# Patient Record
Sex: MALE | Race: WHITE | NOT HISPANIC OR LATINO | Employment: PART TIME | URBAN - METROPOLITAN AREA
[De-identification: names, ages, dates, MRNs, and addresses within clinical notes are randomized per-mention and may not be internally consistent; named-entity substitution may affect disease eponyms.]

---

## 2017-01-04 ENCOUNTER — ANESTHESIA EVENT (OUTPATIENT)
Dept: SURGERY | Facility: CLINIC | Age: 47
DRG: 027 | End: 2017-01-04
Payer: COMMERCIAL

## 2017-01-05 ENCOUNTER — OFFICE VISIT (OUTPATIENT)
Dept: SURGERY | Facility: CLINIC | Age: 47
End: 2017-01-05

## 2017-01-05 ENCOUNTER — ALLIED HEALTH/NURSE VISIT (OUTPATIENT)
Dept: SURGERY | Facility: CLINIC | Age: 47
End: 2017-01-05

## 2017-01-05 VITALS
BODY MASS INDEX: 27.63 KG/M2 | OXYGEN SATURATION: 93 % | RESPIRATION RATE: 16 BRPM | WEIGHT: 208.5 LBS | SYSTOLIC BLOOD PRESSURE: 133 MMHG | HEART RATE: 77 BPM | HEIGHT: 73 IN | TEMPERATURE: 97.6 F | DIASTOLIC BLOOD PRESSURE: 90 MMHG

## 2017-01-05 DIAGNOSIS — G24.1 DYSTONIA DUE TO DYT-1 GENE MUTATION: ICD-10-CM

## 2017-01-05 DIAGNOSIS — G24.1 DYSTONIA DUE TO DYT-1 GENE MUTATION: Primary | ICD-10-CM

## 2017-01-05 LAB
ALBUMIN UR-MCNC: NEGATIVE MG/DL
ANION GAP SERPL CALCULATED.3IONS-SCNC: 6 MMOL/L (ref 3–14)
APPEARANCE UR: ABNORMAL
APTT PPP: 27 SEC (ref 22–37)
BILIRUB UR QL STRIP: NEGATIVE
BUN SERPL-MCNC: 19 MG/DL (ref 7–30)
CALCIUM SERPL-MCNC: 9 MG/DL (ref 8.5–10.1)
CHLORIDE SERPL-SCNC: 106 MMOL/L (ref 94–109)
CO2 SERPL-SCNC: 29 MMOL/L (ref 20–32)
COLOR UR AUTO: YELLOW
CREAT SERPL-MCNC: 1.16 MG/DL (ref 0.66–1.25)
ERYTHROCYTE [DISTWIDTH] IN BLOOD BY AUTOMATED COUNT: 12.6 % (ref 10–15)
GFR SERPL CREATININE-BSD FRML MDRD: 68 ML/MIN/1.7M2
GLUCOSE SERPL-MCNC: 99 MG/DL (ref 70–99)
GLUCOSE UR STRIP-MCNC: NEGATIVE MG/DL
HCT VFR BLD AUTO: 51.7 % (ref 40–53)
HGB BLD-MCNC: 17.6 G/DL (ref 13.3–17.7)
HGB UR QL STRIP: NEGATIVE
INR PPP: 0.92 (ref 0.86–1.14)
KETONES UR STRIP-MCNC: 5 MG/DL
LEUKOCYTE ESTERASE UR QL STRIP: NEGATIVE
MCH RBC QN AUTO: 29.2 PG (ref 26.5–33)
MCHC RBC AUTO-ENTMCNC: 34 G/DL (ref 31.5–36.5)
MCV RBC AUTO: 86 FL (ref 78–100)
MUCOUS THREADS #/AREA URNS LPF: PRESENT /LPF
NITRATE UR QL: NEGATIVE
PH UR STRIP: 5 PH (ref 5–7)
PLATELET # BLD AUTO: 207 10E9/L (ref 150–450)
POTASSIUM SERPL-SCNC: 4.5 MMOL/L (ref 3.4–5.3)
RBC # BLD AUTO: 6.03 10E12/L (ref 4.4–5.9)
RBC #/AREA URNS AUTO: 1 /HPF (ref 0–2)
SODIUM SERPL-SCNC: 142 MMOL/L (ref 133–144)
SP GR UR STRIP: 1.03 (ref 1–1.03)
URN SPEC COLLECT METH UR: ABNORMAL
UROBILINOGEN UR STRIP-MCNC: 0 MG/DL (ref 0–2)
WBC # BLD AUTO: 6.5 10E9/L (ref 4–11)
WBC #/AREA URNS AUTO: 1 /HPF (ref 0–2)

## 2017-01-05 RX ORDER — POLYETHYLENE GLYCOL 3350 17 G
2 POWDER IN PACKET (EA) ORAL
COMMUNITY

## 2017-01-05 ASSESSMENT — LIFESTYLE VARIABLES: TOBACCO_USE: 1

## 2017-01-05 NOTE — PATIENT INSTRUCTIONS
Using an Incentive Spirometer: 5 x's/day and  5-10 x's each time.  Soon after your surgery, a nurse or therapist will teach you breathing exercises. These keep your lungs clear, strengthen your breathing muscles, and help prevent complications.  The exercises include doing a deep-breathing exercise using a device called an incentive spirometer.  To do these exercises, you will breathe in through your mouth and not your nose. The incentive spirometer only works correctly if you breathe in through your mouth.  Four steps to clear lungs     Deep breathing expands the lungs, aids circulation, and helps prevent pneumonia.   1. Exhale normally.    Relax and breathe out.  2. Place your lips tightly around the mouthpiece.    Make sure the device is upright and not tilted.  3. Inhale as much air as you can through the mouthpiece (don't breath through your nose).    Inhale slowly and deeply.    Hold your breath long enough to keep the balls or disk raised for at least 3 seconds.    If you re inhaling too quickly, your device may make a tone. If you hear this tone, inhale more slowly.  4. Repeat the exercise regularly.    Do this exercise every hour while you're awake, or as your health care provider instructs.    You will also be taught coughing exercises and be asked to do them regularly on your own.    0198-4922 [employer]. [address] [place] [address], [place] [address]. All rights reserved. This information is not intended as a substitute for professional medical care. Always follow your healthcare professional's instructions.    How to Quit Smoking  Smoking is one of the hardest habits to break. About half of all people who have ever smoked have been able to quit. Most people who still smoke want to quit. Here are some of the best ways to stop smoking.    Keep trying  It takes most smokers about eight tries before they can quit entirely. It s important not to give up.  Go cold turkey  Most former smokers quit  cold turkey (all at once). Trying to cut back gradually doesn't seem to work as well, perhaps because it continues the smoking habit. Also, it is possible to inhale more while smoking fewer cigarettes. This results in the same amount of nicotine in your body!  Get support  Support programs can be a big help, especially for heavy smokers. These groups offer lectures, ways to change behavior, and peer support. Here are some ways to find a support program:    Free national quitline: 800-QUIT-NOW (967-080-6186).    [facility] quit-smoking programs.    American Lung Association: (721.922.7624).    American Cancer Society (190-075-4067).  Support at home is important too. Nonsmokers can offer praise and encouragement. If the smoker in your life finds it hard to quit, encourage them to keep trying!  Over-the-counter medicines  Nicotine replacement therapy may make quitting easier. Certain aids, such as the nicotine patch, gum, and lozenges, are available without a prescription. It is best to use these under a doctor s care, though. The skin patch provides a steady supply of nicotine. Nicotine gum and lozenges give temporary bursts of low levels of nicotine. Both methods reduce the craving for cigarettes. Warning: If you have nausea, vomiting, dizziness, weakness, or a fast heartbeat, stop using these products and see your doctor.  Prescription medicines  After reviewing your smoking patterns and prior attempts to quit, your doctor may offer a prescription medicine such as bupropion, varenicline, a nicotine inhaler, or nasal spray. Each has advantages and side effects. Your doctor can review these with you.  Health benefits of quitting  The benefits of quitting start right away and keep improving the longer you go without smoking. These benefits occur at any age.  So whether you are 17 or 70, quitting is a good decision. Some of the benefits include:    20 minutes: Blood pressure and pulse return to normal.    8 hours: Oxygen  levels return to normal.    2 days: Ability to smell and taste begin to improve as damaged nerves regrow.    2 to 3 weeks: Circulation and lung function improve.    1 to 9 months: Coughing, congestion, and shortness of breath decrease; tiredness decreases.    1 year: Risk of heart attack decreases by half.    5 years: Risk of lung cancer decreases by half; risk of stroke becomes the same as a nonsmoker s.  For more on how to quit smoking, try these online resources:     Smokefree.gov http://smokefree.gov/    Clearing the Air  booklet from the National Cancer Bowlus http://smokefree.gov/sites/default/files/pdf/clearing-the-air-accessible.pdf    7374-7204 [employer]. [address] [place] [address], [place] [address]. All rights reserved. This information is not intended as a substitute for professional medical care. Always follow your healthcare professional's instructions.  AFTER YOUR SURGERY  Breathing exercises   Breathing exercises help you recover faster. Take deep breaths and let the air out slowly. This will:     Help you wake up after surgery.    Help prevent complications like pneumonia.  Preventing complications will help you go home sooner.   We may give you a breathing device (incentive spirometer) to encourage you to breathe deeply.   Nausea and vomiting   You may feel sick to your stomach after surgery; if so, let your nurse know.    Pain control:  After surgery, you may have pain. Our goal is to help you manage your pain. Pain medicine will help you feel comfortable enough to do activities that will help you heal.  These activities may include breathing exercises, walking and physical therapy.   To help your health care team treat your pain we will ask: 1) If you have pain  2) where it is located 3) describe your pain in your words  Methods of pain control include medications given by mouth, vein or by nerve block for some surgeries.  We may give you a pain control pump that will:  1) Deliver  the medicine through a tube placed in your vein  2) Control the amount of medicine you receive  3) Allow you to push a button to deliver a dose of pain medicine  Sequential Compression Device (SCD) or Pneumo Boots:  You may need to wear SCD S on your legs or feet. These are wraps connected to a machine that pumps in air and releases it. The repeated pumping helps prevent blood clots from forming.Preparing for Your Surgery      Name:  Ayad Moreno   MRN:  [id number]   :  1970   Today's Date:  2017     Arriving for surgery:  Surgery date:  01/10/16 Tuesday  Surgery time:  0800 AM  Arrival time:0600 AM  Please come to:       [facility] [address]  [address] [place] [address]  [address]    -   parking is available in front of the hospital from 5:15 am to 8:00 pm    -  Stop at the Information Desk in the lobby    -   Inform the information person that you are here for surgery. An escort to  will be provided. If you would not like an escort, please proceed to 3C on the 3rd floor. [phone]     What can I eat or drink?  -  You may have solid food or milk products until 8 hours prior to your surgery.12   -  You may have water, apple juice or 7up/Sprite until 2 hours prior to your surgery. 0600 AM TUESDAY    Which medicines can I take?  -  Do NOT take these medications in the morning, the day of surgery:  HOLD VITAMINS AM OF SURG.  HOLD FISH OIL AND CBD OIL  AS OF 16    -  Please take these medications the day of surgery:  Scheduled meds.    How do I prepare myself?  -  Take two showers: one the night before surgery; and one the morning of surgery.         Use Scrubcare or Hibiclens to wash from neck down.  You may use your own shampoo and conditioner. No other hair products.   -  Do NOT use lotion, powder, deodorant, or antiperspirant the day of your surgery.  -  Do NOT wear any makeup, fingernail polish or jewelry.  -  Begin using Incentive  Spirometer 1 week prior to surgery.  Use 4 times per day, up to 5-10 breaths each time.  Bring Incentive Spirometer to hospital.  -Do not bring your own medications to the hospital, except for inhalers and eye drops.  -  Bring your ID and insurance card.    Questions or Concerns:  If you have questions or concerns, please call the  Preoperative Assessment Center, Monday-Friday 7AM-7PM:  [phone]

## 2017-01-05 NOTE — MR AVS SNAPSHOT
After Visit Summary   1/5/2017    Ayad Moreno    MRN: [id number]           Patient Information     Date Of Birth          1970        Visit Information        Provider Department      1/5/2017 7:30 AM Rn, [facility]        Care Instructions      Using an Incentive Spirometer  Soon after your surgery, a nurse or therapist will teach you breathing exercises. These keep your lungs clear, strengthen your breathing muscles, and help prevent complications.  The exercises include doing a deep-breathing exercise using a device called an incentive spirometer.  To do these exercises, you will breathe in through your mouth and not your nose. The incentive spirometer only works correctly if you breathe in through your mouth.  Four steps to clear lungs     Deep breathing expands the lungs, aids circulation, and helps prevent pneumonia.   1. Exhale normally.    Relax and breathe out.  2. Place your lips tightly around the mouthpiece.    Make sure the device is upright and not tilted.  3. Inhale as much air as you can through the mouthpiece (don't breath through your nose).    Inhale slowly and deeply.    Hold your breath long enough to keep the balls or disk raised for at least 3 seconds.    If you re inhaling too quickly, your device may make a tone. If you hear this tone, inhale more slowly.  4. Repeat the exercise regularly.    Do this exercise every hour while you're awake, or as your health care provider instructs.    You will also be taught coughing exercises and be asked to do them regularly on your own.    7472-2768 [employer]. [address] [place] [address]. All rights reserved. This information is not intended as a substitute for professional medical care. Always follow your healthcare professional's instructions.    How to Quit Smoking  Smoking is one of the hardest habits to break. About half of all people who have ever smoked have  been able to quit. Most people who still smoke want to quit. Here are some of the best ways to stop smoking.    Keep trying  It takes most smokers about eight tries before they can quit entirely. It s important not to give up.  Go cold turkey  Most former smokers quit cold turkey (all at once). Trying to cut back gradually doesn't seem to work as well, perhaps because it continues the smoking habit. Also, it is possible to inhale more while smoking fewer cigarettes. This results in the same amount of nicotine in your body!  Get support  Support programs can be a big help, especially for heavy smokers. These groups offer lectures, ways to change behavior, and peer support. Here are some ways to find a support program:    Free national quitline: 800-QUIT-NOW (276-509-2649).    [facility] quit-smoking programs.    American Lung Association: (402.492.4317).    American Cancer Society (323-084-6625).  Support at home is important too. Nonsmokers can offer praise and encouragement. If the smoker in your life finds it hard to quit, encourage them to keep trying!  Over-the-counter medicines  Nicotine replacement therapy may make quitting easier. Certain aids, such as the nicotine patch, gum, and lozenges, are available without a prescription. It is best to use these under a doctor s care, though. The skin patch provides a steady supply of nicotine. Nicotine gum and lozenges give temporary bursts of low levels of nicotine. Both methods reduce the craving for cigarettes. Warning: If you have nausea, vomiting, dizziness, weakness, or a fast heartbeat, stop using these products and see your doctor.  Prescription medicines  After reviewing your smoking patterns and prior attempts to quit, your doctor may offer a prescription medicine such as bupropion, varenicline, a nicotine inhaler, or nasal spray. Each has advantages and side effects. Your doctor can review these with you.  Health benefits of quitting  The benefits of quitting  start right away and keep improving the longer you go without smoking. These benefits occur at any age.  So whether you are 17 or 70, quitting is a good decision. Some of the benefits include:    20 minutes: Blood pressure and pulse return to normal.    8 hours: Oxygen levels return to normal.    2 days: Ability to smell and taste begin to improve as damaged nerves regrow.    2 to 3 weeks: Circulation and lung function improve.    1 to 9 months: Coughing, congestion, and shortness of breath decrease; tiredness decreases.    1 year: Risk of heart attack decreases by half.    5 years: Risk of lung cancer decreases by half; risk of stroke becomes the same as a nonsmoker s.  For more on how to quit smoking, try these online resources:     Smokefree.gov http://smokefree.gov/    Clearing the Air  booklet from the National Cancer [employer]illa http://smokefree.gov/sites/default/files/pdf/clearing-the-air-accessible.pdf    6559-8811 [employer]. [address] [place] [address]. All rights reserved. This information is not intended as a substitute for professional medical care. Always follow your healthcare professional's instructions.  AFTER YOUR SURGERY  Breathing exercises   Breathing exercises help you recover faster. Take deep breaths and let the air out slowly. This will:     Help you wake up after surgery.    Help prevent complications like pneumonia.  Preventing complications will help you go home sooner.   We may give you a breathing device (incentive spirometer) to encourage you to breathe deeply.   Nausea and vomiting   You may feel sick to your stomach after surgery; if so, let your nurse know.    Pain control:  After surgery, you may have pain. Our goal is to help you manage your pain. Pain medicine will help you feel comfortable enough to do activities that will help you heal.  These activities may include breathing exercises, walking and physical therapy.   To help your health care team  treat your pain we will ask: 1) If you have pain  2) where it is located 3) describe your pain in your words  Methods of pain control include medications given by mouth, vein or by nerve block for some surgeries.  We may give you a pain control pump that will:  1) Deliver the medicine through a tube placed in your vein  2) Control the amount of medicine you receive  3) Allow you to push a button to deliver a dose of pain medicine  Sequential Compression Device (SCD) or Pneumo Boots:  You may need to wear SCD S on your legs or feet. These are wraps connected to a machine that pumps in air and releases it. The repeated pumping helps prevent blood clots from forming.Preparing for Your Surgery      Name:  Ayad Moreno   MRN:  [id number]   :  1970   Today's Date:  2017     Arriving for surgery:  Surgery date:  01/10/16 Tuesday  Surgery time:  0800 AM  Arrival time:0600 AM  Please come to:       [facility] [address] [place] [address]  [address]    -  [employer] parking is available in front of the hospital from 5:15 am to 8:00 pm    -  Stop at the Information Desk in the lobby    -   Inform the information person that you are here for surgery. An escort to  will be provided. If you would not like an escort, please proceed to 3C on the 3rd floor. [phone]     What can I eat or drink?  -  You may have solid food or milk products until 8 hours prior to your surgery.12   -  You may have water, apple juice or 7up/Sprite until 2 hours prior to your surgery. 0600 AM TUESDAY    Which medicines can I take?  -  Do NOT take these medications in the morning, the day of surgery:  HOLD VITAMINS AM OF SURG.  HOLD FISH OIL AND CBD OIL  AS OF 16    -  Please take these medications the day of surgery:  Scheduled meds.    How do I prepare myself?  -  Take two showers: one the night before surgery; and one the morning of surgery.         Use Scrubcare or Hibiclens  to wash from neck down.  You may use your own shampoo and conditioner. No other hair products.   -  Do NOT use lotion, powder, deodorant, or antiperspirant the day of your surgery.  -  Do NOT wear any makeup, fingernail polish or jewelry.  -  Begin using Incentive Spirometer 1 week prior to surgery.  Use 4 times per day, up to 5-10 breaths each time.  Bring Incentive Spirometer to hospital.  -Do not bring your own medications to the hospital, except for inhalers and eye drops.  -  Bring your ID and insurance card.    Questions or Concerns:  If you have questions or concerns, please call the  Preoperative Assessment Center, Monday-Friday 7AM-7PM:  [phone]                                  Follow-ups after your visit        Your next 10 appointments already scheduled     Jan 05, 2017  9:00 AM   (Arrive by 8:45 AM)   PAC Anesthesia Consult with  Pac Anesthesiologist   [facility] ([facility])    [address] [place] [address]  [facility] [id number] [phone]            Jan 05, 2017  9:15 AM   LAB with  LAB   [facility] ([facility])    [address] [place] [id number] [phone]           Patient must bring picture ID.  Patient should be prepared to give a urine specimen  Please do not eat 10-12 hours before your appointment if you are coming in fasting for labs on lipids, cholesterol, or glucose (sugar).  Pregnant women should follow their Care Team instructions. Water with medications is okay. Do not drink coffee or other fluids.   If you have concerns about taking  your medications, please ask at office or if scheduling via [employer], send a message by clicking on Secure Messaging, Message Your Care Team.            Tono 10, 2017   Procedure with Duy Carolina MD   [facility], [place], Same Day Surgery (--)    [address] [id number] [phone]            Tono 10, 2017  8:40  AM   CT HEAD W/O CONTRAST with UUCT1   [facility], [place], [place] ([facility], [facility])    [address] [facility] [address] [phone]           Please bring any scans or X-rays taken at other hospitals, if similar tests were done. Also bring a list of your medicines, including vitamins, minerals and over-the-counter drugs. It is safest to leave personal items at home.  Be sure to tell your doctor:   If you have any allergies.   If there s any chance you are pregnant.   If you are breastfeeding.   If you have any special needs.  You do not need to do anything special to prepare.  Please wear loose clothing, such as a sweat suit or jogging clothes. Avoid snaps, zippers and other metal. We may ask you to undress and put on a hospital gown.            Jan 20, 2017   Procedure with Dyu Carolina MD   [facility], [place], Same Day Surgery (--)    [address] [facility] [address] [phone]            Feb 07, 2017  7:00 AM   (Arrive by 6:45 AM)   RETURN MOTOR TESTING with [facility] ([facility])    [address] [facility] [address]  3rd Floor  [facility] [id number] [phone]            Feb 07, 2017 11:30 AM   CT HEAD W/O CONTRAST with UMPPET1   [facility] ([facility])    [address] [facility] [address]   [phone]           Please bring any scans or X-rays taken at other hospitals, if similar tests were done. Also bring a list of your medicines, including vitamins, minerals and over-the-counter drugs. It is safest to leave personal items at home.  Be sure to tell your doctor:   If you have any allergies.   If there s any chance you are pregnant.   If you are breastfeeding.   If you have any special needs.  You do not need to do anything special to prepare.  Please wear loose clothing, such as a sweat suit or jogging clothes. Avoid snaps, zippers and other  metal. We may ask you to undress and put on a hospital gown.              Who to contact     Please call your clinic at [phone] to:    Ask questions about your health    Make or cancel appointments    Discuss your medicines    Learn about your test results    Speak to your doctor   If you have compliments or concerns about an experience at your clinic, or if you wish to file a complaint, please contact [facility] Patient Relations at [phone] or email us at [email or web address]         Additional Information About Your Visit        [employer] Information     [employer] is an electronic gateway that provides easy, online access to your medical records. With [employer] you can request a clinic appointment, read your test results, renew a prescription or communicate with your care team.     To sign up for [employer] visit the website at [email or web address]   You will be asked to enter the access code listed below, as well as some personal information. Please follow the directions to create your username and password.     Your access code is: [email or web address]  Expires: 3/22/2017  6:30 AM     Your access code will  in 90 days. If you need help or a new code, please contact your [facility] or call [phone] for assistance.        Care EveryWhere ID     This is your Care EveryWhere ID. This could be used by other organizations to access your [place] medical records  [id number]         Blood Pressure from Last 3 Encounters:   16 118/77   16 132/82   16 117/75    Weight from Last 3 Encounters:   16 94.802 kg (209 lb)   16 95.391 kg (210 lb 4.8 oz)   16 96.163 kg (212 lb)              Today, you had the following     No orders found for display       Primary Care Provider Office Phone # Fax #    Yves Pope [phone]       [facility] [address] [facility]  [place] [address]        Thank you!     Thank you for choosing [facility]  for your care. Our goal is always to provide you with excellent care. Hearing back from our patients is one way we can continue to improve our services. Please take a few minutes to complete the written survey that you may receive in the mail after your visit with us. Thank you!             Your Updated Medication List - Protect others around you: Learn how to safely use, store and throw away your medicines at www.disposemymeds.org.          This list is accurate as of: 1/5/17  8:21 AM.  Always use your most recent med list.                   Brand Name Dispense Instructions for use    BOTOX 100 UNITS injection   Generic drug:  botulinum toxin type A     95 each    every 3 months Inject 95 units       diazepam 2 MG tablet    VALIUM    60    Take 2 mg by mouth 4 times daily Takes 2 pills at a time       NEURONTIN 800 MG tablet   Generic drug:  gabapentin     90    Take 800 mg by mouth 3 times daily       NICORETTE 2 MG lozenge   Generic drug:  nicotine polacrilex      Place 2 mg inside cheek every hour as needed for smoking cessation (10x's/day)       OMEGA-3 FISH OIL PO      Take 1 tablet by mouth daily       REMERON 30 MG tablet   Generic drug:  mirtazapine     30    Take 2 tablets by mouth daily.       TRAMADOL HCL PO      Take 50 mg by mouth 3 times daily       UNABLE TO FIND      Take 1 teaspoonful by mouth 2 times daily MEDICATION NAME: Marijuana (CBD) Oil- NOT THC       vitamin B complex with vitamin C Tabs tablet      Take 1 tablet by mouth daily       VITAMIN C PO      Take 1 tablet by mouth daily       VITAMIN D (CHOLECALCIFEROL) PO      Take 1 tablet by mouth daily

## 2017-01-05 NOTE — H&P
Pre-Operative H & P     CC:  Preoperative exam to assess for increased cardiopulmonary risk while undergoing surgery and anesthesia.    Date of Encounter: 1/5/2017  Primary Care Physician:  Yves Pope  Ayad Moreno is a 46 year old male with DYT-1 genetic mutation generalized dyskinesia/dystonia, who presents for pre-operative H & P in preparation for Stealth guided left side deep brain stimulator-Phase 1 electrode placement 1/10/17, with Dr. Carolina and Phase 2 on 1/20/17, at [facility].   He has had multiple treatments with Botox (last few in April and then Dec 2016). He has some difficulty with swallowing due to these injections as well as dry mouth from meds. He is on tramodol, neurontin, remeron, diazepam, and marijuana oil.   His sx began at age 12. He has worked as a . He understand sthe procedure very well.    History is obtained from the patient.     Past Medical History  Past Medical History   Diagnosis Date     Generalized dystonia      Rt ACL Tear-repaired      Depression      Anxiety      Asthmas with exposure to cats      OCD (obsessive compulsive disorder)        Past Surgical History  Past Surgical History   Procedure Laterality Date     C repair cruciate ligament,knee  2008       Hx of Blood transfusions/reactions: no     Hx of abnormal bleeding or anti-platelet use: no    Menstrual history: No LMP for male patient.:     Steroid use in the last year: no    Personal or FH with difficulty with Anesthesia:  no    Prior to Admission Medications  Current Outpatient Prescriptions   Medication Sig Dispense Refill     Ascorbic Acid (VITAMIN C PO) Take 1 tablet by mouth daily       vitamin B complex with vitamin C (VITAMIN  B COMPLEX) TABS tablet Take 1 tablet by mouth daily       VITAMIN D, CHOLECALCIFEROL, PO Take 1 tablet by mouth daily       Omega-3 Fatty Acids (OMEGA-3 FISH OIL PO) Take 1 tablet by mouth daily       UNABLE TO  "FIND Take 1 teaspoonful by mouth 2 times daily MEDICATION NAME: Marijuana (CBD) Oil- NOT THC       nicotine polacrilex (NICORETTE) 2 MG lozenge Place 2 mg inside cheek every hour as needed for smoking cessation (10x's/day)       botulinum toxin type A (BOTOX) 100 UNITS injection every 3 months Inject 95 units 95 each      TRAMADOL HCL PO Take 50 mg by mouth 3 times daily        NEURONTIN 800 MG OR TABS Take 800 mg by mouth 3 times daily  90 0     REMERON 30 MG OR TABS Take 2 tablets by mouth daily. 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     Social History Main Topics     Smoking status: Hx smoking up to 1 PPD-quit  And on nicorette lozenges     Smokeless tobacco: Never Used     Alcohol Use: 0.0 oz/week     0 Standard drinks or equivalent per week      Comment: 1-2x's/month.     Drug Use: Yes      Comment: CBC Oil- 2x'/day (\"Marijuana Oil\")     Sexual Activity: Not on file       Social History Narrative   Lives with his wife and 2 children  Son with dystonia    Family History  Family History   Problem Relation Age of Onset     DIABETES Father      CANCER Paternal Grandmother      Stomach CA     CANCER Maternal Grandmother      Bladder     Myocardial Infarction Paternal Grandfather      MI     Obsessive Compulsive Disorder Son      Genetic Disorder Son      Dystonia 2ndary to DYT 1 micah mutation         ROS/MED HX    ENT/Pulmonary:     (+)DUNCAN risk factors snores loudly, tobacco use, Past use up to 1 PPD- quitting on nicorette packs/day  asthma Last exacerbation: triggered by cats- last 14 yrs ago,, . .    Neurologic:     (+)other neuro DYT-1 genetic mutation generalized dystonia  " "  Cardiovascular:     (+) ----. : . . . :. . No previous cardiac testing       METS/Exercise Tolerance:  >4 METS   Hematologic:  - neg hematologic  ROS       Musculoskeletal:  - neg musculoskeletal ROS       GI/Hepatic:  - neg GI/hepatic ROS       Renal/Genitourinary:         Endo:  - neg endo ROS       Psychiatric:     (+) psychiatric history depression      Infectious Disease:  - neg infectious disease ROS       Malignancy:      - no malignancy   Other:    (+) No chance of pregnancy no H/O Chronic Pain,no other significant disability          The complete review of systems is negative other than noted in the HPI or here.                        /90 mmHg  Pulse 77  Temp(Src) 97.6  F (36.4  C) (Oral)  Resp 16  Ht 1.854 m (6' 1\")  Wt 94.575 kg (208 lb 8 oz)  BMI 27.51 kg/m2  SpO2 93%       Physical Exam  Constitutional: Awake, alert, cooperative, no apparent distress, and appears stated age.  Eyes: Pupils equal, round and reactive to light, extra ocular muscles intact, sclera clear, conjunctiva normal.  HENT: Normocephalic, dysarthria but understandable speech. Lower lip/jaw movements at rest.  oral pharynx with moist mucus membranes, good dentition. No goiter appreciated.   Respiratory: Clear to auscultation bilaterally, no crackles or wheezing.  Cardiovascular: Regular rate and rhythm, normal S1 and S2, and no murmur noted.  Carotids +2, no bruits. No LE edema. Palpable pulses to radial and PT arteries.   GI: Normal bowel sounds, soft, non-distended, non-tender, no masses palpated, no hepatosplenomegaly.    Lymph/Hematologic: No cervical lymphadenopathy and no supraclavicular lymphadenopathy.  Genitourinary:  deferred  Skin: Warm and dry.  No rashes at anticipated surgical site.   Musculoskeletal: Full ROM of neck. There is no redness, warmth, or swelling of the joints. Gross motor strength is normal.    Neurologic: Awake, alert, oriented to name, place and time. Cranial nerves II-XII are grossly " intact. Finger to nose testing triggers dyskinetic movements.    Neuropsychiatric: Calm, cooperative. Normal affect.     Labs: (personally reviewed)  WBC      6.5   1/5/2017  RBC     6.03   1/5/2017  HGB     17.6   1/5/2017  HCT     51.7   1/5/2017  MCV       86   1/5/2017  MCH     29.2   1/5/2017  MCHC     34.0   1/5/2017  RDW     12.6   1/5/2017  PLT      207   1/5/2017  Last Basic Metabolic Panel:  NA      142   1/5/2017   POTASSIUM      4.5   1/5/2017  CHLORIDE      106   1/5/2017  JOSÉ MIGUEL      9.0   1/5/2017  CO2       29   1/5/2017  BUN       19   1/5/2017  CR     1.16   1/5/2017  GLC       99   1/5/2017    EKG: Not indicated      ASSESSMENT and PLAN  Ayad Moreno is a 46 year old male with DYT-1 genetic mutation generalized dyskinesia/dystonia, for Stealth guided left side deep brain stimulator-Phase 1 electrode placement 1/10/17, with Dr. Carolina and Phase 2 on 1/20/17.  He has had multiple treatments with Botox  (April and then Dec 2016). He has some difficulty with swallowing due to these injections as well as dry mouth from meds. Depression is stable on remeron  -May take tramodol, neurontin, remeron, diazepam  -HOLD oil   He has no cardiopulmonary dx, sx or meds.      . He has the following specific operative considerations:   - RCRI : No serious cardiac risks.  0.4% risk of major adverse cardiac event.   - Anesthesia considerations:  Refer to PAC assessment in anesthesia records  - VTE risk: 0.26%  - DUNCAN # of risks 1/8 = low  - Risk of PONV score = 1.  If > 2, anti-emetic intervention recommended.        Patient was discussed with Dr Lyman.    MAYCOL Baca  Preoperative Assessment Center  [facility]  Phone: [phone]  Fax: [phone]

## 2017-01-05 NOTE — ANESTHESIA PREPROCEDURE EVALUATION
Anesthesia Evaluation     . Pt has had prior anesthetic. Type: General      ROS/MED HX    ENT/Pulmonary:     (+)DUNCAN risk factors snores loudly, tobacco use, Past use up to 1 PPD- quitting on nicorette packs/day  asthma Last exacerbation: triggered by cats- last 14 yrs ago,, . .    Neurologic:     (+)other neuro DYT-1 genetic mutation generalized dystonia    Cardiovascular:     (+) ----. : . . . :. . No previous cardiac testing       METS/Exercise Tolerance:  >4 METS   Hematologic:  - neg hematologic  ROS       Musculoskeletal:  - neg musculoskeletal ROS       GI/Hepatic:  - neg GI/hepatic ROS       Renal/Genitourinary:         Endo:  - neg endo ROS       Psychiatric:     (+) psychiatric history depression      Infectious Disease:  - neg infectious disease ROS       Malignancy:      - no malignancy   Other:    (+) No chance of pregnancy no H/O Chronic Pain,no other significant disability              Physical Exam  Normal systems: dental    Airway   Mallampati: II  TM distance: >3 FB  Neck ROM: full    Dental     Cardiovascular   Rhythm and rate: regular and normal      Pulmonary    breath sounds clear to auscultation    Other findings: Dysarthria with speech.      LABS;  WBC      6.9   5/29/2009  RBC     4.82   5/29/2009  HGB     14.6   5/29/2009  HCT     41.5   5/29/2009  MCV       86   5/29/2009  MCH     30.3   5/29/2009  MCHC     35.2   5/29/2009  RDW     13.3   5/29/2009  PLT      124   5/29/2009             PAC Discussion and Assessment    ASA Classification: 2  Case is suitable for:   Anesthetic techniques and relevant risks discussed: MAC with GA as backup  Invasive monitoring and risk discussed:   Types:   Possibility and Risk of blood transfusion discussed:   NPO instructions given:   Additional anesthetic preparation and risks discussed:   Needs early admission to pre-op area:   Other:     PAC Resident/NP Anesthesia Assessment:  46 year old male with DYT-1 genetic mutation generalized  dyskinesia/dystonia, for Stealth guided left side deep brain stimulator-Phase 1 electrode placement 1/10/17, with Dr. Carolina and Phase 2 on 1/20/17 with Dr. Carolina. PAC referral for risk assessment and optimization for anesthesia. He has had multiple treatments with Botox ( April and then Dec 2016). He has some difficulty with swallowing due to these injections as well as dry mouth from meds. He is on tramodol, neurontin, remeron, diazepam, and marijuana oil.  He has no cardiopulmonary dx, sx or meds. Exercise tolerance is > 4 METS. RCRI = 0.4%   Depression treated with remeron with positive results. Hx OCD.   Only GA was for knee surgery 2008 with no problems. He was also seen by Dr. Lyman. See her recommendations below.        Reviewed and Signed by PAC Mid-Level Provider/Resident  Mid-Level Provider/Resident: Abida Bal PA-C  Date: 1/5/17  Time: 9:01 AM    Attending Anesthesiologist Anesthesia Assessment:  46 year old for DBS for electrode placement. Chart reviewed, patient seen and evaluated; agree with above assessment. As noted, he has some difficulty swallowing and thinks that he may aspirate at times, so would consider bicitra preop. There are no specific contraindications to anesthesia agents per Anesthesia and Uncommon Diseases, although they do note that head and neck dystonia may complicate airway management. He should respond normally to NMB.     Patient is appropriate for the planned procedure without further workup or medical management change. The final anesthesia plan will be determined by the physician anesthesiologist caring for the patient on the day of surgery.    Reviewed and Signed by PAC Anesthesiologist  Anesthesiologist: Paola Lyman MD  Date: 1/5/2017  Time:   Pass/Fail: Pass  Disposition:     PAC Pharmacist Assessment:        Pharmacist:   Date:   Time:      Anesthesia Plan      History & Physical Review  History and physical reviewed and following examination; no interval  change.    ASA Status:  3 .    NPO Status:  > 8 hours    Plan for MAC Maintenance will be Other.    PONV prophylaxis:  Ondansetron (or other 5HT-3) and Dexamethasone or Solumedrol       Postoperative Care  Postoperative pain management:  IV analgesics.      Consents  Anesthetic plan, risks, benefits and alternatives discussed with:  Patient..                          .

## 2017-01-10 ENCOUNTER — APPOINTMENT (OUTPATIENT)
Dept: GENERAL RADIOLOGY | Facility: CLINIC | Age: 47
DRG: 027 | End: 2017-01-10
Attending: NEUROLOGICAL SURGERY
Payer: COMMERCIAL

## 2017-01-10 ENCOUNTER — ANESTHESIA (OUTPATIENT)
Dept: SURGERY | Facility: CLINIC | Age: 47
DRG: 027 | End: 2017-01-10
Payer: COMMERCIAL

## 2017-01-10 ENCOUNTER — HOSPITAL ENCOUNTER (OUTPATIENT)
Dept: CT IMAGING | Facility: CLINIC | Age: 47
DRG: 027 | End: 2017-01-10
Attending: NEUROLOGICAL SURGERY | Admitting: NEUROLOGICAL SURGERY
Payer: COMMERCIAL

## 2017-01-10 DIAGNOSIS — G24.9 GENERALIZED DYSTONIA: ICD-10-CM

## 2017-01-10 LAB
ABO + RH BLD: NORMAL
ABO + RH BLD: NORMAL
BLD GP AB SCN SERPL QL: NORMAL
BLOOD BANK CMNT PATIENT-IMP: NORMAL
BLOOD BANK CMNT PATIENT-IMP: NORMAL
SPECIMEN EXP DATE BLD: NORMAL

## 2017-01-10 PROCEDURE — 25000125 ZZHC RX 250: Performed by: NEUROLOGICAL SURGERY

## 2017-01-10 PROCEDURE — 40000277 XR SURGERY CARM FLUORO LESS THAN 5 MIN W STILLS: Mod: TC

## 2017-01-10 PROCEDURE — 70450 CT HEAD/BRAIN W/O DYE: CPT

## 2017-01-10 PROCEDURE — 25000128 H RX IP 250 OP 636: Performed by: NURSE ANESTHETIST, CERTIFIED REGISTERED

## 2017-01-10 PROCEDURE — 25800025 ZZH RX 258: Performed by: NURSE ANESTHETIST, CERTIFIED REGISTERED

## 2017-01-10 PROCEDURE — 25000125 ZZHC RX 250: Performed by: NURSE ANESTHETIST, CERTIFIED REGISTERED

## 2017-01-10 PROCEDURE — 25000125 ZZHC RX 250

## 2017-01-10 RX ORDER — CEFAZOLIN SODIUM 1 G/3ML
INJECTION, POWDER, FOR SOLUTION INTRAMUSCULAR; INTRAVENOUS PRN
Status: DISCONTINUED | OUTPATIENT
Start: 2017-01-10 | End: 2017-01-10

## 2017-01-10 RX ORDER — FENTANYL CITRATE 50 UG/ML
INJECTION, SOLUTION INTRAMUSCULAR; INTRAVENOUS PRN
Status: DISCONTINUED | OUTPATIENT
Start: 2017-01-10 | End: 2017-01-10

## 2017-01-10 RX ORDER — GLYCOPYRROLATE 0.2 MG/ML
INJECTION, SOLUTION INTRAMUSCULAR; INTRAVENOUS PRN
Status: DISCONTINUED | OUTPATIENT
Start: 2017-01-10 | End: 2017-01-10

## 2017-01-10 RX ORDER — SODIUM CHLORIDE, SODIUM LACTATE, POTASSIUM CHLORIDE, CALCIUM CHLORIDE 600; 310; 30; 20 MG/100ML; MG/100ML; MG/100ML; MG/100ML
INJECTION, SOLUTION INTRAVENOUS CONTINUOUS PRN
Status: DISCONTINUED | OUTPATIENT
Start: 2017-01-10 | End: 2017-01-10

## 2017-01-10 RX ORDER — PROPOFOL 10 MG/ML
INJECTION, EMULSION INTRAVENOUS CONTINUOUS PRN
Status: DISCONTINUED | OUTPATIENT
Start: 2017-01-10 | End: 2017-01-10

## 2017-01-10 RX ADMIN — SODIUM CHLORIDE, POTASSIUM CHLORIDE, SODIUM LACTATE AND CALCIUM CHLORIDE: 600; 310; 30; 20 INJECTION, SOLUTION INTRAVENOUS at 14:37

## 2017-01-10 RX ADMIN — FENTANYL CITRATE 25 MCG: 50 INJECTION, SOLUTION INTRAMUSCULAR; INTRAVENOUS at 14:04

## 2017-01-10 RX ADMIN — CEFAZOLIN 1 G: 1 INJECTION, POWDER, FOR SOLUTION INTRAMUSCULAR; INTRAVENOUS at 13:35

## 2017-01-10 RX ADMIN — GLYCOPYRROLATE 0.2 MG: 0.2 INJECTION, SOLUTION INTRAMUSCULAR; INTRAVENOUS at 14:17

## 2017-01-10 RX ADMIN — FENTANYL CITRATE 25 MCG: 50 INJECTION, SOLUTION INTRAMUSCULAR; INTRAVENOUS at 14:00

## 2017-01-10 RX ADMIN — PROPOFOL 150 MCG/KG/MIN: 10 INJECTION, EMULSION INTRAVENOUS at 08:09

## 2017-01-10 RX ADMIN — CEFAZOLIN SODIUM 2 G: 2 INJECTION, SOLUTION INTRAVENOUS at 09:35

## 2017-01-10 RX ADMIN — CEFAZOLIN 1 G: 1 INJECTION, POWDER, FOR SOLUTION INTRAMUSCULAR; INTRAVENOUS at 11:35

## 2017-01-10 RX ADMIN — GLYCOPYRROLATE 0.2 MG: 0.2 INJECTION, SOLUTION INTRAMUSCULAR; INTRAVENOUS at 14:23

## 2017-01-10 RX ADMIN — SODIUM CHLORIDE, POTASSIUM CHLORIDE, SODIUM LACTATE AND CALCIUM CHLORIDE: 600; 310; 30; 20 INJECTION, SOLUTION INTRAVENOUS at 07:58

## 2017-01-10 RX ADMIN — DEXMEDETOMIDINE 0.2 MCG/KG/HR: 100 INJECTION, SOLUTION, CONCENTRATE INTRAVENOUS at 09:10

## 2017-01-10 NOTE — ANESTHESIA CARE TRANSFER NOTE
Patient: Ayad Moreno    OPTICAL TRACKING SYSTEM INSERTION DEEP BRAIN STIMULATION (Left Head)  Additional InformationProcedure(s):  Stealth Guided Left side deep brain stimulator placement, phase I, placement of deep brain stimulator electrode, target left globus pallidus internus, with microelectrode recording - Wound Class: I-Clean    Diagnosis: Dystonia   Diagnosis Additional Information: No value filed.    Anesthesia Type:   No value filed.     Note:  Airway :Nasal Cannula  Patient transferred to:PACU  Comments: Pt remains stable, monitors on alarms in place, report to PACU RN, no complications      Vitals: (Last set prior to Anesthesia Care Transfer)              Electronically Signed By: CT Bradshaw CRNA  January 10, 2017  2:45 PM

## 2017-01-10 NOTE — ANESTHESIA POSTPROCEDURE EVALUATION
Patient: Ayad Moreno    OPTICAL TRACKING SYSTEM INSERTION DEEP BRAIN STIMULATION (Left Head)  Additional InformationProcedure(s):  Stealth Guided Left side deep brain stimulator placement, phase I, placement of deep brain stimulator electrode, target left globus pallidus internus, with microelectrode recording - Wound Class: I-Clean    Diagnosis:Dystonia   Diagnosis Additional Information: No value filed.    Anesthesia Type:  MAC    Note:  Anesthesia Post Evaluation    Patient location during evaluation: PACU  Patient participation: Able to fully participate in evaluation  Level of consciousness: awake and alert  Pain management: adequate  Airway patency: patent  Cardiovascular status: acceptable  Respiratory status: acceptable  Hydration status: acceptable  PONV: none     Anesthetic complications: None          Last vitals:  Filed Vitals:    01/10/17 1445 01/10/17 1500 01/10/17 1515   BP: 111/73 110/90 103/82   Temp: 36.1  C (97  F)  36.3  C (97.3  F)   Resp: 16 16 16   SpO2: 98% 99% 99%       Electronically Signed By: Duy Quinones MD  January 10, 2017  3:44 PM

## 2017-01-11 ENCOUNTER — APPOINTMENT (OUTPATIENT)
Dept: OCCUPATIONAL THERAPY | Facility: CLINIC | Age: 47
DRG: 027 | End: 2017-01-11
Attending: NEUROLOGICAL SURGERY
Payer: COMMERCIAL

## 2017-01-11 ENCOUNTER — APPOINTMENT (OUTPATIENT)
Dept: CT IMAGING | Facility: CLINIC | Age: 47
DRG: 027 | End: 2017-01-11
Attending: NEUROLOGICAL SURGERY
Payer: COMMERCIAL

## 2017-01-11 ENCOUNTER — APPOINTMENT (OUTPATIENT)
Dept: PHYSICAL THERAPY | Facility: CLINIC | Age: 47
DRG: 027 | End: 2017-01-11
Attending: NEUROLOGICAL SURGERY
Payer: COMMERCIAL

## 2017-01-11 PROCEDURE — 70450 CT HEAD/BRAIN W/O DYE: CPT

## 2017-01-13 ENCOUNTER — CARE COORDINATION (OUTPATIENT)
Dept: NEUROSURGERY | Facility: CLINIC | Age: 47
End: 2017-01-13

## 2017-01-13 NOTE — PROGRESS NOTES
Neurosurgery Discharge Coordination Note     Attending physician: Dr. Carolina  Surgery performed: DBS lead placement  Date of Discharge: 1/10/2017  Discharge to: Home     Current status: Patient states he feels good, denies pain. Denies redness, swelling, increased tenderness, incision opening, drainage or elevated temp. Reports Incision CDI without signs of infection.  Denies current bowel or bladder issues    Discharge instructions and medications reviewed with patient.  Follow up appointments/imaging/tests needed: battery placement next week, will let Dr. Carolina know that pt would like the Active PC battery    RN triage/on call number given: [phone]/ [phone]

## 2017-01-19 ENCOUNTER — ANESTHESIA EVENT (OUTPATIENT)
Dept: SURGERY | Facility: CLINIC | Age: 47
End: 2017-01-19
Payer: COMMERCIAL

## 2017-01-19 NOTE — ANESTHESIA PREPROCEDURE EVALUATION
Physical Exam  Normal systems: cardiovascular, pulmonary and dental    Airway   Mallampati: I  TM distance: >3 FB  Neck ROM: full    Dental     Cardiovascular       Pulmonary                     Anesthesia Plan      History & Physical Review  History and physical reviewed and following examination; no interval change.    ASA Status:  2 .    NPO Status:  > 8 hours    Plan for General and ETT with Intravenous induction. Maintenance will be Inhalation.    PONV prophylaxis:  Ondansetron (or other 5HT-3) and Dexamethasone or Solumedrol   Case: [id number] Date/Time: 01/20/17 0900     Procedure:   IMPLANT DEEP BRAIN STIMULATION GENERATOR / BATTERY (Left Chest) Deep brain stimulator placement, phase II, placement of deep brain stimulator generator/battery over the left chest wall        Anesthesia type: General     Pre-op diagnosis: Dystonia     [facility] 14 / [facility]     Surgeon: Duy Carolina MD       46 year old male with DYT-1 genetic mutation generalized dyskinesia/dystonia, for Stealth guided left side deep brain stimulator-Phase 2 electrode placement ( Had phase 1  1/10/17, with Dr. Carolina)     He has had multiple treatments with Botox ( April and then Dec 2016) and has some difficulty with swallowing due to these injections as well as dry mouth from meds. He is on tramodol, neurontin, remeron, diazepam, and marijuana oil.  He has no cardiopulmonary dx, sx or meds. Exercise tolerance is > 4 METS.    Depression treated with remeron with positive results. Hx OCD.    Only GA was for knee surgery 2008 with no problems. He was also seen by Dr. Lyman presacha for 1/1017 phase 1 (see Epic)      Reviewed and Signed by PAC Mid-Level Provider/Resident  Mid-Level Provider/Resident: Abida Bal PA-C  Date: 1/5/17  Time: 9:01 AM    Attending Anesthesiologist Anesthesia Assessment:  46 year old for DBS for electrode placement. Chart reviewed, patient seen and evaluated; agree with above assessment. As  "noted, he has some difficulty swallowing and thinks that he may aspirate at times, so would consider bicitra preop. There are no specific contraindications to anesthesia agents per Anesthesia and Uncommon Diseases, although they do note that head and neck dystonia may complicate airway management. He should respond normally to NMB.     On exam. Patient has full flex and extension of c-spine.    Plan: GAET. NPO after MN - black coffee at 05:30. Ayad is scared he \"won't wake up.\" He and I spoke about his concerns. Consent obtained.      Postoperative Care  Postoperative pain management:  IV analgesics.      Consents  Anesthetic plan, risks, benefits and alternatives discussed with:  Patient..        Ayad Moreno F <[id number]>     Male     46 year old  Current as of 01/19/17 1316     Height Weight BMI ASA Status     Not recorded Not recorded Not recorded Not recorded         Allergies       No Known Allergies                   Go to the Billing and Compliance Report for this case.         Go to the BCR-Single Column_[facility]          Go to the Preoperative Summary Report for this case.         Go to the BCR-Single Column_[facility].          Go to the [facility] BCR for this case.         Go to the [facility] BCR w/DEMOGRAPHICS for this case.             Procedure Summary      Date Anesthesia Start Anesthesia Stop Room / Location     01/20/17   [facility] OR 14 / [facility] OR         Procedure Diagnosis Surgeon Responsible Provider     IMPLANT DEEP BRAIN STIMULATION GENERATOR / BATTERY (Left Chest) (Dystonia) Duy Carolina MD No responsible provider of record.       Intraprocedure Grid/Graph             Staff      No responsible staff documented.       Assesments      No data found.       Lines, Drains, and Airways      Type Details Placement Removal     Incision/Surgical Site 01/10/17; Bilateral; Forehead 01/10/17 0000 by Corinne Marks RN      Incision/Surgical Site 01/10/17; 1437; Left; Head 01/10/17 1437 by Wei Villegas RN         " Events      Date Time Event     No anesthesia events filed.       Positioning      No data found.          Medications      None       Procedure Notes      No procedure notes have been written.              Attestation Information      None                 Hematology Labs        WBC    RBC    Hematocrit    Hemoglobin    Plt    MCV        8.3 01/11/17 0317 5.21 01/11/17 0317 45.3 01/11/17 0317 15.3 01/11/17 0317 162 01/11/17 0317 87 01/11/17 0317     6.5 01/05/17 0931 6.03  01/05/17 0931 51.7 01/05/17 0931 17.6 01/05/17 0931 207 01/05/17 0931 86 01/05/17 0931       Urine Labs        BLOOD UA    WBC UA    NITRITE UA        Negative 01/05/17 1004 1 01/05/17 1004 Negative 01/05/17 1004       Pregnancy Labs        No relevant labs found       Chemistry Labs        Na+    K+    Ca2+    Cl    BUN    CRT        140 01/11/17 0317 4.1 01/11/17 0317 7.9  01/11/17 0317 108 01/11/17 0317 17 01/11/17 0317 0.87 01/11/17 0317     142 01/05/17 0931 4.5 01/05/17 0931 9.0 01/05/17 0931 106 01/05/17 0931 19 01/05/17 0931 1.16 01/05/17 0931       Blood Gases        No relevant labs found       Coagulation Labs        PTT    INR        27 01/05/17 0931 0.92 01/05/17 0931       Electrolyte Labs        Na    Cl        140 01/11/17 0317 108 01/11/17 0317     142 01/05/17 0931 106 01/05/17 0931       Other Labs        No relevant labs found                No orders found                         .

## 2017-01-20 ENCOUNTER — ANESTHESIA (OUTPATIENT)
Dept: SURGERY | Facility: CLINIC | Age: 47
End: 2017-01-20
Payer: COMMERCIAL

## 2017-01-20 PROCEDURE — 25800025 ZZH RX 258: Performed by: NURSE ANESTHETIST, CERTIFIED REGISTERED

## 2017-01-20 PROCEDURE — 25000128 H RX IP 250 OP 636: Performed by: NURSE ANESTHETIST, CERTIFIED REGISTERED

## 2017-01-20 PROCEDURE — 25000125 ZZHC RX 250: Performed by: NURSE ANESTHETIST, CERTIFIED REGISTERED

## 2017-01-20 PROCEDURE — 25000125 ZZHC RX 250: Performed by: NEUROLOGICAL SURGERY

## 2017-01-20 RX ORDER — LIDOCAINE HYDROCHLORIDE 20 MG/ML
INJECTION, SOLUTION INFILTRATION; PERINEURAL PRN
Status: DISCONTINUED | OUTPATIENT
Start: 2017-01-20 | End: 2017-01-20

## 2017-01-20 RX ORDER — SODIUM CHLORIDE, SODIUM LACTATE, POTASSIUM CHLORIDE, CALCIUM CHLORIDE 600; 310; 30; 20 MG/100ML; MG/100ML; MG/100ML; MG/100ML
INJECTION, SOLUTION INTRAVENOUS CONTINUOUS PRN
Status: DISCONTINUED | OUTPATIENT
Start: 2017-01-20 | End: 2017-01-20

## 2017-01-20 RX ORDER — NEOSTIGMINE METHYLSULFATE 1 MG/ML
VIAL (ML) INJECTION PRN
Status: DISCONTINUED | OUTPATIENT
Start: 2017-01-20 | End: 2017-01-20

## 2017-01-20 RX ORDER — PROPOFOL 10 MG/ML
INJECTION, EMULSION INTRAVENOUS PRN
Status: DISCONTINUED | OUTPATIENT
Start: 2017-01-20 | End: 2017-01-20

## 2017-01-20 RX ORDER — FENTANYL CITRATE 50 UG/ML
INJECTION, SOLUTION INTRAMUSCULAR; INTRAVENOUS PRN
Status: DISCONTINUED | OUTPATIENT
Start: 2017-01-20 | End: 2017-01-20

## 2017-01-20 RX ORDER — ONDANSETRON 2 MG/ML
INJECTION INTRAMUSCULAR; INTRAVENOUS PRN
Status: DISCONTINUED | OUTPATIENT
Start: 2017-01-20 | End: 2017-01-20

## 2017-01-20 RX ORDER — GLYCOPYRROLATE 0.2 MG/ML
INJECTION, SOLUTION INTRAMUSCULAR; INTRAVENOUS PRN
Status: DISCONTINUED | OUTPATIENT
Start: 2017-01-20 | End: 2017-01-20

## 2017-01-20 RX ADMIN — PROPOFOL 200 MG: 10 INJECTION, EMULSION INTRAVENOUS at 09:18

## 2017-01-20 RX ADMIN — GLYCOPYRROLATE 0.8 MG: 0.2 INJECTION, SOLUTION INTRAMUSCULAR; INTRAVENOUS at 11:10

## 2017-01-20 RX ADMIN — ROCURONIUM BROMIDE 50 MG: 10 INJECTION INTRAVENOUS at 09:18

## 2017-01-20 RX ADMIN — Medication 4 MG: at 11:10

## 2017-01-20 RX ADMIN — SODIUM CHLORIDE, POTASSIUM CHLORIDE, SODIUM LACTATE AND CALCIUM CHLORIDE: 600; 310; 30; 20 INJECTION, SOLUTION INTRAVENOUS at 09:12

## 2017-01-20 RX ADMIN — FENTANYL CITRATE 50 MCG: 50 INJECTION, SOLUTION INTRAMUSCULAR; INTRAVENOUS at 09:51

## 2017-01-20 RX ADMIN — FENTANYL CITRATE 100 MCG: 50 INJECTION, SOLUTION INTRAMUSCULAR; INTRAVENOUS at 09:14

## 2017-01-20 RX ADMIN — ONDANSETRON 4 MG: 2 INJECTION INTRAMUSCULAR; INTRAVENOUS at 10:40

## 2017-01-20 RX ADMIN — LIDOCAINE HYDROCHLORIDE 100 MG: 20 INJECTION, SOLUTION INFILTRATION; PERINEURAL at 09:18

## 2017-01-20 RX ADMIN — FENTANYL CITRATE 50 MCG: 50 INJECTION, SOLUTION INTRAMUSCULAR; INTRAVENOUS at 10:07

## 2017-01-20 RX ADMIN — MIDAZOLAM HYDROCHLORIDE 2 MG: 1 INJECTION, SOLUTION INTRAMUSCULAR; INTRAVENOUS at 09:12

## 2017-01-20 RX ADMIN — CEFAZOLIN SODIUM 2 G: 2 INJECTION, SOLUTION INTRAVENOUS at 09:35

## 2017-01-20 NOTE — ANESTHESIA POSTPROCEDURE EVALUATION
Patient: Ayad Rodriguezker    IMPLANT DEEP BRAIN STIMULATION GENERATOR / BATTERY (Left Chest)  Additional InformationProcedure(s):  Deep brain stimulator placement, phase II, placement of deep brain stimulator generator/battery over the left chest wall - Wound Class: I-Clean    Diagnosis:Dystonia  Diagnosis Additional Information: No value filed.    Anesthesia Type:  General, ETT    Note:  Anesthesia Post Evaluation    Patient location during evaluation: PACU  Patient participation: Able to fully participate in evaluation  Level of consciousness: awake and alert  Pain management: adequate  Airway patency: patent  Cardiovascular status: acceptable  Respiratory status: acceptable  Hydration status: acceptable  PONV: none     Anesthetic complications: None          Last vitals:  Filed Vitals:    01/20/17 0710   BP: 120/78   Temp: 36.6  C (97.8  F)   Resp: 15   SpO2: 98%       Electronically Signed By: Christophe Dewey MD  January 20, 2017  11:38 AM

## 2017-01-20 NOTE — ANESTHESIA CARE TRANSFER NOTE
Patient: Ayad Moreno    IMPLANT DEEP BRAIN STIMULATION GENERATOR / BATTERY (Left Chest)  Additional InformationProcedure(s):  Deep brain stimulator placement, phase II, placement of deep brain stimulator generator/battery over the left chest wall - Wound Class: I-Clean    Diagnosis: Dystonia  Diagnosis Additional Information: No value filed.    Anesthesia Type:   General, ETT     Note:  Airway :Face Mask  Patient transferred to:PACU  Comments: Pt to PACU resting comfortably with eyes closed.  VSS see anesthesia record.  Report to RN.      Vitals: (Last set prior to Anesthesia Care Transfer)              Electronically Signed By: CT Esparza CRNA  January 20, 2017  11:25 AM

## 2017-01-23 ENCOUNTER — CARE COORDINATION (OUTPATIENT)
Dept: NEUROSURGERY | Facility: CLINIC | Age: 47
End: 2017-01-23

## 2017-02-06 ENCOUNTER — OFFICE VISIT (OUTPATIENT)
Dept: NEUROSURGERY | Facility: CLINIC | Age: 47
End: 2017-02-06

## 2017-02-06 VITALS — HEART RATE: 105 BPM | SYSTOLIC BLOOD PRESSURE: 140 MMHG | HEIGHT: 73 IN | DIASTOLIC BLOOD PRESSURE: 85 MMHG

## 2017-02-06 DIAGNOSIS — G24.1 DYSTONIA DUE TO DYT-1 GENE MUTATION: ICD-10-CM

## 2017-02-06 DIAGNOSIS — G24.9 GENERALIZED DYSTONIA: Primary | ICD-10-CM

## 2017-02-06 ASSESSMENT — PAIN SCALES - GENERAL: PAINLEVEL: NO PAIN (0)

## 2017-02-06 NOTE — PROGRESS NOTES
HISTORY AND PHYSICAL EXAM    Chief Complaint   Patient presents with     RECHECK     S/p left Gpi DBS placement on 01/10/17 w/ left Activa SC generator placement on 01/20/17; Dystonia       HISTORY OF PRESENT ILLNESS  We saw Mr. Ayad Moreno back in Neurosurgery Clinic today for follow-up of his recent surgery. 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 His symptoms began at the age of 12 with right arm tightening then a fixed dystonia. This progressed to his left hand, then to his voice by 20 years old. Now, all his extremities are affect, the upper more than the lower and the right more than the left.  His dystonia started in 1981 and he was diagnosed in 1984. Again, his symptoms included involuntary contractions in his right.  Speech issues has been most bothersome for the past 12 to 15 years. Medcations he has tried in the past include Carbidopa, baclofen, Artane, Neurontin, Diazepam, and Botox injections. He underwent left Gpi DBS placement on 01/10/2017 with Activa SC battery over the left chest wall on 01/20. His surgeries were well tolerated and there were no complications. Today, he is doing well. He is excited to turn his DBS on tomorrow. He has no major complaints.        Past Medical History   Diagnosis Date     Generalized dystonia      Lumbar disc herniation      Rt ACL Tear      Depression      Anxiety      Asthmas with exposure to cats      OCD (obsessive compulsive disorder)        Past Surgical History   Procedure Laterality Date     C repair cruciate ligament,knee  2008     Optical tracking system insertion deep brain stimulation Left 1/10/2017     Procedure: OPTICAL TRACKING SYSTEM INSERTION DEEP BRAIN STIMULATION;  Surgeon: Duy Carolina MD;  Location: [facility] OR     Implant deep brain stimulation generator / battery Left 1/20/2017     Procedure: IMPLANT DEEP BRAIN STIMULATION GENERATOR / BATTERY;  Surgeon: Duy Carolina MD;  Location: [facility] OR  "      Family History   Problem Relation Age of Onset     DIABETES Father      CANCER Paternal Grandfather      Stomach CA     CANCER Maternal Grandmother      Bladder     Myocardial Infarction Maternal Grandfather      MI     Obsessive Compulsive Disorder Son      Genetic Disorder Son      Dystonia 2ndary to DYT 1 micah mutation       Social History     Social History     Marital Status:      Spouse Name: Lisseth     Number of Children: 2     Years of Education: N/A     Occupational History     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     Social History Main Topics     Smoking status: Never Smoker      Smokeless tobacco: Never Used     Alcohol Use: 0.0 oz/week     0 Standard drinks or equivalent per week      Comment: 1-2x's/month.     Drug Use: Yes      Comment: CBC Oil- 2x'/day (\"Marijuana Oil\")     Sexual Activity: Not on file     Other Topics Concern     Not on file     Social History Narrative        No Known Allergies    Current Outpatient Prescriptions   Medication     oxyCODONE (ROXICODONE) 5 MG IR tablet     TRAZODONE HCL PO     oxyCODONE (ROXICODONE) 5 MG IR tablet     polyethylene glycol (MIRALAX/GLYCOLAX) Packet     Ascorbic Acid (VITAMIN C PO)     vitamin B complex with vitamin C (VITAMIN  B COMPLEX) TABS tablet     VITAMIN D, CHOLECALCIFEROL, PO     Omega-3 Fatty Acids (OMEGA-3 FISH OIL PO)     UNABLE TO FIND     nicotine polacrilex (NICORETTE) 2 MG lozenge     botulinum toxin type A (BOTOX) 100 UNITS injection     TRAMADOL HCL PO     NEURONTIN 800 MG OR TABS     REMERON 30 MG OR TABS     DIAZEPAM 2 MG OR TABS     No current facility-administered medications for this visit.       REVIEW OF SYSTEMS - per HPI.  General: negative for difficulty sleeping and headaches, chills/sweats/fever, fatigue, weight gain or loss.  Neurologic: negative for headaches, numbness of arms or legs, problem with memory, tingling of hands, arms or legs.      PHYSICAL EXAM  /85 mmHg  Pulse 105  Ht " "1.854 m (6' 1\")    General: Awake, alert, oriented. Well nourished, well developed, he is not in any acute distress.  Incisions: Left frontal incision healing well. Left parietal incision healing well. Left chest wall incision healing well. No fluid collection or drainage. All Dermabond/sutures her removed without difficulty.    Neurological  Awake, alert and oriented to date, time, place and person. Speech fluent.   Pupils equal, round, reactive to light.  Extraocular movement intact.  Facial sensation intact.  Face symmetric.  Moves all extremity with good strength.  Sensation is grossly intact.      ASSESSMENT  47 year old male with a history of Dystonis with DYT-1 mutation. S/p left Gpi DBS placement on 01/10 w/ left Activa SC generator placement on 01/20.     He is doing well and he has tolerated his surgery well.  There are no issues with his incisions, as they are all healing well.  He is scheduled to get his DBS turned on.      PLAN  1. We will see him in clinic as needed.    I, Marie Dhaliwal, am serving as a scribe to document services personally performed by Duy Carolina MD, PhD, based upon my observations and the provider's statements to me. All documentation has been reviewed and edited by the aforementioned doctor prior to being entered into the official medical record.    I, Duy Carolina, attest that above named individual is acting in scribe capacity, has observed my performance of the services and has documented them in accordance with my direction. The documentation recorded by the scribe accurately reflects the service I personally performed and the decisions made by me. The document was also partially recorded by me and the entire document was edited by me as well.         "

## 2017-02-06 NOTE — LETTER
2/6/2017       RE: Ayad Moreno  [address]  [facility] [address]     Dear Colleague,    Thank you for referring your patient, Ayad Moreno, to the [facility] NEUROSURGERY at [facility]. Please see a copy of my visit note below.    HISTORY AND PHYSICAL EXAM    Chief Complaint   Patient presents with     RECHECK     S/p left Gpi DBS placement on 01/10/17 w/ left Activa SC generator placement on 01/20/17; Dystonia       HISTORY OF PRESENT ILLNESS  We saw Mr. Ayad Moreno back in Neurosurgery Clinic today for follow-up of his recent surgery. 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 His symptoms began at the age of 12 with right arm tightening then a fixed dystonia. This progressed to his left hand, then to his voice by 20 years old. Now, all his extremities are affect, the upper more than the lower and the right more than the left.  His dystonia started in 1981 and he was diagnosed in 1984. Again, his symptoms included involuntary contractions in his right.  Speech issues has been most bothersome for the past 12 to 15 years. Medcations he has tried in the past include Carbidopa, baclofen, Artane, Neurontin, Diazepam, and Botox injections. He underwent left Gpi DBS placement on 01/10/2017 with Activa SC battery over the left chest wall on 01/20. His surgeries were well tolerated and there were no complications. Today, he is doing well. He is excited to turn his DBS on tomorrow. He has no major complaints.      Past Medical History   Diagnosis Date     Generalized dystonia      Lumbar disc herniation      Rt ACL Tear      Depression      Anxiety      Asthmas with exposure to cats      OCD (obsessive compulsive disorder)        Past Surgical History   Procedure Laterality Date     C repair cruciate ligament,knee  2008     Optical tracking system insertion deep brain stimulation Left 1/10/2017     Procedure: OPTICAL TRACKING SYSTEM  "INSERTION DEEP BRAIN STIMULATION;  Surgeon: Duy Carolina MD;  Location: [facility] OR     Implant deep brain stimulation generator / battery Left 1/20/2017     Procedure: IMPLANT DEEP BRAIN STIMULATION GENERATOR / BATTERY;  Surgeon: Duy Carolina MD;  Location: [facility] OR       Family History   Problem Relation Age of Onset     DIABETES Father      CANCER Paternal Grandfather      Stomach CA     CANCER Maternal Grandmother      Bladder     Myocardial Infarction Maternal Grandfather      MI     Obsessive Compulsive Disorder Son      Genetic Disorder Son      Dystonia 2ndary to DYT 1 micah mutation       Social History     Social History     Marital Status:      Spouse Name: Lisseth     Number of Children: 2     Years of Education: N/A     Occupational History     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     Social History Main Topics     Smoking status: Never Smoker      Smokeless tobacco: Never Used     Alcohol Use: 0.0 oz/week     0 Standard drinks or equivalent per week      Comment: 1-2x's/month.     Drug Use: Yes      Comment: CBC Oil- 2x'/day (\"Marijuana Oil\")     Sexual Activity: Not on file     Other Topics Concern     Not on file     Social History Narrative        No Known Allergies    Current Outpatient Prescriptions   Medication     oxyCODONE (ROXICODONE) 5 MG IR tablet     TRAZODONE HCL PO     oxyCODONE (ROXICODONE) 5 MG IR tablet     polyethylene glycol (MIRALAX/GLYCOLAX) Packet     Ascorbic Acid (VITAMIN C PO)     vitamin B complex with vitamin C (VITAMIN  B COMPLEX) TABS tablet     VITAMIN D, CHOLECALCIFEROL, PO     Omega-3 Fatty Acids (OMEGA-3 FISH OIL PO)     UNABLE TO FIND     nicotine polacrilex (NICORETTE) 2 MG lozenge     botulinum toxin type A (BOTOX) 100 UNITS injection     TRAMADOL HCL PO     NEURONTIN 800 MG OR TABS     REMERON 30 MG OR TABS     DIAZEPAM 2 MG OR TABS     No current facility-administered medications for this visit.       REVIEW OF SYSTEMS - per " "HPI.  General: negative for difficulty sleeping and headaches, chills/sweats/fever, fatigue, weight gain or loss.  Neurologic: negative for headaches, numbness of arms or legs, problem with memory, tingling of hands, arms or legs.      PHYSICAL EXAM  /85 mmHg  Pulse 105  Ht 1.854 m (6' 1\")    General: Awake, alert, oriented. Well nourished, well developed, he is not in any acute distress.  Incisions: Left frontal incision healing well. Left parietal incision healing well. Left chest wall incision healing well. No fluid collection or drainage. All Dermabond/sutures her removed without difficulty.    Neurological  Awake, alert and oriented to date, time, place and person. Speech fluent.   Pupils equal, round, reactive to light.  Extraocular movement intact.  Facial sensation intact.  Face symmetric.  Moves all extremity with good strength.  Sensation is grossly intact.      ASSESSMENT  47 year old male with a history of Dystonis with DYT-1 mutation. S/p left Gpi DBS placement on 01/10 w/ left Activa SC generator placement on 01/20.     He is doing well and he has tolerated his surgery well.  There are no issues with his incisions, as they are all healing well.  He is scheduled to get his DBS turned on.      PLAN  1. We will see him in clinic as needed.    I, Marie Dhaliwal, am serving as a scribe to document services personally performed by Duy Carolina MD, PhD, based upon my observations and the provider's statements to me. All documentation has been reviewed and edited by the aforementioned doctor prior to being entered into the official medical record.    I, Duy Carolina, attest that above named individual is acting in scribe capacity, has observed my performance of the services and has documented them in accordance with my direction. The documentation recorded by the scribe accurately reflects the service I personally performed and the decisions made by me. The document was also partially recorded by " me and the entire document was edited by me as well.         Duy Carolina MD

## 2017-02-06 NOTE — MR AVS SNAPSHOT
"              After Visit Summary   2/6/2017    Ayad Moreno    MRN: [id number]           Patient Information     Date Of Birth          1970        Visit Information        Provider Department      2/6/2017 10:30 AM Duy Carolina MD [facility] Neurosurgery        Today's Diagnoses     Generalized dystonia    -  1    Dystonia due to DYT-1 gene mutation           Follow-ups after your visit        Who to contact     Please call your clinic at [phone] to:    Ask questions about your health    Make or cancel appointments    Discuss your medicines    Learn about your test results    Speak to your doctor   If you have compliments or concerns about an experience at your clinic, or if you wish to file a complaint, please contact [facility] Patient Relations at [phone] or email us at [email or web address]         Additional Information About Your Visit        MyChart Information     [employer] gives you secure access to your electronic health record. If you see a primary care provider, you can also send messages to your care team and make appointments. If you have questions, please call your primary care clinic.  If you do not have a primary care provider, please call [phone] and they will assist you.      [employer] is an electronic gateway that provides easy, online access to your medical records. With [employer], you can request a clinic appointment, read your test results, renew a prescription or communicate with your care team.     To access your existing account, please contact your [facility] Clinic or call [phone] for assistance.        Care EveryWhere ID     This is your Care EveryWhere ID. This could be used by other organizations to access your [facility] medical records  [id number]        Your Vitals Were     Pulse Height                105 1.854 m (6' 1\")           Blood Pressure from Last 3 Encounters:   05/04/17 120/78 "   04/03/17 122/78   03/06/17 116/72    Weight from Last 3 Encounters:   05/04/17 93.4 kg (206 lb)   04/03/17 95.2 kg (209 lb 14.4 oz)   03/06/17 93 kg (205 lb)              Today, you had the following     No orders found for display       Primary Care Provider Office Phone # Fax #    Yves Pope [phone]       [facility] [address] [place] [address]        Thank you!     Thank you for choosing [facility]  for your care. Our goal is always to provide you with excellent care. Hearing back from our patients is one way we can continue to improve our services. Please take a few minutes to complete the written survey that you may receive in the mail after your visit with us. Thank you!             Your Updated Medication List - Protect others around you: Learn how to safely use, store and throw away your medicines at www.disposemymeds.org.          This list is accurate as of: 2/6/17 11:59 PM.  Always use your most recent med list.                   Brand Name Dispense Instructions for use    BOTOX 100 UNITS injection   Generic drug:  botulinum toxin type A     95 each    every 3 months Inject 95 units       diazepam 2 MG tablet    VALIUM    60    Take 2 mg by mouth 4 times daily Takes 2 pills at a time       NEURONTIN 800 MG tablet   Generic drug:  gabapentin     90    Take 800 mg by mouth 3 times daily       NICORETTE 2 MG lozenge   Generic drug:  nicotine polacrilex      Place 2 mg inside cheek every hour as needed for smoking cessation (10x's/day)       OMEGA-3 FISH OIL PO      Take 1 tablet by mouth daily       REMERON 30 MG tablet   Generic drug:  mirtazapine     30    Take 2 tablets by mouth daily.       TRAMADOL HCL PO      Take 50 mg by mouth 3 times daily       TRAZODONE HCL PO      Take 25 mg by mouth nightly as needed for sleep       UNABLE TO FIND      Take 1 teaspoonful by mouth 2 times daily MEDICATION NAME: Marijuana (CBD) Oil- NOT THC        vitamin B complex with vitamin C Tabs tablet      Take 1 tablet by mouth daily       VITAMIN C PO      Take 1 tablet by mouth daily       VITAMIN D (CHOLECALCIFEROL) PO      Take 1 tablet by mouth daily

## 2017-02-06 NOTE — NURSING NOTE
Chief Complaint   Patient presents with     RECHECK     POST OP/ DOS 01/20/2017/ IMPLANT DEEP BRAIN STIMULATION GENERATOR / BATTERY     Ryan Zambrano CMA

## 2017-02-07 ENCOUNTER — OFFICE VISIT (OUTPATIENT)
Dept: NEUROLOGY | Facility: CLINIC | Age: 47
End: 2017-02-07

## 2017-02-07 VITALS
DIASTOLIC BLOOD PRESSURE: 85 MMHG | HEART RATE: 102 BPM | BODY MASS INDEX: 27.22 KG/M2 | HEIGHT: 73 IN | WEIGHT: 205.4 LBS | SYSTOLIC BLOOD PRESSURE: 133 MMHG

## 2017-02-07 DIAGNOSIS — G24.9 DYSTONIA: Primary | ICD-10-CM

## 2017-02-07 ASSESSMENT — PAIN SCALES - GENERAL: PAINLEVEL: NO PAIN (0)

## 2017-02-07 NOTE — NURSING NOTE
Chief Complaint   Patient presents with     RECHECK     [facility] RETURN MOTOR TESTING     Daisy Gonzalez MA

## 2017-02-07 NOTE — Clinical Note
2017       RE: Ayad Moreno  [address]  [place] [facility] [place] [address]     Dear Colleague,    Thank you for referring your patient, Ayad Moreno, to the [facility] NEUROLOGY at [facility]. Please see a copy of my visit note below.    PATIENT: Ayad Moreno    : 1970    NANI: 2017    REASON FOR VISIT: Left hemisphere monopolar review & initial deep brain stimulation (DBS) programming for dystonia.      HPI:  Mr. Ayad Moreno is a 47 year old *** handed  male who came to the [facility] neurology clinic accompanied by his father for DBS initial programming.  He underwent Left Globus Pallidus Interus (Gpi) DBS lead placement on 1/10/2017 and right chest infraclavicular Activa-PC generator placement on 2017 by Dr. Carolina.     He reports right after surgery, he had word finding problems.  Rt foot dragging was also worse.        MEDICATIONS:   Outpatient Prescriptions Marked as Taking for the 17 encounter (Office Visit) with Cindy Manning APRN CNP   Medication Sig     oxyCODONE (ROXICODONE) 5 MG IR tablet Take 1 tablet (5 mg) by mouth every 4 hours as needed for pain     TRAZODONE HCL PO Take 25 mg by mouth nightly as needed for sleep     oxyCODONE (ROXICODONE) 5 MG IR tablet Take 1-2 tablets (5-10 mg) by mouth every 3 hours as needed for moderate to severe pain     polyethylene glycol (MIRALAX/GLYCOLAX) Packet Take 17 g by mouth 2 times daily     Ascorbic Acid (VITAMIN C PO) Take 1 tablet by mouth daily     vitamin B complex with vitamin C (VITAMIN  B COMPLEX) TABS tablet Take 1 tablet by mouth daily     VITAMIN D, CHOLECALCIFEROL, PO Take 1 tablet by mouth daily     Omega-3 Fatty Acids (OMEGA-3 FISH OIL PO) Take 1 tablet by mouth daily     UNABLE TO FIND Take 1 teaspoonful by mouth 2 times daily MEDICATION NAME: Marijuana (CBD) Oil- NOT THC     nicotine polacrilex (NICORETTE) 2 MG lozenge Place 2 mg inside cheek every hour as needed for smoking  "cessation (10x's/day)     botulinum toxin type A (BOTOX) 100 UNITS injection every 3 months Inject 95 units     TRAMADOL HCL PO Take 50 mg by mouth 3 times daily      NEURONTIN 800 MG OR TABS Take 800 mg by mouth 3 times daily      REMERON 30 MG OR TABS Take 2 tablets by mouth daily.     DIAZEPAM 2 MG OR TABS Take 2 mg by mouth 4 times daily Takes 2 pills at a time       PHYSICAL EXAM:    Vital Signs:  Blood pressure 133/85, pulse 102, height 1.854 m (6' 1\"), weight 93.169 kg (205 lb 6.4 oz). Body mass index is 27.11 kg/(m^2).    General:  Mr. Moreno is a pleasant  male who is well-groomed and well-developed sitting comfortably in the exam room without any distress.  Affect is appropriate.    Incision Site: Left scalp    MOVEMENT DISORDERS ASSESSMENT: (OFF DBS)  ***      DBS Interrogation & Analysis:    Implanted generator:  Left chest Activa-PC.  Lead placement:  Left Globus Pallidus Interus (Gpi).      Left Brain  Therapy On:  0%  Battery Service Life:  OK.  3.09 volts.    Electrode Impedance Check: (Amplitude 3.0 volts: PW 80 msec: Rate 100 Hz)   Left Lead: No Problems Found.       See scanned report for impedance details.    MONOPOLAR REVIEW      LEFT Hemisphere    Contacts Amplitude   volts PW   msec Rate  hertz Assessment Adverse Effect   Case +, 0- 0.0 60 130      1.0        2.0        3.0        4.0        5.0    Worsening dysarthria.  Tongue pulling.  Mouth twitching.    Case+, 1- 0.0 60 130      1.0        2.0        3.0        4.0        5.0        6.0    Started having upper & lower twitching; \"tongue felt thick.\"     6.5    It got worse & persistent.    Case+, 2- 0.0 60 130      1.0        2.0        3.0        4.0        5.0        6.0    Slight twitching in lips.       7.0    Right forearm twitching, more twitching in lips, tongue felt tight.    Case+, 3- 0.0 60 130      1.0        2.0        3.0        4.0        5.0        6.0        7.0    Slight twitching in lips.     8.0    RUE " twitching, more twitching in lips; transient.     9.0    Same as above.  Tongue tightness.              __  Monopolar survey showed good theshold.   Based on the monopolar review, patient was programmed as below: -     Left Gpi  C +, 1 -, 2 -  Volts:  2.5 volts  PW:  60 msec  Rate:  130 Hz    Therapy impedance:  681 Ohms  Therapy Current:  3.632 mA       Patient :  Upper Limit: 2.5 volts  Lower Limit: 0.0 volts       __ Went over patient controller with pt & ***.  *** were shown how to check battery, turn DBS on & off, and adjust voltage.  Pt was able to demonstrate independently.    __  Pt was instructed to call if there is any side effects from today's programming or worsening tremor.    __  Will return in 1 months for DBS Programming & videotaping per Humanitarian Device Exemption (HDE) protocol.     The total amount of time spent with patient in DBS programming was 135 minutes.     CT See, CNP  [facility]       Again, thank you for allowing me to participate in the care of your patient.      Sincerely,    CT Campbell CNP

## 2017-02-07 NOTE — PROGRESS NOTES
PATIENT: Ayad Moreno    : 1970    NANI: 2017    REASON FOR VISIT: Left hemisphere monopolar review & initial deep brain stimulation (DBS) programming for dystonia.      HPI:  Mr. Ayad Moreno is a 47 year old left - handed (used to be right-handed)  male who came to the [facility] neurology clinic accompanied by his father for DBS initial programming.  He underwent Left Globus Pallidus Internus (Gpi) DBS lead placement on 1/10/2017 and right chest infraclavicular Activa-PC generator placement on 2017 by Dr. Carolina.     He reports right after surgery, he had word finding problems for 3 days & went back to baseline.  Rt foot dragging was also worse. He thinks, it's still feels worse than his baseline.  But he is not sure.     MEDICATIONS:   Medication Sig     oxyCODONE (ROXICODONE) 5 MG IR Take 1 tablet (5 mg) by mouth every 4 hours as needed for pain     TRAZODONE HCL PO Take 25 mg by mouth nightly as needed for sleep     oxyCODONE (ROXICODONE) 5 MG IR Take 1-2 tablets (5-10 mg) by mouth every 3 hours as needed for moderate to severe pain     polyethylene glycol (MIRALAX/GLYCOLAX) Packet Take 17 g by mouth 2 times daily     Ascorbic Acid (VITAMIN C PO) Take 1 tablet by mouth daily     vitamin B complex with vitamin C Take 1 tablet by mouth daily     VITAMIN D, CHOLECALCIFEROL, PO Take 1 tablet by mouth daily     Omega-3 Fatty Acids  Take 1 tablet by mouth daily     UNABLE TO FIND Take 1 teaspoonful by mouth 2 times daily MEDICATION NAME: Marijuana (CBD) Oil- NOT THC     nicotine polacrilex (NICORETTE) 2 MG lozenge Place 2 mg inside cheek every hour as needed for smoking cessation (10x's/day)     botulinum toxin type A (BOTOX) 100 UNITS injection every 3 months Inject 95 units     TRAMADOL HCL PO Take 50 mg by mouth 3 times daily      NEURONTIN 800 MG  Take 800 mg by mouth 3 times daily      REMERON 30 MG  Take 2 tablets by mouth daily.     DIAZEPAM 2 MG Take 2 mg by mouth 4 times daily Takes 2  "pills at a time       PHYSICAL EXAM:    Vital Signs:  Blood pressure 133/85, pulse 102, height 1.854 m (6' 1\"), weight 93.169 kg (205 lb 6.4 oz). Body mass index is 27.11 kg/(m^2).    General:  Mr. Moreno is a pleasant  male who is well-groomed and well-developed sitting comfortably in the exam room without any distress.  Affect is appropriate.    Incision Site: Left scalp incision has healed well.     MOVEMENT DISORDERS ASSESSMENT: (OFF DBS)  The Fahn Kavon (BF) Disability Score    Activity Severity Factor Score   A. Speech  0 - Normal  1 - Slightly involved; easily understood  2 - Some difficulty in understanding  3 - Marked difficulty in understanding  4 - Complete or almost complete anarthria     3   B. Handwriting   (tremor or dystonia) 0 - Normal  1 - Slight difficulty, legible  2 - Almost illegible  3 - Illegible  4 - Unable to grasp to maintain hold on pen   4     C. Feeding 0 - Normal  1- Uses  tricks , independent  2 - Can feed, but not cut  3 - Finger food only  4 - Completely dependent     3   D. Eating/swallowing 0 - Normal  1 - Occasional choking  2 - Chokes frequently; difficulty swallowing  3 - Unable to swallow firm foods  4 - Marked difficulty swallowing soft foods and liquids     1   E. Hygiene 0 - Normal  1 - Clumsy, independent  2 - Needs help with some activities  3 - Needs help with most activities  4 - Needs help with all activities   1     F. Dressing 0 - Normal  1 - Clumsy, independent  2 - Needs help with some activities  3 - Needs help with most activities  4 - Helpless       1     G. Walking 0 - Normal  1 - Slightly abnormal; hardly noticeable  2 - Moderately abnormal; obvious to naïve observer  3 - Considerably abnormal  4 - Needs assistance to walk  6 - Wheelchair-bound     2         Total (maximum: 30) = 15      The Fahn-Kavon Score    Region Provoking factor  General Severity Factor Weight Score   Eyes 0 0 0.5 0   Mouth 2 2 0.5 2   Speech & Swallowing 3 2 1.0 6   Neck " "1 1 0.5 0.5   Right Arm 4 3 1.0 12   Left Arm 3 2 1.0 6   Right Leg 1 2 1.0 2   Left Leg 0 0 1.0 0   Trunk 0 0 1.0 0     Total Score = 28.5    Writing & drawing samples sent to be scanned into Epic.     DBS Interrogation & Analysis:    Implanted generator:  Left chest Activa-PC.  Lead placement:  Left Globus Pallidus Internus (Gpi).      Left Brain  Therapy On:  0%  Battery Service Life:  OK.  3.09 volts.    Electrode Impedance Check: (Amplitude 3.0 volts: PW 80 msec: Rate 100 Hz)   Left Lead: No Problems Found.       See scanned report for impedance details.    MONOPOLAR REVIEW      LEFT GPi   Contacts Amplitude   volts PW   msec Rate  hertz Assessment Adverse Effect   Case +, 0- 0.0 60 130      1.0        2.0         Pt started having anxiety attack.   He became restless.  \"Are you using a high voltage? Is this going to cause dysarthria?  May be I should take my medications. . . .   He was reassured & encouraged to take his antianxiety medication.  Pt was told that it would be best to stay blinded as it might cause more anxiety.   He agreed.  After he calmed down, increasing voltage was resumed.       3.0        4.0        5.0    Worsening dysarthria.  Tongue pulling.  Lips twitching.    Case+, 1- 0.0 60 130      1.0        2.0        3.0        4.0        5.0        6.0    Started having upper & lower lip twitching; \"tongue felt thick.\"     6.5    Above symptoms got worse & persistent.    Case+, 2- 0.0 60 130      1.0        2.0        3.0        4.0        5.0        6.0    Slight twitching in lips.       7.0    Right forearm twitching, more twitching in lips, & \"tongue felt tight.\"    Case+, 3- 0.0 60 130      1.0        2.0        3.0        4.0        5.0        6.0        7.0    Slight twitching in lips.     8.0    Transient right forearm twitching & more twitching in lips that subsided.     9.0    Same as above.  Persistent tongue tightness.       After the initial episode, pt didn t have further anxiety. "        __  Monopolar survey showed good threshold.   Based on the monopolar review, patient was programmed as below: -     Left Gpi  C +, 1 -, 2 -  Volts:  2.5 volts  PW:  60 msec  Rate:  130 Hz    Therapy impedance:  681 Ohms  Therapy Current:  3.632 mA       Patient :  Upper Limit: 2.5 volts  Lower Limit: 0.0 volts       __ Went over patient controller with pt & his father.  Pt was the only active participant.  They were shown how to check battery, turn DBS on & off, and adjust voltage.  Pt was able to demonstrate independently.    __  Pt was instructed to call if there is any side effects from today's programming.  He was reassured that it's highly unlikely that he would have side effects.      __  Will return in 1 months for DBS Programming & videotaping per Humanitarian Device Exemption (HDE) protocol.     The total amount of time spent with patient in DBS programming was 135 minutes.     CT See, CNP  [facility]

## 2017-02-07 NOTE — PATIENT INSTRUCTIONS
February 7, 2017    Dear Mr. Ayad Moreno,    Thank you for coming today.  During your visit, we have discussed the following:     __ Your DBS electrical system function (impedance) is normal. The battery life is also good.  __ Your initial programming is set as    Left Gpi    Voltage:  2.5 volts       Patient :  Upper Limit: 2.5 volts  Lower Limit: 0.0 volts         __ I don't anticipate that you would have side effects, but if you do, you can lower the voltage.  You can also call us as needed.   __ For questions, [employer] Support Line is [phone].      To contact our clinic, you may call [phone] or use [employer] message.     It's good to see you!  I'll see you in 1 month for videotaping & DBS reprogramming.       CT See, CNP  [facility]

## 2017-03-06 ENCOUNTER — OFFICE VISIT (OUTPATIENT)
Dept: NEUROLOGY | Facility: CLINIC | Age: 47
End: 2017-03-06

## 2017-03-06 VITALS
HEART RATE: 68 BPM | HEIGHT: 73 IN | BODY MASS INDEX: 27.17 KG/M2 | DIASTOLIC BLOOD PRESSURE: 72 MMHG | SYSTOLIC BLOOD PRESSURE: 116 MMHG | WEIGHT: 205 LBS

## 2017-03-06 DIAGNOSIS — F41.8 DEPRESSION WITH ANXIETY: ICD-10-CM

## 2017-03-06 DIAGNOSIS — G24.9 GENERALIZED DYSTONIA: Primary | ICD-10-CM

## 2017-03-06 RX ORDER — SILDENAFIL 50 MG/1
50 TABLET, FILM COATED ORAL
COMMUNITY
Start: 2017-03-02 | End: 2017-11-09

## 2017-03-06 NOTE — PROGRESS NOTES
"PATIENT: Ayad Moreno    : 1970    NANI: 3/6/2017    REASON FOR VISIT: Left hemisphere deep brain stimulation (DBS) programming for the treatment of dystonia & 1-Month videotaping per Humanitarian Device Exemption protocol.     HPI:  Mr. Ayad Moreno is a 47 year old left - handed (used to be right-handed)  male who came to the [facility] neurology clinic by himself for DBS programming.  He is s/p Left Globus Pallidus Internus (Gpi) DBS lead placement on 1/10/2017 and right chest infraclavicular Activa-PC generator placement on 2017 by Dr. Carolina.     Initially, he was having more improvement in speech. But it \"regressed.\"  Although it regressed, the benefit didn't completely go way.  He reports being depressed for 1 week when he started losing the benefit he got.  Now, mood is better. He has a therapist that he sees regularly.   He reports that \"I need to overcoming social stigma.\"  He has a hard time socializing due to speech problems.  \"I know that I have to get out there socialize more.\"     Overall, neck pulling has improved.  Breathing & walking are better.  Right hand tremor is improved.  \"Right foot is less annoying.\"  At times he has some imbalance. Right lip is still drooped.  His family has noticed improvement.      He has gained weight.  \"I'm eating like crazy.\"  I need to exercise more often.    On day 3, post initial programming, he decided to turn down his voltage from 2.5 to 2.3 volts.  He said, \"I felt intense.\"  He was unable to elaborate more on how he was feeling.  Overall, there is some improvement.     During the day, he takes his kids to school, then does house chores, like groceries & cook for the family.  He has a hard time cutting due to hand dystonia.        MEDICATIONS:   Medication Sig     sildenafil (REVATIO/VIAGRA) 50 MG cap/tab Take 50 mg by mouth     oxyCODONE (ROXICODONE) 5 MG IR Take 1 tablet (5 mg) by mouth every 4 hours as needed for pain     TRAZODONE HCL PO Take " "25 mg by mouth nightly as needed for sleep     oxyCODONE (ROXICODONE) 5 MG IR Take 1-2 tablets (5-10 mg) by mouth every 3 hours as needed for moderate to severe pain     polyethylene glycol (MIRALAX/GLYCOLAX) Packet Take 17 g by mouth 2 times daily     Ascorbic Acid (VITAMIN C PO) Take 1 tablet by mouth daily     vitamin B complex with vitamin C (VITAMIN  B COMPLEX) TABS tablet Take 1 tablet by mouth daily     VITAMIN D, CHOLECALCIFEROL, PO Take 1 tablet by mouth daily     Omega-3 Fatty Acids  Take 1 tablet by mouth daily     UNABLE TO FIND Take 1 teaspoonful by mouth 2 times daily MEDICATION NAME: Marijuana (CBD) Oil- NOT THC     nicotine polacrilex (NICORETTE) 2 MG lozenge Place 2 mg inside cheek every hour as needed for smoking cessation (10x's/day)     botulinum toxin type A (BOTOX) 100 UNITS injection every 3 months Inject 95 units     TRAMADOL HCL PO Take 50 mg by mouth 3 times daily      NEURONTIN 800 MG OR TABS Take 800 mg by mouth 3 times daily      REMERON 30 MG OR TABS Take 2 tablets by mouth daily.     DIAZEPAM 2 MG OR TABS Take 2 mg by mouth 4 times daily Takes 2 pills at a time       PHYSICAL EXAM:  Vital Signs:  Blood pressure 116/72, pulse 68, height 1.854 m (6' 1\"), weight 93 kg (205 lb). Body mass index is 27.05 kg/(m^2).    General:  Mr. Moreno is a pleasant  male who is well-groomed and well-developed sitting comfortably in the exam room without any distress.  Affect is appropriate.     DBS Interrogation & Analysis:    Implanted generator:  Left chest Activa-PC.  Lead placement:  Left Globus Pallidus Internus (Gpi).    Therapy On:  99%  Battery Service Life:  OK.  3.04 volts.    Left Brain  C +, 1 -, 2 -  Volts:  2.3 volts  PW:  60 msec  Rate:  130 Hz    Electrode Impedance Check: (Amplitude 3.0 volts: PW 80 msec: Rate 100 Hz)   Left Lead: No Problems Found.    See scanned report for impedance details.    MOVEMENT DISORDERS ASSESSMENT: (ON DBS)    The [facility] Disability " Score     Activity Severity Factor Score   A. Speech  0 - Normal  1 - Slightly involved; easily understood  2 - Some difficulty in understanding  3 - Marked difficulty in understanding  4 - Complete or almost complete anarthria       2   B. Handwriting   (tremor or dystonia) 0 - Normal  1 - Slight difficulty, legible  2 - Almost illegible  3 - Illegible  4 - Unable to grasp to maintain hold on pen      4   C. Feeding 0 - Normal  1- Uses  tricks , independent  2 - Can feed, but not cut  3 - Finger food only  4 - Completely dependent    2        D. Eating/swallowing 0 - Normal  1 - Occasional choking  2 - Chokes frequently; difficulty swallowing  3 - Unable to swallow firm foods  4 - Marked difficulty swallowing soft foods and liquids       2   E. Hygiene 0 - Normal  1 - Clumsy, independent  2 - Needs help with some activities  3 - Needs help with most activities  4 - Needs help with all activities      1   F. Dressing 0 - Normal  1 - Clumsy, independent  2 - Needs help with some activities  3 - Needs help with most activities  4 - Helpless       1      G. Walking 0 - Normal  1 - Slightly abnormal; hardly noticeable  2 - Moderately abnormal; obvious to naïve observer  3 - Considerably abnormal  4 - Needs assistance to walk  6 - Wheelchair-bound       1     Total (maximum: 30) = 13                The Fahn-Kavon Score     Region Provoking factor  General Severity Factor Weight Score   Eyes 0 0 0.5 0   Mouth 2 2 0.5 2   Speech & Swallowing 3 2 1.0 6   Neck 1 1 0.5 0.5   Right Arm 3 3 1.0 9   Left Arm 3 2 1.0 6   Right Leg 1 1 1.0 1   Left Leg 0 0 1.0 0   Trunk 0 0 1.0 0     Total Score = 24.5           Writing & drawing samples sent to be scanned into Epic.      DBS PROGRAMMING:    Using the same contacts, (C +, 1 -, 2 - // 2.3 volts // 60 msec //130 Hz) voltage was increased to determine threshold.  __  From 2.3 volts, voltage increased slowly to 4.0 volts.  __  At 4.0 volts, he had persistent tongue tightening &  worsening dysarthria & articulating words, which quickly resolved when the voltage was dropped to 2.5 volts.     Final setting:  Voltage set at 2.5 volts.       Stimulation Parameters    LEFT Gpi    Initial Final   Volts 2.3 2.5   Pulse Width 60 60   Frequency 130 130   Polarities (+/-) C + C +    3  3     2 - 2 -    1 - 1 -    0 0     Patient  Left Brain   Upper Limit 3.1   Lower Limit 2.3         __  We had a long discussion about the negative consequences of lowering his voltage as he may not get to full therapeutic benefit.  He was encouraged to work with therapist to manage anxiety instead of lowering voltage.      __  He was reassured that there are some benefit from his DBS & will continue to optimize programming & wait to see how much improvement he would get.    __  He was instructed to increased your voltage by 0.2 volts weekly.    __  He was instructed to call if there is any side effects or loss of benefit from today's programming.    __  Will return in 1 month for optimization of DBS programming.     The total amount of time spent with patient was 95 minutes; 45 minutes in DBS programming and 50 minutes in management of dystonia and anxiety; 50% of the time was spent in counseling & coordination of care.      Dulce Manning APRN, CNP  [facility]

## 2017-03-06 NOTE — PATIENT INSTRUCTIONS
February 7, 2017    Dear Mr. Ayad Moreno,    Thank you for coming today.  During your visit, we have discussed the following:     __ Your DBS electrical system function (impedance) is normal. The battery life is also good.  __ Your voltage has been increased from 2.3 to 2.5 volts.     Left Gpi    Voltage:  2.5 volts       Patient :  Upper Limit: 3.1 volts  Lower Limit: 2.3 volts       __  Increased your voltage by 0.2 volts weekly.  __  Since your voltage is set well below the threshold, I don't anticipate that you would have side effects.  But if you experience side effects, you can lower the voltage.  You can also call or send a [employer] message as needed.   __ For questions, "[employer] Support Line is [phone].      To contact our clinic, you may call [phone] or use [employer] message.     It's good to see you!  I'll see you in 1 month & DBS reprogramming.       CT See, CNP  [facility]

## 2017-03-06 NOTE — MR AVS SNAPSHOT
After Visit Summary   3/6/2017    Ayad Moreno    MRN: [id number]           Patient Information     Date Of Birth          1970        Visit Information        Provider Department      3/6/2017 7:20 AM Cindy Manning APRN CNP [facility]        Care Instructions    February 7, 2017    Dear Mr. Ayad Moreno,    Thank you for coming today.  During your visit, we have discussed the following:     __ Your DBS electrical system function (impedance) is normal. The battery life is also good.  __ Your voltage has been increased from 2.3 to 2.5 volts.     Left Gpi    Voltage:  2.5 volts       Patient :  Upper Limit: 3.1 volts  Lower Limit: 2.3 volts       __  Increased your voltage by 0.2 volts weekly.  __  Since your voltage is set well below the threshold, I don't anticipate that you would have side effects.  But if you experience side effects, you can lower the voltage.  You can also call or send a [employer] message as needed.   __ For questions, [employer] Support Line is [phone].      To contact our clinic, you may call [phone] or use [employer] message.     It's good to see you!  I'll see you in 1 month & DBS reprogramming.       CT See CNP  [facility]          Follow-ups after your visit        Follow-up notes from your care team     Return in about 1 month (around 4/6/2017).      Your next 10 appointments already scheduled     Apr 03, 2017  7:20 AM CDT   (Arrive by 7:05 AM)   Return Movement Disorder with [place] CNP   [facility] ([facility])    [address] [place] [id number] [phone]              Who to contact     Please call your clinic at [phone] to:    Ask questions about your health    Make or cancel appointments    Discuss your medicines    Learn about your test results    Speak to your doctor   If you have compliments or concerns about an  "experience at your clinic, or if you wish to file a complaint, please contact [facility] Patient Relations at [phone] or email us at [email or web address]         Additional Information About Your Visit        [employer] Information     [employer] gives you secure access to your electronic health record. If you see a primary care provider, you can also send messages to your care team and make appointments. If you have questions, please call your primary care clinic.  If you do not have a primary care provider, please call [phone] and they will assist you.      [employer] is an electronic gateway that provides easy, online access to your medical records. With [employer], you can request a clinic appointment, read your test results, renew a prescription or communicate with your care team.     To access your existing account, please contact your [facility] or call [phone] for assistance.        Care EveryWhere ID     This is your Care EveryWhere ID. This could be used by other organizations to access your [facility] medical records  [id number]        Your Vitals Were     Pulse Height BMI (Body Mass Index)             68 1.854 m (6' 1\") 27.05 kg/m2          Blood Pressure from Last 3 Encounters:   03/06/17 116/72   02/07/17 133/85   02/06/17 140/85    Weight from Last 3 Encounters:   03/06/17 93 kg (205 lb)   02/07/17 93.2 kg (205 lb 6.4 oz)   01/20/17 94.4 kg (208 lb 1.8 oz)              Today, you had the following     No orders found for display       Primary Care Provider Office Phone # Fax #    Yves Pope [phone]       [facility] [address] [place] [address]        Thank you!     Thank you for choosing [facility]  for your care. Our goal is always to provide you with excellent care. Hearing back from our patients is one way we can continue to improve our services. Please take a " few minutes to complete the written survey that you may receive in the mail after your visit with us. Thank you!             Your Updated Medication List - Protect others around you: Learn how to safely use, store and throw away your medicines at www.disposemymeds.org.          This list is accurate as of: 3/6/17  8:55 AM.  Always use your most recent med list.                   Brand Name Dispense Instructions for use    BOTOX 100 UNITS injection   Generic drug:  botulinum toxin type A     95 each    every 3 months Inject 95 units       diazepam 2 MG tablet    VALIUM    60    Take 2 mg by mouth 4 times daily Takes 2 pills at a time       NEURONTIN 800 MG tablet   Generic drug:  gabapentin     90    Take 800 mg by mouth 3 times daily       NICORETTE 2 MG lozenge   Generic drug:  nicotine polacrilex      Place 2 mg inside cheek every hour as needed for smoking cessation (10x's/day)       OMEGA-3 FISH OIL PO      Take 1 tablet by mouth daily       * oxyCODONE 5 MG IR tablet    ROXICODONE    60 tablet    Take 1-2 tablets (5-10 mg) by mouth every 3 hours as needed for moderate to severe pain       * oxyCODONE 5 MG IR tablet    ROXICODONE    60 tablet    Take 1 tablet (5 mg) by mouth every 4 hours as needed for pain       polyethylene glycol Packet    MIRALAX/GLYCOLAX    30 packet    Take 17 g by mouth 2 times daily       REMERON 30 MG tablet   Generic drug:  mirtazapine     30    Take 2 tablets by mouth daily.       sildenafil 50 MG cap/tab    REVATIO/VIAGRA     Take 50 mg by mouth       TRAMADOL HCL PO      Take 50 mg by mouth 3 times daily       TRAZODONE HCL PO      Take 25 mg by mouth nightly as needed for sleep       UNABLE TO FIND      Take 1 teaspoonful by mouth 2 times daily MEDICATION NAME: Marijuana (CBD) Oil- NOT THC       vitamin B complex with vitamin C Tabs tablet      Take 1 tablet by mouth daily       VITAMIN C PO      Take 1 tablet by mouth daily       VITAMIN D (CHOLECALCIFEROL) PO      Take 1 tablet  by mouth daily       * Notice:  This list has 2 medication(s) that are the same as other medications prescribed for you. Read the directions carefully, and ask your doctor or other care provider to review them with you.

## 2017-03-21 ENCOUNTER — DOCUMENTATION ONLY (OUTPATIENT)
Dept: OTHER | Facility: CLINIC | Age: 47
End: 2017-03-21

## 2017-03-21 DIAGNOSIS — Z71.89 ACP (ADVANCE CARE PLANNING): Chronic | ICD-10-CM

## 2017-04-02 ASSESSMENT — MOVEMENT DISORDERS SOCIETY - UNIFIED PARKINSONS DISEASE RATING SCALE (MDS-UPDRS)
DRESSING: MILD: I AM SLOW AND NEED HELP FOR A FEW DRESSING TASKS (BUTTONS, BRACELETS).
HANDWRITING: SEVERE: MOST OR ALL WORDS CANNOT BE READ.
EATING_TASKS: MILD: I AM SLOW WITH MY EATING AND HAVE OCCASIONAL FOOD SPILLS.  I MAY NEED HELP WITH A FEW TASKS SUCH AS CUTTING MEAT.
HOBBIES_AND_OTHER_ACTIVITIES: MILD: I HAVE SOME DIFFICULTY DOING THESE ACTIVITIES.
HYGIENE: SLIGHT:  I AM SLOW BUT I DO NOT NEED ANY HELP.
TOTAL_SCORE: 22
GETTING_OUT_OF_BED_CAR_DEEP_CHAIR: NORMAL: NOT AT ALL (NO PROBLEMS).
FREEZING: NORMAL: NOT AT ALL (NO PROBLEMS).
TREMOR: MODERATE: SHAKING OR TREMOR CAUSES PROBLEMS WITH MANY OF MY DAILY ACTIVITES.
SPEECH: MILD: MY SPEECH CAUSES PEOPLE TO ASK ME TO OCCASIONALLY REPEAT MYSELF, BUT NOT EVERYDAY.
WALKING_AND_BALANCE: NORMAL: NOT AT ALL (NO PROBLEMS).
SALIVA_AND_DROOLING: MODERATE: I HAVE SOME DROOLING WHEN I AM AWAKE, BUT I USUALLY DO NOT NEED TISSUES OR A HANDKERCHIEF.
CHEWING_AND_SWALLOWING: MILD: I NEED TO HAVE MY PILLS CUT OR MY FOOD SPECIALLY PREPARED BECAUSE OF CHEWING OR SWALLOWING PROBLEMS, BUT I HAVE NOT CHOKED OVER THE PAST WEEK.
TURNING_IN_BED: SLIGHT: I HAVE A BIT OF TROUBLE TURNING, BUT I DO NOT NEED ANY HELP.

## 2017-04-03 ENCOUNTER — OFFICE VISIT (OUTPATIENT)
Dept: NEUROLOGY | Facility: CLINIC | Age: 47
End: 2017-04-03

## 2017-04-03 VITALS
HEIGHT: 73 IN | WEIGHT: 209.9 LBS | SYSTOLIC BLOOD PRESSURE: 122 MMHG | DIASTOLIC BLOOD PRESSURE: 78 MMHG | BODY MASS INDEX: 27.82 KG/M2 | HEART RATE: 61 BPM

## 2017-04-03 DIAGNOSIS — G24.9 DYSTONIA: Primary | ICD-10-CM

## 2017-04-03 ASSESSMENT — PAIN SCALES - GENERAL: PAINLEVEL: NO PAIN (0)

## 2017-04-03 NOTE — PROGRESS NOTES
"PATIENT: Ayad Moreno    : 1970    NANI: April 3, 2017    REASON FOR VISIT: Left hemisphere deep brain stimulation (DBS) programming optimization for the treatment of dystonia.    HPI:  Mr. Ayad Moreno is a 47 year old left - handed (used to be right-handed)  male who came to the [facility] neurology clinic by himself for DBS programming.  He is s/p Left Globus Pallidus Internus (Gpi) DBS lead placement on 1/10/2017 and right chest infraclavicular Activa-PC generator placement on 2017 by Dr. Carolina.  His monopolar review & initial programming was done on 2017.  His last programming was on 3/6/2017.     Since his last visit, he followed the instruction in increasing his voltage & has been at 3.1 for the last 3 weeks.  No side effects.  He turns down his voltage to 2.5 volts at night \"to sleep better.\"    He reports that his speech is improved.  \"It's easier to talk.\"  The base of his tongue is less tired.  \"I don't have to strain to talk.\"  When he works out, he is less fatigued.  He continues to larry eat, which is not new.  His wife, who is a speech pathologist, has commented that he doesn't twist his body when he talks like he used to.  She also has commented improvement in his speech.  Tremor in right hand is improved, but he is still unable to use his right hand much.    He wanted to know when he should get the 2nd side (right brain) DBS to improve left hand.     He eventually wants to wean off his medications, including Neurontin, Diazepam, & Tramadol.  He has been on Neurontin for anxiety & dystonic pain.     MEDICATIONS:   Medication Sig     sildenafil (REVATIO/VIAGRA) 50 MG cap/tab Take 50 mg by mouth     TRAZODONE HCL PO Take 25 mg by mouth nightly as needed for sleep     Ascorbic Acid (VITAMIN C PO) Take 1 tablet by mouth daily     vitamin B complex with vitamin C Take 1 tablet by mouth daily     VITAMIN D, CHOLECALCIFEROL, PO Take 1 tablet by mouth daily     Omega-3 Fatty Acids " "(OMEGA-3 FISH OIL PO) Take 1 tablet by mouth daily     UNABLE TO FIND Take 1 teaspoonful by mouth 2 times daily MEDICATION NAME: Marijuana (CBD) Oil- NOT THC     nicotine polacrilex (NICORETTE) 2 MG lozenge Place 2 mg inside cheek every hour as needed for smoking cessation (10x's/day)     botulinum toxin type A (BOTOX) 100 UNITS injection every 3 months Inject 95 units     TRAMADOL HCL PO Take 50 mg by mouth 3 times daily      NEURONTIN 800 MG OR TABS Take 800 mg by mouth 3 times daily      REMERON 30 MG OR TABS Take 2 tablets by mouth daily.     DIAZEPAM 2 MG OR TABS Take 2 mg by mouth 4 times daily Takes 2 pills at a time       PHYSICAL EXAM:  Vital Signs:  Blood pressure 122/78, pulse 61, height 1.854 m (6' 1\"), weight 95.2 kg (209 lb 14.4 oz).  Body mass index is 27.69 kg/(m^2).     General:  Mr. Moreno is a pleasant  male who is well-groomed and well-developed sitting comfortably in the exam room without any distress.  Affect is appropriate.     DBS Interrogation & Analysis:    Implanted generator:  Left chest [employer]  Lead placement:  Left Globus Pallidus Internus (Gpi).    Therapy On:  99%  Battery Service Life:  OK.  3.00 volts.    Left Brain  C +, 1 -, 2 -  Volts:  3.1 volts  PW:  60 msec  Rate:  130 Hz    Electrode Impedance Check: (Amplitude 3.0 volts: PW 80 msec: Rate 100 Hz)   Left Lead: No Problems Found.    See scanned report for impedance details.     DBS PROGRAMMING:    Using the same contacts, (C +, 1 -, 2 - // 2.3 volts // 60 msec //130 Hz) voltage was increased to determine threshold.  __  From 3.5 volts, voltage increased slowly to 4.3 volts.  __  At 4.2, he had slight speech change & at 4.3 volts, he had persistent tongue tightening & worsening dysarthria.  Articulating word is a difficult.  Side effects resolved when the voltage was dropped to 3.2 volts.     Final setting:  Voltage set at 3.2 volts.       Stimulation Parameters    LEFT Gpi    Initial Final   Volts 3.1 3.2   Pulse " Width 60 60   Frequency 130 130   Polarities (+/-) C + C +    3  3     2 - 2 -    1 - 1 -    0 0     Patient  Left Brain   Upper Limit 3.8   Lower Limit 3.0       __  He was instructed to increase voltage by 0.2 volts weekly.    __  He was instructed to call if there is any side effects or loss of benefit from today's programming.    __  He was shown how to check his  battery as well as generator battery life.     __  Will return in 1 month for 3 month videotaping & optimization of DBS programming.  After his next visit, will bring him to the DBS Consensus to discuss the 2nd side surgery, which he agreed.     The total amount of time spent with patient in DBS programming was 75 minutes.     CT See, CNP  [facility]

## 2017-04-03 NOTE — MR AVS SNAPSHOT
After Visit Summary   4/3/2017    Ayad Moreno    MRN: [id number]           Patient Information     Date Of Birth          1970        Visit Information        Provider Department      4/3/2017 7:20 AM Cindy Manning APRN CNP [facility] Neurology        Today's Diagnoses     Dystonia    -  1      Care Instructions    April 3, 2017      Dear Mr. Ayad Moreno,    Thank you for coming today.  During your visit, we have discussed the following:     __ Your DBS electrical system function (impedance) is normal. The battery life is also good.  __ Your voltage has been increased from 3.1 to 3.2 volts.     Left Gpi    Voltage:  3.2 volts       Patient :  Upper Limit: 3.8 volts  Lower Limit: 3.0 volts       __  Increase your voltage by 0.2 volts weekly.  __  Will discuss your 2nd side surgery after your next visit.  At your next visit, will do videotaping.   __  Since your voltage is set well below the threshold, I don't anticipate that you would have side effects.  But if you experience side effects, you can lower the voltage.  You can also call or send a Enplug message as needed.   __ For questions, [employer] Support Line is [phone].      To contact our clinic, you may call [phone] or use Enplug message.     It's good to see you!  I'll see you in 1 month & DBS reprogramming.       CT See, CNP  [facility]rology [facility]          Follow-ups after your visit        Your next 10 appointments already scheduled     May 14, 2017  1:00 PM CDT   (Arrive by 12:45 PM)   Return Movement Disorder with [place] CNP   [facility]rology ([facility])    [address] [place] [id number] [phone]              Who to contact     Please call your clinic at [phone] to:    Ask questions about your health    Make or cancel appointments    Discuss your medicines    Learn about your test results    Speak to your  "doctor   If you have compliments or concerns about an experience at your clinic, or if you wish to file a complaint, please contact [facility] Patient Relations at [phone] or email us at [email or web address]         Additional Information About Your Visit        MyChart Information     Sparkflyhart gives you secure access to your electronic health record. If you see a primary care provider, you can also send messages to your care team and make appointments. If you have questions, please call your primary care clinic.  If you do not have a primary care provider, please call [phone] and they will assist you.      Minicabster is an electronic gateway that provides easy, online access to your medical records. With Minicabster, you can request a clinic appointment, read your test results, renew a prescription or communicate with your care team.     To access your existing account, please contact your [facility] Clinic or call [phone] for assistance.        Care EveryWhere ID     This is your Care EveryWhere ID. This could be used by other organizations to access your [place] medical records  [id number]        Your Vitals Were     Pulse Height BMI (Body Mass Index)             61 1.854 m (6' 1\") 27.69 kg/m2          Blood Pressure from Last 3 Encounters:   04/03/17 122/78   03/06/17 116/72   02/07/17 133/85    Weight from Last 3 Encounters:   04/03/17 95.2 kg (209 lb 14.4 oz)   03/06/17 93 kg (205 lb)   02/07/17 93.2 kg (205 lb 6.4 oz)              Today, you had the following     No orders found for display       Primary Care Provider Office Phone # Fax #    Yves Pope [phone]       [facility] [address] [place] [address]        Thank you!     Thank you for choosing [facility] NEUROLOGY  for your care. Our goal is always to provide you with excellent care. Hearing back from our patients is one way " we can continue to improve our services. Please take a few minutes to complete the written survey that you may receive in the mail after your visit with us. Thank you!             Your Updated Medication List - Protect others around you: Learn how to safely use, store and throw away your medicines at www.disposemymeds.org.          This list is accurate as of: 4/3/17  8:39 AM.  Always use your most recent med list.                   Brand Name Dispense Instructions for use    BOTOX 100 UNITS injection   Generic drug:  botulinum toxin type A     95 each    every 3 months Inject 95 units       diazepam 2 MG tablet    VALIUM    60    Take 2 mg by mouth 4 times daily Takes 2 pills at a time       NEURONTIN 800 MG tablet   Generic drug:  gabapentin     90    Take 800 mg by mouth 3 times daily       NICORETTE 2 MG lozenge   Generic drug:  nicotine polacrilex      Place 2 mg inside cheek every hour as needed for smoking cessation (10x's/day)       OMEGA-3 FISH OIL PO      Take 1 tablet by mouth daily       REMERON 30 MG tablet   Generic drug:  mirtazapine     30    Take 2 tablets by mouth daily.       sildenafil 50 MG cap/tab    REVATIO/VIAGRA     Take 50 mg by mouth       TRAMADOL HCL PO      Take 50 mg by mouth 3 times daily       TRAZODONE HCL PO      Take 25 mg by mouth nightly as needed for sleep       UNABLE TO FIND      Take 1 teaspoonful by mouth 2 times daily MEDICATION NAME: Marijuana (CBD) Oil- NOT THC       vitamin B complex with vitamin C Tabs tablet      Take 1 tablet by mouth daily       VITAMIN C PO      Take 1 tablet by mouth daily       VITAMIN D (CHOLECALCIFEROL) PO      Take 1 tablet by mouth daily

## 2017-04-03 NOTE — NURSING NOTE
Chief Complaint   Patient presents with     RECHECK     P RETURN MOVEMENT DISORDER     Daisy Gonzalez MA

## 2017-04-03 NOTE — PATIENT INSTRUCTIONS
April 3, 2017      Dear Mr. Ayad Moreno,    Thank you for coming today.  During your visit, we have discussed the following:     __ Your DBS electrical system function (impedance) is normal. The battery life is also good.  __ Your voltage has been increased from 3.1 to 3.2 volts.     Left Gpi    Voltage:  3.2 volts       Patient :  Upper Limit: 3.8 volts  Lower Limit: 3.0 volts       __  Increase your voltage by 0.2 volts weekly.  __  Will discuss your 2nd side surgery after your next visit.  At your next visit, will do videotaping.   __  Since your voltage is set well below the threshold, I don't anticipate that you would have side effects.  But if you experience side effects, you can lower the voltage.  You can also call or send a [employer]VoiceMedia message as needed.   __ For questions, [employer] Support Line is [phone].      To contact our clinic, you may call [phone] or use NewVoiceMedia message.     It's good to see you!  I'll see you in 1 month & DBS reprogramming.       CT See, CNP  [facility]

## 2017-05-04 ENCOUNTER — OFFICE VISIT (OUTPATIENT)
Dept: NEUROLOGY | Facility: CLINIC | Age: 47
End: 2017-05-04

## 2017-05-04 VITALS
WEIGHT: 206 LBS | BODY MASS INDEX: 27.3 KG/M2 | SYSTOLIC BLOOD PRESSURE: 120 MMHG | HEART RATE: 71 BPM | HEIGHT: 73 IN | DIASTOLIC BLOOD PRESSURE: 78 MMHG

## 2017-05-04 DIAGNOSIS — G24.9 GENERALIZED DYSTONIA: Primary | ICD-10-CM

## 2017-05-04 DIAGNOSIS — F41.8 DEPRESSION WITH ANXIETY: ICD-10-CM

## 2017-05-04 ASSESSMENT — MOVEMENT DISORDERS SOCIETY - UNIFIED PARKINSONS DISEASE RATING SCALE (MDS-UPDRS)
HOBBIES_AND_OTHER_ACTIVITIES: MILD: I HAVE SOME DIFFICULTY DOING THESE ACTIVITIES.
HYGIENE: SLIGHT:  I AM SLOW BUT I DO NOT NEED ANY HELP.
TOTAL_SCORE: 17
WALKING_AND_BALANCE: NORMAL: NOT AT ALL (NO PROBLEMS).
CHEWING_AND_SWALLOWING: SLIGHT: I AM AWARE OF SLOWNESS IN MY CHEWING OR INCREASED EFFORT AT SWALLOWING, BUT I DO NOT CHOKE OR NEED TO HAVE MY FOOD SPECIALLY PREPARED.
HANDWRITING: SEVERE: MOST OR ALL WORDS CANNOT BE READ.
SALIVA_AND_DROOLING: NORMAL: NOT AT ALL (NO PROBLEMS).
EATING_TASKS: MILD: I AM SLOW WITH MY EATING AND HAVE OCCASIONAL FOOD SPILLS.  I MAY NEED HELP WITH A FEW TASKS SUCH AS CUTTING MEAT.
FREEZING: NORMAL: NOT AT ALL (NO PROBLEMS).
GETTING_OUT_OF_BED_CAR_DEEP_CHAIR: NORMAL: NOT AT ALL (NO PROBLEMS).
TREMOR: MODERATE: SHAKING OR TREMOR CAUSES PROBLEMS WITH MANY OF MY DAILY ACTIVITES.
TURNING_IN_BED: SLIGHT: I HAVE A BIT OF TROUBLE TURNING, BUT I DO NOT NEED ANY HELP.
SPEECH: MILD: MY SPEECH CAUSES PEOPLE TO ASK ME TO OCCASIONALLY REPEAT MYSELF, BUT NOT EVERYDAY.
DRESSING: SLIGHT: I AM SLOW BUT I DO NOT NEED HELP.

## 2017-05-04 ASSESSMENT — PAIN SCALES - GENERAL: PAINLEVEL: NO PAIN (0)

## 2017-05-04 NOTE — Clinical Note
2017       RE: Ayad Moreno  [address]  [facility] [address]     Dear Colleague,    Thank you for referring your patient, Ayad Moreno, to the [facility] NEUROLOGY at [facility]. Please see a copy of my visit note below.    PATIENT: Ayad Moreno    : 1970    NANI: May 4, 2017    REASON FOR VISIT: Left hemisphere deep brain stimulation (DBS) programming for the treatment of dystonia & 3-Month videotaping per Humanitarian Device Exemption protocol.     HPI:  Mr. Ayad Moreno is a 47 year old left - handed (used to be right-handed)  male who came to the [facility] neurology clinic by himself for DBS programming.      He is s/p Left Globus Pallidus Internus (Gpi) DBS lead placement on 1/10/2017 and right chest infraclavicular Activa-PC generator placement on 2017 by Dr. Carolina.  His initial programming was on 2017.     Since his last programming, he reports added benefit from   He has had more benefit in speech & Rt hand tremor.  He has less effort when talking.  He is able to do sequential finger tapping in Right hand & able to play scales on his piano.      Mood is depressed.  He has been adjusting his Remeron & had decreased it from 60 mg to 30 mg due to weight gain.  Due to depression, he has increased it to 45 mg daily.  He is concerned about his son, as he is not doing well. His dystonia is getting worse.        His son still thinks that he hasn't improved.     Grateful for the benefit he has had so far and would like to get the 2nd surgery.  He is happy that he's not shaking.    His son is not doing well.  Desperation social isolation, his left body is more affected.  Lt foot moves at night & causes him pain.      MEDICATIONS:   Outpatient Prescriptions Marked as Taking for the 17 encounter (Office Visit) with Cindy Manning APRN CNP   Medication Sig     sildenafil (REVATIO/VIAGRA) 50 MG cap/tab Take 50 mg by mouth     TRAZODONE  "HCL PO Take 25 mg by mouth nightly as needed for sleep     Ascorbic Acid (VITAMIN C PO) Take 1 tablet by mouth daily     vitamin B complex with vitamin C (VITAMIN  B COMPLEX) TABS tablet Take 1 tablet by mouth daily     VITAMIN D, CHOLECALCIFEROL, PO Take 1 tablet by mouth daily     Omega-3 Fatty Acids (OMEGA-3 FISH OIL PO) Take 1 tablet by mouth daily     UNABLE TO FIND Take 1 teaspoonful by mouth 2 times daily MEDICATION NAME: Marijuana (CBD) Oil- NOT THC     nicotine polacrilex (NICORETTE) 2 MG lozenge Place 2 mg inside cheek every hour as needed for smoking cessation (10x's/day)     botulinum toxin type A (BOTOX) 100 UNITS injection every 3 months Inject 95 units     TRAMADOL HCL PO Take 50 mg by mouth 3 times daily      NEURONTIN 800 MG OR TABS Take 800 mg by mouth 3 times daily      REMERON 30 MG OR TABS Take 2 tablets by mouth daily.     DIAZEPAM 2 MG OR TABS Take 2 mg by mouth 4 times daily Takes 2 pills at a time         PHYSICAL EXAM:  Vital Signs:  Blood pressure 120/78, pulse 71, height 1.854 m (6' 1\"), weight 93.4 kg (206 lb).   Body mass index is 27.18 kg/(m^2).      General:  Mr. Moreno is a pleasant  male who is well-groomed and well-developed sitting comfortably in the exam room without any distress.  Affect is appropriate.     DBS Interrogation & Analysis:    Implanted generator:  Left chest Activa-PC.  Lead placement:  Left Globus Pallidus Internus (Gpi).    Therapy On:  99%  Battery Service Life:  OK.  2.98 volts.    Left Brain  C +, 1 -, 2 -  Volts:  3.5 volts  PW:  60 msec  Rate:  130 Hz    Electrode Impedance Check: (Amplitude 3.0 volts: PW 80 msec: Rate 100 Hz)   Left Lead: No Problems Found.    Patient Controller:  Upper Limit: 3.8 volts  Lower Limit: 3.0 volts    See scanned report for impedance details.    MOVEMENT DISORDERS ASSESSMENT: (ON DBS)    The Fahn Kavon (BFM) Disability Score     Activity Severity Factor Score   A. Speech  0 - Normal  1 - Slightly involved; easily " understood  2 - Some difficulty in understanding  3 - Marked difficulty in understanding  4 - Complete or almost complete anarthria       2   B. Handwriting   (tremor or dystonia) 0 - Normal  1 - Slight difficulty, legible  2 - Almost illegible  3 - Illegible  4 - Unable to grasp to maintain hold on pen      4   C. Feeding 0 - Normal  1- Uses  tricks , independent  2 - Can feed, but not cut  3 - Finger food only  4 - Completely dependent      2      D. Eating/swallowing 0 - Normal  1 - Occasional choking  2 - Chokes frequently; difficulty swallowing  3 - Unable to swallow firm foods  4 - Marked difficulty swallowing soft foods and liquids       1   E. Hygiene 0 - Normal  1 - Clumsy, independent  2 - Needs help with some activities  3 - Needs help with most activities  4 - Needs help with all activities    1     F. Dressing 0 - Normal  1 - Clumsy, independent  2 - Needs help with some activities  3 - Needs help with most activities  4 - Helpless       1      G. Walking 0 - Normal  1 - Slightly abnormal; hardly noticeable  2 - Moderately abnormal; obvious to naïve observer  3 - Considerably abnormal  4 - Needs assistance to walk  6 - Wheelchair-bound       1     Total (maximum: 30) = 12                 The Fahn-Kavon Score     Region Provoking factor  General Severity Factor Weight Score   Eyes       Mouth       Speech & Swallowing       Neck       Right Arm       Left Arm       Right Leg       Left Leg       Trunk         Total Score = ***            Writing & drawing samples sent to be scanned into Epic.      DBS PROGRAMMING:    __  We had a long discussion about the negative consequences of lowering his voltage as he may not get to full therapeutic benefit.  He was encouraged to work with therapist to manage anxiety instead of lowering voltage.      __  He was reassured that there are some benefit from his DBS & will continue to optimize programming & wait to see how much improvement he would get.    __  He was  instructed to increased your voltage by 0.2 volts weekly.    __  He was instructed to call if there is any side effects or loss of benefit from today's programming.    __  Will return in 1 month for optimization of DBS programming.     The total amount of time spent with patient was 95 minutes; 45 minutes in DBS programming and 50 minutes in management of dystonia and anxiety; 50% of the time was spent in counseling & coordination of care.      CT See, CNP  [facility]rology [facility]         Again, thank you for allowing me to participate in the care of your patient.      Sincerely,    CT Campbell CNP

## 2017-05-04 NOTE — MR AVS SNAPSHOT
"              After Visit Summary   5/4/2017    Ayad Moreno    MRN: [id number]           Patient Information     Date Of Birth          1970        Visit Information        Provider Department      5/4/2017 1:00 PM Cindy Manning APRN CNP [facility]rology        Today's Diagnoses     Generalized dystonia    -  1    Depression with anxiety           Follow-ups after your visit        Who to contact     Please call your clinic at [phone] to:    Ask questions about your health    Make or cancel appointments    Discuss your medicines    Learn about your test results    Speak to your doctor   If you have compliments or concerns about an experience at your clinic, or if you wish to file a complaint, please contact [facility] Patient Relations at [phone] or email us at [email or web address]         Additional Information About Your Visit        MyChart Information     SpotOnWayt gives you secure access to your electronic health record. If you see a primary care provider, you can also send messages to your care team and make appointments. If you have questions, please call your primary care clinic.  If you do not have a primary care provider, please call [phone] and they will assist you.      Omedix is an electronic gateway that provides easy, online access to your medical records. With Omedix, you can request a clinic appointment, read your test results, renew a prescription or communicate with your care team.     To access your existing account, please contact your [facility] Clinic or call [phone] for assistance.        Care EveryWhere ID     This is your Care EveryWhere ID. This could be used by other organizations to access your [place] medical records  [id number]        Your Vitals Were     Pulse Height BMI (Body Mass Index)             71 1.854 m (6' 1\") 27.18 kg/m2          Blood Pressure from Last 3 Encounters: "   05/04/17 120/78   04/03/17 122/78   03/06/17 116/72    Weight from Last 3 Encounters:   05/04/17 93.4 kg (206 lb)   04/03/17 95.2 kg (209 lb 14.4 oz)   03/06/17 93 kg (205 lb)              We Performed the Following     ANALYSIS NEUROSTIM, SIMPLE/COMPLEX W/O REPROGRAMMING        Primary Care Provider Office Phone # Fax #    Yves Pope [phone]       [facility] [address] [place] [address]        Thank you!     Thank you for choosing [facility] NEUROLOGY  for your care. Our goal is always to provide you with excellent care. Hearing back from our patients is one way we can continue to improve our services. Please take a few minutes to complete the written survey that you may receive in the mail after your visit with us. Thank you!             Your Updated Medication List - Protect others around you: Learn how to safely use, store and throw away your medicines at www.disposemymeds.org.          This list is accurate as of: 5/4/17 11:59 PM.  Always use your most recent med list.                   Brand Name Dispense Instructions for use    BOTOX 100 UNITS injection   Generic drug:  botulinum toxin type A     95 each    every 3 months Inject 95 units       diazepam 2 MG tablet    VALIUM    60    Take 2 mg by mouth 4 times daily Takes 2 pills at a time       NEURONTIN 800 MG tablet   Generic drug:  gabapentin     90    Take 800 mg by mouth 3 times daily       NICORETTE 2 MG lozenge   Generic drug:  nicotine polacrilex      Place 2 mg inside cheek every hour as needed for smoking cessation (10x's/day)       OMEGA-3 FISH OIL PO      Take 1 tablet by mouth daily       REMERON 30 MG tablet   Generic drug:  mirtazapine     30    Take 2 tablets by mouth daily.       sildenafil 50 MG cap/tab    REVATIO/VIAGRA     Take 50 mg by mouth       TRAMADOL HCL PO      Take 50 mg by mouth 3 times daily       TRAZODONE HCL PO      Take 25 mg by mouth nightly as needed for sleep        UNABLE TO FIND      Take 1 teaspoonful by mouth 2 times daily MEDICATION NAME: Marijuana (CBD) Oil- NOT THC       vitamin B complex with vitamin C Tabs tablet      Take 1 tablet by mouth daily       VITAMIN C PO      Take 1 tablet by mouth daily       VITAMIN D (CHOLECALCIFEROL) PO      Take 1 tablet by mouth daily

## 2017-05-04 NOTE — NURSING NOTE
Chief Complaint   Patient presents with     RECHECK     [facility] RETUR - MOVEMENT DISORDER     Daisy Gonzalez MA

## 2017-05-04 NOTE — PROGRESS NOTES
"PATIENT: Ayad Moreno    : 1970    NANI: May 4, 2017    REASON FOR VISIT: Left hemisphere deep brain stimulation (DBS) programming for the treatment of dystonia & 3-Month videotaping per Humanitarian Device Exemption protocol.     HPI:  Mr. Ayad Moreno is a 47 year old left - handed (used to be right-handed)  male who came to the [facility] neurology clinic by himself for DBS programming.      He is s/p Left Globus Pallidus Internus (Gpi) DBS lead placement on 1/10/2017 by Dr. Carolina.  His initial programming was on 2017.     Since his last DBS programming, he reports getting more benefit in speech & Rt hand tremor.  There is less effort when talking.  He is able to do sequential finger tapping in Right hand & able to play scales on his piano.      Mood is depressed.  He has been adjusting his Remeron & had decreased it from 60 mg to 30 mg due to weight gain & improved anxiety. \"I was self-medicating.\"  Due to depression, he has increased it to 45 mg daily.  He is concerned about his son, Jose Eduardo, who has dystonia and not doing well.  His dystonia is getting worse; Lt foot moves at night & causes him pain.  His son is in the process of getting DBS surgery.  Ayad's father is recommended a 2nd opinion from [place].  There is also financial concern as Ayad's DBS surgery hasn't been paid by [facility] insurance.  He has a lot of stressors.      Pt reports that he is \"grateful\" for the benefit he has had so far and would like to get the 2nd surgery.  He is happy that he's not shaking.    MEDICATIONS:   Medication Sig     sildenafil (REVATIO/VIAGRA) 50 MG cap/tab Take 50 mg by mouth     TRAZODONE HCL PO Take 25 mg by mouth nightly as needed for sleep     Ascorbic Acid (VITAMIN C PO) Take 1 tablet by mouth daily     vitamin B complex with vitamin C (VITAMIN  B COMPLEX) TABS tablet Take 1 tablet by mouth daily     VITAMIN D, CHOLECALCIFEROL, PO Take 1 tablet by mouth daily     Omega-3 Fatty Acids (OMEGA-3 FISH " "OIL PO) Take 1 tablet by mouth daily     UNABLE TO FIND Take 1 teaspoonful by mouth 2 times daily MEDICATION NAME: Marijuana (CBD) Oil- NOT THC     nicotine polacrilex (NICORETTE) 2 MG lozenge Place 2 mg inside cheek every hour as needed for smoking cessation (10x's/day)     botulinum toxin type A (BOTOX) 100 UNITS injection every 3 months Inject 95 units     TRAMADOL HCL PO Take 50 mg by mouth 3 times daily      NEURONTIN 800 MG OR TABS Take 800 mg by mouth 3 times daily      REMERON 30 MG OR TABS Take 1.5 tablets by mouth daily.     DIAZEPAM 2 MG OR TABS Take 2 mg by mouth 4 times daily Takes 2 pills at a time       PHYSICAL EXAM:    Vital Signs:  Blood pressure 120/78, pulse 71, height 1.854 m (6' 1\"), weight 93.4 kg (206 lb).  Body mass index is 27.18 kg/(m^2).    General:  Mr. Moreno is a pleasant  male who is well-groomed and well-developed sitting comfortably in the exam room without any distress.  Affect is appropriate.     DBS Interrogation & Analysis:    Implanted generator:  Left chest Activa-eBuilder.  Lead placement:  Left Globus Pallidus Internus (Gpi).    Therapy On:  99%  Battery Service Life:  OK.  2.98 volts.    Left Brain  C +, 1 -, 2 -  Volts:  3.5 volts  PW:  60 msec  Rate:  130 Hz    Electrode Impedance Check: (Amplitude 3.0 volts: PW 80 msec: Rate 100 Hz)   Left Lead: No Problems Found.    Patient Controller:  Upper Limit: 3.8 volts  Lower Limit: 3.0 volts    See scanned report for impedance details.    MOVEMENT DISORDERS ASSESSMENT: (ON DBS)    The Fahn Kavon (BFM) Disability Score     Activity Severity Factor Score   A. Speech  0 - Normal  1 - Slightly involved; easily understood  2 - Some difficulty in understanding  3 - Marked difficulty in understanding  4 - Complete or almost complete anarthria       2   B. Handwriting   (tremor or dystonia) 0 - Normal  1 - Slight difficulty, legible  2 - Almost illegible  3 - Illegible  4 - Unable to grasp to maintain hold on pen      4   C. " Feeding 0 - Normal  1- Uses  tricks , independent  2 - Can feed, but not cut  3 - Finger food only  4 - Completely dependent      2      D. Eating/swallowing 0 - Normal  1 - Occasional choking  2 - Chokes frequently; difficulty swallowing  3 - Unable to swallow firm foods  4 - Marked difficulty swallowing soft foods and liquids       1   E. Hygiene 0 - Normal  1 - Clumsy, independent  2 - Needs help with some activities  3 - Needs help with most activities  4 - Needs help with all activities    1     F. Dressing 0 - Normal  1 - Clumsy, independent  2 - Needs help with some activities  3 - Needs help with most activities  4 - Helpless       1      G. Walking 0 - Normal  1 - Slightly abnormal; hardly noticeable  2 - Moderately abnormal; obvious to naïve observer  3 - Considerably abnormal  4 - Needs assistance to walk  6 - Wheelchair-bound       1     Total (maximum: 30) = 12                 The Fahn-Kavon Score     Region Provoking factor  General Severity Factor Weight Score   Eyes 0 0 0 0   Mouth 1 2 0.5 1   Speech & Swallowing 2 2 1 4   Neck 1 1 0.5 0.5   Right Arm 3 3 1 9   Left Arm 2 2 1 4   Right Leg 0 0 1 0   Left Leg 0 0 1 0   Trunk 0 0 1 0     Total Score = 18.5           Writing & drawing samples sent to be scanned into Epic.      DBS PROGRAMMING:    __  Mr. Moreno continues to get benefit from DBS.  Since benefit hasn't reached plateau, he was encouraged to continue increasing voltage gradually to 3.8 volts.  If there is no added benefit, will reduce voltage.  He agreed.    __  He was informed that will bring his request to have the 2nd side surgery.  Message was sent to Hoda Bermudez RN & Yanci Aguiar RN.     __  He has anxiety & stress related to his son's worsening dystonia symptoms & upcoming DBS surgery for his son.  He had reduced his Remeron in the past as anxiety was managed.  Reviewed records, which showed that he had been on Remeron 60 mg.  Encouraged to increase dose.      __  He'll continue to  see his psychologist.    __  Will return after he was discussed at the DBS Consensus.      The total amount of time spent with patient was 95 minutes; 20 minutes in DBS interrogation & analysis; and 70 minutes in management of dystonia and anxiety; 50% of the time was spent in counseling & coordination of care.      CT See, CNP  [facility]

## 2017-05-23 ENCOUNTER — TRANSFERRED RECORDS (OUTPATIENT)
Dept: HEALTH INFORMATION MANAGEMENT | Facility: CLINIC | Age: 47
End: 2017-05-23

## 2017-05-30 ENCOUNTER — TELEPHONE (OUTPATIENT)
Dept: NEUROLOGY | Facility: CLINIC | Age: 47
End: 2017-05-30

## 2017-05-30 NOTE — TELEPHONE ENCOUNTER
Called & left a voice mail that I would call back tomorrow to discuss the decision made by the DBS Team.

## 2017-05-31 NOTE — TELEPHONE ENCOUNTER
Called Ayad & informed him that the DBS Team would like a support letter from his psychologist before going forward with the 2nd side surgery.    He said, there is ongoing stressors in his life now.  His son is scheduled to be seen at [facility] for DBS evaluation & if all goes well to have the surgery on June 27th.      Ayad wants to delay his own surgery until after his son's surgery.  He'll contact us when he is ready to go forward with his 2nd side surgery.  He'll also delay getting the letter of recommendation from his psychologist until he is ready to go forward with surgery.

## 2017-07-14 ENCOUNTER — MEDICAL CORRESPONDENCE (OUTPATIENT)
Dept: HEALTH INFORMATION MANAGEMENT | Facility: CLINIC | Age: 47
End: 2017-07-14

## 2017-07-14 ENCOUNTER — OFFICE VISIT (OUTPATIENT)
Dept: NEUROLOGY | Facility: CLINIC | Age: 47
End: 2017-07-14

## 2017-07-14 VITALS
DIASTOLIC BLOOD PRESSURE: 79 MMHG | HEIGHT: 73 IN | BODY MASS INDEX: 27.3 KG/M2 | SYSTOLIC BLOOD PRESSURE: 133 MMHG | WEIGHT: 206 LBS | HEART RATE: 79 BPM

## 2017-07-14 DIAGNOSIS — G24.9 GENERALIZED DYSTONIA: Primary | ICD-10-CM

## 2017-07-14 NOTE — PROGRESS NOTES
"PATIENT: Ayad Moreno    : 1970    NANI: 2017    REASON FOR VISIT:  Left hemisphere deep brain stimulation (DBS) programming optimization for the treatment of dystonia.    HPI:  Mr. Ayad Moreno is a 47 year old left - handed (used to be right-handed)  male who came to the [facility] neurology clinic by himself for DBS programming.  He is s/p Left Globus Pallidus Internus (Gpi) DBS lead placement on 1/10/2017 by Dr. Carolina.  His initial programming was on 2017.  He was last seen on 2017.      Since his last visit, his son, who has dystonia went to [facility] in [place] & had bilateral DBS on  & was programmed on .  They returned on 2017.    He reports when he was in [place], he couldn't talk.  \"It hurt too much to talk when I was in [place] & yesterday while at home.\"  He reports pain is present 60% of the time when he talks.  \"I'm not sure if this is in my head or not.\"  He reports right facial pulling.  This was present before DBS & post DBS implantation had gotten better.  But it's still bothers him that his right face still pulls.      He has worsening left hand.  \"I can't write with either hand.\"  He wants to go forward with 2nd side surgery. He has asked his psychologist to write a support letter.  He expressed concern that Dr. Loera had told him that when his Rt Gpi DBS was implanted he had \"10 passes.\"  He said when he told the experts at [facility] that they gave him a look that it was too much, although he admits that they didn't verbalize it.  Also, compared to how quickly with 1 or 2  passes his son's lead was implanted he expressed concerns that his might be way to many passes.  He also expressed concern that Dr. Loera used \"10 volts\" in OR & everybody at [facility] showed him a look that was too high.  He wanted to share his concerns with Dr. Loera.  He still believes that Dr. Loera is the expert & wants him to map him for the 2nd side.      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  He has been on disability since July 2016.  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  He thought about it & decided to resign from his job 2 days ago.    He is seeing a therapist, Chandra rutledge, who is in [place] to deal with depression & anxiety.  He used to live in [place], but moved to [place] about 1 - 2 years ago.  He FaceTime with his counselor.  He has talked to his psychologist to write a support letter to go forward with the 2nd side surgery.    MEDICATIONS:   Medication Sig     sildenafil (REVATIO/VIAGRA) 50 MG cap/tab Take 50 mg by mouth     TRAZODONE HCL PO Take 25 mg by mouth nightly as needed for sleep     Ascorbic Acid (VITAMIN C PO) Take 1 tablet by mouth daily     vitamin B complex with vitamin C (VITAMIN  B COMPLEX) TABS tablet Take 1 tablet by mouth daily     VITAMIN D, CHOLECALCIFEROL, PO Take 1 tablet by mouth daily     Omega-3 Fatty Acids (OMEGA-3 FISH OIL PO) Take 1 tablet by mouth daily     UNABLE TO FIND Take 1 teaspoonful by mouth 2 times daily MEDICATION NAME: Marijuana (CBD) Oil- NOT THC     nicotine polacrilex (NICORETTE) 2 MG lozenge Place 2 mg inside cheek every hour as needed for smoking cessation (10x's/day)     botulinum toxin type A (BOTOX) 100 UNITS injection every 3 months Inject 95 units     TRAMADOL HCL PO Take 50 mg by mouth 3 times daily      NEURONTIN 800 MG OR TABS Take 800 mg by mouth 3 times daily      REMERON 30 MG OR TABS Take 2 tablets by mouth daily.     DIAZEPAM 2 MG OR TABS Take 2 mg by mouth 4 times daily Takes 2 pills at a time       Vital Signs:  Blood pressure 133/79, pulse 79, height 1.854 m (6' 1\"), weight 93.4 kg (206 lb). Body mass index is 27.18 kg/(m^2).    Mr. Moreno is a pleasant  male who is well-groomed and well-developed sitting comfortably in the exam room without any distress.  Affect is appropriate.     DBS " Interrogation & Analysis:     Implanted generator:  Left chest Activa-PC.  Lead placement:  Left Globus Pallidus Internus (Gpi).     Therapy On:  99%  Battery Service Life:  OK.  2.98 volts.     Left Brain  C +, 1 -, 2 -  Volts:  3.6 volts  PW:  60 msec  Rate:  130 Hz     Electrode Impedance Check: (Amplitude 3.0 volts: PW 80 msec: Rate 100 Hz)   Left Lead: No Problems Found.     Patient Controller:  Upper Limit: 3.8 volts  Lower Limit: 3.0 volts     See scanned report for impedance details.    DBS Programming:  Voltage increased from 3.6 to 3.7 volts.  Patient  was adjusted, upper limit to 4.0 volts & lower limit to 3.0 volts.       __  He was encouraged to increase his voltage to 3.8 volts & leave it there.  __  He was informed that once we get the letter from his psychologist, that we'll discuss him at the DBS Consensus.    __  I reassured him that I would relay his concerns to Dr. Loera & the team.    __  Message was sent to Yanci Aguiar RN to look for a letter from his psychologist & that pt is ready for his 2nd side.     The total amount of time spent with patient in DBS programming was 60 minutes.     CT See, CNP  [facility]

## 2017-07-14 NOTE — PATIENT INSTRUCTIONS
July 14, 2017      Dear Mr. Ayad Moreno,    Thank you for coming today.  During your visit, we have discussed the following:     __ Your DBS electrical system function (impedance) is normal. The battery life is also good.  __ Your voltage has been increased from 3.6 to 3.7 volts.     Left Gpi    Voltage:  3.7 volts       Patient :  Upper Limit: 4.0 volts  Lower Limit: 3.0 volts       __  Increase your voltage to 3.8 volts & leave it there if tolerated.  __  Once we get the letter from your psychologist, we'll re-discuss you at the DBS Meeting & schedule you for surgery.   __  If you experience side effects, you can lower the voltage.  You can also call or send a [employer] message as needed.   __ For questions, [employer] Support Line is [phone].      To contact our clinic, you may call [phone] or use [employer] message.     It's good to see you!     CT See, CNP  [facility]

## 2017-07-14 NOTE — MR AVS SNAPSHOT
After Visit Summary   7/14/2017    Ayad Moreno    MRN: [id number]           Patient Information     Date Of Birth          1970        Visit Information        Provider Department      7/14/2017 10:20 AM Cindy Manning APRN [facility] Neurology        Care Instructions    July 14, 2017      Dear Mr. Ayad Moreno,    Thank you for coming today.  During your visit, we have discussed the following:     __ Your DBS electrical system function (impedance) is normal. The battery life is also good.  __ Your voltage has been increased from 3.6 to 3.7 volts.     Left Gpi    Voltage:  3.7 volts       Patient :  Upper Limit: 4.0 volts  Lower Limit: 3.0 volts       __  Increase your voltage to 3.8 volts & leave it there if tolerated.  __  Once we get the letter from your psychologist, we'll re-discuss you at the DBS Meeting & schedule you for surgery.   __  If you experience side effects, you can lower the voltage.  You can also call or send a [employer] message as needed.   __ For questions, [employer] Support Line is [phone].      To contact our clinic, you may call [phone] or use [employer] message.     It's good to see you!     CT See, CNP  [facility]                    Follow-ups after your visit        Who to contact     Please call your clinic at [phone] to:    Ask questions about your health    Make or cancel appointments    Discuss your medicines    Learn about your test results    Speak to your doctor   If you have compliments or concerns about an experience at your clinic, or if you wish to file a complaint, please contact [facility] Patient Relations at [phone] or email us at [email or web address]         Additional Information About Your Visit        Latindahart Information     [employer] gives you secure access to your electronic health record. If you see a primary care provider, you can also send  "messages to your care team and make appointments. If you have questions, please call your primary care clinic.  If you do not have a primary care provider, please call [phone] and they will assist you.      [employer] is an electronic gateway that provides easy, online access to your medical records. With [employer], you can request a clinic appointment, read your test results, renew a prescription or communicate with your care team.     To access your existing account, please contact your [facility] or call [phone] for assistance.        Care EveryWhere ID     This is your Care EveryWhere ID. This could be used by other organizations to access your [place] medical records  [id number]        Your Vitals Were     Pulse Height BMI (Body Mass Index)             79 1.854 m (6' 1\") 27.18 kg/m2          Blood Pressure from Last 3 Encounters:   07/14/17 133/79   05/04/17 120/78   04/03/17 122/78    Weight from Last 3 Encounters:   07/14/17 93.4 kg (206 lb)   05/04/17 93.4 kg (206 lb)   04/03/17 95.2 kg (209 lb 14.4 oz)              Today, you had the following     No orders found for display       Primary Care Provider Office Phone # Fax #    Yves BECK Toshia [phone]       [facility] [address] [place] [address]        Equal Access to Services     [facility]: Hadii aad ku hadasho Soomaali, waaxda luqadaha, qaybta kaalmada adeegyada, raffy goff haykrystyna lopez . So [facility] [phone].    ATENCIÓN: Si habla español, tiene a loving disposición servicios gratuitos de asistencia lingüística. Llame al [phone].    We comply with applicable federal civil rights laws and [place] laws. We do not discriminate on the basis of race, color, national origin, age, disability sex, sexual orientation or gender identity.            Thank you!     Thank you for choosing [facility] NEUROLOGY  for your care. Our goal is always to provide " you with excellent care. Hearing back from our patients is one way we can continue to improve our services. Please take a few minutes to complete the written survey that you may receive in the mail after your visit with us. Thank you!             Your Updated Medication List - Protect others around you: Learn how to safely use, store and throw away your medicines at www.disposemymeds.org.          This list is accurate as of: 7/14/17 11:36 AM.  Always use your most recent med list.                   Brand Name Dispense Instructions for use Diagnosis    BOTOX 100 UNITS injection   Generic drug:  botulinum toxin type A     95 each    every 3 months Inject 95 units    Oromandibular dystonia       diazepam 2 MG tablet    VALIUM    60    Take 2 mg by mouth 4 times daily Takes 2 pills at a time        NEURONTIN 800 MG tablet   Generic drug:  gabapentin     90    Take 800 mg by mouth 3 times daily        NICORETTE 2 MG lozenge   Generic drug:  nicotine polacrilex      Place 2 mg inside cheek every hour as needed for smoking cessation (10x's/day)        OMEGA-3 FISH OIL PO      Take 1 tablet by mouth daily        REMERON 30 MG tablet   Generic drug:  mirtazapine     30    Take 2 tablets by mouth daily.        sildenafil 50 MG cap/tab    REVATIO/VIAGRA     Take 50 mg by mouth        TRAMADOL HCL PO      Take 50 mg by mouth 3 times daily        TRAZODONE HCL PO      Take 25 mg by mouth nightly as needed for sleep        UNABLE TO FIND      Take 1 teaspoonful by mouth 2 times daily MEDICATION NAME: Marijuana (CBD) Oil- NOT THC        vitamin B complex with vitamin C Tabs tablet      Take 1 tablet by mouth daily        VITAMIN C PO      Take 1 tablet by mouth daily        VITAMIN D (CHOLECALCIFEROL) PO      Take 1 tablet by mouth daily

## 2017-07-18 ENCOUNTER — MYC MEDICAL ADVICE (OUTPATIENT)
Dept: NEUROLOGY | Facility: CLINIC | Age: 47
End: 2017-07-18

## 2017-08-30 DIAGNOSIS — G24.9 DYSTONIA: Primary | ICD-10-CM

## 2017-09-11 DIAGNOSIS — G24.9 DYSTONIA: Primary | ICD-10-CM

## 2017-10-11 ENCOUNTER — DOCUMENTATION ONLY (OUTPATIENT)
Dept: NEUROSURGERY | Facility: CLINIC | Age: 47
End: 2017-10-11

## 2017-10-12 ENCOUNTER — OFFICE VISIT (OUTPATIENT)
Dept: NEUROLOGY | Facility: CLINIC | Age: 47
End: 2017-10-12

## 2017-10-12 VITALS
HEART RATE: 69 BPM | SYSTOLIC BLOOD PRESSURE: 119 MMHG | BODY MASS INDEX: 27.9 KG/M2 | DIASTOLIC BLOOD PRESSURE: 79 MMHG | HEIGHT: 73 IN | WEIGHT: 210.5 LBS

## 2017-10-12 DIAGNOSIS — G24.9 GENERALIZED DYSTONIA: Primary | ICD-10-CM

## 2017-10-12 ASSESSMENT — PAIN SCALES - GENERAL: PAINLEVEL: NO PAIN (0)

## 2017-10-12 NOTE — MR AVS SNAPSHOT
After Visit Summary   10/12/2017    Ayad Moreno    MRN: [id number]           Patient Information     Date Of Birth          1970        Visit Information        Provider Department      10/12/2017 9:30 AM Uche Loera MD [facility] Neurology        Today's Diagnoses     Generalized dystonia    -  1       Follow-ups after your visit        Your next 10 appointments already scheduled     Jan 12, 2018  7:00 AM CST   (Arrive by 6:45 AM)   Return Movement Disorder with CT [place] CNP   [facility] Neurology ([facility])    [address] [place] [id number] [phone]              Who to contact     Please call your clinic at [phone] to:    Ask questions about your health    Make or cancel appointments    Discuss your medicines    Learn about your test results    Speak to your doctor   If you have compliments or concerns about an experience at your clinic, or if you wish to file a complaint, please contact [facility] Patient Relations at [phone] or email us at [email or web address]         Additional Information About Your Visit        MyChart Information     [employer] gives you secure access to your electronic health record. If you see a primary care provider, you can also send messages to your care team and make appointments. If you have questions, please call your primary care clinic.  If you do not have a primary care provider, please call [phone] and they will assist you.      [employer] is an electronic gateway that provides easy, online access to your medical records. With [employer], you can request a clinic appointment, read your test results, renew a prescription or communicate with your care team.     To access your existing account, please contact your [facility] or call [phone] for assistance.        Care EveryWhere ID     This is your Care  "EveryWhere ID. This could be used by other organizations to access your [place] medical records  [id number]        Your Vitals Were     Pulse Height BMI (Body Mass Index)             69 1.854 m (6' 1\") 27.77 kg/m2          Blood Pressure from Last 3 Encounters:   12/13/17 119/81   12/04/17 123/80   11/17/17 121/72    Weight from Last 3 Encounters:   12/13/17 93.6 kg (206 lb 4.8 oz)   11/16/17 94.2 kg (207 lb 10.8 oz)   11/09/17 95.3 kg (210 lb)              Today, you had the following     No orders found for display       Primary Care Provider Office Phone # Fax #    Yves Pope [phone]       [facility] [address] [place] [address]        Equal Access to Services     [facility]: Hadii aad ku hadasho Soomaali, waaxda luqadaha, qaybta kaalmada adeegyada, waxay callyin hayaan manav lopez . So [facility] [phone].    ATENCIÓN: Si habla español, tiene a loving disposición servicios gratuitos de asistencia lingüística. Shankarame al [phone].    We comply with applicable federal civil rights laws and [place] laws. We do not discriminate on the basis of race, color, national origin, age, disability, sex, sexual orientation, or gender identity.            Thank you!     Thank you for choosing [facility] NEUROLOGY  for your care. Our goal is always to provide you with excellent care. Hearing back from our patients is one way we can continue to improve our services. Please take a few minutes to complete the written survey that you may receive in the mail after your visit with us. Thank you!             Your Updated Medication List - Protect others around you: Learn how to safely use, store and throw away your medicines at www.disposemymeds.org.          This list is accurate as of: 10/12/17 11:59 PM.  Always use your most recent med list.                   Brand Name Dispense Instructions for use Diagnosis    BOTOX 100 UNITS injection   Generic drug:  botulinum " toxin type A     95 each    every 3 months Inject 95 units    Oromandibular dystonia       diazepam 2 MG tablet    VALIUM    60    Take 4 mg by mouth 2 times daily Takes 2 pills at a time        NEURONTIN 800 MG tablet   Generic drug:  gabapentin     90    Take 800 mg by mouth 3 times daily        NICORETTE 2 MG lozenge   Generic drug:  nicotine polacrilex      Place 2 mg inside cheek every hour as needed for smoking cessation (10x's/day)        REMERON 30 MG tablet   Generic drug:  mirtazapine     30    Take 1 tablet by mouth At Bedtime        UNABLE TO FIND      Take 1 teaspoonful by mouth 2 times daily MEDICATION NAME: Marijuana (CBD) Oil- NOT THC        vitamin B complex with vitamin C Tabs tablet      Take 1 tablet by mouth every morning

## 2017-10-12 NOTE — NURSING NOTE
Chief Complaint   Patient presents with     RECHECK     [facility] MOVEMENT DISORDER     Daisy Gonzalez MA

## 2017-10-12 NOTE — PROGRESS NOTES
"PATIENT: Ayad Moreno    : 1970    NANI: 2017    REASON FOR VISIT: To ask questions before his upcoming deep brain stimulation (DBS) surgery.    HPI: Mr. Ayad Moreno is a 47 year old left - handed  male who came to the [facility] neurology clinic by himself to discuss various questions before his 2nd side, right globus pallidus internus (Gpi), DBS lead implantation surgery.   He is s/p left Gpi lead placement on 1/10/2017 by Dr. Carolina.  He has good benefit in improving Rt body dystonia & Rt hand tremor, & some benefit in speech.  He still has tremor in Rt hand, however, it's much improved compared to pre-DBS.  He is able to use his Rt hand & retraining it to do different tasks.  His 2nd side, right Gpi DBS surgery is scheduled on 2017.     Today, he brought various questions to discuss: -   __  He wanted to know if he would get improvement in his speech.    He was informed how hard it is to improve speech.  Since he got some benefit from the 1st surgery, the hope is that he would get some benefit.  However, he was informed how hard it is to predict how much benefit he would get, that is, if he gets benefit.     __  He expressed concern about \"brain swelling\", recovery time & what to expect post-op recovery time.     He was informed that the risks for the 2nd side DBS lead placement is the same as the 1st surgery.  He was informed 1 - 2 % chance of severe side effects including bleeding, seizures, . . .  He was told the recovery time is similar to the first one.  He was informed how hard it is to predict recovery time.  If no complications, recovery time is usually short.     __  He wanted to know how high voltage could go up before tissue damage occurs.      This was explained to pt in detail.      __  He reports that when he was in operating room during his last procedure, \"I remember that I had a panic attack when you turned up the voltage.\"  He said, it would mean a lot to him \"if " "the surgery team is encouraging.\"      Dr. William Feliz was contacted, who agreed to be with pt to provide emotional support as needed.  Pt was pleased.    __  He wanted to know if he was going to have fewer incisions compared to his last surgery.      Per Dr. Caorlina, pt was informed that he'll have 2 incisions in his head where the lead will be place & the extension wire will be connected.  He'll not have an incision on his chest.     __  He asked if he needs pre-op H & P.       He was informed that Hoda Bermudez RN has mailed him a packet yesterday with all his upcoming appointments.     __  He stated that \"The fewer passes you do the better.\"     It was explained to him in detail how mapping is done to give him the best motor benefit.     He reports his son is doing very well post bilateral DBS surgery.  He had about 80% of his leg function back. He is skate boarding & back to school.  He wondered what would happen when his son is in his 40's.  He asked what his disease progression would be if the DBS is turned off.    All questions above & more were addressed in detail.  Pt has underlying anxiety, which he is aware.  Support was provided.  He is determined to go forward with the surgery.     VITAL SIGNS:  Blood pressure 119/79, pulse 69, height 1.854 m (6' 1\"), weight 95.5 kg (210 lb 8 oz). Body mass index is 27.77 kg/(m^2).    The total time spent with the patient was 60 minutes, and greater than 50% of this time was spent in counseling and coordination of care.  Patient was seen with Dr. Loera, who was present to address the questions above.      CT See,  CNP  [facility]    Fair amount of anxiety over the second surgery, similar to how he felt before the first surgery. Lad location on the first side is in the posterolateral portion of the GPi and patient has had substantial improvement in the Right Upper Extremity and surprisingly has noted some improvement in speech. We were very clear " that doing the surgery for speech improvement is not the reason to move forward given the marked unpredictability of DBS for speech in PD or dystonia. Given he had some improvement for speech on the first side it is possible he could some some additional improvement with the second side however we again wanted to make sure he understood this would not be the reason to go forward with the second side.    Uche Loera MD, PhD

## 2017-10-24 ENCOUNTER — TELEPHONE (OUTPATIENT)
Dept: NEUROLOGY | Facility: CLINIC | Age: 47
End: 2017-10-24

## 2017-10-24 DIAGNOSIS — G24.9 DYSTONIA: Primary | ICD-10-CM

## 2017-11-09 ENCOUNTER — ALLIED HEALTH/NURSE VISIT (OUTPATIENT)
Dept: SURGERY | Facility: CLINIC | Age: 47
End: 2017-11-09

## 2017-11-09 ENCOUNTER — OFFICE VISIT (OUTPATIENT)
Dept: SURGERY | Facility: CLINIC | Age: 47
End: 2017-11-09

## 2017-11-09 ENCOUNTER — ANESTHESIA EVENT (OUTPATIENT)
Dept: SURGERY | Facility: CLINIC | Age: 47
End: 2017-11-09
Payer: COMMERCIAL

## 2017-11-09 VITALS
DIASTOLIC BLOOD PRESSURE: 85 MMHG | SYSTOLIC BLOOD PRESSURE: 128 MMHG | OXYGEN SATURATION: 98 % | RESPIRATION RATE: 16 BRPM | WEIGHT: 210 LBS | HEART RATE: 74 BPM | HEIGHT: 73 IN | TEMPERATURE: 97.8 F | BODY MASS INDEX: 27.83 KG/M2

## 2017-11-09 DIAGNOSIS — Z01.818 PREOPERATIVE EVALUATION TO RULE OUT SURGICAL CONTRAINDICATION: ICD-10-CM

## 2017-11-09 DIAGNOSIS — Z01.818 PREOP EXAMINATION: Primary | ICD-10-CM

## 2017-11-09 LAB
ALBUMIN UR-MCNC: 30 MG/DL
ANION GAP SERPL CALCULATED.3IONS-SCNC: 6 MMOL/L (ref 3–14)
APPEARANCE UR: ABNORMAL
APTT PPP: 29 SEC (ref 22–37)
BILIRUB UR QL STRIP: NEGATIVE
BUN SERPL-MCNC: 12 MG/DL (ref 7–30)
CALCIUM SERPL-MCNC: 8.8 MG/DL (ref 8.5–10.1)
CHLORIDE SERPL-SCNC: 108 MMOL/L (ref 94–109)
CO2 SERPL-SCNC: 26 MMOL/L (ref 20–32)
COLOR UR AUTO: YELLOW
CREAT SERPL-MCNC: 1 MG/DL (ref 0.66–1.25)
ERYTHROCYTE [DISTWIDTH] IN BLOOD BY AUTOMATED COUNT: 12.8 % (ref 10–15)
GFR SERPL CREATININE-BSD FRML MDRD: 81 ML/MIN/1.7M2
GLUCOSE SERPL-MCNC: 96 MG/DL (ref 70–99)
GLUCOSE UR STRIP-MCNC: NEGATIVE MG/DL
HCT VFR BLD AUTO: 49.4 % (ref 40–53)
HGB BLD-MCNC: 17.2 G/DL (ref 13.3–17.7)
HGB UR QL STRIP: NEGATIVE
INR PPP: 0.95 (ref 0.86–1.14)
KETONES UR STRIP-MCNC: NEGATIVE MG/DL
LEUKOCYTE ESTERASE UR QL STRIP: NEGATIVE
MCH RBC QN AUTO: 29.5 PG (ref 26.5–33)
MCHC RBC AUTO-ENTMCNC: 34.8 G/DL (ref 31.5–36.5)
MCV RBC AUTO: 85 FL (ref 78–100)
MUCOUS THREADS #/AREA URNS LPF: PRESENT /LPF
NITRATE UR QL: NEGATIVE
PH UR STRIP: 6 PH (ref 5–7)
PLATELET # BLD AUTO: 213 10E9/L (ref 150–450)
POTASSIUM SERPL-SCNC: 4.4 MMOL/L (ref 3.4–5.3)
RBC # BLD AUTO: 5.83 10E12/L (ref 4.4–5.9)
RBC #/AREA URNS AUTO: <1 /HPF (ref 0–2)
SODIUM SERPL-SCNC: 140 MMOL/L (ref 133–144)
SOURCE: ABNORMAL
SP GR UR STRIP: 1.02 (ref 1–1.03)
UROBILINOGEN UR STRIP-MCNC: 0 MG/DL (ref 0–2)
WBC # BLD AUTO: 6.4 10E9/L (ref 4–11)
WBC #/AREA URNS AUTO: <1 /HPF (ref 0–2)

## 2017-11-09 ASSESSMENT — LIFESTYLE VARIABLES: TOBACCO_USE: 1

## 2017-11-09 NOTE — ANESTHESIA PREPROCEDURE EVALUATION
Anesthesia Evaluation     . Pt has had prior anesthetic. Type: General and MAC    No history of anesthetic complications          ROS/MED HX    ENT/Pulmonary: Comment: Reports smoking as social. Has quit but feels that he's addicted to Nicorette logenzes.    (+)DUNCAN risk factors snores loudly, other ENT- difficult control of base of tongue, can obstruct swallowing and breathing, tobacco use, Past use asthma Last exacerbation: triggered by cats- last 14 yrs ago,, . .    Neurologic: Comment: Difficult speech    (+)other neuro DYT-1 genetic mutation generalized dystonia    Cardiovascular:  - neg cardiovascular ROS   (+) ----. : . . . :. . No previous cardiac testing       METS/Exercise Tolerance:  >4 METS   Hematologic:  - neg hematologic  ROS       Musculoskeletal:  - neg musculoskeletal ROS       GI/Hepatic:  - neg GI/hepatic ROS       Renal/Genitourinary:  - ROS Renal section negative       Endo:  - neg endo ROS       Psychiatric:     (+) psychiatric history depression and anxiety      Infectious Disease:  - neg infectious disease ROS       Malignancy:      - no malignancy   Other:    (+) No chance of pregnancy C-spine cleared: N/A, H/O Chronic Pain,no other significant disability                    Physical Exam  Normal systems: dental    Airway   Mallampati: II  TM distance: >3 FB  Neck ROM: full  Comment: Difficulty controlling mouth and tongue    Dental     Cardiovascular   Rhythm and rate: regular and normal      Pulmonary    breath sounds clear to auscultation    Other findings: For further details of assessment, testing, and physical exam please see H and P completed on same date.           PAC Discussion and Assessment    ASA Classification: 3  Case is suitable for: [place]  Anesthetic techniques and relevant risks discussed: MAC with GA as backup  Invasive monitoring and risk discussed: No  Types:   Possibility and Risk of blood transfusion discussed: No  NPO instructions given:   Additional anesthetic  preparation and risks discussed:   Needs early admission to pre-op area:   Other:     PAC Resident/NP Anesthesia Assessment:  Ayad Moreno is a 47 year old male scheduled to undergo right side optical tracking DBS on 11/16/17 by Dr. Carolina. He has the following specific operative considerations:   - RCRI : No serious cardiac risks.    - Anesthesia considerations:  Refer to PAC assessment in anesthesia records  - VTE risk: 0.5%  - DUNCAN # of risks 1/8 = Low risk  - Risk of PONV score = 2.  If > 2, anti-emetic intervention recommended.    Last procedure with MAC for generator placement. Prior to that DBS-patient didn't like getting cathed while awake, prolonged positioning was somewhat difficult, and had some difficulty in the post op period with 02 sats dropping while sleeping with careful balance of pain meds.        --Generalized dyskinesia/dystonia due to DYT-1 genetic mutation, s/p left DBS placement in 1/2017 with improvement in symptoms. Above procedure planned with MAC. Patient is comfortable with plan.    --Difficult speech and swallowing. Talking is painful and exhausting. Limited and painful fine motor function. Multiple medications and use of medical marijuana. Will take Tramadol, Neurontin, and Diazepam on DOS.    --No cardiac history, symptoms or meds. Able to walk distance.   --Former smoker. Now uses Nicorette lozenges and feels that he is addicted to these. Asthma triggered by cats, no recent symptoms. No concern for DUNCAN but his tongue can obstruct airway if supine. Will require careful monitoring in the post op period.              --Anxiety/Depression. Remeron at HS.     Patient was discussed with Dr Lyman.      Reviewed and Signed by PAC Mid-Level Provider/Resident  Mid-Level Provider/Resident: CT Kee, CNS  Date: 11/9/17  Time: 10:57am    Attending Anesthesiologist Anesthesia Assessment:  47 year old for DBS. Patient/case discussed with HELGA. No need to see patient. Patient is appropriate  for the planned procedure without further work-up or medical management.    Note that patient had lepe placed while he was wide awake last time. He asks that if a lepe is needed, could it be placed while sedated?          Reviewed and Signed by PAC Anesthesiologist  Anesthesiologist: carlton  Date: 11/9/2017  Time:   Pass/Fail: Pass  Disposition:     PAC Pharmacist Assessment:        Pharmacist:   Date:   Time:      Anesthesia Plan      History & Physical Review  History and physical reviewed and following examination; no interval change.    ASA Status:  3 .    NPO Status:  > 8 hours    Plan for MAC with Intravenous induction. Maintenance will be TIVA.           Postoperative Care      Consents                          .

## 2017-11-09 NOTE — H&P
Pre-Operative H & P     CC:  Preoperative exam to assess for increased cardiopulmonary risk while undergoing surgery and anesthesia.    Date of Encounter: 11/9/2017  Primary Care Physician:  Yves Pope  Ayad Moreno is a 47 year old male who presents for pre-operative H & P in preparation for right side optical tracking DBS with Dr. Carolina on 11/16/17 at [facility]. History is obtained from the patient.     Patient with history of DYT-1 genetic mutation generalized dyskinesia/dystonia.  He is on multiple medications including Tramadol, Neurontin, Remeron, Valium and uses marijuana by vaping.  He is s/p left side DBS placement on 1/10/17 by Dr. Carolina. This has improved his right body dystonia and speech. He still has some right hand tremor but it is much improved. He is now preparing for right side placement above.   Patient reports that his last procedure went generally well. The length of the surgery was difficult for positioning, and he didn't like being catheterized while awake. He reports that due to brain swelling, his language was affected for a time after procedure, but now has returned to baseline. In the post op period, there were some issues with his 02 sats and his pain medication had to be carefully balanced. He stayed approximately 24 hours in the hospital.    Past Medical History  Past Medical History:   Diagnosis Date     Anxiety      Asthmas with exposure to cats      Depression      Generalized dystonia     DYT-1 genetic mutation     Lumbar disc herniation      OCD (obsessive compulsive disorder)      Rt ACL Tear        Past Surgical History  Past Surgical History:   Procedure Laterality Date     C REPAIR CRUCIATE LIGAMENT,KNEE  2008     IMPLANT DEEP BRAIN STIMULATION GENERATOR / BATTERY Left 1/20/2017    Procedure: IMPLANT DEEP BRAIN STIMULATION GENERATOR / BATTERY;  Surgeon: Duy Carolina MD;  Location: [facility] OR     OPTICAL  TRACKING SYSTEM INSERTION DEEP BRAIN STIMULATION Left 1/10/2017    Procedure: OPTICAL TRACKING SYSTEM INSERTION DEEP BRAIN STIMULATION;  Surgeon: Duy Carolina MD;  Location: [facility] OR       Hx of Blood transfusions/reactions: Denies.      Hx of abnormal bleeding or anti-platelet use: Denies.     Menstrual history: No LMP for male patient.    Steroid use in the last year: Denies.     Personal or FH with difficulty with Anesthesia:  Denies.    Prior to Admission Medications  Current Outpatient Prescriptions   Medication Sig Dispense Refill     vitamin B complex with vitamin C (VITAMIN  B COMPLEX) TABS tablet Take 1 tablet by mouth every morning        UNABLE TO FIND Take 1 teaspoonful by mouth 2 times daily MEDICATION NAME: Marijuana (CBD) Oil- NOT THC       nicotine polacrilex (NICORETTE) 2 MG lozenge Place 2 mg inside cheek every hour as needed for smoking cessation (10x's/day)       TRAMADOL HCL PO Take 50 mg by mouth 3 times daily        NEURONTIN 800 MG OR TABS Take 800 mg by mouth 3 times daily  90 0     REMERON 30 MG OR TABS Take 1 tablet by mouth At Bedtime  30 0     DIAZEPAM 2 MG OR TABS Take 4 mg by mouth 2 times daily Takes 2 pills at a time 60 0     botulinum toxin type A (BOTOX) 100 UNITS injection every 3 months Inject 95 units 95 each        Allergies  No Known Allergies    Social History  Social History     Social History     Marital status:      Spouse name: Lisseth     Number of children: 2     Years of education: N/A     Occupational History     Teacher [employer]     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     Social History Main Topics     Smoking status: Former Smoker     Types: Cigarettes     Smokeless tobacco: Never Used      Comment: social     Alcohol use 0.0 oz/week     0 Standard drinks or equivalent per week      Comment: 1-2x's/month.     Drug use: Yes     Special: Marijuana      Comment: vaping     Sexual activity: Not on file     Other Topics Concern     Not on file  "    Social History Narrative       Family History  Family History   Problem Relation Age of Onset     DIABETES Father      CANCER Paternal Grandfather      Stomach CA     CANCER Maternal Grandmother      Bladder     Myocardial Infarction Maternal Grandfather      MI     Obsessive Compulsive Disorder Son      Genetic Disorder Son      Dystonia 2ndary to DYT 1 micah mutation       Review of Systems    The complete review of systems is negative other than noted in the HPI or here.   Constitutional: Denies fever, chills, weight loss.  Skin: Incisions have healed well.   HEENT: Wears glasses for vision. Some swallowing difficulty due to mouth dryness and lack of control of the base of his tongue. He sometimes has obstruction. He has had Botox in the past but not for some time. He manages this by eating carefully. Talking is difficult and painful.  Respiratory: Denies cough or shortness of breath. The same obstruction by his tongue will happen with his airway if supine. He has history asthma but only to cats and has not had any issues. He has no meds or 02. History of social smoking. Has been using Nicorette logenzes to avoid temptation but now feels that he is addicted to these.   CV: Denies chest pain or irregular HR. Able to walk distance.   GI: Denies abdominal pain or bowel issues.  : Urinary frequency.  M/S: Able to move joints, generalized pain but mostly with fine motor work.  Neuro: No history of seizure.     Temp: 97.8  F (36.6  C) Temp src: Oral BP: 128/85 Pulse: 74   Resp: 16 SpO2: 98 %         210 lbs 0 oz  6' 1\"   Body mass index is 27.71 kg/(m^2).     Physical Exam  Constitutional: Awake, alert, cooperative, no apparent distress, and appears stated age.  Eyes: Pupils equal, round and reactive to light, extra ocular muscles intact, sclera clear, conjunctiva normal. Glasses on.  HENT: Normocephalic, dysarthria but understandable speech. Lower lip/jaw movements at rest. Oral pharynx with moist mucus " membranes, good dentition. No goiter appreciated.   Respiratory: Clear to auscultation bilaterally, no crackles or wheezing. No cough or obvious dyspnea.  Cardiovascular: Regular rate and rhythm, normal S1 and S2, and no murmur noted. Carotids +2, no bruits. No LE edema. Palpable pulses to radial and PT arteries.   GI: Normal bowel sounds, soft, non-distended, non-tender, no masses palpated.  Lymph/Hematologic: No cervical lymphadenopathy and no supraclavicular lymphadenopathy.  Genitourinary:  Deferred  Skin: Warm and dry.     Musculoskeletal: Full ROM of neck. There is no redness, warmth, or swelling of the joints. Gross motor strength is normal.    Neurologic: Awake, alert, oriented to name, place and time.  Dyskinetic movements and some tremor.   Neuropsychiatric: Calm, cooperative. Normal affect.   Labs: (personally reviewed)  Lab Results   Component Value Date    WBC 6.4 11/09/2017     Lab Results   Component Value Date    RBC 5.83 11/09/2017     Lab Results   Component Value Date    HGB 17.2 11/09/2017     Lab Results   Component Value Date    HCT 49.4 11/09/2017     Lab Results   Component Value Date    MCV 85 11/09/2017     Lab Results   Component Value Date    MCH 29.5 11/09/2017     Lab Results   Component Value Date    MCHC 34.8 11/09/2017     Lab Results   Component Value Date    RDW 12.8 11/09/2017     Lab Results   Component Value Date     11/09/2017     Last Basic Metabolic Panel:  Lab Results   Component Value Date     11/09/2017      Lab Results   Component Value Date    POTASSIUM 4.4 11/09/2017     Lab Results   Component Value Date    CHLORIDE 108 11/09/2017     Lab Results   Component Value Date    JOSÉ MIGUEL 8.8 11/09/2017     Lab Results   Component Value Date    CO2 26 11/09/2017     Lab Results   Component Value Date    BUN 12 11/09/2017     Lab Results   Component Value Date    CR 1.00 11/09/2017     Lab Results   Component Value Date    GLC 96 11/09/2017     INR 0.95  PTT 29  EKG Not  indicated    ASSESSMENT and PLAN  Ayad Moreno is a 47 year old male scheduled to undergo right side optical tracking DBS on 11/16/17 by Dr. Carolina. He has the following specific operative considerations:   - RCRI : No serious cardiac risks.    - Anesthesia considerations:  Refer to PAC assessment in anesthesia records  - VTE risk: 0.5%  - DUNCAN # of risks 1/8 = Low risk  - Risk of PONV score = 2.  If > 2, anti-emetic intervention recommended.     --Generalized dyskinesia/dystonia due to DYT-1 genetic mutation, s/p left DBS placement in 1/2017 with improvement in symptoms. Above procedure planned with MAC. Patient is comfortable with plan.    --Difficult speech and swallowing. Talking is painful and exhausting. Limited and painful fine motor function. Multiple medications and use of medical marijuana. Will take Tramadol, Neurontin, and Diazepam on DOS.    --No cardiac history, symptoms or meds. Able to walk distance.   --Former smoker. Now uses Nicorette lozenges and feels that he is addicted to these. Asthma triggered by cats, no recent symptoms. No concern for DUNCAN but his tongue can obstruct airway if supine. Will require careful monitoring in the post op period.  Anxiety/Depression. Remeron at HS.   Arrival time, NPO, shower and medication instructions provided by nursing staff today. Preparing For Your Surgery handout given.  Patient was discussed with Dr Lyman.    CT Umaña CNS  Preoperative Assessment Center  [facility]  Clinic and Surgery Center  Phone: [phone]  Fax: [phone]

## 2017-11-09 NOTE — MR AVS SNAPSHOT
After Visit Summary   2017    Ayad Moreno    MRN: [id number]           Patient Information     Date Of Birth          1970        Visit Information        Provider Department      2017 11:00 AM Rn, [facility]        Care Instructions    Preparing for Your Surgery      Name:  Ayad Moreno   MRN:  [id number]   :  1970   Today's Date:  2017     Arriving for surgery:  Surgery date:    Arrival time:  5:30AM  Please come to:        [facility] [address]  [address] [place]  [address]    -   parking is available in front of the hospital from 5:15 am to 8:00 pm    -  Stop at the Information Desk in the lobby    -   Inform the information person that you are here for surgery. An escort to 3c will be provided. If you would not like an escort, please proceed to 3C on the 3rd floor. [phone]     What can I eat or drink?  -  You may have solid food or milk products until 8 hours prior to your surgery--do not eat food after 11:30PM on the night before surgery   -  You may have water, apple juice or 7up/Sprite until 2 hours prior to your surgery--OK to have CLEAR liquids until 5:30AM on the morning of surgery     Which medicines can I take?  -  Do NOT take these medications in the morning, the day of surgery:    Please stop all vitamins/supplements/minerals/fish oil/viagra one week prior to surgery       -  Please take these medications the day of surgery:    neurontin   Tramadol  Diazepam           How do I prepare myself?  -  Take two showers: one the night before surgery; and one the morning of surgery.         Use Scrubcare or Hibiclens to wash from neck down.  You may use your own shampoo and conditioner. No other hair products.   -  Do NOT use lotion, powder, deodorant, or antiperspirant the day of your surgery.  -  Do NOT wear any makeup, fingernail polish or jewelry.  -  Begin using  Incentive Spirometer 1 week prior to surgery.  Use 4 times per day, up to 5 breaths each time.  Bring Incentive Spirometer to hospital.  -Do not bring your own medications to the hospital, except for inhalers and eye drops.  -  Bring your ID and insurance card.    Questions or Concerns:  If you have questions or concerns, please call the  Preoperative Assessment Center, Monday-Friday 7AM-7PM:  [phone]            AFTER YOUR SURGERY  Breathing exercises   Breathing exercises help you recover faster. Take deep breaths and let the air out slowly. This will:     Help you wake up after surgery.    Help prevent complications like pneumonia.  Preventing complications will help you go home sooner.   We may give you a breathing device (incentive spirometer) to encourage you to breathe deeply.   Nausea and vomiting   You may feel sick to your stomach after surgery; if so, let your nurse know.    Pain control:  After surgery, you may have pain. Our goal is to help you manage your pain. Pain medicine will help you feel comfortable enough to do activities that will help you heal.  These activities may include breathing exercises, walking and physical therapy.   To help your health care team treat your pain we will ask: 1) If you have pain  2) where it is located 3) describe your pain in your words  Methods of pain control include medications given by mouth, vein or by nerve block for some surgeries.  We may give you a pain control pump that will:  1) Deliver the medicine through a tube placed in your vein  2) Control the amount of medicine you receive  3) Allow you to push a button to deliver a dose of pain medicine  Sequential Compression Device (SCD) or Pneumo Boots:  You may need to wear SCD S on your legs or feet. These are wraps connected to a machine that pumps in air and releases it. The repeated pumping helps prevent blood clots from forming.   Using an Incentive Spirometer    An incentive spirometer is a device that  helps you do deep breathing exercises. These exercises expand your lungs, aid in circulation, and help prevent pneumonia. Deep breathing exercises also help you breathe better and improve the function of your lungs by:    Keeping your lungs clear    Strengthening your breathing muscles    Helping prevent respiratory complications or problems  The incentive spirometer gives you a way to take an active part in your care. A nurse or therapist will teach you breathing exercises. To do these exercises, you will breathe in through your mouth and not your nose. The incentive spirometer only works correctly if you breathe in through your mouth.    Steps to clear lungs  Step 1. Exhale normally. Then, inhale normally.    Relax and breathe out.  Step 2. Place your lips tightly around the mouthpiece.    Make sure the device is upright and not tilted.  Step 3. Inhale as much air as you can through the mouthpiece (don't breath through your nose).    Inhale slowly and deeply.    Hold your breath long enough to keep the balls or disk raised for at least 3 to 5 seconds, or as instructed by your healthcare provider.  Step 4. Repeat the exercise regularly.    Begin using the Incentive Spirometer one week prior to your surgery, 4 times per day-5 times each.          Follow-ups after your visit        Your next 10 appointments already scheduled     Nov 09, 2017 11:00 AM CST   (Arrive by 10:45 AM)   PAC RN ASSESSMENT with Mg Pac Rn   [facility] Preoperative Assessment [place] ([facility])    [address] [place] [id number]   [phone]            Nov 09, 2017 11:30 AM CST   (Arrive by 11:15 AM)   PAC Anesthesia Consult with gM Pac Anesthesiologist   [facility] Preoperative [facility] ([facility])    [address] [place] [id number]   [phone]            Nov 09, 2017 11:45 AM CST   LAB with MG LAB   [facility] Lab ([facility])    [address] [facility] [address]  1st Floor  [facility] [id number] [phone]           Please do not eat 10-12 hours before your appointment if you are coming in fasting for labs on lipids, cholesterol, or glucose (sugar). This does not apply to pregnant women. Water, hot tea and black coffee (with nothing added) are okay. Do not drink other fluids, diet soda or chew gum.            Nov 16, 2017   Procedure with Duy Carolina MD   [facility], [employer], Same Day Surgery (--)    [address] [facility] [address] [phone]            Nov 16, 2017  8:00 AM CST   CT HEAD W/O & W CONTRAST with UUCT1   [facility], [place], [place] ([facility], [facility])    [address] [facility] [id number] [phone]           Please bring any scans or X-rays taken at other hospitals, if similar tests were done. Also bring a list of your medicines, including vitamins, minerals and over-the-counter drugs. It is safest to leave personal items at home.  Be sure to tell your doctor:   If you have any allergies.   If there s any chance you are pregnant.   If you are breastfeeding.   If you have any special needs.  You will have contrast for this exam. To prepare:   Do not eat or drink for 2 hours before your exam. If you need to take medicine, you may take it with small sips of water. (We may ask you to take liquid medicine as well.)   The day before your exam, drink extra fluids at least six 8-ounce glasses (unless your doctor tells you to restrict your fluids).  Patients over 70 or patients with diabetes or kidney problems:   If you haven t had a blood test (creatinine test) within the last 30 days, go to your clinic or Diagnostic Imaging Department for this test.  If you have diabetes:   If your kidney function is normal, continue taking your metformin (Avandamet, Glucophage, Glucovance, Metaglip) on the day of your exam.   If your kidney function is abnormal, wait 48  hours before restarting this medicine.  Please wear loose clothing, such as a sweat suit or jogging clothes. Avoid snaps, zippers and other metal. We may ask you to undress and put on a hospital gown.  If you have any questions, please call the Imaging Department where you will have your exam.            Dec 13, 2017  6:40 AM CST   CT HEAD W/O CONTRAST with UCCT2   [facility] [place] ([facility])    [address] [place] [address] [phone]           Please bring any scans or X-rays taken at other hospitals, if similar tests were done. Also bring a list of your medicines, including vitamins, minerals and over-the-counter drugs. It is safest to leave personal items at home.  Be sure to tell your doctor:   If you have any allergies.   If there s any chance you are pregnant.   If you are breastfeeding.   If you have any special needs.  You do not need to do anything special to prepare.  Please wear loose clothing, such as a sweat suit or jogging clothes. Avoid snaps, zippers and other metal. We may ask you to undress and put on a hospital gown.            Dec 13, 2017  7:00 AM CST   (Arrive by 6:45 AM)   Return Movement Disorder with [facility] ([facility])    [address] [place] [address] [phone]              Who to contact     Please call your clinic at [phone] to:    Ask questions about your health    Make or cancel appointments    Discuss your medicines    Learn about your test results    Speak to your doctor   If you have compliments or concerns about an experience at your clinic, or if you wish to file a complaint, please contact [facility] Patient Relations at [phone] or email us at [email or web address]         Additional Information About Your Visit        MyChart Information     [employer] gives you  secure access to your electronic health record. If you see a primary care provider, you can also send messages to your care team and make appointments. If you have questions, please call your primary care clinic.  If you do not have a primary care provider, please call [phone] and they will assist you.      [employer] is an electronic gateway that provides easy, online access to your medical records. With [employer], you can request a clinic appointment, read your test results, renew a prescription or communicate with your care team.     To access your existing account, please contact your [facility] or call [phone] for assistance.        Care EveryWhere ID     This is your Care EveryWhere ID. This could be used by other organizations to access your [facility] medical records  [id number]         Blood Pressure from Last 3 Encounters:   11/09/17 128/85   10/12/17 119/79   07/14/17 133/79    Weight from Last 3 Encounters:   11/09/17 95.3 kg (210 lb)   10/12/17 95.5 kg (210 lb 8 oz)   07/14/17 93.4 kg (206 lb)              Today, you had the following     No orders found for display       Primary Care Provider Office Phone # Fax #    Yves BECK [place] [phone]       [facility] [address] [place] [address]        Equal Access to Services     DORIS PASCAL : Hadii aad ku hadasho Soomaali, waaxda luqadaha, qaybta kaalmada adeegyada, raffy barrientos. So [facility] [phone].    ATENCIÓN: Si habla español, tiene a loving disposición servicios gratuitos de asistencia lingüística. Llame al [phone].    We comply with applicable federal civil rights laws and [place] laws. We do not discriminate on the basis of race, color, national origin, age, disability, sex, sexual orientation, or gender identity.            Thank you!     Thank you for choosing [facility] PREOPERATIVE ASSESSMENT [place]  for your care. Our goal is  always to provide you with excellent care. Hearing back from our patients is one way we can continue to improve our services. Please take a few minutes to complete the written survey that you may receive in the mail after your visit with us. Thank you!             Your Updated Medication List - Protect others around you: Learn how to safely use, store and throw away your medicines at www.disposemymeds.org.          This list is accurate as of: 11/9/17 10:50 AM.  Always use your most recent med list.                   Brand Name Dispense Instructions for use Diagnosis    BOTOX 100 UNITS injection   Generic drug:  botulinum toxin type A     95 each    every 3 months Inject 95 units    Oromandibular dystonia       diazepam 2 MG tablet    VALIUM    60    Take 4 mg by mouth 2 times daily Takes 2 pills at a time        NEURONTIN 800 MG tablet   Generic drug:  gabapentin     90    Take 800 mg by mouth 3 times daily        NICORETTE 2 MG lozenge   Generic drug:  nicotine polacrilex      Place 2 mg inside cheek every hour as needed for smoking cessation (10x's/day)        REMERON 30 MG tablet   Generic drug:  mirtazapine     30    Take 1 tablet by mouth At Bedtime        TRAMADOL HCL PO      Take 50 mg by mouth 3 times daily        UNABLE TO FIND      Take 1 teaspoonful by mouth 2 times daily MEDICATION NAME: Marijuana (CBD) Oil- NOT THC        vitamin B complex with vitamin C Tabs tablet      Take 1 tablet by mouth every morning

## 2017-11-09 NOTE — PATIENT INSTRUCTIONS
Preparing for Your Surgery      Name:  Ayad Moreno   MRN:  [id number]   :  1970   Today's Date:  2017     Arriving for surgery:  Surgery date:    Arrival time:  5:30AM  Please come to:        [facility] [address]  [address] [place]  [address]    -   parking is available in front of the hospital from 5:15 am to 8:00 pm    -  Stop at the Information Desk in the lobby    -   Inform the information person that you are here for surgery. An escort to 3c will be provided. If you would not like an escort, please proceed to 3C on the 3rd floor. [phone]     What can I eat or drink?  -  You may have solid food or milk products until 8 hours prior to your surgery--do not eat food after 11:30PM on the night before surgery   -  You may have water, apple juice or 7up/Sprite until 2 hours prior to your surgery--OK to have CLEAR liquids until 5:30AM on the morning of surgery     Which medicines can I take?  -  Do NOT take these medications in the morning, the day of surgery:    Please stop all vitamins/supplements/minerals/fish oil/viagra one week prior to surgery       -  Please take these medications the day of surgery:    neurontin   Tramadol  Diazepam           How do I prepare myself?  -  Take two showers: one the night before surgery; and one the morning of surgery.         Use Scrubcare or Hibiclens to wash from neck down.  You may use your own shampoo and conditioner. No other hair products.   -  Do NOT use lotion, powder, deodorant, or antiperspirant the day of your surgery.  -  Do NOT wear any makeup, fingernail polish or jewelry.  -  Begin using Incentive Spirometer 1 week prior to surgery.  Use 4 times per day, up to 5 breaths each time.  Bring Incentive Spirometer to hospital.  -Do not bring your own medications to the hospital, except for inhalers and eye drops.  -  Bring your ID and insurance card.    Questions or Concerns:  If you have  questions or concerns, please call the  Preoperative Assessment Center, Monday-Friday 7AM-7PM:  [phone]            AFTER YOUR SURGERY  Breathing exercises   Breathing exercises help you recover faster. Take deep breaths and let the air out slowly. This will:     Help you wake up after surgery.    Help prevent complications like pneumonia.  Preventing complications will help you go home sooner.   We may give you a breathing device (incentive spirometer) to encourage you to breathe deeply.   Nausea and vomiting   You may feel sick to your stomach after surgery; if so, let your nurse know.    Pain control:  After surgery, you may have pain. Our goal is to help you manage your pain. Pain medicine will help you feel comfortable enough to do activities that will help you heal.  These activities may include breathing exercises, walking and physical therapy.   To help your health care team treat your pain we will ask: 1) If you have pain  2) where it is located 3) describe your pain in your words  Methods of pain control include medications given by mouth, vein or by nerve block for some surgeries.  We may give you a pain control pump that will:  1) Deliver the medicine through a tube placed in your vein  2) Control the amount of medicine you receive  3) Allow you to push a button to deliver a dose of pain medicine  Sequential Compression Device (SCD) or Pneumo Boots:  You may need to wear SCD S on your legs or feet. These are wraps connected to a machine that pumps in air and releases it. The repeated pumping helps prevent blood clots from forming.   Using an Incentive Spirometer    An incentive spirometer is a device that helps you do deep breathing exercises. These exercises expand your lungs, aid in circulation, and help prevent pneumonia. Deep breathing exercises also help you breathe better and improve the function of your lungs by:    Keeping your lungs clear    Strengthening your breathing muscles    Helping  prevent respiratory complications or problems  The incentive spirometer gives you a way to take an active part in your care. A nurse or therapist will teach you breathing exercises. To do these exercises, you will breathe in through your mouth and not your nose. The incentive spirometer only works correctly if you breathe in through your mouth.    Steps to clear lungs  Step 1. Exhale normally. Then, inhale normally.    Relax and breathe out.  Step 2. Place your lips tightly around the mouthpiece.    Make sure the device is upright and not tilted.  Step 3. Inhale as much air as you can through the mouthpiece (don't breath through your nose).    Inhale slowly and deeply.    Hold your breath long enough to keep the balls or disk raised for at least 3 to 5 seconds, or as instructed by your healthcare provider.  Step 4. Repeat the exercise regularly.    Begin using the Incentive Spirometer one week prior to your surgery, 4 times per day-5 times each.

## 2017-11-14 ENCOUNTER — TELEPHONE (OUTPATIENT)
Dept: NEUROSURGERY | Facility: CLINIC | Age: 47
End: 2017-11-14

## 2017-11-14 NOTE — TELEPHONE ENCOUNTER
Pt called because he came down with a cold a couple of days ago and is scheduled for 2nd side DBS surgery on 11/16. As a precaution he started 500 mg amoxicillin 3x day (2nd day today), but he does not feel like this is a sinus infection. Mucous is clear, afebrile. He just feels like his nose is stuffy. Taking afrin nasal spray as needed. Since surgery is scheduled for Thursday, I will notify Dr. Carolina. Pt in agreement with plan.

## 2017-11-16 ENCOUNTER — HOSPITAL ENCOUNTER (OUTPATIENT)
Dept: CT IMAGING | Facility: CLINIC | Age: 47
End: 2017-11-16
Attending: NEUROLOGICAL SURGERY | Admitting: NEUROLOGICAL SURGERY
Payer: COMMERCIAL

## 2017-11-16 ENCOUNTER — TRANSFERRED RECORDS (OUTPATIENT)
Dept: HEALTH INFORMATION MANAGEMENT | Facility: CLINIC | Age: 47
End: 2017-11-16

## 2017-11-16 ENCOUNTER — HOSPITAL ENCOUNTER (INPATIENT)
Facility: CLINIC | Age: 47
LOS: 1 days | Discharge: HOME | End: 2017-11-17
Attending: NEUROLOGICAL SURGERY | Admitting: NEUROLOGICAL SURGERY
Payer: COMMERCIAL

## 2017-11-16 ENCOUNTER — APPOINTMENT (OUTPATIENT)
Dept: GENERAL RADIOLOGY | Facility: CLINIC | Age: 47
End: 2017-11-16
Attending: NEUROLOGICAL SURGERY
Payer: COMMERCIAL

## 2017-11-16 ENCOUNTER — ANESTHESIA (OUTPATIENT)
Dept: SURGERY | Facility: CLINIC | Age: 47
End: 2017-11-16
Payer: COMMERCIAL

## 2017-11-16 ENCOUNTER — APPOINTMENT (OUTPATIENT)
Dept: CT IMAGING | Facility: CLINIC | Age: 47
End: 2017-11-16
Attending: STUDENT IN AN ORGANIZED HEALTH CARE EDUCATION/TRAINING PROGRAM
Payer: COMMERCIAL

## 2017-11-16 DIAGNOSIS — G24.9 DYSTONIA: ICD-10-CM

## 2017-11-16 DIAGNOSIS — Z96.89 S/P DEEP BRAIN STIMULATOR PLACEMENT: Primary | ICD-10-CM

## 2017-11-16 LAB
ABO + RH BLD: NORMAL
ABO + RH BLD: NORMAL
BLD GP AB SCN SERPL QL: NORMAL
BLOOD BANK CMNT PATIENT-IMP: NORMAL
GLUCOSE BLDC GLUCOMTR-MCNC: 82 MG/DL (ref 70–99)
GLUCOSE BLDC GLUCOMTR-MCNC: 96 MG/DL (ref 70–99)
SPECIMEN EXP DATE BLD: NORMAL

## 2017-11-16 PROCEDURE — 40000277 XR SURGERY CARM FLUORO LESS THAN 5 MIN W STILLS: Mod: TC

## 2017-11-16 PROCEDURE — 27210794 ZZH OR GENERAL SUPPLY STERILE: Performed by: NEUROLOGICAL SURGERY

## 2017-11-16 PROCEDURE — 20000004 ZZH R&B ICU UMMC

## 2017-11-16 PROCEDURE — 86850 RBC ANTIBODY SCREEN: CPT | Performed by: ANESTHESIOLOGY

## 2017-11-16 PROCEDURE — 25000125 ZZHC RX 250: Performed by: NURSE ANESTHETIST, CERTIFIED REGISTERED

## 2017-11-16 PROCEDURE — 25000128 H RX IP 250 OP 636: Performed by: NURSE ANESTHETIST, CERTIFIED REGISTERED

## 2017-11-16 PROCEDURE — 36000074 ZZH SURGERY LEVEL 6 1ST 30 MIN - UMMC: Performed by: NEUROLOGICAL SURGERY

## 2017-11-16 PROCEDURE — 27210995 ZZH RX 272: Performed by: NEUROLOGICAL SURGERY

## 2017-11-16 PROCEDURE — 36415 COLL VENOUS BLD VENIPUNCTURE: CPT | Performed by: ANESTHESIOLOGY

## 2017-11-16 PROCEDURE — C1778 LEAD, NEUROSTIMULATOR: HCPCS | Performed by: NEUROLOGICAL SURGERY

## 2017-11-16 PROCEDURE — 25000125 ZZHC RX 250: Performed by: NEUROLOGICAL SURGERY

## 2017-11-16 PROCEDURE — S0020 INJECTION, BUPIVICAINE HYDRO: HCPCS | Performed by: NEUROLOGICAL SURGERY

## 2017-11-16 PROCEDURE — 25000128 H RX IP 250 OP 636: Performed by: NEUROLOGICAL SURGERY

## 2017-11-16 PROCEDURE — 00000146 ZZHCL STATISTIC GLUCOSE BY METER IP

## 2017-11-16 PROCEDURE — 40000170 ZZH STATISTIC PRE-PROCEDURE ASSESSMENT II: Performed by: NEUROLOGICAL SURGERY

## 2017-11-16 PROCEDURE — 86900 BLOOD TYPING SEROLOGIC ABO: CPT | Performed by: ANESTHESIOLOGY

## 2017-11-16 PROCEDURE — 8E09XBG COMPUTER ASSISTED PROCEDURE OF HEAD AND NECK REGION, WITH COMPUTERIZED TOMOGRAPHY: ICD-10-PCS | Performed by: NEUROLOGICAL SURGERY

## 2017-11-16 PROCEDURE — 00H00MZ INSERTION OF NEUROSTIMULATOR LEAD INTO BRAIN, OPEN APPROACH: ICD-10-PCS | Performed by: NEUROLOGICAL SURGERY

## 2017-11-16 PROCEDURE — 71000014 ZZH RECOVERY PHASE 1 LEVEL 2 FIRST HR: Performed by: NEUROLOGICAL SURGERY

## 2017-11-16 PROCEDURE — 27810169 ZZH OR IMPLANT GENERAL: Performed by: NEUROLOGICAL SURGERY

## 2017-11-16 PROCEDURE — 25000128 H RX IP 250 OP 636: Performed by: ANESTHESIOLOGY

## 2017-11-16 PROCEDURE — 37000009 ZZH ANESTHESIA TECHNICAL FEE, EACH ADDTL 15 MIN: Performed by: NEUROLOGICAL SURGERY

## 2017-11-16 PROCEDURE — 36000076 ZZH SURGERY LEVEL 6 EA 15 ADDTL MIN - UMMC: Performed by: NEUROLOGICAL SURGERY

## 2017-11-16 PROCEDURE — 70496 CT ANGIOGRAPHY HEAD: CPT

## 2017-11-16 PROCEDURE — 37000008 ZZH ANESTHESIA TECHNICAL FEE, 1ST 30 MIN: Performed by: NEUROLOGICAL SURGERY

## 2017-11-16 PROCEDURE — 25000128 H RX IP 250 OP 636: Performed by: STUDENT IN AN ORGANIZED HEALTH CARE EDUCATION/TRAINING PROGRAM

## 2017-11-16 PROCEDURE — 71000015 ZZH RECOVERY PHASE 1 LEVEL 2 EA ADDTL HR: Performed by: NEUROLOGICAL SURGERY

## 2017-11-16 PROCEDURE — 25000132 ZZH RX MED GY IP 250 OP 250 PS 637: Performed by: STUDENT IN AN ORGANIZED HEALTH CARE EDUCATION/TRAINING PROGRAM

## 2017-11-16 PROCEDURE — 70450 CT HEAD/BRAIN W/O DYE: CPT | Mod: XS

## 2017-11-16 PROCEDURE — 86901 BLOOD TYPING SEROLOGIC RH(D): CPT | Performed by: ANESTHESIOLOGY

## 2017-11-16 DEVICE — IMPLANTABLE DEVICE: Type: IMPLANTABLE DEVICE | Site: SKULL | Status: FUNCTIONAL

## 2017-11-16 DEVICE — KIT DBS LEAD W/STIMLOC 3389S-40: Type: IMPLANTABLE DEVICE | Site: BRAIN | Status: FUNCTIONAL

## 2017-11-16 RX ORDER — ACETAMINOPHEN 325 MG/1
975 TABLET ORAL EVERY 8 HOURS
Status: DISCONTINUED | OUTPATIENT
Start: 2017-11-16 | End: 2017-11-17 | Stop reason: HOSPADM

## 2017-11-16 RX ORDER — HYDRALAZINE HYDROCHLORIDE 20 MG/ML
2.5-5 INJECTION INTRAMUSCULAR; INTRAVENOUS EVERY 10 MIN PRN
Status: DISCONTINUED | OUTPATIENT
Start: 2017-11-16 | End: 2017-11-16 | Stop reason: HOSPADM

## 2017-11-16 RX ORDER — CEFAZOLIN SODIUM 2 G/100ML
2 INJECTION, SOLUTION INTRAVENOUS
Status: COMPLETED | OUTPATIENT
Start: 2017-11-16 | End: 2017-11-16

## 2017-11-16 RX ORDER — ACETAMINOPHEN 325 MG/1
650 TABLET ORAL EVERY 4 HOURS PRN
Status: DISCONTINUED | OUTPATIENT
Start: 2017-11-19 | End: 2017-11-17 | Stop reason: HOSPADM

## 2017-11-16 RX ORDER — PROCHLORPERAZINE MALEATE 5 MG
10 TABLET ORAL EVERY 6 HOURS PRN
Status: DISCONTINUED | OUTPATIENT
Start: 2017-11-16 | End: 2017-11-17 | Stop reason: HOSPADM

## 2017-11-16 RX ORDER — ONDANSETRON 2 MG/ML
4 INJECTION INTRAMUSCULAR; INTRAVENOUS EVERY 6 HOURS PRN
Status: DISCONTINUED | OUTPATIENT
Start: 2017-11-16 | End: 2017-11-17 | Stop reason: HOSPADM

## 2017-11-16 RX ORDER — MEPERIDINE HYDROCHLORIDE 25 MG/ML
12.5 INJECTION INTRAMUSCULAR; INTRAVENOUS; SUBCUTANEOUS EVERY 5 MIN PRN
Status: DISCONTINUED | OUTPATIENT
Start: 2017-11-16 | End: 2017-11-16 | Stop reason: HOSPADM

## 2017-11-16 RX ORDER — ONDANSETRON 2 MG/ML
4 INJECTION INTRAMUSCULAR; INTRAVENOUS EVERY 30 MIN PRN
Status: COMPLETED | OUTPATIENT
Start: 2017-11-16 | End: 2017-11-16

## 2017-11-16 RX ORDER — ONDANSETRON 4 MG/1
4 TABLET, ORALLY DISINTEGRATING ORAL EVERY 6 HOURS PRN
Status: DISCONTINUED | OUTPATIENT
Start: 2017-11-16 | End: 2017-11-17 | Stop reason: HOSPADM

## 2017-11-16 RX ORDER — HYDRALAZINE HYDROCHLORIDE 20 MG/ML
10-20 INJECTION INTRAMUSCULAR; INTRAVENOUS EVERY 30 MIN PRN
Status: DISCONTINUED | OUTPATIENT
Start: 2017-11-16 | End: 2017-11-17 | Stop reason: HOSPADM

## 2017-11-16 RX ORDER — FENTANYL CITRATE 50 UG/ML
25-50 INJECTION, SOLUTION INTRAMUSCULAR; INTRAVENOUS
Status: DISCONTINUED | OUTPATIENT
Start: 2017-11-16 | End: 2017-11-16 | Stop reason: HOSPADM

## 2017-11-16 RX ORDER — BUPIVACAINE HYDROCHLORIDE 2.5 MG/ML
INJECTION, SOLUTION INFILTRATION; PERINEURAL PRN
Status: DISCONTINUED | OUTPATIENT
Start: 2017-11-16 | End: 2017-11-16 | Stop reason: HOSPADM

## 2017-11-16 RX ORDER — HYDROMORPHONE HYDROCHLORIDE 1 MG/ML
.3-.5 INJECTION, SOLUTION INTRAMUSCULAR; INTRAVENOUS; SUBCUTANEOUS EVERY 5 MIN PRN
Status: DISCONTINUED | OUTPATIENT
Start: 2017-11-16 | End: 2017-11-16 | Stop reason: HOSPADM

## 2017-11-16 RX ORDER — AMOXICILLIN 250 MG
1-2 CAPSULE ORAL 2 TIMES DAILY
Status: DISCONTINUED | OUTPATIENT
Start: 2017-11-16 | End: 2017-11-17 | Stop reason: HOSPADM

## 2017-11-16 RX ORDER — LIDOCAINE 40 MG/G
CREAM TOPICAL
Status: DISCONTINUED | OUTPATIENT
Start: 2017-11-16 | End: 2017-11-16 | Stop reason: HOSPADM

## 2017-11-16 RX ORDER — ONDANSETRON 4 MG/1
4 TABLET, ORALLY DISINTEGRATING ORAL EVERY 30 MIN PRN
Status: COMPLETED | OUTPATIENT
Start: 2017-11-16 | End: 2017-11-16

## 2017-11-16 RX ORDER — POLYETHYLENE GLYCOL 3350 17 G
2 POWDER IN PACKET (EA) ORAL
Status: DISCONTINUED | OUTPATIENT
Start: 2017-11-16 | End: 2017-11-17 | Stop reason: HOSPADM

## 2017-11-16 RX ORDER — SODIUM CHLORIDE AND POTASSIUM CHLORIDE 150; 900 MG/100ML; MG/100ML
INJECTION, SOLUTION INTRAVENOUS CONTINUOUS
Status: DISCONTINUED | OUTPATIENT
Start: 2017-11-16 | End: 2017-11-17

## 2017-11-16 RX ORDER — LABETALOL HYDROCHLORIDE 5 MG/ML
10 INJECTION, SOLUTION INTRAVENOUS
Status: DISCONTINUED | OUTPATIENT
Start: 2017-11-16 | End: 2017-11-16 | Stop reason: HOSPADM

## 2017-11-16 RX ORDER — PROPOFOL 10 MG/ML
INJECTION, EMULSION INTRAVENOUS PRN
Status: DISCONTINUED | OUTPATIENT
Start: 2017-11-16 | End: 2017-11-16

## 2017-11-16 RX ORDER — TRAMADOL HYDROCHLORIDE 50 MG/1
50 TABLET ORAL 3 TIMES DAILY
Status: DISCONTINUED | OUTPATIENT
Start: 2017-11-16 | End: 2017-11-17 | Stop reason: HOSPADM

## 2017-11-16 RX ORDER — SODIUM CHLORIDE, SODIUM LACTATE, POTASSIUM CHLORIDE, CALCIUM CHLORIDE 600; 310; 30; 20 MG/100ML; MG/100ML; MG/100ML; MG/100ML
INJECTION, SOLUTION INTRAVENOUS CONTINUOUS
Status: DISCONTINUED | OUTPATIENT
Start: 2017-11-16 | End: 2017-11-16 | Stop reason: HOSPADM

## 2017-11-16 RX ORDER — PROPOFOL 10 MG/ML
INJECTION, EMULSION INTRAVENOUS CONTINUOUS PRN
Status: DISCONTINUED | OUTPATIENT
Start: 2017-11-16 | End: 2017-11-16

## 2017-11-16 RX ORDER — LIDOCAINE 40 MG/G
CREAM TOPICAL
Status: DISCONTINUED | OUTPATIENT
Start: 2017-11-16 | End: 2017-11-17 | Stop reason: HOSPADM

## 2017-11-16 RX ORDER — DIAZEPAM 2 MG
4 TABLET ORAL 2 TIMES DAILY
Status: DISCONTINUED | OUTPATIENT
Start: 2017-11-16 | End: 2017-11-17 | Stop reason: HOSPADM

## 2017-11-16 RX ORDER — CEFAZOLIN SODIUM 1 G/3ML
1 INJECTION, POWDER, FOR SOLUTION INTRAMUSCULAR; INTRAVENOUS SEE ADMIN INSTRUCTIONS
Status: DISCONTINUED | OUTPATIENT
Start: 2017-11-16 | End: 2017-11-16 | Stop reason: HOSPADM

## 2017-11-16 RX ORDER — OXYCODONE HYDROCHLORIDE 5 MG/1
5-10 TABLET ORAL
Status: DISCONTINUED | OUTPATIENT
Start: 2017-11-16 | End: 2017-11-17 | Stop reason: HOSPADM

## 2017-11-16 RX ORDER — IOPAMIDOL 755 MG/ML
75 INJECTION, SOLUTION INTRAVASCULAR ONCE
Status: COMPLETED | OUTPATIENT
Start: 2017-11-16 | End: 2017-11-16

## 2017-11-16 RX ORDER — LABETALOL HYDROCHLORIDE 5 MG/ML
10-40 INJECTION, SOLUTION INTRAVENOUS EVERY 10 MIN PRN
Status: DISCONTINUED | OUTPATIENT
Start: 2017-11-16 | End: 2017-11-17 | Stop reason: HOSPADM

## 2017-11-16 RX ORDER — MIRTAZAPINE 30 MG/1
30 TABLET, FILM COATED ORAL AT BEDTIME
Status: DISCONTINUED | OUTPATIENT
Start: 2017-11-16 | End: 2017-11-17 | Stop reason: HOSPADM

## 2017-11-16 RX ORDER — GABAPENTIN 800 MG/1
800 TABLET ORAL 3 TIMES DAILY
Status: DISCONTINUED | OUTPATIENT
Start: 2017-11-16 | End: 2017-11-17 | Stop reason: HOSPADM

## 2017-11-16 RX ORDER — HYDROMORPHONE HYDROCHLORIDE 1 MG/ML
.3-.5 INJECTION, SOLUTION INTRAMUSCULAR; INTRAVENOUS; SUBCUTANEOUS
Status: DISCONTINUED | OUTPATIENT
Start: 2017-11-16 | End: 2017-11-17 | Stop reason: HOSPADM

## 2017-11-16 RX ORDER — NALOXONE HYDROCHLORIDE 0.4 MG/ML
.1-.4 INJECTION, SOLUTION INTRAMUSCULAR; INTRAVENOUS; SUBCUTANEOUS
Status: DISCONTINUED | OUTPATIENT
Start: 2017-11-16 | End: 2017-11-17 | Stop reason: HOSPADM

## 2017-11-16 RX ADMIN — HYDROMORPHONE HYDROCHLORIDE 0.5 MG: 1 INJECTION, SOLUTION INTRAMUSCULAR; INTRAVENOUS; SUBCUTANEOUS at 18:00

## 2017-11-16 RX ADMIN — TRAMADOL HYDROCHLORIDE 50 MG: 50 TABLET, COATED ORAL at 20:04

## 2017-11-16 RX ADMIN — MIRTAZAPINE 30 MG: 30 TABLET, FILM COATED ORAL at 21:38

## 2017-11-16 RX ADMIN — FENTANYL CITRATE 25 MCG: 50 INJECTION INTRAMUSCULAR; INTRAVENOUS at 17:08

## 2017-11-16 RX ADMIN — SODIUM CHLORIDE, POTASSIUM CHLORIDE, SODIUM LACTATE AND CALCIUM CHLORIDE: 600; 310; 30; 20 INJECTION, SOLUTION INTRAVENOUS at 08:44

## 2017-11-16 RX ADMIN — CEFAZOLIN SODIUM 2 G: 2 INJECTION, SOLUTION INTRAVENOUS at 10:38

## 2017-11-16 RX ADMIN — HYDROMORPHONE HYDROCHLORIDE 0.5 MG: 1 INJECTION, SOLUTION INTRAMUSCULAR; INTRAVENOUS; SUBCUTANEOUS at 20:20

## 2017-11-16 RX ADMIN — ONDANSETRON 4 MG: 2 INJECTION INTRAMUSCULAR; INTRAVENOUS at 17:08

## 2017-11-16 RX ADMIN — PROPOFOL 50 MG: 10 INJECTION, EMULSION INTRAVENOUS at 08:55

## 2017-11-16 RX ADMIN — IOPAMIDOL 75 ML: 755 INJECTION, SOLUTION INTRAVENOUS at 09:11

## 2017-11-16 RX ADMIN — SENNOSIDES AND DOCUSATE SODIUM 2 TABLET: 8.6; 5 TABLET ORAL at 20:04

## 2017-11-16 RX ADMIN — CEFAZOLIN SODIUM 1 G: 2 INJECTION, SOLUTION INTRAVENOUS at 12:35

## 2017-11-16 RX ADMIN — FENTANYL CITRATE 25 MCG: 50 INJECTION INTRAMUSCULAR; INTRAVENOUS at 17:22

## 2017-11-16 RX ADMIN — FENTANYL CITRATE 50 MCG: 50 INJECTION INTRAMUSCULAR; INTRAVENOUS at 17:37

## 2017-11-16 RX ADMIN — PROPOFOL 50 MG: 10 INJECTION, EMULSION INTRAVENOUS at 08:51

## 2017-11-16 RX ADMIN — POTASSIUM CHLORIDE AND SODIUM CHLORIDE: 900; 150 INJECTION, SOLUTION INTRAVENOUS at 20:03

## 2017-11-16 RX ADMIN — HYDROMORPHONE HYDROCHLORIDE 0.5 MG: 1 INJECTION, SOLUTION INTRAMUSCULAR; INTRAVENOUS; SUBCUTANEOUS at 22:36

## 2017-11-16 RX ADMIN — ONDANSETRON 4 MG: 2 INJECTION INTRAMUSCULAR; INTRAVENOUS at 17:47

## 2017-11-16 RX ADMIN — OXYCODONE HYDROCHLORIDE 10 MG: 5 TABLET ORAL at 20:20

## 2017-11-16 RX ADMIN — PROPOFOL 50 MG: 10 INJECTION, EMULSION INTRAVENOUS at 08:59

## 2017-11-16 RX ADMIN — CEFAZOLIN SODIUM 1 G: 2 INJECTION, SOLUTION INTRAVENOUS at 14:37

## 2017-11-16 RX ADMIN — DIAZEPAM 4 MG: 2 TABLET ORAL at 20:04

## 2017-11-16 RX ADMIN — PROPOFOL 75 MCG/KG/MIN: 10 INJECTION, EMULSION INTRAVENOUS at 09:03

## 2017-11-16 RX ADMIN — DEXMEDETOMIDINE HYDROCHLORIDE 0.7 MCG/KG/HR: 100 INJECTION, SOLUTION INTRAVENOUS at 09:39

## 2017-11-16 RX ADMIN — CEFAZOLIN SODIUM 1 G: 2 INJECTION, SOLUTION INTRAVENOUS at 16:28

## 2017-11-16 RX ADMIN — PROPOFOL 50 MG: 10 INJECTION, EMULSION INTRAVENOUS at 09:46

## 2017-11-16 RX ADMIN — ACETAMINOPHEN 975 MG: 325 TABLET, FILM COATED ORAL at 20:04

## 2017-11-16 RX ADMIN — PROPOFOL 50 MG: 10 INJECTION, EMULSION INTRAVENOUS at 09:03

## 2017-11-16 RX ADMIN — GABAPENTIN 800 MG: 800 TABLET, FILM COATED ORAL at 21:38

## 2017-11-16 RX ADMIN — HYDROMORPHONE HYDROCHLORIDE 0.5 MG: 1 INJECTION, SOLUTION INTRAMUSCULAR; INTRAVENOUS; SUBCUTANEOUS at 17:45

## 2017-11-16 RX ADMIN — NICOTINE POLACRILEX 2 MG: 2 LOZENGE ORAL at 21:42

## 2017-11-16 ASSESSMENT — VISUAL ACUITY
OU: NORMAL ACUITY;BASELINE

## 2017-11-16 ASSESSMENT — PAIN DESCRIPTION - DESCRIPTORS
DESCRIPTORS: HEADACHE
DESCRIPTORS: ACHING;HEADACHE
DESCRIPTORS: HEADACHE
DESCRIPTORS: CRUSHING;DISCOMFORT;HEAVINESS

## 2017-11-16 NOTE — IP AVS SNAPSHOT
MRN:[id number]                      After Visit Summary   11/16/2017    Ayad Moreno    MRN: [id number]           Thank you!     Thank you for choosing [facility] for your care. Our goal is always to provide you with excellent care. Hearing back from our patients is one way we can continue to improve our services. Please take a few minutes to complete the written survey that you may receive in the mail after you visit with us. Thank you!        Patient Information     Date Of Birth          1970        About your hospital stay     You were admitted on:  November 16, 2017 You last received care in the:  [address]    You were discharged on:  November 17, 2017        Reason for your hospital stay       Deep Brain Stimulator placement                  Who to Call     For medical emergencies, please call 911.  For non-urgent questions about your medical care, please call your primary care provider or clinic, [phone]  For questions related to your surgery, please call your surgery clinic        Attending Provider     Provider Duy Koenig MD Neurosurgery       Primary Care Provider Office Phone # Fax #    Yves Pope [phone]      After Care Instructions     Activity       Your activity upon discharge:   Do not do any bending, twisting, strenuous exercise, or heavy lifting (greater than 10 pounds) for 4-6 weeks. Be careful and ask for assistance when walking or going up and down stairs. Avoid any activities that could result in trauma to the surgical wound. Do not drive within 3 months of having your last seizure or while using narcotics or other sedating medications, such as sleep aids, muscle relaxants, etc.            Diet       Follow this diet upon discharge: Orders Placed This Encounter      Regular Diet Adult            Discharge Instructions       You underwent surgery place a  deep brain stimulator  by Duy Carolina MD, PhD       - Your sutures are absorbable.     - You will have follow up scheduled with the physician assistants and/or nurse practitioners in our clinic 2 weeks after your surgery.  If you live far away, you may see your primary care doctor for a wound check at 2 weeks.     - If you have not heard from our clinic about your follow up visit by 3-4 days following your discharge, please call our clinic at [phone] to schedule an appointment with the Neurosurgery teams.     After discharge, your activity restrictions are:   -We encourage short frequent walks, increasing as tolerated.  - No driving until you are seen in clinic and cleared by your neurosurgeon.  If you have had a seizure, you may not drive for at least 3 months according to [place] law.    - No strenuous activity.  - No lifting more than 10 pounds until you are seen in clinic (a gallon of milk weighs approximately 8 pounds)    Wound care  - You are ok to shower, but do not soak your incisions. Pat them dry if they get damp.   - Avoid coloring your hair or permanent styling until cleared by your surgeon  - No baths, hot tubs or pools for 4-6 weeks after surgery.       Call if you have any of the followin. Temperature greater than 101.5 F.   2. Any redness, swelling or discharge from the wound.   3. Any new weakness, numbness or altered mental status.  4. Worsening pain that is not improving with the pain medications you were prescribed.     Call [phone] or after 5:00 pm or on weekends call [phone] and ask for the neurosurgery resident on call. Thank You.            Wound care and dressings       Instructions to care for your wound at home:   You should remove your dressings and bandages on post-operative day #2. You should then keep the wound undressed and open to air. You are allowed to take showers and get the wound wet starting on post-operative day #3 but you may not scrub or soak the wound or keep it submerged under water. If you do  happen to get the wound wet, be sure to pat dry it rather than scrubbing it with a towel.                  Follow-up Appointments     Adult [facility] Follow-up and recommended labs and tests       Follow up with Dr Carolina in 2 weeks for wound check    Follow up with Neurology as scheduled     Appointments on [place] and/or [facility] (with [facility] or [facility] provider or service). Call [phone] if you haven't heard regarding these appointments within 7 days of discharge.                  Your next 10 appointments already scheduled     Dec 13, 2017  6:40 AM CST   CT HEAD W/O CONTRAST with UCCT2   [facility] [place] [facility] ([facility])    [address] [address] [phone]           Please bring any scans or X-rays taken at other hospitals, if similar tests were done. Also bring a list of your medicines, including vitamins, minerals and over-the-counter drugs. It is safest to leave personal items at home.  Be sure to tell your doctor:   If you have any allergies.   If there s any chance you are pregnant.   If you are breastfeeding.   If you have any special needs.  You do not need to do anything special to prepare.  Please wear loose clothing, such as a sweat suit or jogging clothes. Avoid snaps, zippers and other metal. We may ask you to undress and put on a hospital gown.            Dec 13, 2017  7:00 AM CST   (Arrive by 6:45 AM)   Return Movement Disorder with [facility] [place] CNP   [facility] ([facility])    [address] [place] [address] [phone]              Pending Results     No orders found for last 3 day(s).            Statement of Approval     Ordered          11/17/17 0852  I have reviewed and agree with all the recommendations and orders detailed in this document.  EFFECTIVE NOW     Approved and electronically signed by:  Anuja Lopez APRN CNP      "        Admission Information     Date & Time Provider Department Dept. Phone    11/16/2017 Duy Carolina MD [address] [facility] [place] [phone]      Your Vitals Were     Blood Pressure Temperature Respirations Height Weight Pulse Oximetry    120/86 (BP Location: Right arm) 98  F (36.7  C) (Oral) 18 1.854 m (6' 1\") 94.2 kg (207 lb 10.8 oz) 94%    BMI (Body Mass Index)                   27.4 kg/m2           Typo Keyboard[employer]art Information     [employer] gives you secure access to your electronic health record. If you see a primary care provider, you can also send messages to your care team and make appointments. If you have questions, please call your primary care clinic.  If you do not have a primary care provider, please call [phone] and they will assist you.        Care EveryWhere ID     This is your Care EveryWhere ID. This could be used by other organizations to access your [facility] medical records  [id number]        Equal Access to Services     DORIS PASCAL : Hadii jerardo boudreauxo Sojarvis, waaxda luqadaha, qaybta kaalmada adeegyada, raffy lopez . So [facility] [phone].    ATENCIÓN: Si habla español, tiene a loving disposición servicios gratuitos de asistencia lingüística. Llame al [phone].    We comply with applicable federal civil rights laws and [place] laws. We do not discriminate on the basis of race, color, national origin, age, disability, sex, sexual orientation, or gender identity.               Review of your medicines      START taking        Dose / Directions    oxyCODONE IR 5 MG tablet   Commonly known as:  ROXICODONE        Dose:  5-10 mg   Take 1-2 tablets (5-10 mg) by mouth every 3 hours as needed for moderate to severe pain   Quantity:  30 tablet   Refills:  0       senna-docusate 8.6-50 MG per tablet   Commonly known as:  SENOKOT-S;PERICOLACE        Dose:  1-2 tablet   Take 1-2 tablets by mouth 2 times daily   Quantity:  30 tablet   Refills:  0         CONTINUE " these medicines which have NOT CHANGED        Dose / Directions    BOTOX 100 UNITS injection   Indication:  LD 12/16. takes for his voice.   Used for:  Oromandibular dystonia   Generic drug:  botulinum toxin type A        every 3 months Inject 95 units   Quantity:  95 each   Refills:  0       diazepam 2 MG tablet   Commonly known as:  VALIUM        Dose:  4 mg   Take 4 mg by mouth 2 times daily Takes 2 pills at a time   Quantity:  60   Refills:  0       NEURONTIN 800 MG tablet   Generic drug:  gabapentin        Dose:  800 mg   Take 800 mg by mouth 3 times daily   Quantity:  90   Refills:  0       NICORETTE 2 MG lozenge   Generic drug:  nicotine polacrilex        Dose:  2 mg   Place 2 mg inside cheek every hour as needed for smoking cessation (10x's/day)   Refills:  0       REMERON 30 MG tablet   Generic drug:  mirtazapine        Dose:  1 tablet   Take 1 tablet by mouth At Bedtime   Quantity:  30   Refills:  0       UNABLE TO FIND        Dose:  1 teaspoonful   Take 1 teaspoonful by mouth 2 times daily MEDICATION NAME: Marijuana (CBD) Oil- NOT THC   Refills:  0       vitamin B complex with vitamin C Tabs tablet        Dose:  1 tablet   Take 1 tablet by mouth every morning   Refills:  0         STOP taking     TRAMADOL HCL PO                Where to get your medicines      These medications were sent to [facility] [address] [place] [address] AT [address] [address]    Hours:  24-hours Phone:  [phone]     senna-docusate 8.6-50 MG per tablet         Some of these will need a paper prescription and others can be bought over the counter. Ask your nurse if you have questions.     Bring a paper prescription for each of these medications     oxyCODONE IR 5 MG tablet                Protect others around you: Learn how to safely use, store and throw away your medicines at www.disposemymeds.org.             Medication List: This is a list of  all your medications and when to take them. Check marks below indicate your daily home schedule. Keep this list as a reference.      Medications           Morning Afternoon Evening Bedtime As Needed    BOTOX 100 UNITS injection   every 3 months Inject 95 units   Generic drug:  botulinum toxin type A                                diazepam 2 MG tablet   Commonly known as:  VALIUM   Take 4 mg by mouth 2 times daily Takes 2 pills at a time   Last time this was given:  4 mg on 11/17/2017  7:16 AM                                NEURONTIN 800 MG tablet   Take 800 mg by mouth 3 times daily   Last time this was given:  800 mg on 11/17/2017  7:16 AM   Generic drug:  gabapentin                                NICORETTE 2 MG lozenge   Place 2 mg inside cheek every hour as needed for smoking cessation (10x's/day)   Last time this was given:  2 mg on 11/16/2017  9:42 PM   Generic drug:  nicotine polacrilex                                oxyCODONE IR 5 MG tablet   Commonly known as:  ROXICODONE   Take 1-2 tablets (5-10 mg) by mouth every 3 hours as needed for moderate to severe pain   Last time this was given:  10 mg on 11/17/2017  7:26 AM                                REMERON 30 MG tablet   Take 1 tablet by mouth At Bedtime   Last time this was given:  30 mg on 11/16/2017  9:38 PM   Generic drug:  mirtazapine                                senna-docusate 8.6-50 MG per tablet   Commonly known as:  SENOKOT-S;PERICOLACE   Take 1-2 tablets by mouth 2 times daily   Last time this was given:  1 tablet on 11/17/2017  7:16 AM                                UNABLE TO FIND   Take 1 teaspoonful by mouth 2 times daily MEDICATION NAME: Marijuana (CBD) Oil- NOT THC                                vitamin B complex with vitamin C Tabs tablet   Take 1 tablet by mouth every morning   Last time this was given:  1 tablet on 11/17/2017  7:16 AM

## 2017-11-16 NOTE — ANESTHESIA POSTPROCEDURE EVALUATION
Patient: Ayad Moreno    Procedure(s):  Right Stealth Assisted Deep Brain Stimulator Placement, Phase I And II Combined, Placement Of Right Side Deep Brain Stimulator Electrode, Target Right Globus Pallidus Internus With Microelectrode Recording And Connection To The Existing Generator/Battery - Wound Class: I-Clean    Diagnosis:Dystonia  Diagnosis Additional Information: No value filed.    Anesthesia Type:  No value filed.    Note:  Anesthesia Post Evaluation    Patient location during evaluation: PACU  Patient participation: Able to fully participate in evaluation  Level of consciousness: awake and alert  Pain management: adequate  Airway patency: patent  Cardiovascular status: acceptable  Respiratory status: acceptable  Hydration status: acceptable  PONV: none     Anesthetic complications: None          Last vitals:  Vitals:    11/16/17 0737 11/16/17 1700 11/16/17 1715   BP: 123/79 138/84 (!) 146/127   Resp: 14 16 16   Temp: 36.7  C (98  F) 36.4  C (97.5  F)    SpO2: 96% 95% 96%         Electronically Signed By: Ailyn Ness MD  November 16, 2017  5:33 PM

## 2017-11-16 NOTE — IP AVS SNAPSHOT
[address] [address]    Phone:  [phone]                                       After Visit Summary   11/16/2017    Ayad Moreno    MRN: [id number]           After Visit Summary Signature Page     I have received my discharge instructions, and my questions have been answered. I have discussed any challenges I see with this plan with the nurse or doctor.    ..........................................................................................................................................  Patient/Patient Representative Signature      ..........................................................................................................................................  Patient Representative Print Name and Relationship to Patient    ..................................................               ................................................  Date                                            Time    ..........................................................................................................................................  Reviewed by Signature/Title    ...................................................              ..............................................  Date                                                            Time

## 2017-11-17 VITALS
RESPIRATION RATE: 31 BRPM | SYSTOLIC BLOOD PRESSURE: 121 MMHG | TEMPERATURE: 98 F | WEIGHT: 207.67 LBS | BODY MASS INDEX: 27.52 KG/M2 | OXYGEN SATURATION: 94 % | HEIGHT: 73 IN | DIASTOLIC BLOOD PRESSURE: 72 MMHG

## 2017-11-17 LAB
ANION GAP SERPL CALCULATED.3IONS-SCNC: 9 MMOL/L (ref 3–14)
BUN SERPL-MCNC: 9 MG/DL (ref 7–30)
CALCIUM SERPL-MCNC: 8.8 MG/DL (ref 8.5–10.1)
CHLORIDE SERPL-SCNC: 107 MMOL/L (ref 94–109)
CO2 SERPL-SCNC: 25 MMOL/L (ref 20–32)
CREAT SERPL-MCNC: 0.92 MG/DL (ref 0.66–1.25)
ERYTHROCYTE [DISTWIDTH] IN BLOOD BY AUTOMATED COUNT: 13.3 % (ref 10–15)
GFR SERPL CREATININE-BSD FRML MDRD: 89 ML/MIN/1.7M2
GLUCOSE SERPL-MCNC: 124 MG/DL (ref 70–99)
HCT VFR BLD AUTO: 49.7 % (ref 40–53)
HGB BLD-MCNC: 16.7 G/DL (ref 13.3–17.7)
MCH RBC QN AUTO: 29.8 PG (ref 26.5–33)
MCHC RBC AUTO-ENTMCNC: 33.6 G/DL (ref 31.5–36.5)
MCV RBC AUTO: 89 FL (ref 78–100)
PLATELET # BLD AUTO: 161 10E9/L (ref 150–450)
POTASSIUM SERPL-SCNC: 4 MMOL/L (ref 3.4–5.3)
RBC # BLD AUTO: 5.6 10E12/L (ref 4.4–5.9)
SODIUM SERPL-SCNC: 141 MMOL/L (ref 133–144)
WBC # BLD AUTO: 8.2 10E9/L (ref 4–11)

## 2017-11-17 PROCEDURE — 25000132 ZZH RX MED GY IP 250 OP 250 PS 637: Performed by: STUDENT IN AN ORGANIZED HEALTH CARE EDUCATION/TRAINING PROGRAM

## 2017-11-17 PROCEDURE — 36415 COLL VENOUS BLD VENIPUNCTURE: CPT | Performed by: STUDENT IN AN ORGANIZED HEALTH CARE EDUCATION/TRAINING PROGRAM

## 2017-11-17 PROCEDURE — 85027 COMPLETE CBC AUTOMATED: CPT | Performed by: STUDENT IN AN ORGANIZED HEALTH CARE EDUCATION/TRAINING PROGRAM

## 2017-11-17 PROCEDURE — 25000128 H RX IP 250 OP 636: Performed by: STUDENT IN AN ORGANIZED HEALTH CARE EDUCATION/TRAINING PROGRAM

## 2017-11-17 PROCEDURE — 27210995 ZZH RX 272: Performed by: STUDENT IN AN ORGANIZED HEALTH CARE EDUCATION/TRAINING PROGRAM

## 2017-11-17 PROCEDURE — 80048 BASIC METABOLIC PNL TOTAL CA: CPT | Performed by: STUDENT IN AN ORGANIZED HEALTH CARE EDUCATION/TRAINING PROGRAM

## 2017-11-17 RX ORDER — CEFAZOLIN SODIUM 1 G
1 VIAL (EA) INJECTION EVERY 8 HOURS
Status: DISCONTINUED | OUTPATIENT
Start: 2017-11-17 | End: 2017-11-17 | Stop reason: HOSPADM

## 2017-11-17 RX ORDER — OXYCODONE HYDROCHLORIDE 5 MG/1
5-10 TABLET ORAL
Qty: 30 TABLET | Refills: 0 | Status: SHIPPED | OUTPATIENT
Start: 2017-11-17 | End: 2017-11-21

## 2017-11-17 RX ORDER — AMOXICILLIN 250 MG
1-2 CAPSULE ORAL 2 TIMES DAILY
Qty: 30 TABLET | Refills: 0 | Status: SHIPPED | OUTPATIENT
Start: 2017-11-17 | End: 2017-12-04

## 2017-11-17 RX ADMIN — ACETAMINOPHEN 975 MG: 325 TABLET, FILM COATED ORAL at 10:42

## 2017-11-17 RX ADMIN — CEFAZOLIN SODIUM 1 G: 10 INJECTION, POWDER, FOR SOLUTION INTRAVENOUS at 01:35

## 2017-11-17 RX ADMIN — OXYCODONE HYDROCHLORIDE 10 MG: 5 TABLET ORAL at 10:42

## 2017-11-17 RX ADMIN — CEFAZOLIN SODIUM 1 G: 10 INJECTION, POWDER, FOR SOLUTION INTRAVENOUS at 09:50

## 2017-11-17 RX ADMIN — OXYCODONE HYDROCHLORIDE 10 MG: 5 TABLET ORAL at 01:10

## 2017-11-17 RX ADMIN — TRAMADOL HYDROCHLORIDE 50 MG: 50 TABLET, COATED ORAL at 07:16

## 2017-11-17 RX ADMIN — B-COMPLEX W/ C & FOLIC ACID TAB 1 TABLET: TAB at 07:16

## 2017-11-17 RX ADMIN — OXYCODONE HYDROCHLORIDE 10 MG: 5 TABLET ORAL at 07:26

## 2017-11-17 RX ADMIN — GABAPENTIN 800 MG: 800 TABLET, FILM COATED ORAL at 07:16

## 2017-11-17 RX ADMIN — ACETAMINOPHEN 975 MG: 325 TABLET, FILM COATED ORAL at 03:00

## 2017-11-17 RX ADMIN — SENNOSIDES AND DOCUSATE SODIUM 1 TABLET: 8.6; 5 TABLET ORAL at 07:16

## 2017-11-17 RX ADMIN — DIAZEPAM 4 MG: 2 TABLET ORAL at 07:16

## 2017-11-17 ASSESSMENT — VISUAL ACUITY
OU: NORMAL ACUITY;BASELINE

## 2017-11-17 ASSESSMENT — PAIN DESCRIPTION - DESCRIPTORS
DESCRIPTORS: ACHING;HEADACHE
DESCRIPTORS: HEADACHE

## 2017-11-17 NOTE — PROGRESS NOTES
[facility]  Neurosurgery Daily ICU Note:          Assessment   Ayad Moreno is a 47 year old male who is postoperative day #1 from right stealth assisted DBS, phase I and II combined. Target right GPi.           Plan 1.   Neuro: s/p DBS Phase I and II combined    Continue frequent neuro exams while in ICU. Notify MD for acute changes in exam.    Pain control    Ancef 3x doses post op    No skull XR needed    Post-op stealth HCT completed, minimal pneumocephalus, no hemorrhage    PTA neurontin, remeron  2. CVS: hemodynamically stable    Maintain SBP < 140    Hydralazine and labetolol PRN    Continuous cardiac monitoring while in ICU  3. Pulmonary: no issues    Continuous pulse oximetry    Supplemental oxygen PRN    Incentive spirometry Q1H while awake  4. GI:     Advance diet as tolerated to regular    Bowel regimen. PRN anti-emetics.  5. Renal: no issues    mIVF -- TKO when patient is tolerating good PO intake    Johnson out    Electrolyte replacement protocol    Continue to monitor intake/output  6. ID: afebrile, normal WBC count    Continue to monitor for fevers and/or signs of infection  7. Endocrine:    No current concerns  8. Heme: no issues    Platelets > 100,000    INR < 1.5    Hemoglobin > 8  9. Prophylaxis    DVT: SCDs while in bed    GI: Protonix  10. Disposition: Surgical ICU    PT/OT    Above patient and plan has been discussed with the neurosurgery chief resident.     Please dial * * * [phone] and enter job [phone] to reach the on-call neurosurgery resident if you have questions.          Subjective     No acute events overnight.          Objective   Temp:  [97.5  F (36.4  C)-98.6  F (37  C)] 98  F (36.7  C)  Heart Rate:  [43-76] 76  Resp:  [8-37] 18  BP: ()/(64-86) 120/86  SpO2:  [92 %-98 %] 94 %    Resp: 18    I/O last 3 completed shifts:  In: 1796.25 [P.O.:200; I.V.:1596.25]  Out: 2220 [Urine:2200; Blood:20]    Physical Exam  General: NAD, lying comfortably in  bed  Incision: clean, dry, intact  Neurologic    Mental Status:       -- Awake; Alert; oriented x 3      -- Follows commands       -- no gaze preference. No apparent hemineglect.      -- slurring of some words, at times difficult to understand    Cranial Nerves:      -- PERRL 3-2mm bilat and brisk      -- extraocular movements intact      -- face symmetrical, tongue midline      -- sensory V1-V3 intact bilaterally      -- palate elevates symmetrically, uvula midline    Motor:    Delt Bi Tri WE WF    R 5 5 5 5 5 5   L 5 5 5 5 5 5    IP Quad Ham DF PF EHL   R 5 5 5 5 5 5   L 5 5 5 5 5 5     Sensory: symmetrically intact to light touch x4 extremities.     Left upper extremity tremor.      Labs and Imaging   BMP  Recent Labs  Lab 11/17/17  0821      POTASSIUM 4.0   CHLORIDE 107   CO2 25   BUN 9   CR 0.92   JOSÉ MIGUEL 8.8       CBC  Recent Labs  Lab 11/17/17  0821   WBC 8.2   HGB 16.7          IMAGING: reviewed post-op images       Please contact the neurosurgery resident on call with questions by dialing [phone], then entering [phone] when prompted

## 2017-11-17 NOTE — PLAN OF CARE
Problem: Patient Care Overview  Goal: Plan of Care/Patient Progress Review  D: Patient alert and oriented x 4, Neuro intact except left hand shakiness, he had baseline dystonia with affected speech, slurred speech but able to express himself well. Bradycardia with heart rate in the 50's but converted to sinus rhythm around midnight, blood pressure within normal limit. Room air.    I: Complain of pain, medicated with just little effect and pain became more intense, medicated as needed while neuro remain intact, pain relieved around midnight and he was able to sleep better, Continue to be stable all night, good urine output.    P: Possible discharge today.

## 2017-11-17 NOTE — BRIEF OP NOTE
[facility], [place]    Brief Operative Note    Pre-operative diagnosis: Dystonia  Post-operative diagnosis * No post-op diagnosis entered *  Procedure: Procedure(s):  Right Stealth Assisted Deep Brain Stimulator Placement, Phase I And II Combined, Placement Of Right Side Deep Brain Stimulator Electrode, Target Right Globus Pallidus Internus With Microelectrode Recording And Connection To The Existing Generator/Battery - Wound Class: I-Clean  Surgeon: Surgeon(s) and Role:     * Duy Carolina MD - Primary     * Farideh Aviles MD - Resident - Assisting  Anesthesia: Combined MAC with Local   Estimated blood loss: 20 mL  Drains: None  Specimens: * No specimens in log *  Findings:   Final electrode location, 1.5 mm anterior move of X-Y stage, lateral Joni Gun position, 4 mm below the target.  All impedances within normal.  No problems found..  Complications: None.  Implants: Med[employer] DBS electrode, model 3389.

## 2017-11-17 NOTE — DISCHARGE SUMMARY
[facility] Discharge Summary and Instructions    Ayad Moreno MRN# [id number]   Age: 47 year old YOB: 1970     Date of Admission:  11/16/2017  Date of Discharge::  11/17/2017  Admitting Physician:  Duy Carolina MD  Discharge Physician:  Duy Carolina MD          Admission Diagnoses:   S/P deep brain stimulator placement [Z96.89]          Discharge Diagnosis:   S/P deep brain stimulator placement [Z96.89]          Procedures:   11/16/2017  Right Stealth Assisted Deep Brain Stimulator Placement, Phase I And II Combined, Placement Of Right Side Deep Brain Stimulator Electrode, Target Right Globus Pallidus Internus With Microelectrode Recording And Connection To The Existing Generator/Battery            Brief History of Illness:   Mr. Ayad Moreno is a 47 year old with history of DYT-1 genetic mutation generalized dyskinesia/dystonia. He previously underwent a left side DBS placement on 1/10/17 by Dr. Carolina. This has improved his right body dystonia and speech. He still has some right hand tremor but it is much improved. He presented for right sided DBS placement and connection to existing IPG.            Hospital Course:   Patient underwent above-mentioned procedure on 11/16. The operation was uncomplicated and he was admitted to the surgical ICU for routine post operative cares. On post operative day 1 he was doing well and transferred to the floor. On post operative day 1, he was ambulating, voiding without a lepe, eating a regular diet, pain was well controlled and therefore he was discharged home          Discharge Medications:     Current Discharge Medication List      START taking these medications    Details   oxyCODONE IR (ROXICODONE) 5 MG tablet Take 1-2 tablets (5-10 mg) by mouth every 3 hours as needed for moderate to severe pain  Qty: 30 tablet, Refills: 0    Associated Diagnoses: S/P deep brain stimulator placement      senna-docusate (SENOKOT-S;PERICOLACE)  "8.6-50 MG per tablet Take 1-2 tablets by mouth 2 times daily  Qty: 30 tablet, Refills: 0    Associated Diagnoses: S/P deep brain stimulator placement         CONTINUE these medications which have NOT CHANGED    Details   vitamin B complex with vitamin C (VITAMIN  B COMPLEX) TABS tablet Take 1 tablet by mouth every morning       nicotine polacrilex (NICORETTE) 2 MG lozenge Place 2 mg inside cheek every hour as needed for smoking cessation (10x's/day)      NEURONTIN 800 MG OR TABS Take 800 mg by mouth 3 times daily   Qty: 90, Refills: 0      REMERON 30 MG OR TABS Take 1 tablet by mouth At Bedtime   Qty: 30, Refills: 0      DIAZEPAM 2 MG OR TABS Take 4 mg by mouth 2 times daily Takes 2 pills at a time  Qty: 60, Refills: 0      UNABLE TO FIND Take 1 teaspoonful by mouth 2 times daily MEDICATION NAME: Marijuana (CBD) Oil- NOT THC      botulinum toxin type A (BOTOX) 100 UNITS injection every 3 months Inject 95 units  Qty: 95 each    Associated Diagnoses: Oromandibular dystonia         STOP taking these medications       TRAMADOL HCL PO Comments:   Reason for Stopping:              Exam:   Physical Exam  /86 (BP Location: Right arm)  Temp 98  F (36.7  C) (Oral)  Resp 18  Ht 1.854 m (6' 1\")  Wt 94.2 kg (207 lb 10.8 oz)  SpO2 94%  BMI 27.4 kg/m2  General: Appears comfortable, NAD  Wound: Incision, clean, dry, intact  Neurologic Exam:  - AOx3.  - Follows commands.  - Speech fluent, spontaneous. No aphasia or dysarthria.  - No gaze preference. No apparent hemineglect.  - PERRL, EOMI.  - Face symmetric with sensation intact to light touch.  - Palate elevates symmetrically, uvula midline, tongue protrudes midline.  - Trapezii and sternocleidomastoid muscles 5/5 bilaterally.  - No pronator drift.  Motor: Normal bulk/tone; no tremor, rigidity, or bradykinesia.   Right:  Deltoid 5/5, tricep 5/5, bicep 5/5, wrist flexor 5/5, wrist extensor 5/5, finger intrinsic 5/5  Left:  Deltoid 5/5, tricep 5/5, bicep 5/5, wrist flexor " 5/5, wrist extensor 5/5, finger intrinsic 5/5  Right: Iliopsoas 5/5, quadricep 5/5, hamstring 5/5, tibialis anterior 5/5, gastrocnemius 5/5, EHL 5/5  Left:  Iliopsoas 5/5, quadricep 5/5, hamstring 5/5, tibialis anterior 5/5, gastrocnemius 5/5, EHL 5/5    Sensation intact in bilateral L4-S1 dermatones            Discharge Instructions and Follow-Up:   Discharge diet: Regular   Discharge activity: You may advance activity as tolerated. No strenuous exercise or heay lifting greater than 10 lbs for 4 weeks or until seen and cleared in clinic.   Discharge follow-up: Follow-up with Dr. Duy Carolina MD in 2 weeks   Wound care: Ok to shower,however no scrubbing of the wound and no soaking of the wound, meaning no bathtubs or swimming pools. Pat dry only. Leave wound open to air.  Sutures are not absorbable and need to be removed in 2 weeks. If patient still at rehab by this time, the sutures may be removed by the rehab physician if he or she considers that the wound has healed completely.     Please call if you have:  1. increased pain, redness, drainage, swelling at your incision  2. fevers > 101.5 F degrees  3. with any questions or concerns.  You may reach the Neurosurgery clinic at [phone] during regular work hours. ER at [phone].    and ask for the Neurosurgery Resident on call at [phone], during off hours or weekends.         Discharge Disposition:   Discharged to home

## 2017-11-20 ENCOUNTER — CARE COORDINATION (OUTPATIENT)
Dept: NEUROSURGERY | Facility: CLINIC | Age: 47
End: 2017-11-20

## 2017-11-20 NOTE — PROGRESS NOTES
Pt s/p right side DBS lead placement and connection to existing battery. Left VM to check on his post op status and requested that he call me at his earliest convenience.

## 2017-11-21 DIAGNOSIS — Z96.89 S/P DEEP BRAIN STIMULATOR PLACEMENT: ICD-10-CM

## 2017-11-21 RX ORDER — OXYCODONE HYDROCHLORIDE 5 MG/1
5-10 TABLET ORAL
Qty: 30 TABLET | Refills: 0 | Status: SHIPPED | OUTPATIENT
Start: 2017-11-21 | End: 2017-12-04

## 2017-11-21 NOTE — PROGRESS NOTES
Neurosurgery Discharge Coordination Note     Attending physician: Dr. Carolina  Surgery performed: DBS lead placement and connection to existing battery  Date of Discharge: 11/17/2017  Discharge to: Home     Current status: Patient states he  feels like his dystonia symptoms are more aggravated on both sides since surgery. He is having more difficulty talking than he did prior to surgery and he states he doesn't like how he looks when he talks. He would like to be seen earlier for DBS programming, but I am unclear if he wants his first side to be tweaked earlier than his current programming appt of 12/13 or if he wants the new side programmed sooner than this date. Pt states he has no cognitive difficulties like he did following his initial DBS surgery and he is pleased about this. He has some HA pain and is out of oxycodone. I will f/u with Dr. Carolina about a refill and pt understands that it will only be few days worth. Denies redness, swelling, increased tenderness, drainage, incision opening or elevated temp. Reports Incision CDI without signs of infection.  Denies new bowel or bladder issues.    Discharge instructions and medications reviewed with patient.  Follow up appointments/imaging/tests needed: 2 week post op with Dr. Carolina on December 4, 2017 at 9:00.   RN triage/on call number given: [phone]/ [phone]

## 2017-12-02 ASSESSMENT — MOVEMENT DISORDERS SOCIETY - UNIFIED PARKINSONS DISEASE RATING SCALE (MDS-UPDRS)
SPEECH: SEVERE: MOST OF ALL OF MY SPEECH CANNOT BE UNDERSTOOD.
TOTAL_SCORE: 27
DRESSING: MILD: I AM SLOW AND NEED HELP FOR A FEW DRESSING TASKS (BUTTONS, BRACELETS).
FREEZING: NORMAL: NOT AT ALL (NO PROBLEMS).
SALIVA_AND_DROOLING: MODERATE: I HAVE SOME DROOLING WHEN I AM AWAKE, BUT I USUALLY DO NOT NEED TISSUES OR A HANDKERCHIEF.
TREMOR: SEVERE: SHAKING OR TEMORS CAUSES PROBLEMS WITH MOST OR ALL ACTIVITES.
TURNING_IN_BED: NORMAL: NOT AT ALL (NO PROBLEMS).
WALKING_AND_BALANCE: SLIGHT: I AM SLIGHTLY SLOW OR MAY DRAG A LEG.  I NEVER USE A WALKING AID.
GETTING_OUT_OF_BED_CAR_DEEP_CHAIR: SLIGHT: I AM SLOW OR AWKWARD, BUT I USUALLY CAN DO IT ON MY FIRST TRY.
EATING_TASKS: MODERATE: I NEED HELP WITH MANY EATING TASKS BUT CAN MANAGE SOME ALONE.
CHEWING_AND_SWALLOWING: SLIGHT: I AM AWARE OF SLOWNESS IN MY CHEWING OR INCREASED EFFORT AT SWALLOWING, BUT I DO NOT CHOKE OR NEED TO HAVE MY FOOD SPECIALLY PREPARED.
HYGIENE: SLIGHT:  I AM SLOW BUT I DO NOT NEED ANY HELP.
HOBBIES_AND_OTHER_ACTIVITIES: MODERATE: I HAVE MAJOR PROBLEMS DOING THESE ACTIVITIES, BUT STILL DO MOST.
HANDWRITING: SEVERE: MOST OR ALL WORDS CANNOT BE READ.

## 2017-12-04 ENCOUNTER — OFFICE VISIT (OUTPATIENT)
Dept: NEUROSURGERY | Facility: CLINIC | Age: 47
End: 2017-12-04

## 2017-12-04 VITALS — DIASTOLIC BLOOD PRESSURE: 80 MMHG | SYSTOLIC BLOOD PRESSURE: 123 MMHG | HEART RATE: 83 BPM | HEIGHT: 73 IN

## 2017-12-04 DIAGNOSIS — Z96.89 S/P DEEP BRAIN STIMULATOR PLACEMENT: ICD-10-CM

## 2017-12-04 DIAGNOSIS — G24.9 GENERALIZED DYSTONIA: ICD-10-CM

## 2017-12-04 DIAGNOSIS — G24.1 DYSTONIA DUE TO DYT-1 GENE MUTATION: ICD-10-CM

## 2017-12-04 DIAGNOSIS — G24.9 DYSTONIA: Primary | ICD-10-CM

## 2017-12-04 ASSESSMENT — PAIN SCALES - GENERAL: PAINLEVEL: MILD PAIN (3)

## 2017-12-04 NOTE — MR AVS SNAPSHOT
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 Also bring a list of your medicines, including vitamins, minerals and over-the-counter drugs. It is safest to leave personal items at home.  Be sure to tell your doctor:   If you have any allergies.   If there s any chance you are pregnant.   If you are breastfeeding.   If you have any special needs.  You do not need to do anything special to prepare.  Please wear loose clothing, such as a sweat suit or jogging clothes. Avoid snaps, zippers and other metal. We may ask you to undress and put on a hospital gown.            Dec 13, 2017  7:00 AM CST   (Arrive by 6:45 AM)   Return Movement Disorder with [facility] Neurology ([facility] [place])    [address] [place] [address] [phone]              Who to contact     Please call your clinic at [phone] to:    Ask questions about your health    Make or cancel appointments    Discuss your medicines    Learn about your test results    Speak to your doctor   If you have compliments or concerns about an experience at your clinic, or if you wish to file a complaint, please contact  "[facility] Patient Relations at [phone] or email us at [email or web address]         Additional Information About Your Visit        Brycehart Information     VerbalizeIthart gives you secure access to your electronic health record. If you see a primary care provider, you can also send messages to your care team and make appointments. If you have questions, please call your primary care clinic.  If you do not have a primary care provider, please call [phone] and they will assist you.      [email or web address] is an electronic gateway that provides easy, online access to your medical records. With [email or web address], you can request a clinic appointment, read your test results, renew a prescription or communicate with your care team.     To access your existing account, please contact your [facility] or call [phone] for assistance.        Care EveryWhere ID     This is your Care EveryWhere ID. This could be used by other organizations to access your [facility] medical records  [id number]        Your Vitals Were     Pulse Height                83 1.854 m (6' 1\")           Blood Pressure from Last 3 Encounters:   12/04/17 123/80   11/17/17 121/72   11/09/17 128/85    Weight from Last 3 Encounters:   11/16/17 94.2 kg (207 lb 10.8 oz)   11/09/17 95.3 kg (210 lb)   10/12/17 95.5 kg (210 lb 8 oz)              Today, you had the following     No orders found for display       Primary Care Provider Office Phone # Fax #    Yves KIRAN Pope [phone]       [facility] [address] [place] [address]        Equal Access to Services     DORIS PASCAL AH: Hadii jerardo hernandez hadasho Soomaali, waaxda luqadaha, qaybta kaalmada adeegyatripp, raffy barrientos. So [facility] [phone].    ATENCIÓN: Si habla español, tiene a loving disposición servicios gratuitos de asistencia lingüística. Shankarame al [phone].    We comply with " applicable federal civil rights laws and [place] laws. We do not discriminate on the basis of race, color, national origin, age, disability, sex, sexual orientation, or gender identity.            Thank you!     Thank you for choosing [facility]  for your care. Our goal is always to provide you with excellent care. Hearing back from our patients is one way we can continue to improve our services. Please take a few minutes to complete the written survey that you may receive in the mail after your visit with us. Thank you!             Your Updated Medication List - Protect others around you: Learn how to safely use, store and throw away your medicines at www.disposemymeds.org.          This list is accurate as of: 12/4/17 10:49 AM.  Always use your most recent med list.                   Brand Name Dispense Instructions for use Diagnosis    BOTOX 100 UNITS injection   Generic drug:  botulinum toxin type A     95 each    every 3 months Inject 95 units    Oromandibular dystonia       diazepam 2 MG tablet    VALIUM    60    Take 4 mg by mouth 2 times daily Takes 2 pills at a time        NEURONTIN 800 MG tablet   Generic drug:  gabapentin     90    Take 800 mg by mouth 3 times daily        NICORETTE 2 MG lozenge   Generic drug:  nicotine polacrilex      Place 2 mg inside cheek every hour as needed for smoking cessation (10x's/day)        REMERON 30 MG tablet   Generic drug:  mirtazapine     30    Take 1 tablet by mouth At Bedtime        UNABLE TO FIND      Take 1 teaspoonful by mouth 2 times daily MEDICATION NAME: Marijuana (CBD) Oil- NOT THC        vitamin B complex with vitamin C Tabs tablet      Take 1 tablet by mouth every morning

## 2017-12-04 NOTE — PROGRESS NOTES
HISTORY AND PHYSICAL EXAM    Chief Complaint   Patient presents with     RECHECK     Post-op wound check s/p Phase I and II combined DBS on 11/16/2017       HISTORY OF PRESENT ILLNESS  We saw Mr. Ayad Moreno back in Neurosurgery Clinic today for his post-operative wound check. He is a 47 year old male with a history of dystonia secondary to a DYT1 gene mutation who underwent left-sided DBS lead placement of the GPi, phase I, in 01/10/2017. He then underwent phase II DBS susrgery to follow on 01/20/2017. He obtained good relief from this procedure and elected to undergo right-sided DBS implantation on 11/16/2017. He was actually a staged bilateral implantation candidate so he had a dual channel generator/battery placed with two extension wires.  His right side DBS implantation surgery recently was without any complications.  He has recovered well from this operation and is here today for removal of his sutures. He has some numbness and tingling over his incisions. He also complains of some occasional pain at the left parietal incision, location of his connector.       Past Medical History:   Diagnosis Date     Anxiety      Asthmas with exposure to cats      Depression      Generalized dystonia     DYT-1 genetic mutation     Lumbar disc herniation      OCD (obsessive compulsive disorder)      Rt ACL Tear        Past Surgical History:   Procedure Laterality Date     C REPAIR CRUCIATE LIGAMENT,KNEE  2008     IMPLANT DEEP BRAIN STIMULATION GENERATOR / BATTERY Left 1/20/2017    Procedure: IMPLANT DEEP BRAIN STIMULATION GENERATOR / BATTERY;  Surgeon: Duy Carolina MD;  Location: [facility] OR     OPTICAL TRACKING SYSTEM INSERTION DEEP BRAIN STIMULATION Left 1/10/2017    Procedure: OPTICAL TRACKING SYSTEM INSERTION DEEP BRAIN STIMULATION;  Surgeon: Duy Carolina MD;  Location: [facility] OR     OPTICAL TRACKING SYSTEM INSERTION DEEP BRAIN STIMULATION Right 11/16/2017    Procedure: OPTICAL TRACKING SYSTEM INSERTION DEEP  BRAIN STIMULATION;  Right Stealth Assisted Deep Brain Stimulator Placement, Phase I And II Combined, Placement Of Right Side Deep Brain Stimulator Electrode, Target Right Globus Pallidus Internus With Microelectrode Recording And Connection To The Existing Generator/Battery;  Surgeon: uDy Carolina MD;  Location: [facility] OR       Family History   Problem Relation Age of Onset     DIABETES Father      CANCER Paternal Grandfather      Stomach CA     CANCER Maternal Grandmother      Bladder     Myocardial Infarction Maternal Grandfather      MI     Obsessive Compulsive Disorder Son      Genetic Disorder Son      Dystonia 2ndary to DYT 1 micah mutation       Social History     Social History     Marital status:      Spouse name: Lisseth     Number of children: 2     Years of education: N/A     Occupational History     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     Social History Main Topics     Smoking status: Former Smoker     Types: Cigarettes     Smokeless tobacco: Never Used      Comment: social     Alcohol use 0.0 oz/week     0 Standard drinks or equivalent per week      Comment: 1-2x's/month.     Drug use: Yes     Special: Marijuana      Comment: vaping     Sexual activity: Not on file     Other Topics Concern     Not on file     Social History Narrative        No Known Allergies    Current Outpatient Prescriptions   Medication     vitamin B complex with vitamin C (VITAMIN  B COMPLEX) TABS tablet     UNABLE TO FIND     nicotine polacrilex (NICORETTE) 2 MG lozenge     botulinum toxin type A (BOTOX) 100 UNITS injection     NEURONTIN 800 MG OR TABS     REMERON 30 MG OR TABS     DIAZEPAM 2 MG OR TABS     No current facility-administered medications for this visit.          REVIEW OF SYSTEMS - also per HPI.  General: negative for difficulty sleeping and headaches, chills/sweats/fever, fatigue, weight gain or loss.  Neurologic: POSITIVE for trouble with coordination, speech problems,  "tremors and dystonia.  Negative for headaches, numbness of arms or legs, problem with memory, tingling of hands, arms or legs.      PHYSICAL EXAM  /80  Pulse 83  Ht 1.854 m (6' 1\")    General: Awake, alert, oriented. Well nourished, well developed, he is not in any acute distress.  Incisions: Right frontal and left parietal incisions are healing well. No areas of redness, fluid collection or drainage. Dermabond/sutures removed without difficulty.       Neurological:  Awake, alert and oriented to date, time, place and person. Speech fluent.   Pupils equal, round, reactive to light.  Extraocular movement intact.  Facial sensation intact.  Face symmetric.    Motor: moves all extremities well.  Sensation: grossly intact.      ASSESSMENT  47 year old male with a history of Dystonia  s/p bilateral DBS lead placement of the GPi: Left side implant phase I 1/10/2017 and phase II 1/20/2017, Right side implant phase I and II combined 11/16/2017.    Patient is now implanted on both sides.  He has tolerated the procedures well.  There were no complications.  His first side, left side DBS system, is already on and programmed.  He will have his right side DBS system tested and turned on next week.    His incisions are healing well.  There are no concerns for infection at this time.  The incisions can be open to air.      PLAN  1. No future appointments needed at this time - we will see him back as needed.    IZak, am serving as a scribe to document services personally performed by Duy Carolina MD, PhD, based upon my observations and the provider's statements to me. All documentation has been reviewed and edited by the aforementioned doctor prior to being entered into the official medical record.    I, Duy Carolina, attest that above named individual is acting in scribe capacity, has observed my performance of the services and has documented them in accordance with my direction. The documentation recorded " by the scribe accurately reflects the service I personally performed and the decisions made by me. The document was also partially recorded by me and the entire document was edited by me as well.

## 2017-12-04 NOTE — NURSING NOTE
Chief Complaint   Patient presents with     RECHECK     DBS Post op wound check    Ryan Zambrano CMA

## 2017-12-04 NOTE — LETTER
12/4/2017       RE: Ayad Moreno  [address]  [facility] [id number]     Dear Colleague,    Thank you for referring your patient, Ayad Moreno, to the [facility] NEUROSURGERY at [facility]. Please see a copy of my visit note below.    HISTORY AND PHYSICAL EXAM    Chief Complaint   Patient presents with     RECHECK     Post-op wound check s/p Phase I and II combined DBS on 11/16/2017       HISTORY OF PRESENT ILLNESS  We saw Mr. Ayad Moreno back in Neurosurgery Clinic today for his post-operative wound check. He is a 47 year old male with a history of dystonia secondary to a DYT1 gene mutation who underwent left-sided DBS lead placement of the GPi, phase I, in 01/10/2017. He then underwent phase II DBS susrgery to follow on 01/20/2017. He obtained good relief from this procedure and elected to undergo right-sided DBS implantation on 11/16/2017. He was actually a staged bilateral implantation candidate so he had a dual channel generator/battery placed with two extension wires.  His right side DBS implantation surgery recently was without any complications.  He has recovered well from this operation and is here today for removal of his sutures. He has some numbness and tingling over his incisions. He also complains of some occasional pain at the left parietal incision, location of his connector.       Past Medical History:   Diagnosis Date     Anxiety      Asthmas with exposure to cats      Depression      Generalized dystonia     DYT-1 genetic mutation     Lumbar disc herniation      OCD (obsessive compulsive disorder)      Rt ACL Tear        Past Surgical History:   Procedure Laterality Date     C REPAIR CRUCIATE LIGAMENT,KNEE  2008     IMPLANT DEEP BRAIN STIMULATION GENERATOR / BATTERY Left 1/20/2017    Procedure: IMPLANT DEEP BRAIN STIMULATION GENERATOR / BATTERY;  Surgeon: Duy Carolina MD;  Location: [facility] OR     OPTICAL TRACKING SYSTEM INSERTION DEEP BRAIN  STIMULATION Left 1/10/2017    Procedure: OPTICAL TRACKING SYSTEM INSERTION DEEP BRAIN STIMULATION;  Surgeon: Duy Carolina MD;  Location: [facility] OR     OPTICAL TRACKING SYSTEM INSERTION DEEP BRAIN STIMULATION Right 11/16/2017    Procedure: OPTICAL TRACKING SYSTEM INSERTION DEEP BRAIN STIMULATION;  Right Stealth Assisted Deep Brain Stimulator Placement, Phase I And II Combined, Placement Of Right Side Deep Brain Stimulator Electrode, Target Right Globus Pallidus Internus With Microelectrode Recording And Connection To The Existing Generator/Battery;  Surgeon: Duy Carolina MD;  Location: [facility] OR       Family History   Problem Relation Age of Onset     DIABETES Father      CANCER Paternal Grandfather      Stomach CA     CANCER Maternal Grandmother      Bladder     Myocardial Infarction Maternal Grandfather      MI     Obsessive Compulsive Disorder Son      Genetic Disorder Son      Dystonia 2ndary to DYT 1 micah mutation       Social History     Social History     Marital status:      Spouse name: Lisseth     Number of children: 2     Years of education: N/A     Occupational History     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     Social History Main Topics     Smoking status: Former Smoker     Types: Cigarettes     Smokeless tobacco: Never Used      Comment: social     Alcohol use 0.0 oz/week     0 Standard drinks or equivalent per week      Comment: 1-2x's/month.     Drug use: Yes     Special: Marijuana      Comment: vaping     Sexual activity: Not on file     Other Topics Concern     Not on file     Social History Narrative        No Known Allergies    Current Outpatient Prescriptions   Medication     vitamin B complex with vitamin C (VITAMIN  B COMPLEX) TABS tablet     UNABLE TO FIND     nicotine polacrilex (NICORETTE) 2 MG lozenge     botulinum toxin type A (BOTOX) 100 UNITS injection     NEURONTIN 800 MG OR TABS     REMERON 30 MG OR TABS     DIAZEPAM 2 MG OR TABS  "    No current facility-administered medications for this visit.          REVIEW OF SYSTEMS - also per HPI.  General: negative for difficulty sleeping and headaches, chills/sweats/fever, fatigue, weight gain or loss.  Neurologic: POSITIVE for trouble with coordination, speech problems, tremors and dystonia.  Negative for headaches, numbness of arms or legs, problem with memory, tingling of hands, arms or legs.      PHYSICAL EXAM  /80  Pulse 83  Ht 1.854 m (6' 1\")    General: Awake, alert, oriented. Well nourished, well developed, he is not in any acute distress.  Incisions: Right frontal and left parietal incisions are healing well. No areas of redness, fluid collection or drainage. Dermabond/sutures removed without difficulty.       Neurological:  Awake, alert and oriented to date, time, place and person. Speech fluent.   Pupils equal, round, reactive to light.  Extraocular movement intact.  Facial sensation intact.  Face symmetric.    Motor: moves all extremities well.  Sensation: grossly intact.      ASSESSMENT  47 year old male with a history of Dystonia  s/p bilateral DBS lead placement of the GPi: Left side implant phase I 1/10/2017 and phase II 1/20/2017, Right side implant phase I and II combined 11/16/2017.    Patient is now implanted on both sides.  He has tolerated the procedures well.  There were no complications.  His first side, left side DBS system, is already on and programmed.  He will have his right side DBS system tested and turned on next week.    His incisions are healing well.  There are no concerns for infection at this time.  The incisions can be open to air.      PLAN  1. No future appointments needed at this time - we will see him back as needed.    I, Zak Benites, am serving as a scribe to document services personally performed by Duy Carolina MD, PhD, based upon my observations and the provider's statements to me. All documentation has been reviewed and edited by the " aforementioned doctor prior to being entered into the official medical record.    I, Duy Carolina, attest that above named individual is acting in scribe capacity, has observed my performance of the services and has documented them in accordance with my direction. The documentation recorded by the scribe accurately reflects the service I personally performed and the decisions made by me. The document was also partially recorded by me and the entire document was edited by me as well.     Again, thank you for allowing me to participate in the care of your patient.      Sincerely,    Duy Carolina MD

## 2017-12-08 NOTE — OP NOTE
PATIENT NAME: JEN SALAZAR  YOB: 1970  MRN:   [id number]  ACCOUNT:  [id number]      DATE OF SERVICE:  11/16/2017    PREOPERATIVE DIAGNOSIS:   1.  Generalized dystonia.  2.  Oral-mandibular dystonia.  3.  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  4.  S/p left side deep brain stimulator placement, phase I, placement of left side deep brain stimulator electrode, target left globus pallidus internus, with microelectrode recording on 01/10/2017.  5.  S/p deep brain stimulator placement, phase II, placement of new deep brain stimulator generator/battery, model Activa PC, over left chest wall on 01/20/2017.    POSTOPERATIVE DIAGNOSIS:   1.  Generalized dystonia.  2.  Oral-mandibular dystonia.  3.  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  4.  S/p left side deep brain stimulator placement, phase I, placement of left side deep brain stimulator electrode, target left globus pallidus internus, with microelectrode recording on 01/10/2017.  5.  S/p deep brain stimulator placement, phase II, placement of new deep brain stimulator generator/battery, model Activa PC, over left chest wall on 01/20/2017.    PROCEDURE PERFORMED:  1.  Placement of CRW stereotactic head frame.  2.  Stereotactic neuronavigation planning CT of the head.  3.  Stereotactic neuronavigation using [employer] system for surgical planning, targeting and approach: target is right globus pallidus internus.  4.  Right side deep brain stimulator placement, phase I, placement of right side deep brain stimulator electrode: target is right globus pallidus internus.  5.  Use of intraoperative microelectrode recording.  6.  Use of intraoperative fluoroscopy.  7.  Deep brain stimulator placement, phase II, connection of the right side deep brain stimulator electrode, single array, to the existing generator/battery.  8.  Electrical interrogation and analysis of deep brain stimulator system.    ATTENDING SURGEON:  Duy Carolina MD, PhD.    RESIDENT SURGEON: Farideh  Kayla (maiden name MD Mark.    ANESTHESIA:  Monitored anesthesia care and local anesthetic.    ESTIMATED BLOOD LOSS:  20 mL.    COMPLICATIONS:  None.    FINDINGS:  Successful testing, electrode connected to preexisting extension wire.  Final electrode location, 1.5 mm anterior move of X-Y stage, lateral Joni Gun position, 4 mm below the target.  All impedances within normal.  No problems found.    IMPLANT:  [employer]onic DBS electrode, model 3389, Lot# [id number].    INDICATIONS FOR PROCEDURE:  Mr. Ayad Moreno presents for his ongoing DBS surgery.  He is s/p left side deep brain stimulator placement, phase I, placement of deep brain stimulator electrode, target left globus pallidus internus, with microelectrode recording on 1/10/2017 and s/p deep brain stimulator placement, phase II, placement of new deep brain stimulator generator/battery, model Activa PC, over left chest wall on 1/20/2017.  He tolerated the phase I procedure well and he was discharged on POD#1.  His postoperative CT was without any complicating features.  Patient reported that he was having some word finding difficulties postoperatively but that has resolved.  His phase II surgery was also without any complications and he tolerated the procedure well.  He is back at his baseline. He denies any fevers, chills, nausea, vomiting, dizziness, weakness or numbness.  He has his left side system turned on and he is getting benefit.  Briefly, he is a 46 year old male with Dystonia due to  DYT1 gene mutation. His symptoms began at the age of 12 with right arm tightening then a fixed dystonia.  This progressed to his left hand, then to his voice by 20 years old.  Now, all his extremities are affect, the upper more than the lower and the right more than the left.  His dystonia started in 1981 and he was diagnosed in 1984.  Again, his symptoms included involuntary contractions in his right.  Speech issues has been most bothersome for the past 12 to 15 years.   Medcations he has tried in the past include Carbidopa, baclofen, Artane, Neurontin, Diazepam, and Botox injections.  He is hoping that the surgery will help him with his speech as well.  He also has a son who was also just diagnosed with dystonia.  He is wanting to undergo surgical intervention for the sake of his son and he has had his DBS implanted in [place].  We discussed the phase I and II combined surgery for placement of the DBS electrode on the right side under MAC and microelectrode recording followed by the connection to the already existing generator/battery.  Risks, benefits, alternative therapies were discussed with the patient, including but not limited to infection and bleeding.  Surgical procedure was discussed in detail.  All questions were answered, and he expressed understanding.    DESCRIPTION OF PROCEDURE:    CRW head frame placement and stereotactic neuronavigation.     The patient was brought to the operating room and placed in a supine position in his bed.  Brief timeout was performed for placement of the CRW head frame.  He was given sedation to make him comfortable.  Intended pin sites, two in the front and two in the back of the head, were cleaned and were injected with local anesthetic, 2% Lidocaine with epinephrine.  Total of 24 mL were used.  Then, CRW stereotactic head frame was placed onto his head with the four pins for fixation.  Care was taken so that the fiducial box would fit over the head and the frame.  Also, posterior left pin site was carefully chosen to stay away from the buried extension wire.  Once the head frame was on, the fiducial box was easily placed without interference from his forehead.  The patient tolerated the procedure well and his sedation was lightened and he was awakened.     After the CRW stereotactic head frame was placed, he was taken directly to the CT scanner, at which time CT of the head stereotactic protocol was obtained.  Subsequently, the  patient was taken back up to the operating room where he was placed supine on the operative bed with the CRW head frame affixed to the bed as well.  Patient was in a slight sitting up position with the neck in a neutral position and he was made comfortable.  The bed was positioned so that it would be a beach chair reclining position (head up, legs down, reverse trendelenburg).  All pressure points were well padded.  Care was taken to make sure that the neck was in neutral position and that the Su head clifton device had room for manipulation in case more flexion or extension is needed throughout the case.    At this time, attention was turned to the neuro navigation imaging that was obtained.  The Stealth neuronavigation device was loaded with the CT head that had just been obtained.  The device also had an MRI of the head, stereotactic protocol, that was obtained prior to surgery.  We also utilized MRI brain 7 Kelsey alpha protocol along with the Stealth MRI to assist with the localization and targeting of the subthalamic nucleus.  CT of the head was merged with the previously obtained MRI of the brain.  The merge demonstrated good coherence/registration.  Then, using this merged image, neuronavigation was used to stereotactically target the right globus pallidus internus (GPi).  The technique used was the AC-PC line localization technique to target the GPi using stereotactic coordinates, adjustment with available 7T alpha MRI images, and the X, Y and Z as well as the ring and arc angles for the CRW head frame were obtained for the right side.  The entry into the skull, as well as the trajectory of the electrode were checked with a probe's eye view to avoid any sulci, ventricle or vascular structures.       The stereotactic coordinates for the right side was then transferred to the CR stereotactic targeting apparatus as well as the phantom base.  The coordinates were confirmed and double checked for accuracy.   Accuracy was also confirmed using phantom base.  The coordinates were X = +20.9, Y = +0.5, Z =+3.3, ring angle = 70.0 degrees anterior, and arc angle = 1.3 degrees to the right.     At this time, attention was turned to the patient.  Using a hair clipper, his hair over the right frontal area was clipped using the surgical clipper.  A semicircular incision was planned on the right side and this was marked.  Then, the surgical area was prepped and draped in routine surgical fashion.  We also prepped the area posterior to this as well as area towards the left parietal area where the previously implanted connector portion of the extension wire is located.  The patient was also made comfortable.     Timeout was then performed confirming the patient's identity, procedure to be performed, side and site of surgery identified, imaging corresponding with the patient and administration of preoperative prophylactic antibiotic.    Right-sided electrode placement with microelectrode recording: Target right GPi.     The CRW targeting apparatus was attached to the head frame on top of the sterile drapes.  The entry point was marked on the scalp on the right side.  A C-shaped incision was made with a skin knife, after the area was injected with local anesthetic, 2% Lidocaine with epinephrine and 1/4% Marcaine plain, 50:50 mix.  The area posterior to the incision was also injected as a pocket would be created.  Area posterior medial, towards the left parietal area where the previously buried extension wire is located, was also injected as the electrode connector will be tunneled here later.  Total of 25 mL of the above mentioned local anesthetic was used.  The incision was then further carried down to the pericranium.  Hemostasis was obtained using monopolar and bipolar cautery.  Thin layer of pericranial tissue was left behind on the scalp and the skin flap was reflected posteriorly.  Then, using a blunt dissection technique, a  pocket was created posteriorly as well as a tract that was made towards the left parietal area for later use.    At this time, the targeting apparatus again positioned and the entry point to the skull was marked.  Area of the intended bur hole was cleaned and pericranial tissue removed.  Then using an acorn drill, bur hole was created over this entry point to expose the dura.  The area was vigorously irrigated and bone wax used to control the bone bleeding.  Dura was coagulated with bipolar cautery for hemostasis, and again no active bleeding was noted.     At this time, the electrode fixation cover was fixed to the skull using two screws.  Care was taken to overlap the pericranium over the edge of the cover to provide a smooth tissue transition.  Then, the electrode drive was attached to the targeting apparatus.  The entry into the dura was again checked.  It appeared that all positions of the Joni Gun electrode clifton were accessible without any interference from the bone edge.  Then using a Bovie cautery and a #4 Penfield instrument, opening was made into the dura, as well as a small corticectomy.  No active bleeding was noted.    Dr. Uche Loera and Dr. Grayson Valerio of the Neurology Department participated in the recording and neurological testing.  During the microelectrode recording and testing, Dr. Loera and Dr. Valerio took detailed notes of the electrophysiologic data and neurologic exam as well as any stimulation effects.    At this time, the electrode guides were inserted in the center and posterior Joni Gun positions and they were advanced slowly until they were fully inserted at which point the tips would be well above the target.  No abnormal resistance was noted.  Duraseal was used to seal the entry site to provide a seal and to minimize cerebrospinal fluid leak and air entry.  Then, recording microelectrodes were brought in and placed within the guide tubes and they were connected for intraoperative  recording.  The tips of the electrode were now 15 mm above target.  The patient was awakened.  Then, using a micro drive, the electrodes were slowly advanced towards the target, collecting microelectrode recording data for mapping the tract.  Throughout the tract, good quality expected recording was observed.  As the electrode was advanced, striatum, then globus pallidus externus followed by globus pallidus internus activity were noted.  The borders or transition points were easily identified.  The center electrode recordings identified approximately 5.7 to 6.7 mm of GPi with somatosensory response activity related to shoulder.  No optic tract was identified but phosphenes were noted at 20 to 30 microamp stimulation.  The posterior electrode recordings identified approximately 2.45 mm of GPi.  Microstimulation yielded internal capsular effects at 30 microamps.    During the recording, with the patient's informed consent obtained previously and willingness to participate in the research protocol, force tracking data was collected.    Based on the electrophysiological data collected, it was decided that we would need to explore the area anterior and lateral of the Joni Gun location.    The microelectrode recording continued. The electrodes were then pulled back to the initial position, 15 mm above the target, with the micro drive and then pulled out of the guide tubes. With the center and posterior guide tubes still in place to stabilize the brain, guide tubes were inserted into the brain at the anterior and lateral positions in the Joni Gun.  Duraseal was used to seal the opening.  Then, recording microelectrode was brought in and placed within the anterior and lateral guide tubes and they were connected for intraoperative recording. The tip of the electrode was again 15 mm above target. The patient remained awake. Then, using a micro drive, the electrode was slowly advanced towards the target, collecting  microelectrode recording data for mapping the tract.  As the electrode was advanced, striatum, then globus pallidus externus followed by globus pallidus internus activity were noted.  The borders or transition points were easily identified.  During the recording, both electrodes were switched out due to poor impedance values.  The anterior electrode recordings identified approximately 4.6 to 6.0 mm of GPi.  The lateral electrode recordings identified approximately 5.7 to 6.5 mm of GPi.  Optic tract was identified with flashlight.    Based on the mapping data, it was decided that the current lateral Joni Gun position may be a good placement for the permanent electrode.  The electrode drive was then positioned to the desired position with the micro drive and the electrodes pulled out of the guide tubes.  The permanent DBS electrode was brought into the field and placed in the lateral guide tube with the tip being placed at the 4.0 mm below the target depth.  The electrode demonstrated good impedance values.  The electrode was tested and the stimulation yielded the followin- 2+ with internal capsule threshold at 5 Volts, 1- 3+ with internal capsule threshold at 5.5 Volts.    Based on the stimulation and threshold data, it was decided that the electrode had to be moved 1.5 mm anteriorly from the current location.  The DBS electrode was removed and pulled out of the guide tube.  Then, the guide tubes were all removed.  Using the X-Y stage adjustment, the Joni Gun was moved 1.5 mm anteriorly.  The access through the bur hole was tested and the Joni Gun locations were all accessible.  The guide tube was then inserted into the lateral Joni Gun position.  Therefore, this location is 1.5 mm anterior to the previous DBS electrode position.  The guide tube was slowly advanced until it was fully inserted, at which point the tip would be well above the target.  Duraseal was used to seal the opening.  Then, DBS electrode was  brought into the field and placed in the lateral guide tube with the tip being placed at the 4.0 mm below the target depth.  The electrode again demonstrated good impedance values.  The electrode was tested again and the stimulation yielded the followin- 2+ with internal capsule threshold at 8 to 9 Volts, 1- 3+ with internal capsule threshold at 9 Volts.  Therefore, the thresholds for internal capsular effects were much improved.    With the satisfactory testing of the electrode position and the stimulation parameters, the electrode was secured at this location.  The patient was again made comfortable.  The guide tube was removed.  Duraseal was again used to seal any opening. The area was generously irrigated.  Hemostasis was also obtained.  Fluoroscopy was brought into the field to test that the electrode did not move with each manipulation.  The electrode tip was seen to be below the target, as expected.  The electrode clamp was applied to hold the electrode.  Then, the electrode stylet was removed.  The electrode was then removed from the electrode clifton.  The cap cover was finally placed and secured in place.  Fluoroscopy confirmed that the tip of the electrode did not change in position with each manipulation.    Connection of the right side deep brain stimulator electrode, single array, to the generator/battery.    At this time, attention was turned to the left parietal area where the previously buried connector portion of the extension wire, intended for this left side, was located.  This area was also injected with local anesthetic that was previously mentioned.  Using a #10 blade, 4 cm incision was made over the buried connector.  Care was taken to only expose the connector and not harm the hardware around it.  The connector was visualized.    Then, using a tunneler, a passage was created between the right frontal incision and left parietal incision.  When the tunneler was removed, plastic sheath  remained.  Using this, the newly placed right side DBS electrode was tunneled to the extension wire connector site.  Then the plastic sheath was also pulled out, thus leaving the tunneled right side wire.  The connection was cleaned.  Then, the newly placed right GPi electrode was connected to the previously buried extension wire and secured with a screw .  We used a white protective cover for the connector to indicate the right side.  Then, the connector was buried within the left parietal pocket.  From the left frontal incision, excess wire was placed posterior to the incision, in the pocket previously created.  Final fluoroscopy image confirmed that the tip did not move.  Both wounds were then generously irrigated.    With the proper placement and connection of the deep brain stimulator system, electrical interrogation and analysis was performed.  All the impedance values were noted to be within normal.    We began closing the wound. The right frontal incision was reapproximated first.  The galeal layer was reapproximated using 3-0 Vicryl sutures in an inverted interrupted fashion and the skin was reapproximated using 4-0 Monocryl suture in a running fashion.  The wound was cleaned and dried and Dermabond was applied.  Attention was then turned to the left parietal area.  The galeal layer was reapproximated using 3-0 Vicryl sutures in an inverted interrupted fashion and the skin was reapproximated using 4-0 Monocryl suture in a running fashion.  The wound was cleaned and dried and Dermabond was applied.    Removal of the head frame and end of procedure.    First, the stereotactic targeting apparatus was removed.  Then, the sterile drapes were removed.  Subsequently, the patient was taken out of the CRW head frame.  The four pin sites were clean and dry and no active bleeding was noted.  Antibiotic ointment was applied to the pin sites.    Patient was further awakened and taken to the recovery room in a  stable condition.  At the end of the case, all counts including needle, sponge and instrument counts were correct x 2.  There were no complications.      Duy MCMULLEN M.D., Ph.D., Neurosurgery Attending, was present and scrubbed for the entire case and performed the key and critical portions of the case.

## 2017-12-13 ENCOUNTER — OFFICE VISIT (OUTPATIENT)
Dept: NEUROLOGY | Facility: CLINIC | Age: 47
End: 2017-12-13

## 2017-12-13 VITALS
WEIGHT: 206.3 LBS | OXYGEN SATURATION: 98 % | SYSTOLIC BLOOD PRESSURE: 119 MMHG | BODY MASS INDEX: 27.34 KG/M2 | RESPIRATION RATE: 18 BRPM | DIASTOLIC BLOOD PRESSURE: 81 MMHG | HEIGHT: 73 IN | HEART RATE: 78 BPM

## 2017-12-13 DIAGNOSIS — G24.9 GENERALIZED DYSTONIA: Primary | ICD-10-CM

## 2017-12-13 DIAGNOSIS — F41.8 DEPRESSION WITH ANXIETY: ICD-10-CM

## 2017-12-13 ASSESSMENT — PAIN SCALES - GENERAL: PAINLEVEL: NO PAIN (0)

## 2017-12-13 NOTE — Clinical Note
"2017       RE: Ayad Moreno  [address]  [facility] [address]     Dear Colleague,    Thank you for referring your patient, Ayad Moreno, to the [facility] NEUROLOGY at [facility]. Please see a copy of my visit note below.    PATIENT: Ayad Moreno    : 1970    NANI: 2017    REASON FOR VISIT: Right hemisphere monopolar review & initial deep brain stimulation (DBS) programming for dystonia.      HPI:  Mr. Ayad Moreno is a 47 year old left - handed  male who came to the [facility] neurology clinic accompanied by himself for DBS initial programming.  He previously underwent Left Globus Pallidus Internus (Gpi) DBS lead placement on 1/10/2017. He recently underwent Right Globus Pallidus Interus (Gpi) DBS lead placement on 2017 and connection to an existing left infraclavicular Activa-PC generator.     He reports the surgery went well.  OR was cold.  After his surgery, he has notice worsening in his speech & more left hand tremor that has made texting difficulty.  No other side effects including gait, memory or mood.  He reports being more depressed, but \"this is managed.\"    His last Botox injection was in July.  masitor & gastrics Left jaw.    His long term disability paper work has gone through & approved.     MEDICATIONS:   Medication Sig     vitamin B complex with vitamin C (VITAMIN  B COMPLEX) TABS tablet Take 1 tablet by mouth every morning      UNABLE TO FIND Take 1 teaspoonful by mouth 2 times daily MEDICATION NAME: Marijuana (CBD) Oil- NOT THC     nicotine polacrilex (NICORETTE) 2 MG lozenge Place 2 mg inside cheek every hour as needed for smoking cessation (10x's/day)     botulinum toxin type A (BOTOX) 100 UNITS injection every 3 months Inject 95 units     NEURONTIN 800 MG OR TABS Take 800 mg by mouth 3 times daily      REMERON 30 MG OR TABS Take 1 tablet by mouth At Bedtime      DIAZEPAM 2 MG OR TABS Take 4 mg by mouth 2 times daily " "Takes 2 pills at a time       PHYSICAL EXAM:  Vital Signs:  Blood pressure 119/81, pulse 78, resp. rate 18, height 1.854 m (6' 1\"), weight 93.6 kg (206 lb 4.8 oz), SpO2 98 %. Body mass index is 27.22 kg/(m^2).    Mr. Moreno is a pleasant  male who is well-groomed and well-developed sitting comfortably in the exam room without any distress.  Affect is appropriate.    Incision Site: in     MOVEMENT DISORDERS ASSESSMENT: (OFF DBS)  The Fahn Kavon (BF) Disability Score     Activity Severity Factor Score   A. Speech  0 - Normal  1 - Slightly involved; easily understood  2 - Some difficulty in understanding  3 - Marked difficulty in understanding  4 - Complete or almost complete anarthria       3   B. Handwriting   (tremor or dystonia) 0 - Normal  1 - Slight difficulty, legible  2 - Almost illegible  3 - Illegible  4 - Unable to grasp to maintain hold on pen    4   C. Feeding 0 - Normal  1- Uses  tricks , independent  2 - Can feed, but not cut  3 - Finger food only  4 - Completely dependent       3   D. Eating/swallowing 0 - Normal  1 - Occasional choking  2 - Chokes frequently; difficulty swallowing  3 - Unable to swallow firm foods  4 - Marked difficulty swallowing soft foods and liquids       1   E. Hygiene 0 - Normal  1 - Clumsy, independent  2 - Needs help with some activities  3 - Needs help with most activities  4 - Needs help with all activities    1   F. Dressing 0 - Normal  1 - Clumsy, independent  2 - Needs help with some activities  3 - Needs help with most activities  4 - Helpless       1      G. Walking 0 - Normal  1 - Slightly abnormal; hardly noticeable  2 - Moderately abnormal; obvious to naïve observer  3 - Considerably abnormal  4 - Needs assistance to walk  6 - Wheelchair-bound       0            Total (maximum: 30) = ***        The Fahn-Kavon Score     Region Provoking factor  General Severity Factor Weight Score   Eyes 0 0     Mouth 2 2     Speech & Swallowing 3 2     Neck 1 1   " "  Right Arm 3 3     Left Arm 3 2     Right Leg 1  1     Left Leg 0 0     Trunk 0 0        Total Score = ***        DBS Interrogation & Analysis:    Implanted generator:  Left chest Activa-PC.  Lead placement:  Bilateral Globus Pallidus Interus (Gpi).      Left Brain  C +, 1 -, 2 -  Volts:  4.0 volts  PW:  60 msec  Rate:  130 Hz      Electrode Impedance Check: (Amplitude 3.0 volts: PW 80 msec: Rate 100 Hz)   Left Lead: No Problems Found.      Patient Controller:  Upper Limit: 4.1 volts  Lower Limit: 3.5 volts    Right Brain   Electrode Impedance Check:  (Amplitude 3.0 volts: PW 80 msec: Rate 100 Hz)   Right Lead: No Problems Found.    See scanned report for impedance details.    MONOPOLAR REVIEW  Right Hemisphere    Contacts Amplitude   volts PW   msec Rate  hertz Assessment Adverse Effect   Case+, 8 - 0.0 60 130      1.0        2.0        3.0         3.5    Transient tongue tightening.     4.0    Some visual changes.  \"I feel like I'm hallucination.  Not anything extrem.\"  \"My tongue tightening is bothering me.   When voltage turned down pt reported relief.     3.0        3.5        4.0    \"My tongue it a little tight.\" When voltage increased to 4.5 & asked if it's better or worse, he responded \"better.\"    4.5        5.0    \"I feel weird.\"  \"Off balanced.\" \"Felt anxious.\"   Case+, 9 - 0.0 60 130      1.0        2.0        3.0        4.0        5.0    Slight jaw & tongue tightness.    6.0    \"It's pretty tight.\"  Persistent.  Improved when voltage zeroed.    Case+, 10 - 0.0        1.0        2.0        3.0        4.0        5.0        6.0        7.0    Left hand feels \"a little different\" \"looseness.  Has slight transient tongue tightness.      8.0    Persistent tongue tightness.   Chin twitching.    Case+, 11 - 0.0        1.0        2.0        3.0        4.0        5.0        6.0        7.0        8.0        9.0    Slight tongue tightness.    10.0    Lower lip twitching.      __  Monopolar survey showed good " threshold.   Based on the monopolar review, patient was programmed as below: -      Left Gpi  C +, 9 -, 10 -  Volts:  2.5 volts  PW:  60 msec  Rate:  130 Hz     Therapy impedance:   767 Ohms  Therapy Current:   5.172 mA      Patient :  Upper Limit: 2.5 volts  Lower Limit: 0.0 volts      __  Will return in 1 months for DBS Programming & videotaping per Humanitarian Device Exemption (HDE) protocol.     The total amount of time spent with patient in DBS programming was *** minutes.     CT See, CNP  [facility]     Again, thank you for allowing me to participate in the care of your patient.      Sincerely,    CT Campbell CNP

## 2017-12-13 NOTE — PROGRESS NOTES
"PATIENT: Ayad Moreno    : 1970    NANI: 2017    REASON FOR VISIT: Right hemisphere monopolar review & initial deep brain stimulation (DBS) programming to improve dystonia.      HPI:  Mr. Ayad Moreno is a 47 year old left - handed  male who came to the [facility] neurology clinic by himself for DBS initial programming.  He previously underwent Left Globus Pallidus Internus (Gpi) DBS lead placement on 1/10/2017. He recently underwent Right Globus Pallidus Internus (Gpi) DBS lead placement on 2017 and connection of existing left infraclavicular Activa-PC generator performed by Dr. Carolina.     Pt reports that the surgery went well.  \"OR was cold.\"  He reports being given a lot of blankets.   After his surgery, he has notice worsening speech & more left hand tremor that has made texting difficult.  He denies side effects including gait, memory, or mood changes.  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      His long term disability paper work has gone through & approved.     He reports his son, who also has dystonia with bilateral Gpi DBS is doing great.     MEDICATIONS:   Medication Sig     vitamin B complex with vitamin C (VITAMIN  B COMPLEX)  Take 1 tablet by mouth every morning      UNABLE TO FIND Take 1 teaspoonful by mouth 2 times daily MEDICATION NAME: Marijuana (CBD) Oil- NOT THC     nicotine polacrilex (NICORETTE) 2 MG lozenge Place 2 mg inside cheek every hour as needed for smoking cessation (10x's/day)     botulinum toxin type A (BOTOX) 100 UNITS injection every 3 months Inject 95 units     NEURONTIN 800 MG OR TABS Take 800 mg by mouth 3 times daily      REMERON 30 MG OR TABS Take 1 tablet by mouth At Bedtime      DIAZEPAM 2 MG OR TABS Take 4 mg by mouth 2 times daily Takes 2 pills at a time       PHYSICAL EXAM:  Vital Signs:  Blood pressure 119/81, pulse 78, resp. rate 18, height 1.854 " "m (6' 1\"), weight 93.6 kg (206 lb 4.8 oz), SpO2 98 %. Body mass index is 27.22 kg/(m^2).    Mr. Moreno is a pleasant  male who is well-groomed and well-developed sitting comfortably in the exam room without any distress.  Affect is appropriate.    Incision Site: right scalp & left side behind by the extension lead where his DBS was connected looks slightly red, but not infected.  No swelling or drainage.     MOVEMENT DISORDERS ASSESSMENT: (Left Gpi DBS ON & Right Gpi OFF)  The Fahn Kavon ([facility]) Disability Score     Activity Severity Factor Score   A. Speech  0 - Normal  1 - Slightly involved; easily understood  2 - Some difficulty in understanding  3 - Marked difficulty in understanding  4 - Complete or almost complete anarthria       3   B. Handwriting   (tremor or dystonia) 0 - Normal  1 - Slight difficulty, legible  2 - Almost illegible  3 - Illegible  4 - Unable to grasp to maintain hold on pen    4   C. Feeding 0 - Normal  1 - Uses  tricks , independent  2 - Can feed, but not cut  3 - Finger food only  4 - Completely dependent       3   D. Eating/swallowing 0 - Normal  1 - Occasional choking  2 - Chokes frequently; difficulty swallowing  3 - Unable to swallow firm foods  4 - Marked difficulty swallowing soft foods and liquids       1   E. Hygiene 0 - Normal  1 - Clumsy, independent  2 - Needs help with some activities  3 - Needs help with most activities  4 - Needs help with all activities    1   F. Dressing 0 - Normal  1 - Clumsy, independent  2 - Needs help with some activities  3 - Needs help with most activities  4 - Helpless       1      G. Walking 0 - Normal  1 - Slightly abnormal; hardly noticeable  2 - Moderately abnormal; obvious to naïve observer  3 - Considerably abnormal  4 - Needs assistance to walk  6 - Wheelchair-bound       0     Total (maximum: 30) = 13          The Fahn-Kavon Score        Region Provoking factor  General Severity Factor Weight Score   Eyes 0 0 0.5 0.0   Mouth 3 2 " "0.5 3.0   Speech & Swallowing 3 2 1.0 6.0   Neck 1 1 0.5 0.5   Right Arm 3 3 1.0 9.0   Left Arm 3 2 1.0 6.0   Right Leg 1  1 1.0 1.0   Left Leg 0 0 1.0 0.0   Trunk 0 0 1.0 0.0   Total Score = 25.5       DBS Interrogation & Analysis:    Implanted generator:  Left chest Activa-PC.  Lead placement:  Bilateral Globus Pallidus Internus (Gpi).    Left Brain  C +, 1 -, 2 -  Volts:  4.0 volts  PW:  60 msec  Rate:  130 Hz        Patient Controller:  Upper Limit: 4.1 volts  Lower Limit: 3.5 volts    Electrode Impedance Check: (Amplitude 3.0 volts: PW 80 msec: Rate 100 Hz)   Left Lead: No Problems Found.    Right Brain   Electrode Impedance Check:  (Amplitude 3.0 volts: PW 80 msec: Rate 100 Hz)   Right Lead: No Problems Found.    See scanned report for impedance details.    MONOPOLAR REVIEW    Right Hemisphere    Contacts Amplitude   volts PW   msec Rate  hertz Assessment Adverse Effect   Case+, 8 - 0.0 60 130      1.0        2.0        3.0         3.5    Transient tongue tightening.     4.0    Some visual changes.  \"I feel like I'm hallucinating.  Not anything extreme.\"  \"My tongue tightening is bothering me.\"     When voltage turned down to 3.0 volts pt reported relief.  To insure threshold, repeated the procedure as below.    3.0        3.5    No side effect.     4.0    \"My tongue it a little tight.\"    When voltage increased to 4.5 & pt was asked if it's better or worse, he responded \"better.\"    4.5        5.0    \"I feel weird.\"  \"Off balanced.\" \"Felt anxious.\"   Case+, 9 - 0.0 60 130      1.0        2.0        3.0        4.0        5.0    Slight transient jaw & tongue tightness.    6.0    \"It's pretty tight.\"  Persistent.  Resolved when voltage zeroed.    Case+, 10 - 0.0        1.0        2.0        3.0        4.0        5.0        6.0        7.0    Left hand feels \"a little different\" \"looseness.\"  \"Free.\"  Has slight transient tongue tightness.      8.0    Persistent tongue tightness.   Chin twitching.    Case+, 11 - " "0.0        1.0        2.0        3.0        4.0        5.0        6.0        7.0        8.0        9.0    Slight tongue tightness.    10.0    Lower lip twitching.      __  When monopolar review started, pt was a little anxious.  He asked a lot of questions like, \"did you increase the voltage or decrease it?\"  He tried to look at the provider .  \"Should I be feeling something?\" . . .  Anxiety was more pronounced initially, but he was able to relax & handled the procedure better as time progressed.      __  Monopolar survey showed good threshold.  Based on the monopolar review, patient was programmed as below: -      Right Gpi  C +, 9 -, 10 -  Volts:  2.5 volts  PW:  60 msec  Rate:  130 Hz     Therapy impedance:  767 Ohms  Therapy Current:  5.172 mA      Patient :  Upper Limit: 2.5 volts  Lower Limit: 2.5 volts      __  Will return in 1 months for DBS Programming & videotaping per Humanitarian Device Exemption (HDE) protocol.     The total amount of time spent with patient in DBS programming was 110 minutes.     CT See, CNP  [facility]   "

## 2017-12-13 NOTE — MR AVS SNAPSHOT
After Visit Summary   12/13/2017    Ayad Moreno    MRN: [id number]           Patient Information     Date Of Birth          1970        Visit Information        Provider Department      12/13/2017 7:00 AM Cindy Manning APRN CNP [facility] Neurology        Care Instructions    December 13, 2017      Dear Mr. Ayad Moreno,    Thank you for coming today.  During your visit, we have discussed the following:     __ Your DBS electrical system function (impedance) is normal. The battery life is also good.  __ Your right brain left body DBS has been turned ON & programmed: -    Right Gpi    Voltage:  2.5 volts       Patient :  Upper Limit: 2.5 volts  Lower Limit: 0.0 volts         __ I don't anticipate that you would have side effects, but if you do, you can lower the voltage.  You can also call us as needed.   __ For questions, [employer] Support Line is [phone].      To contact our clinic, you may call [phone] or use [email or web address] message.     It's good to see you!  I'll see you in 1 month, Jan 12th at 7 am for videotaping & DBS reprogramming.       CT See, CNP  [facility]            Follow-ups after your visit        Your next 10 appointments already scheduled     Jan 12, 2018  7:00 AM CST   (Arrive by 6:45 AM)   Return Movement Disorder with CT Lake CNP   [facility] Neurology ([facility])    [address] [place] [id number] [phone]              Who to contact     Please call your clinic at [phone] to:    Ask questions about your health    Make or cancel appointments    Discuss your medicines    Learn about your test results    Speak to your doctor   If you have compliments or concerns about an experience at your clinic, or if you wish to file a complaint, please contact [facility] Patient Relations at [phone] or email us at  [email or web address]         Additional Information About Your Visit        [employer] Information     [employer] gives you secure access to your electronic health record. If you see a primary care provider, you can also send messages to your care team and make appointments. If you have questions, please call your primary care clinic.  If you do not have a primary care provider, please call [phone] and they will assist you.      [employer] is an electronic gateway that provides easy, online access to your medical records. With [employer], you can request a clinic appointment, read your test results, renew a prescription or communicate with your care team.     To access your existing account, please contact your [facility] or call [phone] for assistance.        Care EveryWhere ID     This is your Care EveryWhere ID. This could be used by other organizations to access your [facility] medical records  [id number]        Your Vitals Were     Pulse Respirations Height Pulse Oximetry BMI (Body Mass Index)       78 18 1.854 m (6' 1\") 98% 27.22 kg/m2        Blood Pressure from Last 3 Encounters:   12/13/17 119/81   12/04/17 123/80   11/17/17 121/72    Weight from Last 3 Encounters:   12/13/17 93.6 kg (206 lb 4.8 oz)   11/16/17 94.2 kg (207 lb 10.8 oz)   11/09/17 95.3 kg (210 lb)              Today, you had the following     No orders found for display       Primary Care Provider Office Phone # Fax #    Yvse BECK Toshia [phone]       [facility] [address] [place] [address]        Equal Access to Services     DORIS PASCAL : Hadii aad ku hadasho Soomaali, waaxda luqadaha, qaybta kaalmada raffy mckay. So [facility] [phone].    ATENCIÓN: Si habla español, tiene a loving disposición servicios gratuitos de asistencia lingüística. Celeste al [phone].    We comply with applicable federal civil rights " laws and [place] laws. We do not discriminate on the basis of race, color, national origin, age, disability, sex, sexual orientation, or gender identity.            Thank you!     Thank you for choosing [facility] NEUROLOGY  for your care. Our goal is always to provide you with excellent care. Hearing back from our patients is one way we can continue to improve our services. Please take a few minutes to complete the written survey that you may receive in the mail after your visit with us. Thank you!             Your Updated Medication List - Protect others around you: Learn how to safely use, store and throw away your medicines at www.disposemymeds.org.          This list is accurate as of: 12/13/17  8:48 AM.  Always use your most recent med list.                   Brand Name Dispense Instructions for use Diagnosis    BOTOX 100 UNITS injection   Generic drug:  botulinum toxin type A     95 each    every 3 months Inject 95 units    Oromandibular dystonia       diazepam 2 MG tablet    VALIUM    60    Take 4 mg by mouth 2 times daily Takes 2 pills at a time        NEURONTIN 800 MG tablet   Generic drug:  gabapentin     90    Take 800 mg by mouth 3 times daily        NICORETTE 2 MG lozenge   Generic drug:  nicotine polacrilex      Place 2 mg inside cheek every hour as needed for smoking cessation (10x's/day)        REMERON 30 MG tablet   Generic drug:  mirtazapine     30    Take 1 tablet by mouth At Bedtime        UNABLE TO FIND      Take 1 teaspoonful by mouth 2 times daily MEDICATION NAME: Marijuana (CBD) Oil- NOT THC        vitamin B complex with vitamin C Tabs tablet      Take 1 tablet by mouth every morning

## 2017-12-13 NOTE — PATIENT INSTRUCTIONS
December 13, 2017      Dear Mr. Ayad Moreno,    Thank you for coming today.  During your visit, we have discussed the following:     __ Your DBS electrical system function (impedance) is normal. The battery life is also good.    __ Your right brain left body DBS has been turned ON & programmed: -    Right Gpi    Voltage:  2.5 volts       Patient :  Upper Limit: 2.5 volts  Lower Limit: 2.5 volts         __ I don't anticipate that you would have side effects, but if you do, you can lower the voltage.  You can also call us as needed.     __ For questions, [employer] Support Line is [phone].      To contact our clinic, you may call [phone] or use [employer] message.     It's good to see you!  I'll see you in 1 month, Jan 12th at 7 am for videotaping & DBS reprogramming.       CT See, CNP  [facility]

## 2017-12-13 NOTE — NURSING NOTE
Chief Complaint   Patient presents with     RECHECK     P RETURN - MOVEMENT DISORDER     Daisy Gonzalez MA

## 2018-01-02 ENCOUNTER — MYC MEDICAL ADVICE (OUTPATIENT)
Dept: NEUROLOGY | Facility: CLINIC | Age: 48
End: 2018-01-02

## 2018-01-09 ASSESSMENT — MOVEMENT DISORDERS SOCIETY - UNIFIED PARKINSONS DISEASE RATING SCALE (MDS-UPDRS)
TREMOR: MODERATE: SHAKING OR TREMOR CAUSES PROBLEMS WITH MANY OF MY DAILY ACTIVITES.
CHEWING_AND_SWALLOWING: SLIGHT: I AM AWARE OF SLOWNESS IN MY CHEWING OR INCREASED EFFORT AT SWALLOWING, BUT I DO NOT CHOKE OR NEED TO HAVE MY FOOD SPECIALLY PREPARED.
SPEECH: MODERATE: MY SPEECH IS UNCLEAR ENOUGH THAT OTHERS ASK ME TO REPEAT MYSELF EVERY DAY EVEN THOUGH MOST OF MY SPEECH IS UNDERSTOOD.
FREEZING: NORMAL: NOT AT ALL (NO PROBLEMS).
SALIVA_AND_DROOLING: MODERATE: I HAVE SOME DROOLING WHEN I AM AWAKE, BUT I USUALLY DO NOT NEED TISSUES OR A HANDKERCHIEF.
DRESSING: MILD: I AM SLOW AND NEED HELP FOR A FEW DRESSING TASKS (BUTTONS, BRACELETS).
HOBBIES_AND_OTHER_ACTIVITIES: MILD: I HAVE SOME DIFFICULTY DOING THESE ACTIVITIES.
HANDWRITING: SEVERE: MOST OR ALL WORDS CANNOT BE READ.
EATING_TASKS: MILD: I AM SLOW WITH MY EATING AND HAVE OCCASIONAL FOOD SPILLS.  I MAY NEED HELP WITH A FEW TASKS SUCH AS CUTTING MEAT.
WALKING_AND_BALANCE: SLIGHT: I AM SLIGHTLY SLOW OR MAY DRAG A LEG.  I NEVER USE A WALKING AID.
TURNING_IN_BED: SLIGHT: I HAVE A BIT OF TROUBLE TURNING, BUT I DO NOT NEED ANY HELP.
HYGIENE: SLIGHT:  I AM SLOW BUT I DO NOT NEED ANY HELP.
GETTING_OUT_OF_BED_CAR_DEEP_CHAIR: SLIGHT: I AM SLOW OR AWKWARD, BUT I USUALLY CAN DO IT ON MY FIRST TRY.
TOTAL_SCORE: 24

## 2018-01-12 ENCOUNTER — OFFICE VISIT (OUTPATIENT)
Dept: NEUROLOGY | Facility: CLINIC | Age: 48
End: 2018-01-12
Payer: COMMERCIAL

## 2018-01-12 VITALS
SYSTOLIC BLOOD PRESSURE: 128 MMHG | HEIGHT: 73 IN | WEIGHT: 196.9 LBS | OXYGEN SATURATION: 98 % | DIASTOLIC BLOOD PRESSURE: 78 MMHG | BODY MASS INDEX: 26.09 KG/M2 | HEART RATE: 88 BPM

## 2018-01-12 DIAGNOSIS — G24.9 GENERALIZED DYSTONIA: Primary | ICD-10-CM

## 2018-01-12 ASSESSMENT — PAIN SCALES - GENERAL: PAINLEVEL: NO PAIN (0)

## 2018-01-12 NOTE — LETTER
"2018       RE: Ayad Moreno  [address]  [facility] [address]     Dear Colleague,    Thank you for referring your patient, Ayad Moreno, to the [facility] NEUROLOGY at [facility]. Please see a copy of my visit note below.    PATIENT: Ayad Moreno    : 1970    NANI: 2017    REASON FOR VISIT: Right hemisphere deep brain stimulation (DBS) programming optimization to improve dystonia.      HPI:  Mr. Ayad Moreno is a 47 year old left - handed  male who came to the [facility] neurology clinic by himself for DBS programming.  He is s/p bilateral Globus Pallidus Internus (Gpi) DBS lead placement left on 1/10/2017 & right on 2017 by Dr. Carolina.     Since his Rt Gpi initial programming, he reports not being able to text.  His left hand coordination is worse than pre DBS surgery.  He reports the left hand is the one that he used since he was 14 & now he is having a hard time using it.  He reports difficulty with left hand started right after the Right Gpi lead implantation.  Currently, he is not able to use fork to eat & has to use both hands.  When writing on the board for his daughter, he has to use two hands.  His wife is depressed, \"which she does around this time of the year.\"  He is having a hard time to helping around the house to support her.     He reports speech is worse.  \"I could barely talk to my son's teacher yesterday.\"  He was seen by Dr. Horne on 2018 in ENT Clinic at [facility] for Botox injection to his Rt side of mouth/jaw.    Since New Year's Day, he has been on ketogenic diet & has lost 10 lbs.     MEDICATIONS:   Medication Sig     vitamin B complex with vitamin C (VITAMIN  B COMPLEX) TABS tablet Take 1 tablet by mouth every morning      UNABLE TO FIND Take 1 teaspoonful by mouth 2 times daily MEDICATION NAME: Marijuana (CBD) Oil- NOT THC     nicotine polacrilex (NICORETTE) 2 MG lozenge Place 2 mg inside cheek every hour as " "needed for smoking cessation (10x's/day)     botulinum toxin type A (BOTOX) 100 UNITS injection every 3 months Inject 95 units     NEURONTIN 800 MG OR TABS Take 800 mg by mouth 3 times daily      REMERON 30 MG OR TABS Take 1 tablet by mouth At Bedtime      DIAZEPAM 2 MG OR TABS Take 4 mg by mouth 2 times daily Takes 2 pills at a time       PHYSICAL EXAM:  Vital Signs:  Blood pressure 128/78, pulse 88, height 1.854 m (6' 1\"), weight 89.3 kg (196 lb 14.4 oz), SpO2 98 %.   Body mass index is 25.98 kg/(m^2).     Mr. Moreno is a pleasant  male who is well-groomed and well-developed sitting comfortably in the exam room without any distress.  Affect is appropriate.    MOVEMENT DISORDERS ASSESSMENT: (Bilateral Gpi DBS ON)  The Fahn Kavon ([facility]M) Disability Score     Activity Severity Factor Score   A. Speech  0 - Normal  1 - Slightly involved; easily understood  2 - Some difficulty in understanding  3 - Marked difficulty in understanding  4 - Complete or almost complete anarthria       3   B. Handwriting   (tremor or dystonia) 0 - Normal  1 - Slight difficulty, legible  2 - Almost illegible  3 - Illegible  4 - Unable to grasp to maintain hold on pen    4   C. Feeding 0 - Normal  1 - Uses  tricks , independent  2 - Can feed, but not cut  3 - Finger food only  4 - Completely dependent       3   D. Eating/swallowing 0 - Normal  1 - Occasional choking  2 - Chokes frequently; difficulty swallowing  3 - Unable to swallow firm foods  4 - Marked difficulty swallowing soft foods and liquids       1   E. Hygiene 0 - Normal  1 - Clumsy, independent  2 - Needs help with some activities  3 - Needs help with most activities  4 - Needs help with all activities    1   F. Dressing 0 - Normal  1 - Clumsy, independent  2 - Needs help with some activities  3 - Needs help with most activities  4 - Helpless       2      G. Walking 0 - Normal  1 - Slightly abnormal; hardly noticeable  2 - Moderately abnormal; obvious to naïve " "observer  3 - Considerably abnormal  4 - Needs assistance to walk  6 - Wheelchair-bound       1     Total (maximum: 30) = 15          The Fahn-Kavon Score        Region Provoking factor  General Severity Factor Weight Score   Eyes 0 0 0.5 0.0   Mouth 3 2 0.5 3.0   Speech & Swallowing 3 2 1.0 6.0   Neck 1 1 0.5 0.5   Right Arm 3 3 1.0 9.0   Left Arm 3 3 1.0 9.0   Right Leg 0 0 1.0 0.0   Left Leg 0 0 1.0 0.0   Trunk 0 0 1.0 0.0   Total Score = 27.5       DBS Interrogation & Analysis:    Implanted generator:  Left chest Activa-PC.  Lead placement:  Bilateral Globus Pallidus Internus (Gpi).    Therapy On:  99%  Battery Service Life:  OK.  2.96 volts.    Left Brain  C +, 1 -, 2 -  Volts:  3.8 volts  PW:  60 msec  Rate:  130 Hz    Patient :  Upper Limit: 4.1 volts  Lower Limit: 3.5 volts    Electrode Impedance Check: (Amplitude 3.0 volts: PW 80 msec: Rate 100 Hz)   Left Lead: No Problems Found.     Right Brain   C +, 9 -, 10 -  Volts:  2.5 volts  PW:  60 msec  Rate:  130 Hz    Patient :  Upper Limit: 2.5 volts  Lower Limit: 2.5 volts    Electrode Impedance Check:  (Amplitude 3.0 volts: PW 80 msec: Rate 100 Hz)   Right Lead: No Problems Found.    See scanned report for impedance details.      DBS PROGRAMMING:    Right Gpi  __  Voltage increased to 2.8 volts  __   Patient  parameters changed   Upper Limit: 3.4 volts   Lower Limit: 2.4 volts     Pt was upset that he needed to get a 2nd program.  \"Since this is not working I need another setting.\"  He reported that his son was given 2 programs & they had flexibility to choose between programs.  He expressed his frustration that he has been patient & he's not getting better.    __  I explained the difference between him & his son in terms of symptoms, age, & benefits.  I told him how programming optimization takes time & how benefit in dystonia is delayed.  I reminded him that so, far he only had 1 programming done, & generally, it might take up " to 6 - 9 or more months for DBS optimization.    __  Since he was upset, I did create a 2nd program as follows:       Group A    Right Gpi  C +, 9 -, 10 -  Volts:   2.8 volts  PW:  60 msec  Rate:  130 Hz    Group B    Right Gpi  C +, 10 -  Volts:   2.8 volts  PW:  60 msec  Rate:  130 Hz         Patient :  Upper Limit: 3.4 volts  Lower Limit: 2.4 volts   Patient :  Upper Limit: 3.4 volts  Lower Limit: 2.4 volts      __  Will return in 1 months for DBS Programming.    The total amount of time spent with patient in DBS programming was 90 minutes.     Again, thank you for allowing me to participate in the care of your patient.      Sincerely,    CT Campbell CNP

## 2018-01-12 NOTE — PATIENT INSTRUCTIONS
January 12, 2018      Dear Mr. Ayad Moreno,    Thank you for coming today.  During your visit, we have discussed the following:     __ Your DBS electrical system function (impedance) is normal. The battery life is also good.    __ Your right brain left body DBS programming has been changed.    You have 2 programs.    Group A is your previous settings.  Group B is your new settings.     Right Gpi    Group A    Voltage:  2.8 volts     Group B    Voltage:  2.8 volts     Patient :  Upper Limit: 3.4 volts  Lower Limit: 2.4 volts     Patient :  Upper Limit: 3.4 volts  Lower Limit: 2.4 volts       __  Use Group A & increase voltage every 5 days by 0.2 volts to 3.4 volts.  If tolerated, stay at 3.4 volts.  If Left hand gets worse, you can switch to Group B.       To contact our clinic, you may call [phone] or use [employer] message.     It's good to see you!  I'll see you in 1 month, Feb 16th at 7 am for DBS reprogramming.       CT See, CNP  [facility]rology [facility]     VACCINE ADMINISTRATION RECORD  PARENT / GUARDIAN APPROVAL  Date: 2019  Vaccine administered to: Eliel Arana     : 1992    MRN: [id number]    A copy of the appropriate Centers for Disease Control and Prevention Vaccine Information stat

## 2018-01-12 NOTE — PROGRESS NOTES
"PATIENT: Ayad Moreno    : 1970    NANI: 2017    REASON FOR VISIT: Right hemisphere deep brain stimulation (DBS) programming optimization to improve dystonia.      HPI:  Mr. Ayad Moreno is a 47 year old left - handed  male who came to the [facility] neurology clinic by himself for DBS programming.  He is s/p bilateral Globus Pallidus Internus (Gpi) DBS lead placement left on 1/10/2017 & right on 2017 by Dr. Carolina.     Since his Rt Gpi initial programming, he reports not being able to text.  His left hand coordination is worse than pre DBS surgery.  He reports the left hand is the one that he used since he was 14 & now he is having a hard time using it.  He reports difficulty with left hand started right after the Right Gpi lead implantation.  Currently, he is not able to use fork to eat & has to use both hands.  When writing on the board for his daughter, he has to use two hands.  His wife is depressed, \"which she does around this time of the year.\"  He is having a hard time to helping around the house to support her.     He reports speech is worse.  \"I could barely talk to my son's teacher yesterday.\"  He was seen by Dr. Horne on 2018 in ENT Clinic at [facility] for Botox injection to his Rt side of mouth/jaw.    Since New Year's Day, he has been on ketogenic diet & has lost 10 lbs.     MEDICATIONS:   Medication Sig     vitamin B complex with vitamin C (VITAMIN  B COMPLEX) TABS tablet Take 1 tablet by mouth every morning      UNABLE TO FIND Take 1 teaspoonful by mouth 2 times daily MEDICATION NAME: Marijuana (CBD) Oil- NOT THC     nicotine polacrilex (NICORETTE) 2 MG lozenge Place 2 mg inside cheek every hour as needed for smoking cessation (10x's/day)     botulinum toxin type A (BOTOX) 100 UNITS injection every 3 months Inject 95 units     NEURONTIN 800 MG OR TABS Take 800 mg by mouth 3 times daily      REMERON 30 MG OR TABS Take 1 tablet by mouth At Bedtime      DIAZEPAM 2 MG OR TABS Take " "4 mg by mouth 2 times daily Takes 2 pills at a time       PHYSICAL EXAM:  Vital Signs:  Blood pressure 128/78, pulse 88, height 1.854 m (6' 1\"), weight 89.3 kg (196 lb 14.4 oz), SpO2 98 %.   Body mass index is 25.98 kg/(m^2).     Mr. Moreno is a pleasant  male who is well-groomed and well-developed sitting comfortably in the exam room without any distress.  Affect is appropriate.    MOVEMENT DISORDERS ASSESSMENT: (Bilateral Gpi DBS ON)  The Fahn Kavon ([facility]) Disability Score     Activity Severity Factor Score   A. Speech  0 - Normal  1 - Slightly involved; easily understood  2 - Some difficulty in understanding  3 - Marked difficulty in understanding  4 - Complete or almost complete anarthria       3   B. Handwriting   (tremor or dystonia) 0 - Normal  1 - Slight difficulty, legible  2 - Almost illegible  3 - Illegible  4 - Unable to grasp to maintain hold on pen    4   C. Feeding 0 - Normal  1 - Uses  tricks , independent  2 - Can feed, but not cut  3 - Finger food only  4 - Completely dependent       3   D. Eating/swallowing 0 - Normal  1 - Occasional choking  2 - Chokes frequently; difficulty swallowing  3 - Unable to swallow firm foods  4 - Marked difficulty swallowing soft foods and liquids       1   E. Hygiene 0 - Normal  1 - Clumsy, independent  2 - Needs help with some activities  3 - Needs help with most activities  4 - Needs help with all activities    1   F. Dressing 0 - Normal  1 - Clumsy, independent  2 - Needs help with some activities  3 - Needs help with most activities  4 - Helpless       2      G. Walking 0 - Normal  1 - Slightly abnormal; hardly noticeable  2 - Moderately abnormal; obvious to naïve observer  3 - Considerably abnormal  4 - Needs assistance to walk  6 - Wheelchair-bound       1     Total (maximum: 30) = 15          The Fahn-Kavon Score        Region Provoking factor  General Severity Factor Weight Score   Eyes 0 0 0.5 0.0   Mouth 3 2 0.5 3.0   Speech & Swallowing 3 2 1.0 " "6.0   Neck 1 1 0.5 0.5   Right Arm 3 3 1.0 9.0   Left Arm 3 3 1.0 9.0   Right Leg 0 0 1.0 0.0   Left Leg 0 0 1.0 0.0   Trunk 0 0 1.0 0.0   Total Score = 27.5       DBS Interrogation & Analysis:    Implanted generator:  Left chest Activa-PC.  Lead placement:  Bilateral Globus Pallidus Internus (Gpi).    Therapy On:  99%  Battery Service Life:  OK.  2.96 volts.    Left Brain  C +, 1 -, 2 -  Volts:  3.8 volts  PW:  60 msec  Rate:  130 Hz    Patient :  Upper Limit: 4.1 volts  Lower Limit: 3.5 volts    Electrode Impedance Check: (Amplitude 3.0 volts: PW 80 msec: Rate 100 Hz)   Left Lead: No Problems Found.     Right Brain   C +, 9 -, 10 -  Volts:  2.5 volts  PW:  60 msec  Rate:  130 Hz    Patient :  Upper Limit: 2.5 volts  Lower Limit: 2.5 volts    Electrode Impedance Check:  (Amplitude 3.0 volts: PW 80 msec: Rate 100 Hz)   Right Lead: No Problems Found.    See scanned report for impedance details.      DBS PROGRAMMING:    Right Gpi  __  Voltage increased to 2.8 volts  __   Patient  parameters changed   Upper Limit: 3.4 volts   Lower Limit: 2.4 volts     Pt was upset that he needed to get a 2nd program.  \"Since this is not working I need another setting.\"  He reported that his son was given 2 programs & they had flexibility to choose between programs.  He expressed his frustration that he has been patient & he's not getting better.    __  I explained the difference between him & his son in terms of symptoms, age, & benefits.  I told him how programming optimization takes time & how benefit in dystonia is delayed.  I reminded him that so, far he only had 1 programming done, & generally, it might take up to 6 - 9 or more months for DBS optimization.    __  Since he was upset, I did create a 2nd program as follows:       Group A    Right Gpi  C +, 9 -, 10 -  Volts:  2.8 volts  PW:  60 msec  Rate:  130 Hz    Group B    Right Gpi  C +, 10 -  Volts:  2.8 volts  PW:  60 msec  Rate:  130 Hz    "      Patient :  Upper Limit: 3.4 volts  Lower Limit: 2.4 volts   Patient :  Upper Limit: 3.4 volts  Lower Limit: 2.4 volts      __  Will return in 1 months for DBS Programming.    The total amount of time spent with patient in DBS programming was 90 minutes.     CT See, CNP  [facility] Neurology Clinic

## 2018-01-12 NOTE — MR AVS SNAPSHOT
After Visit Summary   1/12/2018    Ayad Moreno    MRN: [id number]           Patient Information     Date Of Birth          1970        Visit Information        Provider Department      1/12/2018 7:00 AM Cindy Manning APRN CNP [facility] Neurology        Care Instructions    January 12, 2018      Dear Mr. Ayad Moreno,    Thank you for coming today.  During your visit, we have discussed the following:     __ Your DBS electrical system function (impedance) is normal. The battery life is also good.    __ Your right brain left body DBS programming has been changed.    You have 2 programs.    Group A is your previous settings.  Group B is your new settings.     Right Gpi    Group A    Voltage:  2.8 volts     Group B    Voltage:  2.8 volts     Patient :  Upper Limit: 3.4 volts  Lower Limit: 2.4 volts     Patient :  Upper Limit: 3.4 volts  Lower Limit: 2.4 volts       __  Use Group A & increase voltage every 5 days by 0.2 volts to 3.4 volts.  If tolerated, stay at 3.4 volts.  If Left hand gets worse, you can switch to Group B.       To contact our clinic, you may call [phone] or use [employer] message.     It's good to see you!  I'll see you in 1 month, Feb 16th at 7 am for DBS reprogramming.       CT See, CNP  [facility] Neurology Clinic            Follow-ups after your visit        Your next 10 appointments already scheduled     Feb 16, 2018  7:00 AM CST   (Arrive by 6:45 AM)   Return Movement Disorder with CT Lake CNP   [facility] Neurology ([facility])    [address] [place] [address] [phone]              Who to contact     Please call your clinic at [phone] to:    Ask questions about your health    Make or cancel appointments    Discuss your medicines    Learn about your test results    Speak to your doctor   If you have compliments or concerns about an experience at your clinic,  "or if you wish to file a complaint, please contact [facility] Patient Relations at [phone] or email us at [email or web address]         Additional Information About Your Visit        in[employer] Information     [employer] gives you secure access to your electronic health record. If you see a primary care provider, you can also send messages to your care team and make appointments. If you have questions, please call your primary care clinic.  If you do not have a primary care provider, please call [phone] and they will assist you.      [employer] is an electronic gateway that provides easy, online access to your medical records. With [employer], you can request a clinic appointment, read your test results, renew a prescription or communicate with your care team.     To access your existing account, please contact your [facility] or call [phone] for assistance.        Care EveryWhere ID     This is your Care EveryWhere ID. This could be used by other organizations to access your [place] medical records  [id number]        Your Vitals Were     Pulse Height Pulse Oximetry BMI (Body Mass Index)          88 1.854 m (6' 1\") 98% 25.98 kg/m2         Blood Pressure from Last 3 Encounters:   01/12/18 128/78   12/13/17 119/81   12/04/17 123/80    Weight from Last 3 Encounters:   01/12/18 89.3 kg (196 lb 14.4 oz)   12/13/17 93.6 kg (206 lb 4.8 oz)   11/16/17 94.2 kg (207 lb 10.8 oz)              Today, you had the following     No orders found for display       Primary Care Provider Office Phone # Fax #    Yvescourt Pope [phone]       [facility] [address] [place] [address]        Equal Access to Services     DORIS PASCAL : Hadii aad ku januszo Sojarvis, waaxda luqadaha, qaybta kaalmada adeegyada, raffy barrientos. So [facility] [phone].    ATENCIÓN: Si habla español, tiene a loving " disposición servicios gratuitos de asistencia lingüística. Celeste rendon [phone].    We comply with applicable federal civil rights laws and [place] laws. We do not discriminate on the basis of race, color, national origin, age, disability, sex, sexual orientation, or gender identity.            Thank you!     Thank you for choosing [facility] NEUROLOGY  for your care. Our goal is always to provide you with excellent care. Hearing back from our patients is one way we can continue to improve our services. Please take a few minutes to complete the written survey that you may receive in the mail after your visit with us. Thank you!             Your Updated Medication List - Protect others around you: Learn how to safely use, store and throw away your medicines at www.disposemymeds.org.          This list is accurate as of: 1/12/18  8:26 AM.  Always use your most recent med list.                   Brand Name Dispense Instructions for use Diagnosis    BOTOX 100 UNITS injection   Generic drug:  botulinum toxin type A     95 each    every 3 months Inject 95 units    Oromandibular dystonia       diazepam 2 MG tablet    VALIUM    60    Take 4 mg by mouth 2 times daily Takes 2 pills at a time        NEURONTIN 800 MG tablet   Generic drug:  gabapentin     90    Take 800 mg by mouth 3 times daily        NICORETTE 2 MG lozenge   Generic drug:  nicotine polacrilex      Place 2 mg inside cheek every hour as needed for smoking cessation (10x's/day)        REMERON 30 MG tablet   Generic drug:  mirtazapine     30    Take 1 tablet by mouth At Bedtime        UNABLE TO FIND      Take 1 teaspoonful by mouth 2 times daily MEDICATION NAME: Marijuana (CBD) Oil- NOT THC        vitamin B complex with vitamin C Tabs tablet      Take 1 tablet by mouth every morning

## 2018-01-18 ENCOUNTER — TRANSFERRED RECORDS (OUTPATIENT)
Dept: HEALTH INFORMATION MANAGEMENT | Facility: CLINIC | Age: 48
End: 2018-01-18

## 2018-02-09 ASSESSMENT — MOVEMENT DISORDERS SOCIETY - UNIFIED PARKINSONS DISEASE RATING SCALE (MDS-UPDRS)
SPEECH: MODERATE: MY SPEECH IS UNCLEAR ENOUGH THAT OTHERS ASK ME TO REPEAT MYSELF EVERY DAY EVEN THOUGH MOST OF MY SPEECH IS UNDERSTOOD.
DRESSING: SLIGHT: I AM SLOW BUT I DO NOT NEED HELP.
HOBBIES_AND_OTHER_ACTIVITIES: MODERATE: I HAVE MAJOR PROBLEMS DOING THESE ACTIVITIES, BUT STILL DO MOST.
HYGIENE: MILD: I NEED SOMEONE ELSE TO HELP ME WITH SOME HYGIENE TASKS.
WALKING_AND_BALANCE: NORMAL: NOT AT ALL (NO PROBLEMS).
EATING_TASKS: MILD: I AM SLOW WITH MY EATING AND HAVE OCCASIONAL FOOD SPILLS.  I MAY NEED HELP WITH A FEW TASKS SUCH AS CUTTING MEAT.
TREMOR: MODERATE: SHAKING OR TREMOR CAUSES PROBLEMS WITH MANY OF MY DAILY ACTIVITES.
SALIVA_AND_DROOLING: MILD: I HAVE SOME DROOLING DURING SLEEP, BUT NONE WHEN I AM AWAKE.
CHEWING_AND_SWALLOWING: MILD: I NEED TO HAVE MY PILLS CUT OR MY FOOD SPECIALLY PREPARED BECAUSE OF CHEWING OR SWALLOWING PROBLEMS, BUT I HAVE NOT CHOKED OVER THE PAST WEEK.
HANDWRITING: SEVERE: MOST OR ALL WORDS CANNOT BE READ.
TURNING_IN_BED: SLIGHT: I HAVE A BIT OF TROUBLE TURNING, BUT I DO NOT NEED ANY HELP.
TOTAL_SCORE: 24
GETTING_OUT_OF_BED_CAR_DEEP_CHAIR: SLIGHT: I AM SLOW OR AWKWARD, BUT I USUALLY CAN DO IT ON MY FIRST TRY.
FREEZING: NORMAL: NOT AT ALL (NO PROBLEMS).

## 2018-02-16 ENCOUNTER — OFFICE VISIT (OUTPATIENT)
Dept: NEUROLOGY | Facility: CLINIC | Age: 48
End: 2018-02-16
Payer: COMMERCIAL

## 2018-02-16 VITALS
OXYGEN SATURATION: 98 % | HEART RATE: 60 BPM | SYSTOLIC BLOOD PRESSURE: 111 MMHG | WEIGHT: 196.87 LBS | BODY MASS INDEX: 26.09 KG/M2 | DIASTOLIC BLOOD PRESSURE: 72 MMHG | HEIGHT: 73 IN

## 2018-02-16 DIAGNOSIS — G24.9 GENERALIZED DYSTONIA: Primary | ICD-10-CM

## 2018-02-16 DIAGNOSIS — G24.1 DYSTONIA DUE TO DYT-1 GENE MUTATION: ICD-10-CM

## 2018-02-16 PROBLEM — Z96.89 S/P DEEP BRAIN STIMULATOR PLACEMENT: Status: ACTIVE | Noted: 2017-11-16

## 2018-02-16 RX ORDER — TRAMADOL HYDROCHLORIDE 50 MG/1
50 TABLET ORAL EVERY 8 HOURS PRN
COMMUNITY
Start: 2018-01-18

## 2018-02-16 ASSESSMENT — PAIN SCALES - GENERAL: PAINLEVEL: NO PAIN (0)

## 2018-02-16 NOTE — LETTER
2018      RE: Ayad Moreno  [address]  [facility] [address]       PATIENT: Ayad Moreno    : 1970    NANI: 2018    REASON FOR VISIT: Right hemisphere deep brain stimulation (DBS) programming optimization to improve dystonia.      HPI:  Mr. Ayad Moreno is a 48 year old left - handed  male who came to the [facility] neurology clinic by himself for DBS programming.  He is s/p bilateral Globus Pallidus Internus (Gpi) DBS lead placement left on 1/10/2017 & right on 2017 by Dr. Carolina.   I saw him last on  2017 for DBS programming.     Today, he reports falling about 3 days ago & broke left ankle.  He stood on top a chair to get something from the cabinet & while going down, he lost his balance & fell.  He is wearing a boot.  No surgery required at this point.    He is off the ketogenic diet.  He is maintaining the 10 lb weight loss.    Since his last programming visit, he has switched to Group B right away.  He wasn't able to tell the difference between Group A & B.   He reports that his speech is not getting better or worse.   Mornings are his best time for him to talk.      He continues to have tremor in Lt hand.  Lt thumb & fingers are affected, which make texting a struggle.  This is his main way of communicating with people.  He can't sign.  He uses both hands when he writes on the board & drinks.     Pt was seen by Dr. Oh.      MEDICATIONS:   Medication Sig     traMADol (ULTRAM) 50 MG tablet Take 50 mg by mouth every 8 hours as needed     vitamin B complex with vitamin C  Take 1 tablet by mouth every morning      UNABLE TO FIND Take 1 teaspoonful by mouth 2 times daily MEDICATION NAME: Marijuana (CBD) Oil- NOT THC     nicotine polacrilex (NICORETTE) 2 MG lozenge Place 2 mg inside cheek every hour as needed for smoking cessation (10x's/day)     botulinum toxin type A (BOTOX) 100 UNITS injection every 3 months Inject 95 units     NEURONTIN 800 MG OR TABS Take  "800 mg by mouth 3 times daily      REMERON 30 MG OR TABS Take 1 tablet by mouth At Bedtime      DIAZEPAM 2 MG OR TABS Take 4 mg by mouth 2 times daily Takes 2 pills at a time       PHYSICAL EXAM:  Vital Signs:  Blood pressure 111/72, pulse 60, height 1.854 m (6' 1\"), weight 89.3 kg (196 lb 13.9 oz), SpO2 98 %.  Body mass index is 25.97 kg/(m^2).     Mr. Moreno is a pleasant  male who is well-groomed and well-developed sitting comfortably in the exam room without any distress.  Affect is appropriate.    MOVEMENT DISORDERS ASSESSMENT: (Bilateral Gpi DBS ON)  The Fahn Kavon (BFM) Disability Score     Activity Severity Factor Score   A. Speech  0 - Normal  1 - Slightly involved; easily understood  2 - Some difficulty in understanding  3 - Marked difficulty in understanding  4 - Complete or almost complete anarthria       3   B. Handwriting   (tremor or dystonia) 0 - Normal  1 - Slight difficulty, legible  2 - Almost illegible  3 - Illegible  4 - Unable to grasp to maintain hold on pen    4   C. Feeding 0 - Normal  1 - Uses  tricks , independent  2 - Can feed, but not cut  3 - Finger food only  4 - Completely dependent       2   D. Eating/swallowing 0 - Normal  1 - Occasional choking  2 - Chokes frequently; difficulty swallowing  3 - Unable to swallow firm foods  4 - Marked difficulty swallowing soft foods and liquids       2   E. Hygiene 0 - Normal  1 - Clumsy, independent  2 - Needs help with some activities  3 - Needs help with most activities  4 - Needs help with all activities    1   F. Dressing 0 - Normal  1 - Clumsy, independent  2 - Needs help with some activities  3 - Needs help with most activities  4 - Helpless       1      G. Walking 0 - Normal  1 - Slightly abnormal; hardly noticeable  2 - Moderately abnormal; obvious to naïve observer  3 - Considerably abnormal  4 - Needs assistance to walk  6 - Wheelchair-bound       1     Total (maximum: 30) = 14            DBS Interrogation & " "Analysis:    Implanted generator:  Left chest Activa-PC.  Lead placement:  Bilateral Globus Pallidus Internus (Gpi).    Therapy On:  100%  Battery Service Life:  OK.  2.96 volts.      Group A -- 12%    Left Gpi  C +, 1 -, 2 -  Volts:  3.8 volts  PW:  60 msec  Rate:  130 Hz-    Group B -- 88%    Left Gpi  C +, 1 -, 2 -  Volts:  3.6 volts  PW:  60 msec  Rate:  130 Hz         Patient :  Upper Limit: 4.1 volts  Lower Limit: 3.5 volts   Patient :  Upper Limit: 4.0 volts  Lower Limit: 3.4 volts     Right Gpi  C +, 9 -, 10 -  Volts:  2.6 volts  PW:  60 msec  Rate:  130 Hz-     Right Gpi  C +, 10 -  Volts:  3.4 volts  PW:  60 msec  Rate:  130 Hz-     Patient :  Upper Limit: 3.4 volts  Lower Limit: 2.4 volts   Patient :  Upper Limit: 3.4 volts  Lower Limit: 2.4 volts       Bilateral Electrode Impedance Check: (Amplitude 3.0 volts: PW 80 msec: Rate 100 Hz)   No Problems Found bilaterally.      See scanned report for impedance details.      DBS PROGRAMMING:    Right Gpi patient  increased for pt to slowly increase.     FINAL PROGRAMMING:    Group A    Left Gpi:  No change made.     Group B    Left Gpi:  No changes made.          Patient :  No change.    Patient :  No change.      Right Gpi: -   C +, 9 -, 10 -  Volts:  2.6 volts  PW:  60 msec  Rate:  130 Hz-     Right Gpi  C +, 10 -  Volts:  3.4 volts  PW:  60 msec  Rate:  130 Hz-     Patient :  Upper Limit: 3.6 volts  (increased by 0.2 volts.)  Lower Limit: 2.4 volts     Patient :  Upper Limit: 4.0 volts (increased by 0.6 volts.)  Lower Limit: 3.0 volts     __  His pre-DBS surgery videotape was shown to pt.  When he saw himself, he said, \"oh, my God!\"  He didn't think his dystonia was as severe as shown on the video.  He also was shown his speech.  He did admit that his speech was better now compared to pre-DBS.  __  He was encouraged to go back to Group A & slowly increase the " voltage.  __  The difference between Group A & B was explained to him.      He was informed that Group A has more tissue volume activation & better chance to improve his dystonia than Group B.     He was told that Group B is a good setting, but tissue volume activation is smaller than Group A.     If he chose to stay in Group B, he was encouraged to increase the voltage slowly.   __  Will return in 1 months for a 3 months videotaping.   __  He'll return to see Dr. Loera on May 10th.     The total amount of time spent with patient in DBS programming was 60 minutes.     CT See, CNP  [facility]

## 2018-02-16 NOTE — Clinical Note
2018       RE: Ayad Moreno  [address]  [facility] [address]     Dear Colleague,    Thank you for referring your patient, Ayad Moreno, to the [facility] NEUROLOGY at [facility]. Please see a copy of my visit note below.    PATIENT: Ayad Moreno    : 1970    NANI: 2018    REASON FOR VISIT: Right hemisphere deep brain stimulation (DBS) programming optimization to improve dystonia.      HPI:  Mr. Ayad Moreno is a 48 year old left - handed  male who came to the [facility] neurology clinic by himself for DBS programming.  He is s/p bilateral Globus Pallidus Internus (Gpi) DBS lead placement left on 1/10/2017 & right on 2017 by Dr. Carolina.     I saw him last on  2017 for DBS progremming.     He fell about 3 days ago & broke left ankle.  He stood on top He is wearing a boot.      He is off the diet.  He is maintain      He reports speech is not worse, but     He can't sign.  He has tremors in Lt thumb & finger.  He struggles with texting.     He reports it started to get more tremors    Both leads, everything gets worse.  Unilateral -- the o  ther side an't compensate.    Morning is best time for him to talk. But he thinks his speech is worse.      Speech was affected when he was in highschool.    He continues to have tremor in Lt hand & uses both hands to write on the board & drink.         MEDICATIONS:   Outpatient Prescriptions Marked as Taking for the 18 encounter (Office Visit) with Cindy Manning APRN CNP   Medication Sig     traMADol (ULTRAM) 50 MG tablet Take 50 mg by mouth every 8 hours as needed     vitamin B complex with vitamin C (VITAMIN  B COMPLEX) TABS tablet Take 1 tablet by mouth every morning      UNABLE TO FIND Take 1 teaspoonful by mouth 2 times daily MEDICATION NAME: Marijuana (CBD) Oil- NOT THC     nicotine polacrilex (NICORETTE) 2 MG lozenge Place 2 mg inside cheek every hour as needed for smoking  "cessation (10x's/day)     botulinum toxin type A (BOTOX) 100 UNITS injection every 3 months Inject 95 units     NEURONTIN 800 MG OR TABS Take 800 mg by mouth 3 times daily      REMERON 30 MG OR TABS Take 1 tablet by mouth At Bedtime      DIAZEPAM 2 MG OR TABS Take 4 mg by mouth 2 times daily Takes 2 pills at a time       PHYSICAL EXAM:  Vital Signs:  Blood pressure 111/72, pulse 60, height 1.854 m (6' 1\"), weight 89.3 kg (196 lb 13.9 oz), SpO2 98 %.  Body mass index is 25.97 kg/(m^2).     Mr. Moreno is a pleasant  male who is well-groomed and well-developed sitting comfortably in the exam room without any distress.  Affect is appropriate.    MOVEMENT DISORDERS ASSESSMENT: (Bilateral Gpi DBS ON)  The Fahn Kavon (BFM) Disability Score     Activity Severity Factor Score   A. Speech  0 - Normal  1 - Slightly involved; easily understood  2 - Some difficulty in understanding  3 - Marked difficulty in understanding  4 - Complete or almost complete anarthria       3   B. Handwriting   (tremor or dystonia) 0 - Normal  1 - Slight difficulty, legible  2 - Almost illegible  3 - Illegible  4 - Unable to grasp to maintain hold on pen    4   C. Feeding 0 - Normal  1 - Uses  tricks , independent  2 - Can feed, but not cut  3 - Finger food only  4 - Completely dependent       2   D. Eating/swallowing 0 - Normal  1 - Occasional choking  2 - Chokes frequently; difficulty swallowing  3 - Unable to swallow firm foods  4 - Marked difficulty swallowing soft foods and liquids       2   E. Hygiene 0 - Normal  1 - Clumsy, independent  2 - Needs help with some activities  3 - Needs help with most activities  4 - Needs help with all activities    1   F. Dressing 0 - Normal  1 - Clumsy, independent  2 - Needs help with some activities  3 - Needs help with most activities  4 - Helpless       1      G. Walking 0 - Normal  1 - Slightly abnormal; hardly noticeable  2 - Moderately abnormal; obvious to naïve observer  3 - Considerably " abnormal  4 - Needs assistance to walk  6 - Wheelchair-bound       1     Total (maximum: 30) = ***              DBS Interrogation & Analysis:    Implanted generator:  Left chest Activa-PC.  Lead placement:  Bilateral Globus Pallidus Internus (Gpi).    Therapy On:  100%  Battery Service Life:  OK.  2.96 volts.      Group A -- 12%    Left Gpi  C +, 1 -, 2 -  Volts:  3.8 volts  PW:  60 msec  Rate:  130 Hz-    Group B -- 88%    Left Gpi  C +, 1 -, 2 -  Volts:  3.6 volts  PW:  60 msec  Rate:  130 Hz         Patient :  Upper Limit: 4.1 volts  Lower Limit: 3.5 volts   Patient :  Upper Limit: 4.0 volts  Lower Limit: 3.4 volts     Right Gpi  C +, 9 -, 10 -  Volts:  2.6 volts  PW:  60 msec  Rate:  130 Hz-     Right Gpi  C +, 10 -  Volts:  3.4 volts  PW:  60 msec  Rate:  130 Hz-     Patient :  Upper Limit: 3.4 volts  Lower Limit: 2.4 volts   Patient :  Upper Limit: 3.4 volts  Lower Limit: 2.4 volts       Bilateral Electrode Impedance Check: (Amplitude 3.0 volts: PW 80 msec: Rate 100 Hz)   No Problems Found bilaterally.      See scanned report for impedance details.      DBS PROGRAMMING:    Right Gpi patient  increased for pt to slowly increase.     FINAL PROGRAMMING:    Group A    Left Gpi:  No change made.       Group B    Left Gpi:  No changes made.          Patient :  No change.    Patient :  No change.      Right Gpi: -   C +, 9 -, 10 -  Volts:  2.6 volts  PW:  60 msec  Rate:  130 Hz-     Right Gpi  C +, 10 -  Volts:  3.4 volts  PW:  60 msec  Rate:  130 Hz-     Patient :  Upper Limit: 3.6 volts  (increased by 0.2 volts.)  Lower Limit: 2.4 volts   Patient :  Upper Limit: 4.0 volts (increased by 0.6 volts.)  Lower Limit: 3.0 volts        __  Will return in 1 months for 3 months videotaping.     The total amount of time spent with patient in DBS programming was 60 minutes.     Dulce Manning, APRN, CNP  [facility]          Again, thank you for allowing me to participate in the care of your patient.      Sincerely,    CT Campbell CNP

## 2018-02-16 NOTE — PROGRESS NOTES
PATIENT: Ayad Moreno    : 1970    NANI: 2018    REASON FOR VISIT: Right hemisphere deep brain stimulation (DBS) programming optimization to improve dystonia.      HPI:  Mr. Ayad Moreno is a 48 year old left - handed  male who came to the [facility] neurology clinic by himself for DBS programming.  He is s/p bilateral Globus Pallidus Internus (Gpi) DBS lead placement left on 1/10/2017 & right on 2017 by Dr. Carolina.   I saw him last on  2017 for DBS programming.     Today, he reports falling about 3 days ago & broke left ankle.  He stood on top a chair to get something from the cabinet & while going down, he lost his balance & fell.  He is wearing a boot.  No surgery required at this point.    He is off the ketogenic diet.  He is maintaining the 10 lb weight loss.    Since his last programming visit, he has switched to Group B right away.  He wasn't able to tell the difference between Group A & B.   He reports that his speech is not getting better or worse.   Mornings are his best time for him to talk.      He continues to have tremor in Lt hand.  Lt thumb & fingers are affected, which make texting a struggle.  This is his main way of communicating with people.  He can't sign.  He uses both hands when he writes on the board & drinks.     Pt was seen by Dr. hO.      MEDICATIONS:   Medication Sig     traMADol (ULTRAM) 50 MG tablet Take 50 mg by mouth every 8 hours as needed     vitamin B complex with vitamin C  Take 1 tablet by mouth every morning      UNABLE TO FIND Take 1 teaspoonful by mouth 2 times daily MEDICATION NAME: Marijuana (CBD) Oil- NOT THC     nicotine polacrilex (NICORETTE) 2 MG lozenge Place 2 mg inside cheek every hour as needed for smoking cessation (10x's/day)     botulinum toxin type A (BOTOX) 100 UNITS injection every 3 months Inject 95 units     NEURONTIN 800 MG OR TABS Take 800 mg by mouth 3 times daily      REMERON 30 MG OR TABS Take 1 tablet by mouth At  "Bedtime      DIAZEPAM 2 MG OR TABS Take 4 mg by mouth 2 times daily Takes 2 pills at a time       PHYSICAL EXAM:  Vital Signs:  Blood pressure 111/72, pulse 60, height 1.854 m (6' 1\"), weight 89.3 kg (196 lb 13.9 oz), SpO2 98 %.  Body mass index is 25.97 kg/(m^2).     Mr. Moreno is a pleasant  male who is well-groomed and well-developed sitting comfortably in the exam room without any distress.  Affect is appropriate.    MOVEMENT DISORDERS ASSESSMENT: (Bilateral Gpi DBS ON)  The Fahn Kavon (BFM) Disability Score     Activity Severity Factor Score   A. Speech  0 - Normal  1 - Slightly involved; easily understood  2 - Some difficulty in understanding  3 - Marked difficulty in understanding  4 - Complete or almost complete anarthria       3   B. Handwriting   (tremor or dystonia) 0 - Normal  1 - Slight difficulty, legible  2 - Almost illegible  3 - Illegible  4 - Unable to grasp to maintain hold on pen    4   C. Feeding 0 - Normal  1 - Uses  tricks , independent  2 - Can feed, but not cut  3 - Finger food only  4 - Completely dependent       2   D. Eating/swallowing 0 - Normal  1 - Occasional choking  2 - Chokes frequently; difficulty swallowing  3 - Unable to swallow firm foods  4 - Marked difficulty swallowing soft foods and liquids       2   E. Hygiene 0 - Normal  1 - Clumsy, independent  2 - Needs help with some activities  3 - Needs help with most activities  4 - Needs help with all activities    1   F. Dressing 0 - Normal  1 - Clumsy, independent  2 - Needs help with some activities  3 - Needs help with most activities  4 - Helpless       1      G. Walking 0 - Normal  1 - Slightly abnormal; hardly noticeable  2 - Moderately abnormal; obvious to naïve observer  3 - Considerably abnormal  4 - Needs assistance to walk  6 - Wheelchair-bound       1     Total (maximum: 30) = 14            DBS Interrogation & Analysis:    Implanted generator:  Left chest Activa-PC.  Lead placement:  Bilateral Globus " "Pallidus Internus (Gpi).    Therapy On:  100%  Battery Service Life:  OK.  2.96 volts.      Group A -- 12%    Left Gpi  C +, 1 -, 2 -  Volts:  3.8 volts  PW:  60 msec  Rate:  130 Hz-    Group B -- 88%    Left Gpi  C +, 1 -, 2 -  Volts:  3.6 volts  PW:  60 msec  Rate:  130 Hz         Patient :  Upper Limit: 4.1 volts  Lower Limit: 3.5 volts   Patient :  Upper Limit: 4.0 volts  Lower Limit: 3.4 volts     Right Gpi  C +, 9 -, 10 -  Volts:  2.6 volts  PW:  60 msec  Rate:  130 Hz-     Right Gpi  C +, 10 -  Volts:  3.4 volts  PW:  60 msec  Rate:  130 Hz-     Patient :  Upper Limit: 3.4 volts  Lower Limit: 2.4 volts   Patient :  Upper Limit: 3.4 volts  Lower Limit: 2.4 volts       Bilateral Electrode Impedance Check: (Amplitude 3.0 volts: PW 80 msec: Rate 100 Hz)   No Problems Found bilaterally.      See scanned report for impedance details.      DBS PROGRAMMING:    Right Gpi patient  increased for pt to slowly increase.     FINAL PROGRAMMING:    Group A    Left Gpi:  No change made.     Group B    Left Gpi:  No changes made.          Patient :  No change.    Patient :  No change.      Right Gpi: -   C +, 9 -, 10 -  Volts:  2.6 volts  PW:  60 msec  Rate:  130 Hz-     Right Gpi  C +, 10 -  Volts:  3.4 volts  PW:  60 msec  Rate:  130 Hz-     Patient :  Upper Limit: 3.6 volts  (increased by 0.2 volts.)  Lower Limit: 2.4 volts     Patient :  Upper Limit: 4.0 volts (increased by 0.6 volts.)  Lower Limit: 3.0 volts     __  His pre-DBS surgery videotape was shown to pt.  When he saw himself, he said, \"oh, my God!\"  He didn't think his dystonia was as severe as shown on the video.  He also was shown his speech.  He did admit that his speech was better now compared to pre-DBS.  __  He was encouraged to go back to Group A & slowly increase the voltage.  __  The difference between Group A & B was explained to him.      He was informed that Group " A has more tissue volume activation & better chance to improve his dystonia than Group B.     He was told that Group B is a good setting, but tissue volume activation is smaller than Group A.     If he chose to stay in Group B, he was encouraged to increase the voltage slowly.   __  Will return in 1 months for a 3 months videotaping.   __  He'll return to see Dr. Loera on May 10th.     The total amount of time spent with patient in DBS programming was 60 minutes.     CT See, CNP  [facility]

## 2018-02-16 NOTE — MR AVS SNAPSHOT
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           Follow-ups after your visit        Your next 10 appointments already scheduled     Mar 16, 2018  7:00 AM CDT   (Arrive by 6:45 AM)   Return Movement Disorder with [place] CNP [facility] ([facility])    [address] [place] [address] [phone]            May 10, 2018  8:00 AM CDT   (Arrive by 7:45 AM)   Return Movement Disorder with Uche Loera MD   [facility] ([facility])    [address] [place] [address] [phone]              Who to contact     Please call your clinic at [phone] to:    Ask questions about your health    Make or cancel appointments    Discuss your medicines    Learn about your test results    Speak to your doctor            Additional Information About Your Visit        [email or web address] Information     [employer] gives you secure access to your electronic health record. If you see a primary care provider, you can also send messages to your care team and make appointments. If you have questions, please call your primary care clinic.  If you do not have a primary care provider, please call [phone] and they will assist you.      [employer] is an electronic gateway that provides easy, online access to your medical records. With [employer], you can request a clinic appointment, read your test results, renew a prescription or communicate with your care team.     To access your existing account, please contact your [facility] or call [phone] for  "assistance.        Care EveryWhere ID     This is your Care EveryWhere ID. This could be used by other organizations to access your [place] medical records  [id number]        Your Vitals Were     Pulse Height Pulse Oximetry BMI (Body Mass Index)          60 1.854 m (6' 1\") 98% 25.97 kg/m2         Blood Pressure from Last 3 Encounters:   02/16/18 111/72   01/12/18 128/78   12/13/17 119/81    Weight from Last 3 Encounters:   02/16/18 89.3 kg (196 lb 13.9 oz)   01/12/18 89.3 kg (196 lb 14.4 oz)   12/13/17 93.6 kg (206 lb 4.8 oz)              We Performed the Following     ANALYSIS NEUROSTIM, SIMPLE W PROGRAMMING        Primary Care Provider Office Phone # Fax #    Yves BECK Toshia [phone]       [facility] [address] [place] [address]        Equal Access to Services     CHAVO [facility] HospitalGEOVANNA : Hadii aad ku hadasho Soomaali, waaxda luqadaha, qaybta kaalmada adeegyada, waxay idiin hayaan catherineeg renanaraleticia lopez . So [facility] [phone].    ATENCIÓN: Si habla español, tiene a loving disposición servicios gratuitos de asistencia lingüística. Llame al [phone].    We comply with applicable federal civil rights laws and [place] laws. We do not discriminate on the basis of race, color, national origin, age, disability, sex, sexual orientation, or gender identity.            Thank you!     Thank you for choosing [facility]UR[facility]  for your care. Our goal is always to provide you with excellent care. Hearing back from our patients is one way we can continue to improve our services. Please take a few minutes to complete the written survey that you may receive in the mail after your visit with us. Thank you!             Your Updated Medication List - Protect others around you: Learn how to safely use, store and throw away your medicines at www.disposemymeds.org.          This list is accurate as of 2/16/18 11:59 PM.  Always use your most recent med list.                   Brand Name " Dispense Instructions for use Diagnosis    BOTOX 100 UNITS injection   Generic drug:  botulinum toxin type A     95 each    every 3 months Inject 95 units    Oromandibular dystonia       diazepam 2 MG tablet    VALIUM    60    Take 4 mg by mouth 2 times daily Takes 2 pills at a time        NEURONTIN 800 MG tablet   Generic drug:  gabapentin     90    Take 800 mg by mouth 3 times daily        NICORETTE 2 MG lozenge   Generic drug:  nicotine polacrilex      Place 2 mg inside cheek every hour as needed for smoking cessation (10x's/day)        REMERON 30 MG tablet   Generic drug:  mirtazapine     30    Take 1 tablet by mouth At Bedtime        traMADol 50 MG tablet    ULTRAM     Take 50 mg by mouth every 8 hours as needed        UNABLE TO FIND      Take 1 teaspoonful by mouth 2 times daily MEDICATION NAME: Marijuana (CBD) Oil- NOT THC        vitamin B complex with vitamin C Tabs tablet      Take 1 tablet by mouth every morning

## 2018-03-01 ASSESSMENT — ENCOUNTER SYMPTOMS
SEIZURES: 0
WEAKNESS: 1
TREMORS: 1
MEMORY LOSS: 0
NUMBNESS: 0
LOSS OF CONSCIOUSNESS: 0
PARALYSIS: 0
TINGLING: 0
HEADACHES: 0
SPEECH CHANGE: 1
DISTURBANCES IN COORDINATION: 1
DIZZINESS: 0

## 2018-03-01 NOTE — TELEPHONE ENCOUNTER
APPT INFO    Date /Time: 3/6/18, 9am   Reason for Appt: Botox/ movement disorder    Ref Provider/Clinic: Dulce Manning   Are there internal records? Yes/No?  IF YES, list clinic names: [facility]   Are there outside records? Yes/No? No    Patient Contact (Y/N) & Call Details: No, referred    Action:

## 2018-03-06 ENCOUNTER — PRE VISIT (OUTPATIENT)
Dept: NEUROLOGY | Facility: CLINIC | Age: 48
End: 2018-03-06

## 2018-03-06 ENCOUNTER — OFFICE VISIT (OUTPATIENT)
Dept: NEUROLOGY | Facility: CLINIC | Age: 48
End: 2018-03-06
Payer: COMMERCIAL

## 2018-03-06 VITALS
HEIGHT: 73 IN | SYSTOLIC BLOOD PRESSURE: 111 MMHG | TEMPERATURE: 97.4 F | BODY MASS INDEX: 26.63 KG/M2 | WEIGHT: 200.9 LBS | DIASTOLIC BLOOD PRESSURE: 72 MMHG | HEART RATE: 67 BPM

## 2018-03-06 DIAGNOSIS — G24.1 DYSTONIA DUE TO DYT-1 GENE MUTATION: Primary | ICD-10-CM

## 2018-03-06 ASSESSMENT — PAIN SCALES - GENERAL: PAINLEVEL: MILD PAIN (2)

## 2018-03-06 NOTE — PROGRESS NOTES
Department of Neurology  Movement Disorders Division   Initial Clinic Evaluation     Patient: Ayad Moreno   MRN: [id number]   : 1970   Date of Visit: 3/6/2018     Referring Physician: Toshia    Reason for Referral: Generalized dystonia    Impression:     #1  Generalized dystonia, DYT1 positive  #2  Status post bilateral globus pallidus pars interna deep brain stimulation.  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  Jaw dystonia, lingual dystonia, hyoid muscle dystonia.    This patient has DYT1 positive dystonia and has had deep brain stimulation.  He is still troubled with dystonic speech.  The character of his speech disorder is complex reflecting the involvement of multiple muscles.  He says that his speech tone has lowered and he feels tightness in the hyoid region.  I suspect that part of his speech problem issues from hyoid muscle dystonia.  Unfortunately this is difficult to treat.  I told him Dr. Horne could consider injecting small amounts of 1-2 units of botulinum toxin A into the sternohyoid and omohyoid muscles.  Some patients feel that this improves there is speech fluidity.  It will not help his speech articulation.    Recommendations:  #1.  I will write to Dr. Horne in see if he would consider adding small injections of the hyoid muscles for the patient's dystonic speech.  I have given the patient a copy of the article on hyoid muscle dystonia that he can review with Dr. Horne.  #2 If the hyoid injections do not give him added benefit, we could consider a therapeutic trial of trihexyphenidyl which is the medicine most likely to benefit DYT1 dystonia.  As an adult he would not be able to tolerate high doses and we would start him at trihexyphenidyl 1/2-2 mg tablet or 1 mg a day increasing towards 4 mg a day as tolerated.  #3  If the trihexyphenidyl was not of benefit he could try some small botulinum toxin injections around the orbicularis oris to see if this would help speech precision.  #4   If this were not effective we could consider 1 of the newer VMAT2 inhibitors to see if this would have any benefit in his dystonia.    The patient realizes this is all trial and error.  We will keep her eye out in the literature and on the clinical trials.gov site to see if there are any more directed therapies for DY T1 dystonia becoming available.      Please call or write with questions or concerns,    Sincerely yours,    Ignacio Zaidi MD      History of Present Illness  Mr. Moreno is a 48 year old male who is right-handed.  He comes referred by Ms. Clary Dent for reevaluation of his dystonia and assumption of primary neurological care.  The patient is a disabled teacher.  He states clearly that his primary problem is his speech which disables him from teaching and normal social activities which she values greatly.    The patient began having dystonia at age 12 when his right hand with tremor while writing.  His right elbow would flex at his side.  He was diagnosed at that time as having dystonia.  He remembers being treated with levodopa at age 14 which caused paranoia and other symptoms.  He also remembers trying baclofen which is no but which was of no benefit.  He tried Artane which caused blurry vision and fatigue.  He cannot remember the dose at which she did experience the side effects.    3 years ago he had genetic testing which revealed the DY T1 mutation.  His son also has this mutation and has had deep brain stimulation in [place] with Dr. Tate with good effect.    The patient underwent deep brain stimulation here at the [facility].  He underwent bilateral globus pallidus internus G stimulation.  He had the left side done for his predominant right-sided dystonia on January 2017 and had some improvement.  He had the right side done to see if speech could be improved in November 2017 but he says his speech has worsened.    His major concern is his speech.  He feels he has lost  intelligibility.  He sees Dr. Horne here in [facility] and gets injections for his jaw dystonia.  He gets injections into the left lateral pterygoid masseters and digastric's.  This does help but he wants more.  He feels his tongue is affected and his articulation is not precise.  He feels that pitch of his voice is low and he feels tightness in his throat.  He finds it hard to chew.  He does have tremor of both hands and dystonic posturing on the right.  His right leg also tremors although does not postures.    He is currently on no medications.    Past Medical History:   Past Medical History:   Diagnosis Date     Anxiety      Asthmas with exposure to cats      Depression      Generalized dystonia     DYT-1 genetic mutation     Lumbar disc herniation      OCD (obsessive compulsive disorder)      Rt ACL Tear        Past Surgical History:   Past Surgical History:   Procedure Laterality Date     C REPAIR CRUCIATE LIGAMENT,KNEE  2008     IMPLANT DEEP BRAIN STIMULATION GENERATOR / BATTERY Left 1/20/2017    Procedure: IMPLANT DEEP BRAIN STIMULATION GENERATOR / BATTERY;  Surgeon: Duy Carolina MD;  Location: [facility] OR     OPTICAL TRACKING SYSTEM INSERTION DEEP BRAIN STIMULATION Left 1/10/2017    Procedure: OPTICAL TRACKING SYSTEM INSERTION DEEP BRAIN STIMULATION;  Surgeon: Duy Carolina MD;  Location: [facility] OR     OPTICAL TRACKING SYSTEM INSERTION DEEP BRAIN STIMULATION Right 11/16/2017    Procedure: OPTICAL TRACKING SYSTEM INSERTION DEEP BRAIN STIMULATION;  Right Stealth Assisted Deep Brain Stimulator Placement, Phase I And II Combined, Placement Of Right Side Deep Brain Stimulator Electrode, Target Right Globus Pallidus Internus With Microelectrode Recording And Connection To The Existing Generator/Battery;  Surgeon: Duy Carolina MD;  Location: [facility] OR       Medications:  Current Outpatient Prescriptions   Medication Sig Dispense Refill     traMADol (ULTRAM) 50 MG tablet Take 50 mg by mouth every 8 hours  as needed       vitamin B complex with vitamin C (VITAMIN  B COMPLEX) TABS tablet Take 1 tablet by mouth every morning        UNABLE TO FIND Take 1 teaspoonful by mouth 2 times daily MEDICATION NAME: Marijuana (CBD) Oil- NOT THC       nicotine polacrilex (NICORETTE) 2 MG lozenge Place 2 mg inside cheek every hour as needed for smoking cessation (10x's/day)       botulinum toxin type A (BOTOX) 100 UNITS injection every 3 months Inject 95 units 95 each      NEURONTIN 800 MG OR TABS Take 800 mg by mouth 3 times daily  90 0     REMERON 30 MG OR TABS Take 1 tablet by mouth At Bedtime  30 0     DIAZEPAM 2 MG OR TABS Take 4 mg by mouth 2 times daily Takes 2 pills at a time 60 0          Movement Disorder-related Medications                                                                                                                                                             Allergies: has No Known Allergies.    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     Social History Main Topics     Smoking status: Former Smoker     Types: Cigarettes     Smokeless tobacco: Never Used      Comment: social     Alcohol use 0.0 oz/week     0 Standard drinks or equivalent per week      Comment: 1-2x's/month.     Drug use: Yes     Special: Marijuana      Comment: vaping     Sexual activity: Yes     Partners: Female     Birth control/ protection: Condom     Other Topics Concern     Parent/Sibling W/ Cabg, Mi Or Angioplasty Before 65f 55m? No     Social History Narrative       Family History:  Family History   Problem Relation Age of Onset     DIABETES Father      CANCER Paternal Grandfather      Stomach CA     HEART DISEASE Paternal Grandfather      CANCER Maternal Grandmother      Bladder     Depression Maternal Grandmother       Myocardial Infarction Maternal Grandfather      MI     Obsessive Compulsive Disorder Son      Genetic Disorder Son      Dystonia 2ndary to DYT 1 micah mutation       ROS:  General:  Fever : no, Chills: no, Sweats: no, Fatigue: no, ,Weight loss: no  Cardiovascular  Chest Pains: no, Palpitations: no, Edema: no, Shortness of breath: no  Respiratory  Cough: no  Gastrointestinal  Nausea: no, Vomiting: no, Diarrhea: no, Constipation: no  Genitourinary  Dysuria: no, Frequency: no, Urgency: no,  Nocturia: no, Incontinence: no Women:  Abnormal vaginal bleeding: no  Musculoskeletal  Back pain: no, Neck Pain: no  Joint pain, swelling, redness: no, Stiffness: no  Skin  Rash: no, Itching: no,  Suspicious lesions: no  Psychiatric  Depression: no, Anxiety/Panic: no  Endocrine  Cold intolerance: no, Heat intolerance: no, Excessive thirst: no  Hematologic/LymphatiAbnormal bruising, bleeding: no ,Enlarged lymph nodes: {.:[id number]  Allergic/Immunologic  Hives (Urticaria): no, HIV exposure: no    Neurologic  Visual loss: no, Double vision: no, Headache: no, Loss of consciousness:  no, Seizure: no, Fainting (syncope): no, Dysarthria: YES, Dysphagia:  YES, Vertigo:  no, Weakness: no, Atrophy: no, Twitches: no, Lhermitte's: no, Numbness or tingling: no, Handwriting change: YES, Tremors: YES, Involuntary movements: YES, Imbalance: no, Abnormal gait:  no, Falls :no, Memory loss: no, RBD: no, Sleep disorder: no, Hallucination: no, Loss of concentration: no,  Behavioral change: no,  Loss of motivation: no      Comprehensive Neurologic Exam    Mental Status Exam   The patient is alert, oriented and exhibits no difficulty with cognition or memory.  A formal short test of mental status was not performed.     Neurovascular         Speech and Language   Right Left     Carotid Bruit Absent Absent  Speech is normal.   Dorsalis Pedis Pulse Normal Normal  Description    Hyperkinetic dysarthria with jaw opening and deviation, mild lingual dystonia and  hyoid muscle dystonia   Posterior Tibial Pulse Normal Normal  Aphasia is not present     Cranial Nerve Exam                 Right Left   Right Left   II                                        Normal Normal  Hearing (VIII) Normal Normal      III, IV, V!                   Normal Normal  Nystagmus (VIII) Normal Normal   EOM description                              Gag (IX, X) Normal Normal   Facial Sensation (V) Normal Normal  SCM (XI) Normal Normal   Muscles of Mastication (V) Normal Normal  Trapezius (XI) Normal Normal   Facial Strength (VII) Normal Normal  Tongue (XII) Normal Normal     Motor Exam  Strength (*/5 grading)  Muscle                  Right Left  Muscle Right Left   Frontalis                                           Normal Normal  Iliopsoas Normal    Normal          Orbicularis Oculi                     Normal Normal  Quadrideps Normal    Normal        Orbicularis Oris                         Normal Normal  Anterior Tibial Normal Normal      Deltoid Normal Normal  Gastrocnemius Normal    Normal   Biceps Normal Normal  Extensor Hallucis Longus Normal    Normal   Triceps Normal Normal  Toe Extensors Normal    Normal   EDC Normal Normal  Toe Flexor Normal    Normal   Finger Flexors Normal Normal  Other Normal Normal   First Dorsal Interosseous Normal Normal  Other Normal Normal   Hypothenar Normal Normal  Other Normal Normal   Thenar Normal Normal  Other Normal Normal     Reflexes      Tone   Right Left   Right Left   Biceps Normal Normal  Spasticity (S)  Rigidity (R)     Triceps Normal Normal  Neck     Brachioradialis Normal Normal  Arm     Quadriceps Normal Normal  Leg     Ankle Normal Normal       Babkinski Flexor Flexor         AMR       Coordination   Right Left   Right Left   Fingers Normal Normal  Finger nose finger Normal Normal   Hand Normal Normal  Drift Normal Normal   Leg Normal Normal  Heel Toribio Normal Normal   Foot Normal Normal  Other     Other               Involuntary Movements    Gait and  Station  None            Right Left  Stand & Sit Normal   Dystonia speech +3 +3  Gait Normal   Dystonia arm +2 +2  Tandem Normal   (Movement type) Normal Normal  Romberg Absent   All dystonia is action induced.  No cranial dystonia at rest but severe dystonia with speech.  Jaw opens and deviates right with speech.  Mild lingual dystonia.  Defiinite palatal dystonia.  Speech tone is low and hyoid region tight.  Arms tremor and posture with writing    Sensory Exam          Right Left    Pin Normal Normal    Vibration Normal Normal    Joint position Normal Normal    Other             Coding statement:     Duration of  Services: face-to-face70min.   Greater than 50% of this visit was spent in counseling and coordination of care.    Medical decision making is high due to the following components:    Number of diagnoses:[id number]  ManagementDiagnostic tests orders or reviewed.  Complexity of medical data:Outside records reviewed.  Radiology images reviewed by myself.  Review/order clinical lab tests.   RiskOne or more chronic illnesses with severe exacerbation, progression, or side effects of treatment.

## 2018-03-06 NOTE — MR AVS SNAPSHOT
After Visit Summary   3/6/2018    Ayad Moreno    MRN: [id number]           Patient Information     Date Of Birth          1970        Visit Information        Provider Department      3/6/2018 9:00 AM Ignacio Zaidi MD [facility] Neurology        Today's Diagnoses     Dystonia due to DYT-1 gene mutation    -  1      Care Instructions    #1  Call if wants to start artane trial  #2  Return in 6 months or sooner          Follow-ups after your visit        Follow-up notes from your care team     Return in about 6 months (around 9/6/2018).      Your next 10 appointments already scheduled     Mar 08, 2018  2:00 PM CST   (Arrive by 1:45 PM)   Return Botox with Yves Horne MD   [facility] Ear Nose [facility] Throat ([facility])    [address] [place] [id number] [phone]           This is a multi-disciplinary care team visit as patients with your type of problem are usually seen by a team of an MD and a Speech-Language Pathologist (who is a specialist in disorders of the voice, throat, and breathing).  Please plan about 2 hours for your visit, which will likely include Laryngeal Function Studies, a Voice/Swallow/Breathing Evaluation, and an Endoscopic Laryngeal Examination to provide a comprehensive evaluation.  Please check with your insurance company to ensure you are covered for these services. - It is important to know that if you are evaluated and/or treated by both a physician and a speech pathologist during your visit, your billing will reflect the input that you receive from both providers as separate professionals. Although most insurance plans do cover these services, we encourage you to contact your insurance company prior to your visit to determine whether there are any coverage limitations that might affect you financially. - Billing/procedure codes that are frequently associated with visits to our clinic include (but are not  limited to) the ones listed below. Most patients will not need all of these items, but it may be useful to ask your insurance company's patient . 66489: Flexible fiberoptic laryngoscopy, 56633: Laryngoscopy; flexible or rigid fiberoptic, with stroboscopy, 60226: Flexible endoscopic evaluation of swallowing, 94426: Laryngeal function aerodynamic evaluation, 93561: Evaluation of Voice and Resonance, 61337: Speech pathology treatment for voice, speech, communication, 13088: Speech pathology treatment for swallowing/oral function for feeding - If you have any concerns or questions, or if you would prefer not to have a speech pathologist involved in your visit, please contact our Clinic Coordinator at [phone], at least 24 hours prior to your appointment.            Mar 16, 2018  7:00 AM CDT   (Arrive by 6:45 AM)   Return Movement Disorder with CT Lake CNP   [facility] ([facility])    [address] [place] [id number] [phone]            May 10, 2018  8:00 AM CDT   (Arrive by 7:45 AM)   Return Movement Disorder with Uche Loera MD   [facility] ([facility])    [address] [place] [id number] [phone]              Who to contact     Please call your clinic at [phone] to:    Ask questions about your health    Make or cancel appointments    Discuss your medicines    Learn about your test results    Speak to your doctor            Additional Information About Your Visit        [employer] Information     Connect[employer] gives you secure access to your electronic health record. If you see a primary care provider, you can also send messages to your care team and make appointments. If you have questions, please call your primary care clinic.  If you do not have a primary care provider, please call [phone] and they will assist you.      [employer] is an  "electronic gateway that provides easy, online access to your medical records. With [employer], you can request a clinic appointment, read your test results, renew a prescription or communicate with your care team.     To access your existing account, please contact your [facility] or call [phone] for assistance.        Care EveryWhere ID     This is your Care EveryWhere ID. This could be used by other organizations to access your [facility] medical records  [id number]        Your Vitals Were     Pulse Temperature Height BMI (Body Mass Index)          67 97.4  F (36.3  C) (Oral) 1.854 m (6' 1\") 26.51 kg/m2         Blood Pressure from Last 3 Encounters:   03/06/18 111/72   02/16/18 111/72   01/12/18 128/78    Weight from Last 3 Encounters:   03/06/18 91.1 kg (200 lb 14.4 oz)   02/16/18 89.3 kg (196 lb 13.9 oz)   01/12/18 89.3 kg (196 lb 14.4 oz)              Today, you had the following     No orders found for display       Primary Care Provider Office Phone # Fax #    Yves BECK Toshia [phone]       [facility] [address] [place] [address]        Equal Access to Services     DORIS PASCAL : Hadii aad ku hadasho Soomaali, waaxda luqadaha, qaybta kaalmada adeegyada, waxay idiin hayaan adedewayne duque lacruz . So [facility] [phone].    ATENCIÓN: Si habla español, tiene a loving disposición servicios gratuitos de asistencia lingüística. Llame al [phone].    We comply with applicable federal civil rights laws and [place] laws. We do not discriminate on the basis of race, color, national origin, age, disability, sex, sexual orientation, or gender identity.            Thank you!     Thank you for choosing [facility] NEUROLOGY  for your care. Our goal is always to provide you with excellent care. Hearing back from our patients is one way we can continue to improve our services. Please take a few minutes to complete the written survey that you " may receive in the mail after your visit with us. Thank you!             Your Updated Medication List - Protect others around you: Learn how to safely use, store and throw away your medicines at www.disposemymeds.org.          This list is accurate as of 3/6/18 10:08 AM.  Always use your most recent med list.                   Brand Name Dispense Instructions for use Diagnosis    BOTOX 100 UNITS injection   Generic drug:  botulinum toxin type A     95 each    every 3 months Inject 95 units    Oromandibular dystonia       diazepam 2 MG tablet    VALIUM    60    Take 4 mg by mouth 2 times daily Takes 2 pills at a time        NEURONTIN 800 MG tablet   Generic drug:  gabapentin     90    Take 800 mg by mouth 3 times daily        NICORETTE 2 MG lozenge   Generic drug:  nicotine polacrilex      Place 2 mg inside cheek every hour as needed for smoking cessation (10x's/day)        REMERON 30 MG tablet   Generic drug:  mirtazapine     30    Take 1 tablet by mouth At Bedtime        traMADol 50 MG tablet    ULTRAM     Take 50 mg by mouth every 8 hours as needed        UNABLE TO FIND      Take 1 teaspoonful by mouth 2 times daily MEDICATION NAME: Marijuana (CBD) Oil- NOT THC        vitamin B complex with vitamin C Tabs tablet      Take 1 tablet by mouth every morning

## 2018-03-06 NOTE — NURSING NOTE
Chief Complaint   Patient presents with     Consult     P NEW - MOVEMENT DISORDER     Daisy Gonzalez MA

## 2018-03-06 NOTE — LETTER
3/6/2018       RE: Ayad Moreno  [address]  [facility] [address]     Dear Colleague,    Thank you for referring your patient, Ayad Moreno, to the [facility] NEUROLOGY at [facility]. Please see a copy of my visit note below.    Department of Neurology  Movement Disorders Division   Initial Clinic Evaluation     Patient: Ayad Moreno   MRN: [id number]   : 1970   Date of Visit: 3/6/2018     Referring Physician: Toshia    Reason for Referral: Generalized dystonia    Impression:     #1  Generalized dystonia, DYT1 positive  #2  Status post bilateral globus pallidus pars interna deep brain stimulation.  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  Jaw dystonia, lingual dystonia, hyoid muscle dystonia.    This patient has DYT1 positive dystonia and has had deep brain stimulation.  He is still troubled with dystonic speech.  The character of his speech disorder is complex reflecting the involvement of multiple muscles.  He says that his speech tone has lowered and he feels tightness in the hyoid region.  I suspect that part of his speech problem issues from hyoid muscle dystonia.  Unfortunately this is difficult to treat.  I told him Dr. Horne could consider injecting small amounts of 1-2 units of botulinum toxin A into the sternohyoid and omohyoid muscles.  Some patients feel that this improves there is speech fluidity.  It will not help his speech articulation.    Recommendations:  #1.  I will write to Dr. Horne in see if he would consider adding small injections of the hyoid muscles for the patient's dystonic speech.  I have given the patient a copy of the article on hyoid muscle dystonia that he can review with Dr. Horne.  #2 If the hyoid injections do not give him added benefit, we could consider a therapeutic trial of trihexyphenidyl which is the medicine most likely to benefit DYT1 dystonia.  As an adult he would not be able to tolerate high doses and we would  start him at trihexyphenidyl 1/2-2 mg tablet or 1 mg a day increasing towards 4 mg a day as tolerated.  #3  If the trihexyphenidyl was not of benefit he could try some small botulinum toxin injections around the orbicularis oris to see if this would help speech precision.  #4  If this were not effective we could consider 1 of the newer VMAT2 inhibitors to see if this would have any benefit in his dystonia.    The patient realizes this is all trial and error.  We will keep her eye out in the literature and on the clinical trials.gov site to see if there are any more directed therapies for DY T1 dystonia becoming available.      Please call or write with questions or concerns,    Sincerely yours,    Ignacio Zaidi MD      History of Present Illness  Mr. Moreno is a 48 year old male who is right-handed.  He comes referred by Ms. Clary Dent for reevaluation of his dystonia and assumption of primary neurological care.  The patient is a disabled teacher.  He states clearly that his primary problem is his speech which disables him from teaching and normal social activities which she values greatly.    The patient began having dystonia at age 12 when his right hand with tremor while writing.  His right elbow would flex at his side.  He was diagnosed at that time as having dystonia.  He remembers being treated with levodopa at age 14 which caused paranoia and other symptoms.  He also remembers trying baclofen which is no but which was of no benefit.  He tried Artane which caused blurry vision and fatigue.  He cannot remember the dose at which she did experience the side effects.    3 years ago he had genetic testing which revealed the DY T1 mutation.  His son also has this mutation and has had deep brain stimulation in [place] with Dr. Tate with good effect.    The patient underwent deep brain stimulation here at the [facility].  He underwent bilateral globus pallidus internus G stimulation.  He had  the left side done for his predominant right-sided dystonia on January 2017 and had some improvement.  He had the right side done to see if speech could be improved in November 2017 but he says his speech has worsened.    His major concern is his speech.  He feels he has lost intelligibility.  He sees Dr. Horne here in ENT and gets injections for his jaw dystonia.  He gets injections into the left lateral pterygoid masseters and digastric's.  This does help but he wants more.  He feels his tongue is affected and his articulation is not precise.  He feels that pitch of his voice is low and he feels tightness in his throat.  He finds it hard to chew.  He does have tremor of both hands and dystonic posturing on the right.  His right leg also tremors although does not postures.    He is currently on no medications.    Past Medical History:   Past Medical History:   Diagnosis Date     Anxiety      Asthmas with exposure to cats      Depression      Generalized dystonia     DYT-1 genetic mutation     Lumbar disc herniation      OCD (obsessive compulsive disorder)      Rt ACL Tear        Past Surgical History:   Past Surgical History:   Procedure Laterality Date     C REPAIR CRUCIATE LIGAMENT,KNEE  2008     IMPLANT DEEP BRAIN STIMULATION GENERATOR / BATTERY Left 1/20/2017    Procedure: IMPLANT DEEP BRAIN STIMULATION GENERATOR / BATTERY;  Surgeon: Duy Carolina MD;  Location: [facility] OR     OPTICAL TRACKING SYSTEM INSERTION DEEP BRAIN STIMULATION Left 1/10/2017    Procedure: OPTICAL TRACKING SYSTEM INSERTION DEEP BRAIN STIMULATION;  Surgeon: Duy Carolina MD;  Location: [facility] OR     OPTICAL TRACKING SYSTEM INSERTION DEEP BRAIN STIMULATION Right 11/16/2017    Procedure: OPTICAL TRACKING SYSTEM INSERTION DEEP BRAIN STIMULATION;  Right Stealth Assisted Deep Brain Stimulator Placement, Phase I And II Combined, Placement Of Right Side Deep Brain Stimulator Electrode, Target Right Globus Pallidus Internus With  Microelectrode Recording And Connection To The Existing Generator/Battery;  Surgeon: Duy Carolina MD;  Location: [facility] OR       Medications:  Current Outpatient Prescriptions   Medication Sig Dispense Refill     traMADol (ULTRAM) 50 MG tablet Take 50 mg by mouth every 8 hours as needed       vitamin B complex with vitamin C (VITAMIN  B COMPLEX) TABS tablet Take 1 tablet by mouth every morning        UNABLE TO FIND Take 1 teaspoonful by mouth 2 times daily MEDICATION NAME: Marijuana (CBD) Oil- NOT THC       nicotine polacrilex (NICORETTE) 2 MG lozenge Place 2 mg inside cheek every hour as needed for smoking cessation (10x's/day)       botulinum toxin type A (BOTOX) 100 UNITS injection every 3 months Inject 95 units 95 each      NEURONTIN 800 MG OR TABS Take 800 mg by mouth 3 times daily  90 0     REMERON 30 MG OR TABS Take 1 tablet by mouth At Bedtime  30 0     DIAZEPAM 2 MG OR TABS Take 4 mg by mouth 2 times daily Takes 2 pills at a time 60 0          Movement Disorder-related Medications                                                                                                                                                             Allergies: has No Known Allergies.    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     Social History Main Topics     Smoking status: Former Smoker     Types: Cigarettes     Smokeless tobacco: Never Used      Comment: social     Alcohol use 0.0 oz/week     0 Standard drinks or equivalent per week      Comment: 1-2x's/month.     Drug use: Yes     Special: Marijuana      Comment: vaping     Sexual activity: Yes     Partners: Female     Birth control/ protection: Condom     Other Topics Concern     Parent/Sibling W/ Cabg, Mi Or Angioplasty Before 65f 55m? No      Social History Narrative       Family History:  Family History   Problem Relation Age of Onset     DIABETES Father      CANCER Paternal Grandfather      Stomach CA     HEART DISEASE Paternal Grandfather      CANCER Maternal Grandmother      Bladder     Depression Maternal Grandmother      Myocardial Infarction Maternal Grandfather      MI     Obsessive Compulsive Disorder Son      Genetic Disorder Son      Dystonia 2ndary to DYT 1 micah mutation       ROS:  General:  Fever : no, Chills: no, Sweats: no, Fatigue: no, ,Weight loss: no  Cardiovascular  Chest Pains: no, Palpitations: no, Edema: no, Shortness of breath: no  Respiratory  Cough: no  Gastrointestinal  Nausea: no, Vomiting: no, Diarrhea: no, Constipation: no  Genitourinary  Dysuria: no, Frequency: no, Urgency: no,  Nocturia: no, Incontinence: no Women:  Abnormal vaginal bleeding: no  Musculoskeletal  Back pain: no, Neck Pain: no  Joint pain, swelling, redness: no, Stiffness: no  Skin  Rash: no, Itching: no,  Suspicious lesions: no  Psychiatric  Depression: no, Anxiety/Panic: no  Endocrine  Cold intolerance: no, Heat intolerance: no, Excessive thirst: no  Hematologic/LymphatiAbnormal bruising, bleeding: no ,Enlarged lymph nodes: {.:[id number]  Allergic/Immunologic  Hives (Urticaria): no, HIV exposure: no    Neurologic  Visual loss: no, Double vision: no, Headache: no, Loss of consciousness:  no, Seizure: no, Fainting (syncope): no, Dysarthria: YES, Dysphagia:  YES, Vertigo:  no, Weakness: no, Atrophy: no, Twitches: no, Lhermitte's: no, Numbness or tingling: no, Handwriting change: YES, Tremors: YES, Involuntary movements: YES, Imbalance: no, Abnormal gait:  no, Falls :no, Memory loss: no, RBD: no, Sleep disorder: no, Hallucination: no, Loss of concentration: no,  Behavioral change: no,  Loss of motivation: no      Comprehensive Neurologic Exam    Mental Status Exam   The patient is alert, oriented and exhibits no difficulty with cognition or memory.  A formal  short test of mental status was not performed.     Neurovascular         Speech and Language   Right Left     Carotid Bruit Absent Absent  Speech is normal.   Dorsalis Pedis Pulse Normal Normal  Description    Hyperkinetic dysarthria with jaw opening and deviation, mild lingual dystonia and hyoid muscle dystonia   Posterior Tibial Pulse Normal Normal  Aphasia is not present     Cranial Nerve Exam                 Right Left   Right Left   II                                        Normal Normal  Hearing (VIII) Normal Normal      III, IV, V!                   Normal Normal  Nystagmus (VIII) Normal Normal   EOM description                              Gag (IX, X) Normal Normal   Facial Sensation (V) Normal Normal  SCM (XI) Normal Normal   Muscles of Mastication (V) Normal Normal  Trapezius (XI) Normal Normal   Facial Strength (VII) Normal Normal  Tongue (XII) Normal Normal     Motor Exam  Strength (*/5 grading)  Muscle                  Right Left  Muscle Right Left   Frontalis                                           Normal Normal  Iliopsoas Normal    Normal          Orbicularis Oculi                     Normal Normal  Quadrideps Normal    Normal        Orbicularis Oris                         Normal Normal  Anterior Tibial Normal Normal      Deltoid Normal Normal  Gastrocnemius Normal    Normal   Biceps Normal Normal  Extensor Hallucis Longus Normal    Normal   Triceps Normal Normal  Toe Extensors Normal    Normal   EDC Normal Normal  Toe Flexor Normal    Normal   Finger Flexors Normal Normal  Other Normal Normal   First Dorsal Interosseous Normal Normal  Other Normal Normal   Hypothenar Normal Normal  Other Normal Normal   Thenar Normal Normal  Other Normal Normal     Reflexes      Tone   Right Left   Right Left   Biceps Normal Normal  Spasticity (S)  Rigidity (R)     Triceps Normal Normal  Neck     Brachioradialis Normal Normal  Arm     Quadriceps Normal Normal  Leg     Ankle Normal Normal       Babkinski Flexor  Flexor         AMR       Coordination   Right Left   Right Left   Fingers Normal Normal  Finger nose finger Normal Normal   Hand Normal Normal  Drift Normal Normal   Leg Normal Normal  Heel Toribio Normal Normal   Foot Normal Normal  Other     Other               Involuntary Movements    Gait and Station  None            Right Left  Stand & Sit Normal   Dystonia speech +3 +3  Gait Normal   Dystonia arm +2 +2  Tandem Normal   (Movement type) Normal Normal  Romberg Absent   All dystonia is action induced.  No cranial dystonia at rest but severe dystonia with speech.  Jaw opens and deviates right with speech.  Mild lingual dystonia.  Defiinite palatal dystonia.  Speech tone is low and hyoid region tight.  Arms tremor and posture with writing    Sensory Exam          Right Left    Pin Normal Normal    Vibration Normal Normal    Joint position Normal Normal    Other             Coding statement:     Duration of  Services: face-to-face70min.   Greater than 50% of this visit was spent in counseling and coordination of care.    Medical decision making is high due to the following components:    Number of diagnoses:[id number]  ManagementDiagnostic tests orders or reviewed.  Complexity of medical data:Outside records reviewed.  Radiology images reviewed by myself.  Review/order clinical lab tests.   RiskOne or more chronic illnesses with severe exacerbation, progression, or side effects of treatment.        Again, thank you for allowing me to participate in the care of your patient.      Sincerely,    Ignacio Zaidi MD

## 2018-03-08 ENCOUNTER — OFFICE VISIT (OUTPATIENT)
Dept: OTOLARYNGOLOGY | Facility: CLINIC | Age: 48
End: 2018-03-08
Payer: COMMERCIAL

## 2018-03-08 DIAGNOSIS — G24.4 OROFACIAL DYSKINESIA: Primary | ICD-10-CM

## 2018-03-08 NOTE — NURSING NOTE
"[facility] EAR NOSE AND THROAT  [address] [address]  Dept: [phone]  ______________________________________________________________________________    Patient: Ayad Moreno   : 1970   MRN: [id number]   2018    INVASIVE PROCEDURE SAFETY CHECKLIST    Date: 3/8/2018   Procedure:Botox injection  Patient Name: Ayad Moreno  MRN: [id number]  YOB: 1970    Action: Complete sections as appropriate. Any discrepancy results in a HARD COPY until resolved.     PRE PROCEDURE:  Patient ID verified with 2 identifiers (name and  or MRN): Yes  Procedure and site verified with patient/designee (when able): Yes  Accurate consent documentation in medical record: Yes  H&P (or appropriate assessment) documented in medical record: NA  H&P must be up to 20 days prior to procedure and updates within 24 hours of procedure as applicable: {NA YES NO, EXPLAIN:[id number]::\"no  Relevant diagnostic and radiology test results appropriately labeled and displayed as applicable: no  Procedure site(s) marked with provider initials: {NA YES NO, EXPLAIN:[id number]:no    TIMEOUT:  Time-Out performed immediately prior to starting procedure, including verbal and active participation of all team members addressing the following:Yes  * Correct patient identify  * Confirmed that the correct side and site are marked  * An accurate procedure consent form  * Agreement on the procedure to be done  * Correct patient position  * Relevant images and results are properly labeled and appropriately displayed  * The need to administer antibiotics or fluids for irrigation purposes during the procedure as applicable   * Safety precautions based on patient history or medication use    DURING PROCEDURE: Verification of correct person, site, and procedures any time the responsibility for care of the patient is transferred to another member of the care team.               "

## 2018-03-08 NOTE — PATIENT INSTRUCTIONS
Today you have had a  Botox injection  1. Do not expect any effect for about two days  2. You might have a period of breathiness for a few days up to a week. You might have no breathiness, everyone is different  3. When the Botox starts to wear off, call the clinic to schedule another appointment. ON THE AVERAGE, that is about every 3-4 months  4. Scheduling number is [phone], option 1  5. If the botox schedule is full, ask the  to talk to Erika, the nurse who works with Dr Horne  6. Dr Bates normal Botox Clinics are the first Thursday of every month, in the afternoon and the third Tuesday of the month, in the morning.  7. Please call the triage RN with questions/concerns: [phone] option 2.

## 2018-03-08 NOTE — PROGRESS NOTES
HISTORY OF PRESENT ILLNESS: Ayad Moreno is a 48 year old male with a history of oromandibular dystonia.  He was last injected at [facility] on April 26, 2016.  He had the injections into the masseters, lateral pterygoid, elevator anguli oris and anterior digastrics.  The total amount of botulinum A toxin injection was 90 units.  He has also had a deep brain stimulator implanted over a year ago he has responded nicely to this.  He comes back for an injection today.  There was an additional recommendation to have a 1-2 units into each sternohyoid muscle.  If he also would like a bilateral lateral pterygoid injection.  Our plan is to give 25 units into each masseter muscle, 5 units into each anterior digastric muscle, 1 unit into each sternohyoid muscle, 5 units into the levator anguli oris muscle, 5 units into the depressor anguli oris muscle and 10 units into each lateral pterygoid muscle.      PAST MEDICAL HISTORY:   Past Medical History:   Diagnosis Date     Anxiety      Asthmas with exposure to cats      Depression      Generalized dystonia     DYT-1 genetic mutation     Lumbar disc herniation      OCD (obsessive compulsive disorder)      Rt ACL Tear        PAST SURGICAL HISTORY:   Past Surgical History:   Procedure Laterality Date     C REPAIR CRUCIATE LIGAMENT,KNEE  2008     IMPLANT DEEP BRAIN STIMULATION GENERATOR / BATTERY Left 1/20/2017    Procedure: IMPLANT DEEP BRAIN STIMULATION GENERATOR / BATTERY;  Surgeon: Duy Carolina MD;  Location: [facility] OR     OPTICAL TRACKING SYSTEM INSERTION DEEP BRAIN STIMULATION Left 1/10/2017    Procedure: OPTICAL TRACKING SYSTEM INSERTION DEEP BRAIN STIMULATION;  Surgeon: Duy Carolina MD;  Location: [facility] OR     OPTICAL TRACKING SYSTEM INSERTION DEEP BRAIN STIMULATION Right 11/16/2017    Procedure: OPTICAL TRACKING SYSTEM INSERTION DEEP BRAIN STIMULATION;  Right Stealth Assisted Deep Brain Stimulator Placement, Phase I And II Combined,  Placement Of Right Side Deep Brain Stimulator Electrode, Target Right Globus Pallidus Internus With Microelectrode Recording And Connection To The Existing Generator/Battery;  Surgeon: Duy Carolina MD;  Location:  OR       FAMILY HISTORY:   Family History   Problem Relation Age of Onset     DIABETES Father      CANCER Paternal Grandfather      Stomach CA     HEART DISEASE Paternal Grandfather      CANCER Maternal Grandmother      Bladder     Depression Maternal Grandmother      Myocardial Infarction Maternal Grandfather      MI     Obsessive Compulsive Disorder Son      Genetic Disorder Son      Dystonia 2ndary to DYT 1 micah mutation       SOCIAL HISTORY:   Social History   Substance Use Topics     Smoking status: Former Smoker     Types: Cigarettes     Smokeless tobacco: Never Used      Comment: social     Alcohol use 0.0 oz/week     0 Standard drinks or equivalent per week      Comment: 1-2x's/month.       REVIEW OF SYSTEMS: Ten point review of systems was performed and is negative except for: No flowsheet data found.     ALLERGIES: Review of patient's allergies indicates no known allergies.    MEDICATIONS:   Current Outpatient Prescriptions   Medication Sig Dispense Refill     traMADol (ULTRAM) 50 MG tablet Take 50 mg by mouth every 8 hours as needed       vitamin B complex with vitamin C (VITAMIN  B COMPLEX) TABS tablet Take 1 tablet by mouth every morning        UNABLE TO FIND Take 1 teaspoonful by mouth 2 times daily MEDICATION NAME: Marijuana (CBD) Oil- NOT THC       nicotine polacrilex (NICORETTE) 2 MG lozenge Place 2 mg inside cheek every hour as needed for smoking cessation (10x's/day)       botulinum toxin type A (BOTOX) 100 UNITS injection every 3 months Inject 95 units 95 each      NEURONTIN 800 MG OR TABS Take 800 mg by mouth 3 times daily  90 0     REMERON 30 MG OR TABS Take 1 tablet by mouth At Bedtime  30 0     DIAZEPAM 2 MG OR TABS Take 4 mg by mouth 2 times daily Takes 2 pills at a  time 60 0         PHYSICAL EXAMINATION:  He  is awake, alert and in no apparent distress.    His tympanic membranes are clear and intact bilaterally. External auditory canals are clear.  Nasal exam shows a mild septal deviation without obstruction.  Examination of the oral cavity shows no suspicious lesions.  There is symmetric movement of the tongue and soft palate.    The oropharynx is clear.  His neck is supple without significant adenopathy.  Pulse is regular.  Upper airway is clear.  Cranial nerves II-XII are grossly intact.            PROCEDURE:  After obtaining informed consent, ground and reference electrodes were placed.  Under electromyographic guidance an EMG was performed of the masseter muscles.  He was unable to turn off his deep brain stimulator so EMG recorded were performed with the background noise of the deep brain stimulator being active.  However in most instances he muscular EMG signal could be observed. This was done with a 27-gauge hollow bore electromyographic needle.  A strong response was made and 25 units of botulinum A toxin was given in divided doses into each masseter muscle each having a strong response.  The total dose provided of the masseter muscles combined is 50 units.  Subsequently, five units was given into each anterior digastric muscle with EMG guidance and a strong response.  One unit was subsequently given into each sternal hyoid muscle.  Due to the background noise of the deep brain stimulator the EMG response was poorly monitored. Then, 5 units was given into the elevator anguli oris muscle without EMG and five units into the depressor anguli oris muscle on the right side only, without EMG.  Finally, 10 units of botulinum A toxin was injected into the lateral pterygoid muscle bilateraly with a good EMG response. .  This was done under EMG with a strong response.  The total amount of botulinum A toxin given today was 92 units.  The EMG was necessary, specifically in this  case to identify the lateral pterygoid muscle on the left side which is in the pterygopalatine fossa not otherwise accessible without EMG.  Additionally, EMG was needed to find the areas of greatest activity within the masseter muscles and anterior digastric muscles bilaterally.  He tolerated the procedure well and left our clinic after a short observation.     IMPRESSION/PLAN: RTC PRN

## 2018-03-08 NOTE — NURSING NOTE
Chief Complaint   Patient presents with     Minor Procedure     botox     Elicia Richland Medical Assistant

## 2018-03-08 NOTE — LETTER
3/8/2018       RE: Ayad Moreno  [address]  [facility] [address]     Dear Colleague,    Thank you for referring your patient, Ayad Moreno, to the [facility] EAR NOSE AND THROAT at [facility]. Please see a copy of my visit note below.      HISTORY OF PRESENT ILLNESS: Ayad Moreno is a 48 year old male with a history of oromandibular dystonia.  He was last injected at [facility] on April 26, 2016.  He had the injections into the masseters, lateral pterygoid, elevator anguli oris and anterior digastrics.  The total amount of botulinum A toxin injection was 90 units.  He has also had a deep brain stimulator implanted over a year ago he has responded nicely to this.  He comes back for an injection today.  There was an additional recommendation to have a 1-2 units into each sternohyoid muscle.  If he also would like a bilateral lateral pterygoid injection.  Our plan is to give 25 units into each masseter muscle, 5 units into each anterior digastric muscle, 1 unit into each sternohyoid muscle, 5 units into the levator anguli oris muscle, 5 units into the depressor anguli oris muscle and 10 units into each lateral pterygoid muscle.      PAST MEDICAL HISTORY:   Past Medical History:   Diagnosis Date     Anxiety      Asthmas with exposure to cats      Depression      Generalized dystonia     DYT-1 genetic mutation     Lumbar disc herniation      OCD (obsessive compulsive disorder)      Rt ACL Tear        PAST SURGICAL HISTORY:   Past Surgical History:   Procedure Laterality Date     C REPAIR CRUCIATE LIGAMENT,KNEE  2008     IMPLANT DEEP BRAIN STIMULATION GENERATOR / BATTERY Left 1/20/2017    Procedure: IMPLANT DEEP BRAIN STIMULATION GENERATOR / BATTERY;  Surgeon: Duy Carolina MD;  Location: [facility] OR     OPTICAL TRACKING SYSTEM INSERTION DEEP BRAIN STIMULATION Left 1/10/2017    Procedure: OPTICAL TRACKING SYSTEM INSERTION DEEP BRAIN STIMULATION;   Surgeon: Duy Carolina MD;  Location: [facility] OR     OPTICAL TRACKING SYSTEM INSERTION DEEP BRAIN STIMULATION Right 11/16/2017    Procedure: OPTICAL TRACKING SYSTEM INSERTION DEEP BRAIN STIMULATION;  Right Stealth Assisted Deep Brain Stimulator Placement, Phase I And II Combined, Placement Of Right Side Deep Brain Stimulator Electrode, Target Right Globus Pallidus Internus With Microelectrode Recording And Connection To The Existing Generator/Battery;  Surgeon: Duy Carolina MD;  Location: [facility] OR       FAMILY HISTORY:   Family History   Problem Relation Age of Onset     DIABETES Father      CANCER Paternal Grandfather      Stomach CA     HEART DISEASE Paternal Grandfather      CANCER Maternal Grandmother      Bladder     Depression Maternal Grandmother      Myocardial Infarction Maternal Grandfather      MI     Obsessive Compulsive Disorder Son      Genetic Disorder Son      Dystonia 2ndary to DYT 1 micah mutation       SOCIAL HISTORY:   Social History   Substance Use Topics     Smoking status: Former Smoker     Types: Cigarettes     Smokeless tobacco: Never Used      Comment: social     Alcohol use 0.0 oz/week     0 Standard drinks or equivalent per week      Comment: 1-2x's/month.       REVIEW OF SYSTEMS: Ten point review of systems was performed and is negative except for: No flowsheet data found.     ALLERGIES: Review of patient's allergies indicates no known allergies.    MEDICATIONS:   Current Outpatient Prescriptions   Medication Sig Dispense Refill     traMADol (ULTRAM) 50 MG tablet Take 50 mg by mouth every 8 hours as needed       vitamin B complex with vitamin C (VITAMIN  B COMPLEX) TABS tablet Take 1 tablet by mouth every morning        UNABLE TO FIND Take 1 teaspoonful by mouth 2 times daily MEDICATION NAME: Marijuana (CBD) Oil- NOT THC       nicotine polacrilex (NICORETTE) 2 MG lozenge Place 2 mg inside cheek every hour as needed for smoking cessation (10x's/day)       botulinum toxin type  A (BOTOX) 100 UNITS injection every 3 months Inject 95 units 95 each      NEURONTIN 800 MG OR TABS Take 800 mg by mouth 3 times daily  90 0     REMERON 30 MG OR TABS Take 1 tablet by mouth At Bedtime  30 0     DIAZEPAM 2 MG OR TABS Take 4 mg by mouth 2 times daily Takes 2 pills at a time 60 0         PHYSICAL EXAMINATION:  He  is awake, alert and in no apparent distress.    His tympanic membranes are clear and intact bilaterally. External auditory canals are clear.  Nasal exam shows a mild septal deviation without obstruction.  Examination of the oral cavity shows no suspicious lesions.  There is symmetric movement of the tongue and soft palate.    The oropharynx is clear.  His neck is supple without significant adenopathy.  Pulse is regular.  Upper airway is clear.  Cranial nerves II-XII are grossly intact.            PROCEDURE:  After obtaining informed consent, ground and reference electrodes were placed.  Under electromyographic guidance an EMG was performed of the masseter muscles.  He was unable to turn off his deep brain stimulator so EMG recorded were performed with the background noise of the deep brain stimulator being active.  However in most instances he muscular EMG signal could be observed. This was done with a 27-gauge hollow bore electromyographic needle.  A strong response was made and 25 units of botulinum A toxin was given in divided doses into each masseter muscle each having a strong response.  The total dose provided of the masseter muscles combined is 50 units.  Subsequently, five units was given into each anterior digastric muscle with EMG guidance and a strong response.  One unit was subsequently given into each sternal hyoid muscle.  Due to the background noise of the deep brain stimulator the EMG response was poorly monitored. Then,  5 units was given into the elevator anguli oris muscle without EMG and five units into the depressor anguli oris muscle on the right side only, without EMG.   Finally, 10 units of botulinum A toxin was injected into the lateral pterygoid muscle bilateraly with a good EMG response. .  This was done under EMG with a strong response.  The total amount of botulinum A toxin given today was 92 units.  The EMG was necessary, specifically in this case to identify the lateral pterygoid muscle on the left side which is in the pterygopalatine fossa not otherwise accessible without EMG.  Additionally, EMG was needed to find the areas of greatest activity within the masseter muscles and anterior digastric muscles bilaterally.  He tolerated the procedure well and left our clinic after a short observation.     IMPRESSION/PLAN: RTC PRN    Again, thank you for allowing me to participate in the care of your patient.      Sincerely,    Yves Horne MD

## 2018-03-09 PROBLEM — G24.4 OROFACIAL DYSKINESIA: Status: ACTIVE | Noted: 2018-03-09

## 2018-03-15 ASSESSMENT — MOVEMENT DISORDERS SOCIETY - UNIFIED PARKINSONS DISEASE RATING SCALE (MDS-UPDRS)
TURNING_IN_BED: SLIGHT: I HAVE A BIT OF TROUBLE TURNING, BUT I DO NOT NEED ANY HELP.
TREMOR: SEVERE: SHAKING OR TEMORS CAUSES PROBLEMS WITH MOST OR ALL ACTIVITES.
HYGIENE: SLIGHT:  I AM SLOW BUT I DO NOT NEED ANY HELP.
FREEZING: NORMAL: NOT AT ALL (NO PROBLEMS).
WALKING_AND_BALANCE: NORMAL: NOT AT ALL (NO PROBLEMS).
SALIVA_AND_DROOLING: MODERATE: I HAVE SOME DROOLING WHEN I AM AWAKE, BUT I USUALLY DO NOT NEED TISSUES OR A HANDKERCHIEF.
TOTAL_SCORE: 25
EATING_TASKS: MILD: I AM SLOW WITH MY EATING AND HAVE OCCASIONAL FOOD SPILLS.  I MAY NEED HELP WITH A FEW TASKS SUCH AS CUTTING MEAT.
SPEECH: MODERATE: MY SPEECH IS UNCLEAR ENOUGH THAT OTHERS ASK ME TO REPEAT MYSELF EVERY DAY EVEN THOUGH MOST OF MY SPEECH IS UNDERSTOOD.
DRESSING: MILD: I AM SLOW AND NEED HELP FOR A FEW DRESSING TASKS (BUTTONS, BRACELETS).
HOBBIES_AND_OTHER_ACTIVITIES: MODERATE: I HAVE MAJOR PROBLEMS DOING THESE ACTIVITIES, BUT STILL DO MOST.
GETTING_OUT_OF_BED_CAR_DEEP_CHAIR: SLIGHT: I AM SLOW OR AWKWARD, BUT I USUALLY CAN DO IT ON MY FIRST TRY.
CHEWING_AND_SWALLOWING: SLIGHT: I AM AWARE OF SLOWNESS IN MY CHEWING OR INCREASED EFFORT AT SWALLOWING, BUT I DO NOT CHOKE OR NEED TO HAVE MY FOOD SPECIALLY PREPARED.
HANDWRITING: SEVERE: MOST OR ALL WORDS CANNOT BE READ.

## 2018-03-16 ENCOUNTER — OFFICE VISIT (OUTPATIENT)
Dept: NEUROLOGY | Facility: CLINIC | Age: 48
End: 2018-03-16
Payer: COMMERCIAL

## 2018-03-16 VITALS
BODY MASS INDEX: 26.62 KG/M2 | DIASTOLIC BLOOD PRESSURE: 72 MMHG | HEIGHT: 73 IN | WEIGHT: 200.84 LBS | HEART RATE: 53 BPM | SYSTOLIC BLOOD PRESSURE: 112 MMHG

## 2018-03-16 DIAGNOSIS — G24.9 GENERALIZED DYSTONIA: ICD-10-CM

## 2018-03-16 DIAGNOSIS — G24.1 DYSTONIA DUE TO DYT-1 GENE MUTATION: Primary | ICD-10-CM

## 2018-03-16 ASSESSMENT — PAIN SCALES - GENERAL: PAINLEVEL: MILD PAIN (2)

## 2018-03-16 NOTE — MR AVS SNAPSHOT
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  If requesting a specialty program, please choose from the list below.  Call one number to schedule at any [facility] location   [phone].    Treatment: Evaluation & Treatment  Speech Treatment Diagnosis: Dysarthria  Dysphonia  Special Instructions: None.   Special Programs: Voice.     Please be aware that coverage of these services is subject to the terms and limitations of your health insurance plan.  Call member services at your health plan with any benefit or coverage questions.      **Note to Provider** To refer patients to therapy outside of the location list, change the order class to External Referral in the order composer.                  Your next 10 appointments already scheduled     May 10, 2018  8:00 AM CDT   (Arrive by 7:45 AM)   Return Movement Disorder with MD [facility] ([facility])    [address] [place] [id number] [phone]              Who to contact     Please call your clinic at [phone] to:    Ask questions about your health    Make or cancel appointments    Discuss your medicines    Learn about your test results    Speak to your doctor            Additional Information About Your Visit    "     MyChart Information     [employer] gives you secure access to your electronic health record. If you see a primary care provider, you can also send messages to your care team and make appointments. If you have questions, please call your primary care clinic.  If you do not have a primary care provider, please call [phone] and they will assist you.      [employer] is an electronic gateway that provides easy, online access to your medical records. With [employer], you can request a clinic appointment, read your test results, renew a prescription or communicate with your care team.     To access your existing account, please contact your [facility] or call [phone] for assistance.        Care EveryWhere ID     This is your Care EveryWhere ID. This could be used by other organizations to access your [facility] medical records  [id number]        Your Vitals Were     Pulse Height BMI (Body Mass Index)             53 1.854 m (6' 1\") 26.5 kg/m2          Blood Pressure from Last 3 Encounters:   03/16/18 112/72   03/06/18 111/72   02/16/18 111/72    Weight from Last 3 Encounters:   03/16/18 91.1 kg (200 lb 13.4 oz)   03/06/18 91.1 kg (200 lb 14.4 oz)   02/16/18 89.3 kg (196 lb 13.9 oz)              We Performed the Following     ANALYSIS NEUROSTIM, COMPLEX DBS W PROGRAM, 1ST HR     SPEECH THERAPY REFERRAL        Primary Care Provider Office Phone # Fax #    Yves BECK Toshia [phone]       [facility] [address] [place] [address]        Equal Access to Services     [facility]: Hadii aad ku hadasho Soomaali, waaxda luqadaha, qaybta kaalmada adeegyada, raffy lopez . So [facility] [phone].    ATENCIÓN: Si habla español, tiene a loving disposición servicios gratuitos de asistencia lingüística. Llame al [phone].    We comply with applicable federal civil rights laws and [place] laws. We do not discriminate on the " basis of race, color, national origin, age, disability, sex, sexual orientation, or gender identity.            Thank you!     Thank you for choosing [facility] NEUROLOGY  for your care. Our goal is always to provide you with excellent care. Hearing back from our patients is one way we can continue to improve our services. Please take a few minutes to complete the written survey that you may receive in the mail after your visit with us. Thank you!             Your Updated Medication List - Protect others around you: Learn how to safely use, store and throw away your medicines at www.disposemymeds.org.          This list is accurate as of 3/16/18 11:59 PM.  Always use your most recent med list.                   Brand Name Dispense Instructions for use Diagnosis    BOTOX 100 UNITS injection   Generic drug:  botulinum toxin type A     95 each    every 3 months Inject 95 units    Oromandibular dystonia       diazepam 2 MG tablet    VALIUM    60    Take 4 mg by mouth 2 times daily Takes 2 pills at a time        NEURONTIN 800 MG tablet   Generic drug:  gabapentin     90    Take 800 mg by mouth 3 times daily        NICORETTE 2 MG lozenge   Generic drug:  nicotine polacrilex      Place 2 mg inside cheek every hour as needed for smoking cessation (10x's/day)        REMERON 30 MG tablet   Generic drug:  mirtazapine     30    Take 1 tablet by mouth At Bedtime        traMADol 50 MG tablet    ULTRAM     Take 50 mg by mouth every 8 hours as needed        UNABLE TO FIND      Take 1 teaspoonful by mouth 2 times daily MEDICATION NAME: Marijuana (CBD) Oil- NOT THC        vitamin B complex with vitamin C Tabs tablet      Take 1 tablet by mouth every morning

## 2018-03-16 NOTE — Clinical Note
"3/16/2018       RE: Ayad Moreno  [address]  [facility] [place] [facility] [address]     Dear Colleague,    Thank you for referring your patient, Ayad Moreno, to the [facility] NEUROLOGY at [facility]. Please see a copy of my visit note below.    PATIENT: Ayad Moreno    : 1970    NANI: 2018    REASON FOR VISIT: Right hemisphere deep brain stimulation (DBS) programming optimization to improve dystonia & .      HPI:  Mr. Ayad Moreno is a 47 year old left - handed  male who came to the [facility] neurology clinic by himself for DBS programming.  He is s/p bilateral Globus Pallidus Internus (Gpi) DBS lead placement left on 1/10/2017 & right on 2017 by Dr. Carolina.      He is here for the 3 months post initial programming videotaping.    He reports he is not able to write with left hand.  He is now using his right hand.  He believes that he has continue to get benefit in his right Hand.  He hasn't seen OT in years & opted that he is adapiong & declined refera.     He is painting the house.  He holds the roller with Rt hand & stabilize it with left hand.     He was seen by Dr. Zaidi for evaluation of .  He was seen by Dr. Horne on 3/8/2018 for Botox injection.     His son has [place]tzv[place] on this coming Saturday.     He went to [facility] for speech therapy, but \"do what you've been doing.\"  He is asking for speech therapy.         MEDICATIONS:   Outpatient Prescriptions Marked as Taking for the 3/16/18 encounter (Office Visit) with Cindy Manning APRN CNP   Medication Sig     traMADol (ULTRAM) 50 MG tablet Take 50 mg by mouth every 8 hours as needed     vitamin B complex with vitamin C (VITAMIN  B COMPLEX) TABS tablet Take 1 tablet by mouth every morning      UNABLE TO FIND Take 1 teaspoonful by mouth 2 times daily MEDICATION NAME: Marijuana (CBD) Oil- NOT THC     nicotine polacrilex (NICORETTE) 2 MG lozenge Place 2 mg inside cheek every hour as " "needed for smoking cessation (10x's/day)     botulinum toxin type A (BOTOX) 100 UNITS injection every 3 months Inject 95 units     NEURONTIN 800 MG OR TABS Take 800 mg by mouth 3 times daily      REMERON 30 MG OR TABS Take 1 tablet by mouth At Bedtime      DIAZEPAM 2 MG OR TABS Take 4 mg by mouth 2 times daily Takes 2 pills at a time       PHYSICAL EXAM:  Vital Signs:  Blood pressure 112/72, pulse 53, height 1.854 m (6' 1\"), weight 91.1 kg (200 lb 13.4 oz).   Body mass index is 26.5 kg/(m^2).      Mr. Moreno is a pleasant  male who is well-groomed and well-developed sitting comfortably in the exam room without any distress.  Affect is appropriate.    MOVEMENT DISORDERS ASSESSMENT: (Bilateral Gpi DBS ON)  The Fahn Kavon (BFM) Disability Score     Activity Severity Factor Score   A. Speech  0 - Normal  1 - Slightly involved; easily understood  2 - Some difficulty in understanding  3 - Marked difficulty in understanding  4 - Complete or almost complete anarthria       3   B. Handwriting   (tremor or dystonia) 0 - Normal  1 - Slight difficulty, legible  2 - Almost illegible  3 - Illegible  4 - Unable to grasp to maintain hold on pen    4   C. Feeding 0 - Normal  1 - Uses  tricks , independent  2 - Can feed, but not cut  3 - Finger food only  4 - Completely dependent       2   D. Eating/swallowing 0 - Normal  1 - Occasional choking  2 - Chokes frequently; difficulty swallowing  3 - Unable to swallow firm foods  4 - Marked difficulty swallowing soft foods and liquids       1   E. Hygiene 0 - Normal  1 - Clumsy, independent  2 - Needs help with some activities  3 - Needs help with most activities  4 - Needs help with all activities    1   F. Dressing 0 - Normal  1 - Clumsy, independent  2 - Needs help with some activities  3 - Needs help with most activities  4 - Helpless       2      G. Walking 0 - Normal  1 - Slightly abnormal; hardly noticeable  2 - Moderately abnormal; obvious to naïve observer  3 - " "Considerably abnormal  4 - Needs assistance to walk  6 - Wheelchair-bound       0     Total (maximum: 30) =           The Fahn-Kavon Score        Region Provoking factor  General Severity Factor Weight Score   Eyes 0 0 0.5 0.0   Mouth 3 2 0.5 3.0   Speech & Swallowing 3 2 1.0 6.0   Neck 1 1 0.5 0.5   Right Arm 3 3 1.0 9.0   Left Arm 3 3 1.0 9.0   Right Leg 0 0 1.0 0.0   Left Leg 0 0 1.0 0.0   Trunk 0 0 1.0 0.0   Total Score = 27.5       DBS Interrogation & Analysis:    Implanted generator:  Left chest Activa-PC.  Lead placement:  Bilateral Globus Pallidus Internus (Gpi).    Therapy On:  100%  Battery Service Life:  OK.  2.95 volts.    Group B ACTIVE    Left Brain  C +, 1 -, 2 -  Volts:  3.6 volts  PW:  60 msec  Rate:  130 Hz    Patient :  Upper Limit: 4.0 volts  Lower Limit: 3.4 volts    Electrode Impedance Check: (Amplitude 3.0 volts: PW 80 msec: Rate 100 Hz)   Left Lead: No Problems Found.     Right Brain   C +, 9 -  Volts:  3.6 volts  PW:  60 msec  Rate:  130 Hz    Patient :  Upper Limit: 2.5 volts  Lower Limit: 2.5 volts    Electrode Impedance Check:  (Amplitude 3.0 volts: PW 80 msec: Rate 100 Hz)   Right Lead: No Problems Found.    See scanned report for impedance details.      DBS PROGRAMMING:    Right Gpi  __  PW increased to 2.8 volts  __   Patient  parameters changed   Upper Limit: 3.4 volts   Lower Limit: 2.4 volts     Pt was upset that he needed to get a 2nd program.  \"Since this is not working I need another setting.\"  He reported that his son was given 2 programs & they had flexibility to choose between programs.  He expressed his frustration that he has been patient & he's not getting better.    __  I explained the difference between him & his son in terms of symptoms, age, & benefits.  I told him how programming optimization takes time & how benefit in dystonia is delayed.  I reminded him that so, far he only had 1 programming done, & generally, it might take up to 6 - " 9 or more months for DBS optimization.    __  Since he was upset, I did create a 2nd program as follows:       Group A    Right Gpi  C +, 9 -, 10 -  Volts:  2.8 volts  PW:  60 msec  Rate:  130 Hz    Group B    Right Gpi  C +, 10 -  Volts:  2.8 volts  PW:  60 msec  Rate:  130 Hz         Patient :  Upper Limit: 3.4 volts  Lower Limit: 2.4 volts   Patient :  Upper Limit: 3.4 volts  Lower Limit: 2.4 volts      __  Will return in 1 months for DBS Programming.    The total amount of time spent with patient in DBS programming was 100 minutes.     CT See, CNP  [facility]       Again, thank you for allowing me to participate in the care of your patient.      Sincerely,    CT Campbell CNP

## 2018-03-16 NOTE — PROGRESS NOTES
"PATIENT: Ayad Moreno    : 1970    NANI: 2018    REASON FOR VISIT: Right hemisphere deep brain stimulation (DBS) programming optimization to improve dystonia & videotaping 3 month post Rt Gpi initial programming.      HPI:  Mr. Ayad Moreno is a 48 year old left - handed  male who came to the [facility] neurology clinic by himself for DBS programming & videotaping.  He is s/p bilateral Globus Pallidus Internus (Gpi) DBS lead placement left on 1/10/2017 & right on 2017 by Dr. Carolina.       He is 3 months post initial programming of his Rt Gpi.  He is here for videotaping per Humanitarian Device Exemption (HDE) protocol.     He is not able to write with left hand.  He is now using his right hand.  He believes that he has continued to get benefit from his Lt Gpi DBS in improving the right hand.  He hasn't seen OT in years & opted that he is adapting & declined referral.  He is painting the house.  He holds the roller with Rt hand & stabilize it with left hand.  He is able to use paint brush to paint around the trim.      He was seen by Dr. Zaidi for evaluation of Botox.  He was seen by Dr. Horne on 3/8/2018 for Botox injection.  He thinks it might be helping.     His son has Tien Caldera this coming Saturday.     He went to [facility] for speech therapy long time ago.  He was told to \"do what you've been doing.\"  He is asking for speech therapy.     MEDICATIONS:   Medication Sig     traMADol (ULTRAM) 50 MG tablet Take 50 mg by mouth every 8 hours as needed     vitamin B complex with vitamin C (VITAMIN  B COMPLEX)  Take 1 tablet by mouth every morning      UNABLE TO FIND Take 1 teaspoonful by mouth 2 times daily MEDICATION NAME: Marijuana (CBD) Oil- NOT THC     nicotine polacrilex (NICORETTE) 2 MG lozenge Place 2 mg inside cheek every hour as needed for smoking cessation (10x's/day)     botulinum toxin type A (BOTOX) 100 UNITS injection every 3 months Inject 95 units     NEURONTIN 800 " "MG OR TABS Take 800 mg by mouth 3 times daily      REMERON 30 MG OR TABS Take 1 tablet by mouth At Bedtime      DIAZEPAM 2 MG OR TABS Take 4 mg by mouth 2 times daily Takes 2 pills at a time       PHYSICAL EXAM:  Vital Signs:  Blood pressure 112/72, pulse 53, height 1.854 m (6' 1\"), weight 91.1 kg (200 lb 13.4 oz).  Body mass index is 26.5 kg/(m^2).      Mr. Moreno is a pleasant  male who is well-groomed and well-developed sitting comfortably in the exam room without any distress.  Affect is appropriate.    MOVEMENT DISORDERS ASSESSMENT: (Bilateral Gpi DBS ON)  The Fahn Kavon ([facility]) Disability Score     Activity Severity Factor Score   A. Speech  0 - Normal  1 - Slightly involved; easily understood  2 - Some difficulty in understanding  3 - Marked difficulty in understanding  4 - Complete or almost complete anarthria       3   B. Handwriting   (tremor or dystonia) 0 - Normal  1 - Slight difficulty, legible  2 - Almost illegible  3 - Illegible  4 - Unable to grasp to maintain hold on pen    4   C. Feeding 0 - Normal  1 - Uses  tricks , independent  2 - Can feed, but not cut  3 - Finger food only  4 - Completely dependent       2   D. Eating/swallowing 0 - Normal  1 - Occasional choking  2 - Chokes frequently; difficulty swallowing  3 - Unable to swallow firm foods  4 - Marked difficulty swallowing soft foods and liquids       1   E. Hygiene 0 - Normal  1 - Clumsy, independent  2 - Needs help with some activities  3 - Needs help with most activities  4 - Needs help with all activities    1   F. Dressing 0 - Normal  1 - Clumsy, independent  2 - Needs help with some activities  3 - Needs help with most activities  4 - Helpless       2      G. Walking 0 - Normal  1 - Slightly abnormal; hardly noticeable  2 - Moderately abnormal; obvious to naïve observer  3 - Considerably abnormal  4 - Needs assistance to walk  6 - Wheelchair-bound       0     Total (maximum: 30) = 13          The Fahn-Kavon Score      "   Region Provoking factor  General Severity Factor Weight Score   Eyes 1 0 0.5 0.0   Mouth 3 1 0.5 1.5   Speech & Swallowing 3 2 1.0 6   Neck 1 1 0.5 0.5   Right Arm 2 3 1.0 6.0   Left Arm 2 3 1.0 6.0   Right Leg 0 0 1.0 0.0   Left Leg 0 0 1.0 0.0   Trunk 0 0 1.0 0.0   Total Score = 20.0       DBS Interrogation & Analysis:    Implanted generator:  Left chest Activa-PC.  Lead placement:  Bilateral Globus Pallidus Internus (Gpi).    Therapy On:  100%  Battery Service Life:  OK.  2.95 volts.    Group B ACTIVE    Left Brain  C +, 1 -, 2 -  Volts:  3.6 volts  PW:  60 msec  Rate:  130 Hz    Patient :  Upper Limit: 4.0 volts  Lower Limit: 3.4 volts    Electrode Impedance Check: (Amplitude 3.0 volts: PW 80 msec: Rate 100 Hz)   Left Lead: No Problems Found.     Right Brain   C +, 10 -  Volts:  3.6 volts  PW:  60 msec  Rate:  130 Hz    Patient :  Upper Limit: 4.0 volts  Lower Limit: 3.0 volts    Electrode Impedance Check:  (Amplitude 3.0 volts: PW 80 msec: Rate 100 Hz)   Right Lead: No Problems Found.    Group A INACTIVE    Left Brain  C +, 1 -, 2 -  Volts:  3.8 volts  PW:  60 msec  Rate:  130 Hz    Patient :  Upper Limit: 4.1 volts  Lower Limit: 3.5 volts    Electrode Impedance Check: (Amplitude 3.0 volts: PW 80 msec: Rate 100 Hz)   Left Lead: No Problems Found.     Right Brain   C +, 9 -, 10 -  Volts:  3.5 volts  PW:  60 msec  Rate:  130 Hz    Patient :  Upper Limit: 3.6 volts  Lower Limit: 2.4 volts    See scanned report for impedance details.      DBS PROGRAMMING:    Right Gpi  __  Pulse width increased from 60 to 90 msec  __  Contacts also changed to double monopolar as below.   __  Patient  upper & lower limits adjusted.     Right Gpi  Group A    C +, 9 -, 10 -  Volts:  3.6 volts  PW:  60 msec  Rate:  130 Hz    Group B    C +, 9 - 10 -  Volts:  3.0 volts  PW:  90 msec  Rate:  130 Hz         Patient :  Upper Limit: 4.0 volts  Lower Limit: 3.0 volts   Patient  :  Upper Limit: 3.8 volts  Lower Limit: 2.6 volts      __  Referral placed for Speech Therapy.  __  He declined referral to OT.    __  Will return in May to see Dr. Loera.  May return sooner as needed.     The total amount of time spent with the patient was 100 minutes; 50 min in DBS programming and 50 min in addressing dystonia, speech issues & videotaping.   50% of the time was spent in counseling & coordination of care.     CT See, CNP  [facility]

## 2018-04-10 ENCOUNTER — HOSPITAL ENCOUNTER (OUTPATIENT)
Dept: SPEECH THERAPY | Facility: CLINIC | Age: 48
Setting detail: THERAPIES SERIES
End: 2018-04-10
Attending: NURSE PRACTITIONER
Payer: COMMERCIAL

## 2018-04-10 PROCEDURE — 92522 EVALUATE SPEECH PRODUCTION: CPT | Mod: GN | Performed by: STUDENT IN AN ORGANIZED HEALTH CARE EDUCATION/TRAINING PROGRAM

## 2018-04-10 PROCEDURE — 40000251 ZZH STATISTIC VOICE CENTER VISIT: Performed by: STUDENT IN AN ORGANIZED HEALTH CARE EDUCATION/TRAINING PROGRAM

## 2018-04-10 NOTE — PROGRESS NOTES
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  Pt is s/p bilateral Globus Pallidus Internus DBS placement, left side on 1/10/17 and right side on 11/16/17.  Pt also receives botox injections in the oromandibular and hyoid musculature every 3 months, with most recent injections on 3/8/18.  Pt reports that his speech is difficult for others to understand, and that his voice is gutteral and difficult to project.  He notes the most difficulty with production of /p/ and /m/ phonemes.  DBS helped with right hand function and overall neck comfort, but he did not feel it made any significant impact on his speech.  Speech is better in the mornings and worsens with use and fatigue.  He notes occasional difficulty inhaling and intermittent stridor following the most recent botox injection into the hyoid musculature.  While he describes himself as an extrovert, he reports significant anxiety over communicating with people because he \"hates how he sounds and looks when talking.\"  Pt also reporting some dysphagia (see below).   Prior Level Of Function Comment Pt has had prior speech therapy for this problem many years ago.  His wife is also a speech-language pathologist.  He uses sensory strategies (e.g. touching jaw) to improve speech intelligibility.   Patient " "Role/employment History Other/comments  (Pt was working as a teacher, but left for DBS surgery)   Patient/family Goals To improve his voice and speech as much as possible, to \"become more courageous\" in seeking out opportunities to communicate with others   General Information Comments Pt also reporting occasional dysphagia since his last Botox injection, noting that he has had some difficulty chewing foods.  He feels foods go down the wrong way about 1x/week, but has not had a choking episode.  He tries to take smaller bites of foods that are difficult to chew.  He denies difficulties swallowing liquids.   Oral Motor Sensory Function   Deficits noted in Labial Function (m-VII, S-V) Seal on the left;Seal on the right;Increased tone on the left  (Difficulty with closure on bilabials during connected speech)   Deficits noted in Lingual Function (m-XII, s-V, VII, IX, XII) Other (Comment)  (Deviates to right on protrusion)   Deficits noted in Mandibular Function (m-V) Other (Comment)  (Fatigue with use in speech, movements fine after botox)   Deficits noted in Velar Function (m-V, X, XI, s-IX) Elevation on the left;Elevation on the right   Deficits noted in Laryngeal Function (m-X, s-X) Phonation;Respiration   Functional Assessment Scale (Oral Motor) Mild to Moderate Impairment   Comments Labial and lingual AMRs and SMRs are regular, but slow in rate with imprecise articulation.  Pt requiring cues to achieve full labial closure during /p/ AMRs and SMRs.   Speech   Deficits in Speech Respiration Thoracic;Shallow;Other (Comment)  (Shoulder involvement, poor coordination with phonation)   Deficits in Phonation Strained-strangled quality;Low pitch;Loudness (soft);Other (Comment)  (Consistent mild-moderate roughness and strain)   Sustained vowel production 24.3   S/Z Ratio 0.75   Deficits in Articulation Spastic dysarthria   Underlying oral motor deficit Oromandibular dystonia affecting the jaw, lingual, and hyoid muscles "   Deficits in Resonance Hypernasal   Deficits in visible velar function Left elevation;Right elevation   Deficits in Prosody disordered intonation patterns  (Monotone)   AIDS-words, % intelligible 88   AIDS-sentences, % intelligible 93   Apraxia Battery:  Sounds (out of 10 possible) 10   Apraxia Battery:  Word Repetition - single syllable (out of 10 possible) 10   Apraxia Battery:  Increasing word length (out of 10 possible) 9   Apraxia Battery:  Word Repetition - mulitple syllables (out of 10 possible) 9   Apraxia Battery:  Repeat Sentences (out of 5 possible) 5   Speech Comments Mild-moderate dysarthria and dysphonia characterized by imprecise articulation (particularly of bilabial and posterior lingual consonants), slow rate, hypernasality, monotone intonation, rough and strained voice quality, reduced volume, and poor respiratory/phonatory coordination.  Jaw clicking and increased jaw discomfort noted toward the end of the evaluation.  Pt is stimulable for improved voice quality with diaphragmatic engagement and increased loudness probes.   Education Assessment   Barriers to Learning No barriers   Preferred Learning Style Listening;Reading;Demonstration;Pictures/video   General Therapy Interventions   Planned Therapy Interventions Communication;Voice   Voice Breath flow to sound flow;Voice quality/pitch or volume tasks   Communication Improve speech intelligibility   Clinical Impression, SLP Eval   Criteria for Skilled Therapeutic Interventions Met yes;treatment indicated   SLP Diagnosis Mild-moderate spastic dysarthria and dysphonia   Influenced by the following factors/impairments Oromandibular dystonia, bilateral DBS, botox injections   Functional limitations due to impairments Pt has difficulty being understood by others, especially in background noise, over the phone, and with novel listeners.  Pt is also very self concious about his voice and speech, and will sometimes avoid opportunities to communicate as  a result.   Rehab potential affected by Need for ongoing botox injections, good stimulability for improvement with therapy probes, highly motivated   Therapy Frequency 1 time;per week   Predicted Duration of Therapy Intervention (days/wks) 6 weeks, with 2-3 monthly follow-ups.   Risks and Benefits of Treatment have been explained. Yes   Patient, Family & other staff in agreement with plan of care Yes   Clinical Impression Comments Mr. Moreno presents with mild-moderate spastic dysarthria and dysphonia secondary to oromandibular and generalized dystonia d/t DYT-1 gene mutation.  Speech and voice problems negatively affect Mr. Moreno's intelligibility, and are characterized by imprecise articulation (particularly of bilabial and posterior lingual consonants), slow rate, hypernasality, monotone intonation, rough and strained voice quality, reduced volume, and poor respiratory/phonatory coordination.  Speech and voice problems are a source of significant anxiety and frustration for Mr. Moreno.  RECOMMEND: a course of skilled speech therapy to improve speech intelligibility and voice quality, so that Mr. Moreno can meet his daily communication demands and to improve quality of life.   Cognitive/Communication Goals   Cognitive/Communication Goals 1;2;3   Cognitive/Communication Goal 1   Goal Identifier Generalization   Goal Description Patient will learn, implement, and demonstrate use of 2-3 voice and speech intelligibility strategies to increase intelligibility at the extended reading and spontaneous conversation level to >95% accy with min cues, so that patient is able to meet his daily communication needs.   Target Date 08/08/18   Cognitive/Communication Goal 2   Goal Identifier Speech   Goal Description Patient will learn, implement, and demonstrate use of speech intelligibility strategies to improve speech intelligibility at the phrase/sentence level (up to 10 words in length) to 90-95% accy with min cues to  meet daily communication needs.   Target Date 07/09/18   Cognitive/Communication Goal 3   Goal Identifier Voice   Goal Description In a 10-minute structured communication task (e.g. reading aloud paragraphs), patient will demonstrate a voice quality with roughness and strain that do not exceed a level of 2 out of 10, 90% of the time by SLP judgment, in order to improve voice quality for daily communication tasks.   Target Date 07/09/18   Total Session Time   Total Evaluation Time 60     Thank you for the referral of this patient.    Allison Alpers, B.A. (serg), M.A., CCC-SLP  Speech-Language Pathologist  Certificate of Vocology  [facility]  [phone]

## 2018-05-09 ASSESSMENT — ENCOUNTER SYMPTOMS
INCREASED ENERGY: 0
NECK MASS: 0
HALLUCINATIONS: 0
MYALGIAS: 1
WEIGHT GAIN: 0
PANIC: 0
TROUBLE SWALLOWING: 1
MEMORY LOSS: 0
FEVER: 0
HEADACHES: 0
DECREASED CONCENTRATION: 0
EYE REDNESS: 0
ARTHRALGIAS: 1
MUSCLE CRAMPS: 0
EYE WATERING: 0
DISTURBANCES IN COORDINATION: 1
POLYDIPSIA: 0
INSOMNIA: 0
DIZZINESS: 0
DYSURIA: 0
SMELL DISTURBANCE: 0
CHILLS: 0
NUMBNESS: 0
NECK PAIN: 0
WEIGHT LOSS: 1
TREMORS: 1
EYE IRRITATION: 0
HOARSE VOICE: 0
TINGLING: 0
NIGHT SWEATS: 0
TASTE DISTURBANCE: 0
SPEECH CHANGE: 1
SEIZURES: 0
SORE THROAT: 0
LOSS OF CONSCIOUSNESS: 0
WEAKNESS: 0
DEPRESSION: 1
BACK PAIN: 1
NERVOUS/ANXIOUS: 0
DOUBLE VISION: 0
PARALYSIS: 0
FLANK PAIN: 0
ALTERED TEMPERATURE REGULATION: 0
FATIGUE: 0
EYE PAIN: 0
STIFFNESS: 0
POLYPHAGIA: 0
SINUS PAIN: 0
HEMATURIA: 0
SINUS CONGESTION: 0
MUSCLE WEAKNESS: 0
DIFFICULTY URINATING: 1
DECREASED APPETITE: 0
JOINT SWELLING: 0

## 2018-05-10 ENCOUNTER — OFFICE VISIT (OUTPATIENT)
Dept: NEUROLOGY | Facility: CLINIC | Age: 48
End: 2018-05-10
Payer: COMMERCIAL

## 2018-05-10 VITALS
HEART RATE: 75 BPM | SYSTOLIC BLOOD PRESSURE: 112 MMHG | BODY MASS INDEX: 25.55 KG/M2 | HEIGHT: 73 IN | WEIGHT: 192.8 LBS | DIASTOLIC BLOOD PRESSURE: 67 MMHG

## 2018-05-10 DIAGNOSIS — G24.1 DYSTONIA DUE TO DYT-1 GENE MUTATION: Primary | ICD-10-CM

## 2018-05-10 ASSESSMENT — PAIN SCALES - GENERAL: PAINLEVEL: NO PAIN (0)

## 2018-05-10 NOTE — MR AVS SNAPSHOT
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 Ayad Moreno,    Thank you for coming today.  During your visit, we have discussed the following:     __ Your DBS electrical system function (impedance) is normal. The battery life is also good.    Final settings:   Group A    Left Gpi (Right body)    C +, 1 -, 2 -  Voltage:  4.0 volts  PW:  60 msec  Rate:  180 Hz    Right Gpi (Left body)    C +, 9 - 10 -  Voltage:  3.7 volts  PW:  60 msec  Rate:  180 Hz         Patient :  Upper Limit: 4.4 volts  Lower Limit: 0.0 volts   Patient :  Upper Limit: 4.5 volts  Lower Limit: 0.0 volts     __  Stay at the current settings for 3 - 4 weeks.  If no benefit in the left hand, increase the Right Gpi (Left body) voltage to 4.0 volts & see if you could get benefit.  You have the option to increase voltage to 4.5 volts.   __  Referral to OT for assistive device to help with writing.   __  For questions, [employer] Support Line is [phone].  __  Return in 3 months to see Dr. Loera.       To contact our clinic, you may call [phone] or use "[employer] message.     It's good to see you!       CT See, CNP  [facility]              Follow-ups after your visit        Additional Services     OCCUPATIONAL THERAPY REFERRAL       Evaluate & treat.  For assistive device to help with writing.                  Your next 10 appointments already scheduled     May 14, 2018  9:45 AM CDT   Voice Treatment with Allison E Alpers, MARLO   [facility] Speech Therapy ([address] [id number] [phone]             May 21, 2018 11:30 AM CDT   Voice Treatment with Allison E Alpers, SLP   [facility] ([facility])    [address]  [address]  [place] [address]   [phone]            Jun 04, 2018 11:00 AM CDT   Voice Treatment with Allison E Alpers, SLP   [facility] ([facility])    [address]  [address]  [place] [address]   [phone]            Jun 11, 2018 11:45 AM CDT   Voice Treatment with Allison E Alpers, SLP   [facility] ([facility])    [address]  [address]  [place] [address]   [phone]            Jun 18, 2018 11:00 AM CDT   Voice Treatment with Allison E Alpers, SLP   [facility] ([facility])    [address]  [address]  [place] [address]   [phone]            Aug 23, 2018 11:00 AM CDT   (Arrive by 10:45 AM)   Return Movement Disorder with Uche Loera MD   [facility] Neurology ([facility] [place])    [address] [place] [address] [phone]              Who to contact     Please call your clinic at [phone] to:    Ask questions about your health    Make or cancel appointments    Discuss your medicines    Learn about your test results    Speak to your doctor            Additional Information About Your Visit        [employer] Information     [employer] gives you secure access to your electronic health record. If you see a primary care provider, you can also send messages to your care team and make appointments. If you have questions, please call your primary care clinic.  If you do not have a primary care provider, please call [phone] and they will assist you.      [employer] is an electronic gateway that provides easy, online access to your medical records. With [employer], you can request a clinic appointment, read your test results, renew a prescription or communicate with your care team.     To access  "your existing account, please contact your [facility] or call [phone] for assistance.        Care EveryWhere ID     This is your Care EveryWhere ID. This could be used by other organizations to access your [facility] medical records  [id number]        Your Vitals Were     Pulse Height BMI (Body Mass Index)             75 1.854 m (6' 1\") 25.44 kg/m2          Blood Pressure from Last 3 Encounters:   05/10/18 112/67   03/16/18 112/72   03/06/18 111/72    Weight from Last 3 Encounters:   05/10/18 87.5 kg (192 lb 12.8 oz)   03/16/18 91.1 kg (200 lb 13.4 oz)   03/06/18 91.1 kg (200 lb 14.4 oz)              We Performed the Following     OCCUPATIONAL THERAPY REFERRAL        Primary Care Provider Office Phone # Fax #    Yves BECK Toshia [phone]       [facility] [address] [place] [address]        Equal Access to Services     DORIS PASCAL : Hadii aad ku hadasho Soomaali, waaxda luqadaha, qaybta kaalmada adeegyada, waxay idiin hayaan manav lopez . So [facility] [phone].    ATENCIÓN: Si habla español, tiene a loving disposición servicios gratuitos de asistencia lingüística. [facility] [phone].    We comply with applicable federal civil rights laws and [place] laws. We do not discriminate on the basis of race, color, national origin, age, disability, sex, sexual orientation, or gender identity.            Thank you!     Thank you for choosing [facility] NEUROLOGY  for your care. Our goal is always to provide you with excellent care. Hearing back from our patients is one way we can continue to improve our services. Please take a few minutes to complete the written survey that you may receive in the mail after your visit with us. Thank you!             Your Updated Medication List - Protect others around you: Learn how to safely use, store and throw away your medicines at www.disposemymeds.org.          This list is accurate as of " 5/10/18 10:53 AM.  Always use your most recent med list.                   Brand Name Dispense Instructions for use Diagnosis    BOTOX 100 units injection   Generic drug:  botulinum toxin type A     95 each    every 3 months Inject 95 units    Oromandibular dystonia       diazepam 2 MG tablet    VALIUM    60    Take 4 mg by mouth 2 times daily Takes 2 pills at a time        NEURONTIN 800 MG tablet   Generic drug:  gabapentin     90    Take 800 mg by mouth 3 times daily        NICORETTE 2 MG lozenge   Generic drug:  nicotine polacrilex      Place 2 mg inside cheek every hour as needed for smoking cessation (10x's/day)        REMERON 30 MG tablet   Generic drug:  mirtazapine     30    Take 1 tablet by mouth At Bedtime        traMADol 50 MG tablet    ULTRAM     Take 50 mg by mouth every 8 hours as needed        UNABLE TO FIND      Take 1 teaspoonful by mouth 2 times daily MEDICATION NAME: Marijuana (CBD) Oil- NOT THC        vitamin B complex with vitamin C Tabs tablet      Take 1 tablet by mouth every morning

## 2018-05-10 NOTE — PROGRESS NOTES
"PATIENT: Ayad Moreno    : 1970    NANI: May 10, 2018    REASON FOR VISIT: Right hemisphere deep brain stimulation (DBS) programming optimization to improve dystonia.     HPI:  Mr. Ayad Moreno is a 48 year old left - handed  male who came to the [facility] neurology clinic by himself for DBS programming optimization.  He is s/p bilateral Globus Pallidus Internus (Gpi) DBS lead placement left on 1/10/2017 & right on 2017 by Dr. Carolina.  Right Gpi Monopolar Review was completed on 2017.    Today, he has returned to clinic as he continues having difficulty with left hand fine hand coordination.  He is not able to write with his left hand.  He is using his right hand more, which he wasn't able to use at all due to tremor & dystonia before his Lt Gpi DBS implantation.  He reports \"not getting any benefit from DBS to use my left hand.\"      Speech is still an issue for him.  He is not able to talk on the phone as people don't understand what he says.  He was seen by Dr. Horne on 3/8/2018 for Botox injection, which is helping jaw tightness & speech.   He has done evaluation with speech therapist & plan to do some sessions.      Answers for HPI/ROS submitted by the patient on 2018   General Symptoms: Yes  Skin Symptoms: No  HENT Symptoms: Yes  EYE SYMPTOMS: Yes  HEART SYMPTOMS: No  LUNG SYMPTOMS: No  INTESTINAL SYMPTOMS: No  URINARY SYMPTOMS: Yes  REPRODUCTIVE SYMPTOMS: No  SKELETAL SYMPTOMS: Yes  BLOOD SYMPTOMS: No  NERVOUS SYSTEM SYMPTOMS: Yes  MENTAL HEALTH SYMPTOMS: Yes  Fever: No  Loss of appetite: No  Weight loss: Yes  Weight gain: No  Fatigue: No  Night sweats: No  Chills: No  Increased stress: No  Excessive hunger: No  Excessive thirst: No  Feeling hot or cold when others believe the temperature is normal: No  Loss of height: No  Post-operative complications: No  Surgical site pain: No  Hallucinations: No  Change in or Loss of Energy: No  Hyperactivity: No  Confusion: No  Ear pain: " No  Ear discharge: No  Hearing loss: Yes  Tinnitus: No  Nosebleeds: No  Congestion: No  Sinus pain: No  Trouble swallowing: Yes   Voice hoarseness: No  Mouth sores: No  Sore throat: No  Tooth pain: No  Gum tenderness: No  Bleeding gums: No  Change in taste: No  Change in sense of smell: No  Dry mouth: Yes  Hearing aid used: No  Neck lump: No  Eye pain: No  Vision loss: Yes  Dry eyes: No  Watery eyes: No  Eye bulging: No  Double vision: No  Flashing of lights: No  Spots: No  Floaters: No  Redness: No  Crossed eyes: No  Tunnel Vision: No  Yellowing of eyes: No  Eye irritation: No  Trouble holding urine or incontinence: No  Pain or burning: No  Trouble starting or stopping: Yes  Increased frequency of urination: No  Blood in urine: No  Decreased frequency of urination: No  Frequent nighttime urination: Yes  Flank pain: No  Difficulty emptying bladder: Yes  Back pain: Yes  Muscle aches: Yes  Neck pain: No  Swollen joints: No  Joint pain: Yes  Bone pain: No  Muscle cramps: No  Muscle weakness: No  Joint stiffness: No  Bone fracture: Yes  Trouble with coordination: Yes  Dizziness or trouble with balance: No  Fainting or black-out spells: No  Memory loss: No  Headache: No  Seizures: No  Speech problems: Yes  Tingling: No  Tremor: Yes  Weakness: No  Difficulty walking: No  Paralysis: No  Numbness: No  Nervous or Anxious: No  Depression: Yes  Trouble sleeping: No  Trouble thinking or concentrating: No  Mood changes: Yes  Panic attacks: No      MEDICATIONS:   Medication Sig     traMADol (ULTRAM) 50 MG tablet Take 50 mg by mouth every 8 hours as needed     vitamin B complex with vitamin C (VITAMIN  B COMPLEX)  Take 1 tablet by mouth every morning      UNABLE TO FIND Take 1 teaspoonful by mouth 2 times daily MEDICATION NAME: Marijuana (CBD) Oil- NOT THC     nicotine polacrilex (NICORETTE) 2 MG lozenge Place 2 mg inside cheek every hour as needed for smoking cessation (10x's/day)     botulinum toxin type A (BOTOX) 100 UNITS  "injection every 3 months Inject 95 units     NEURONTIN 800 MG OR TABS Take 800 mg by mouth 3 times daily      REMERON 30 MG OR TABS Take 1 tablet by mouth At Bedtime      DIAZEPAM 2 MG OR TABS Take 4 mg by mouth 2 times daily Takes 2 pills at a time       PHYSICAL EXAM:  Vital Signs:  Blood pressure 112/67, pulse 75, height 1.854 m (6' 1\"), weight 87.5 kg (192 lb 12.8 oz).  Body mass index is 25.44 kg/(m^2).       Mr. Moreno is a pleasant  male who is well-groomed and well-developed sitting comfortably in the exam room without any distress.  Affect is appropriate.  On bilateral Gpi DBS, he has slight dystonic postural tremor in Rt hand and absent in Lt.  Finger tapping is slow bilaterally; no tremor interruption.  Hand opening & closing shows mild slowing & reduction in amplitude & dystonic posturing in Rt; mild slowing in Lt.  Hand pronation & supination shows mild slowing.     The Fahn Kavon (BFM) Disability Score    Activity Severity Factor Score   A. Speech  0 - Normal  1 - Slightly involved; easily understood  2 - Some difficulty in understanding  3 - Marked difficulty in understanding  4 - Complete or almost complete anarthria       3   B. Handwriting   (tremor or dystonia) 0 - Normal  1 - Slight difficulty, legible  2 - Almost illegible  3 - Illegible  4 - Unable to grasp to maintain hold on pen    4   C. Feeding 0 - Normal  1 - Uses  tricks , independent  2 - Can feed, but not cut  3 - Finger food only  4 - Completely dependent       2   D. Eating/swallowing 0 - Normal  1 - Occasional choking  2 - Chokes frequently; difficulty swallowing  3 - Unable to swallow firm foods  4 - Marked difficulty swallowing soft foods and liquids       1   E. Hygiene 0 - Normal  1 - Clumsy, independent  2 - Needs help with some activities  3 - Needs help with most activities  4 - Needs help with all activities    1   F. Dressing 0 - Normal  1 - Clumsy, independent  2 - Needs help with some activities  3 - Needs help " with most activities  4 - Helpless       1      G. Walking 0 - Normal  1 - Slightly abnormal; hardly noticeable  2 - Moderately abnormal; obvious to naïve observer  3 - Considerably abnormal  4 - Needs assistance to walk  6 - Wheelchair-bound       1      Total (maximum: 30) = 13         DBS Interrogation & Analysis:    Implanted generator:  Left chest Activa-PC.  Lead placement:  Bilateral Globus Pallidus Internus (Gpi).    Therapy On:  100%  Battery Service Life:  OK.  2.94 volts.    Group A ACTIVE   Group B INACTIVE    Left Brain  C +, 1 -, 2 -  Volts:  4.0 volts  PW:  60 msec  Rate:  130 Hz    Patient :  Upper Limit: 4.1 volts  Lower Limit: 3.5 volts    Electrode Impedance Check:   (Amplitude 3.0 volts: PW 80 msec: Rate 100 Hz)   Left Lead: No Problems Found.     Right Brain   C +, 9 -, 10 -  Volts:  3.7 volts  PW:  60 msec  Rate:  130 Hz    Patient :  Upper Limit: 3.6 volts  Lower Limit: 2.4 volts Left Brain  C +, 1 -, 2 -  Volts:  4.0 volts  PW:  60 msec  Rate:  130 Hz    Patient :  Upper Limit: 4.0 volts  Lower Limit: 3.4 volts    Electrode Impedance Check:   (Amplitude 3.0 volts: PW 80 msec: Rate 100 Hz)   Left Lead: No Problems Found.     Right Brain   C +, 9 - 10 -  Volts:  3.6 volts  PW:  90 msec  Rate:  130 Hz    Patient :  Upper Limit: 4.0 volts  Lower Limit: 3.0 volts     See scanned report for impedance details.      DBS PROGRAMMING:    __  Right Gpi DBS was turned off to see if left hand fine motor coordination would improved.  Baseline Lt handwriting & spiral drawing obtained.  __  After 10 minutes, Lt handwriting & spiral drawing repeated, which showed worsening tremor when writing.  On exam (left hand finger tapping, hand opening & closing & hand pronation & supination) no changes noted.   __  After 30 minutes, Lt handwriting & spiral drawing repeated, which showed much worse tremor when writing.   __  Right Gpi DBS was turned back ON using the same settings  "-- Group A.  __  Right Gpi frequency was increased from 130 Hz to 180 Hz, which automatically increased the left side to 180 Hz.  Writing didn't improve.  When holding pen with a fist, he was writing much better.  Gross movements are improved, but fine hand movements are still impaired.   __  Voltage was increased to determine threshold: (C +, 9 - 10 - // Volts:  3.7 volts // PW:  60 msec // Rate:  180 Hz)   __  At 5.0 volts, he had Left side of lip/face twitching.    __  5.5 volts, \"my left body felt detached.\"  He had Lt upper leg & Lt lip twitching.     Final settings:     Group A  Left Gpi    C +, 1 -, 2 -  Volts:  4.0 volts  PW:  60 msec  Rate:  180 Hz    Right Gpi    C +, 9 - 10 -  Volts:  3.7 volts  PW:  60 msec  Rate:  180 Hz         Patient :  Upper Limit: 4.4 volts  Lower Limit: 0.0 volts   Patient :  Upper Limit: 4.5 volts  Lower Limit: 0.0 volts     __  Referral to OT to work on assistive device for writing.   __  Will continue with Botox injections & speech therapy.   __  Plan is to spread current & increase Right Gpi voltage to maximize benefit on the left hand.  __  If the above settings don't give him added benefit, will try using deeper contacts, C+ 8-, 9-  __  Will return in 3 months.    The total amount of time spent with the patient was 120 minutes; 90 min in DBS programming and 30 min in addressing dystonia, writing & speech issues.   50% of the time was spent in counseling & coordination of care.  Patient was seen, examined, & discussed with Dr. Loera, who agrees with assessment and plan.     Dulce Manning, APRN, CNP  [facility]     Problems with left hand are predominately in finger grasp, writing was much better when holding the pen in his fist which required him to write using more proximal musculature. DBS clearly benefits his left hand as when turning off the R GPi DBS his handwriting was significantly worse. We have increased the frequency and will " reassess, if this is not helpful I would consider moving to more ventral contacts, decrease the frequency and see what voltage we can move up to without getting IC responses. Speech is overall much better compared to baseline as I had a very difficult time understanding him prior to surgery but can follow virtually everything he say in clinic today. It is important to note that speech was explained to the patient prior to surgery to be a motor sign that we do not necessarily expect to improve with DBS, sometimes is can get better and sometime can actually be made worse- the fact his improved after the first side was suggestive that he could get further improvement with the second side. While his speech is clearly improved following DBS it is not as clear as he had hoped, however as I noted above I certainly appreciate the significant improvement when speaking with him.    Uche Loera MD, PhD

## 2018-05-10 NOTE — PATIENT INSTRUCTIONS
May 10, 2018    Dear Mr. Ayad Moreno,    Thank you for coming today.  During your visit, we have discussed the following:     __ Your DBS electrical system function (impedance) is normal. The battery life is also good.    Final settings:   Group A    Left Gpi (Right body)    C +, 1 -, 2 -  Voltage:  4.0 volts  PW:  60 msec  Rate:  180 Hz    Right Gpi (Left body)    C +, 9 - 10 -  Voltage:  3.7 volts  PW:  60 msec  Rate:  180 Hz         Patient :  Upper Limit: 4.4 volts  Lower Limit: 0.0 volts   Patient :  Upper Limit: 4.5 volts  Lower Limit: 0.0 volts     __  Stay at the current settings for 3 - 4 weeks.  If no benefit in the left hand, increase the Right Gpi (Left body) voltage to 4.0 volts & see if you could get benefit.  You have the option to increase voltage to 4.5 volts.   __  Referral to OT for assistive device to help with writing.   __  For questions, [employer] Support Line is [phone].  __  Return in 3 months to see Dr. Loera.       To contact our clinic, you may call [phone] or use [employer] message.     It's good to see you!       CT See, CNP  [facility]

## 2018-05-14 ENCOUNTER — HOSPITAL ENCOUNTER (OUTPATIENT)
Dept: SPEECH THERAPY | Facility: CLINIC | Age: 48
Setting detail: THERAPIES SERIES
End: 2018-05-14
Attending: NURSE PRACTITIONER
Payer: COMMERCIAL

## 2018-05-14 PROCEDURE — 92507 TX SP LANG VOICE COMM INDIV: CPT | Mod: GN | Performed by: STUDENT IN AN ORGANIZED HEALTH CARE EDUCATION/TRAINING PROGRAM

## 2018-05-14 PROCEDURE — 40000251 ZZH STATISTIC VOICE CENTER VISIT: Performed by: STUDENT IN AN ORGANIZED HEALTH CARE EDUCATION/TRAINING PROGRAM

## 2018-05-21 ENCOUNTER — HOSPITAL ENCOUNTER (OUTPATIENT)
Dept: SPEECH THERAPY | Facility: CLINIC | Age: 48
Setting detail: THERAPIES SERIES
End: 2018-05-21
Attending: NURSE PRACTITIONER
Payer: COMMERCIAL

## 2018-05-21 PROCEDURE — 40000211 ZZHC STATISTIC SLP  DEPARTMENT VISIT: Performed by: STUDENT IN AN ORGANIZED HEALTH CARE EDUCATION/TRAINING PROGRAM

## 2018-05-21 PROCEDURE — 92507 TX SP LANG VOICE COMM INDIV: CPT | Mod: GN | Performed by: STUDENT IN AN ORGANIZED HEALTH CARE EDUCATION/TRAINING PROGRAM

## 2018-06-04 ENCOUNTER — HOSPITAL ENCOUNTER (OUTPATIENT)
Dept: SPEECH THERAPY | Facility: CLINIC | Age: 48
Setting detail: THERAPIES SERIES
End: 2018-06-04
Attending: NURSE PRACTITIONER
Payer: COMMERCIAL

## 2018-06-04 PROCEDURE — 40000251 ZZH STATISTIC VOICE CENTER VISIT: Performed by: STUDENT IN AN ORGANIZED HEALTH CARE EDUCATION/TRAINING PROGRAM

## 2018-06-04 PROCEDURE — 92507 TX SP LANG VOICE COMM INDIV: CPT | Mod: GN | Performed by: STUDENT IN AN ORGANIZED HEALTH CARE EDUCATION/TRAINING PROGRAM

## 2018-06-11 ENCOUNTER — HOSPITAL ENCOUNTER (OUTPATIENT)
Dept: SPEECH THERAPY | Facility: CLINIC | Age: 48
Setting detail: THERAPIES SERIES
End: 2018-06-11
Attending: NURSE PRACTITIONER
Payer: COMMERCIAL

## 2018-06-11 PROCEDURE — 40000251 ZZH STATISTIC VOICE CENTER VISIT: Performed by: STUDENT IN AN ORGANIZED HEALTH CARE EDUCATION/TRAINING PROGRAM

## 2018-06-11 PROCEDURE — 92507 TX SP LANG VOICE COMM INDIV: CPT | Mod: GN | Performed by: STUDENT IN AN ORGANIZED HEALTH CARE EDUCATION/TRAINING PROGRAM

## 2018-06-18 ENCOUNTER — HOSPITAL ENCOUNTER (OUTPATIENT)
Dept: SPEECH THERAPY | Facility: CLINIC | Age: 48
Setting detail: THERAPIES SERIES
End: 2018-06-18
Attending: NURSE PRACTITIONER
Payer: COMMERCIAL

## 2018-06-18 PROCEDURE — 40000251 ZZH STATISTIC VOICE CENTER VISIT: Performed by: STUDENT IN AN ORGANIZED HEALTH CARE EDUCATION/TRAINING PROGRAM

## 2018-06-18 PROCEDURE — 92507 TX SP LANG VOICE COMM INDIV: CPT | Mod: GN | Performed by: STUDENT IN AN ORGANIZED HEALTH CARE EDUCATION/TRAINING PROGRAM

## 2018-06-20 ENCOUNTER — HOSPITAL ENCOUNTER (OUTPATIENT)
Dept: OCCUPATIONAL THERAPY | Facility: CLINIC | Age: 48
Setting detail: THERAPIES SERIES
End: 2018-06-20
Attending: NURSE PRACTITIONER
Payer: COMMERCIAL

## 2018-06-20 PROCEDURE — 40000125 ZZHC STATISTIC OT OUTPT VISIT: Performed by: REHABILITATION PRACTITIONER

## 2018-06-20 PROCEDURE — 97110 THERAPEUTIC EXERCISES: CPT | Mod: GO | Performed by: REHABILITATION PRACTITIONER

## 2018-06-20 PROCEDURE — 97166 OT EVAL MOD COMPLEX 45 MIN: CPT | Mod: GO | Performed by: REHABILITATION PRACTITIONER

## 2018-06-20 NOTE — PROGRESS NOTES
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  Pt is s/p bilateral Globus Pallidus Internus DBS placement, left side on 1/10/17 and right side on 11/16/17.  Pt also receives botox injections in the oromandibular and hyoid musculature every 3 months, with most recent injections on 3/8/18.  Pt reports that his speech is difficult for others to understand, and that his voice is gutteral and difficult to project.  DBS helped with right hand function and overall neck comfort, but he did not feel it made any significant impact on his speech.     Role/Living Environment   Current Community Support Family/friend caregiver   Patient role/Employment history Disabled   Community/Avocational Activities Pt was a teacher for 20 years.  Pt is using dumb bells for bicep curls (20#)   Current Living Environment House   Prior Level - Transfers Independent   Prior Level - Ambulation Independent   Prior Level - ADLS Independent   Prior Responsibilities - IADL Finances;Medication management;Yardwork;Shopping;Laundry;Housekeeping;Meal Preparation;;Driving   Role/Living Environment Comments Pt has 13 year old boy and 11 year old.     Patient/family Goals Statement To improve I with writing, write checks, filling out forms.     Pain   Patient currently in pain No   Fall Risk Screen   Fall  "screen completed by OT   Have you fallen 2 or more times in the past year? Yes   Have you fallen and had an injury in the past year? Yes   Fall screen comments Pt was standing on a chair and fell breaking his foot March 2018.  Recommend PT at this time.     Cognitive Status Examination   Orientation Orientation to person, place and time   Level of Consciousness Alert   Follows Commands and Answers Questions 100% of the time   Personal Safety and Judgment Intact   Memory Intact   Visual Perception   Visual Perception Wears glasses   Visual Perception Comments Pt reports dystonia impacts reading and muscles fatigue. Pt reports he is reluctant to read due to fatigue.     Strength   Strength Comments 4/5 UE strength 4-/5 tricep strength   Hand Strength   Hand Dominance Right   Left Hand  (pounds) 63 pounds   Right Hand  (pounds) 58 pounds   Left Lateral Pinch (pounds) 13 pounds   Right Lateral Pinch (pounds) 16.5 pounds   Left Three Point Pinch (pounds) 13 pounds   Right Three Point Pinch (pounds) 16 pounds   Hand Strength Comments 2 SD below average for  strength and 3 standard deviations below average for pinch strength.   Muscle Tone   Muscle Tone Comments Dystonia    Coordination   Upper Extremity Coordination Left UE impaired;Right UE impaired   Gross Motor Coordination Impaired   Fine Motor Coordination Impaired   Left Hand, Nine Hole Peg Test (seconds) 32.30   Right Hand, Nine Hole Peg Test (seconds) 37.30   Coordination Comments Pt uses key board at times and types with left hand.  Patient scored over 2 standard deviations below average for coordination.   Balance   Balance Comments Pt would benefit from PT eval and treat and pt prefers to wait at this time.     Transfer Skill   Transfer Skill Comments Pt is able to get out of chairs.  Reports DBS has changed his balance.     Bathing   Bathing Comments Pt reports \"I wish we had a grab bar.\"  Pt reports difficulty getting into bath and will do a " "planned fall into the tub.\"   Upper Body Dressing   Upper Body Dressing Comments Increased time to get dressed.     Lower Body Dressing   Lower Body Dressing Comments Pt reports losing balance when dressing LE, falls over at times.     Toileting   Level of [place]: Toilet independent   Grooming   Level of [place]: Grooming stand-by assist   Grooming Comments Increased time   Eating/Self-Feeding   Eating/Self Feeding Comments Pt is able to drink with right hand, unable to use left hand for feeding.     Activity Tolerance   Activity Tolerance Pt reports he is running, reports endurance level has improved.     Instrumental Activities of Daily Living Assessment   IADL Assessment/Observations Difficulty with completing financial tasks due to writing difficulty   Planned Therapy Interventions   Planned Therapy Interventions ADL training;IADL training;Self care/Home management;Strengthening;Neuromuscular re-education   Adult OT Eval Goals   OT Eval Goals (Adult) 1;2;3   OT Goal 1   Goal Identifier 1 HEP   Goal Description Pt will complete HEP 4/7 days with mod I to increase strength and coordination for dressing.   Target Date 08/15/18   OT Goal 2   Goal Identifier 2 Feeding   Goal Description Pt will demonstrate mod I with feeding self using 1-2 pieces of AE and mod I.   Target Date 08/15/18   OT Goal 3   Goal Identifier 3 Writing   Goal Description Patient will demonstrate modified independence with filling out forms, lists and checks legibly and adaptive equipment   Target Date 08/15/18   Clinical Impression   Criteria for Skilled Therapeutic Interventions Met Yes, treatment indicated   OT Diagnosis Impaired ADL/IADL   Influenced by the following impairments Impaired coordination, tone and strength   Assessment of Occupational Performance 3-5 Performance Deficits   Identified Performance Deficits Feeding, financial management, writing-based ADLs, coordination and strength for ADLs   Clinical Decision Making " (Complexity) Moderate complexity   Therapy Frequency 1x/ week   Predicted Duration of Therapy Intervention (days/wks) 8 weeks   Risks and Benefits of Treatment have been explained. Yes   Patient, Family & other staff in agreement with plan of care Yes   Clinical Impression Comments Pt demonstrates clinical need for skilled OT services to improve ADL/IADL independence and safety.  Pt is motivated to improve and has good family support.     Education Assessment   Barriers To Learning No Barriers   Preferred Learning Style Reading;Demonstration   Total Evaluation Time   Total Evaluation Time 29

## 2018-07-27 ENCOUNTER — HOSPITAL ENCOUNTER (OUTPATIENT)
Dept: OCCUPATIONAL THERAPY | Facility: CLINIC | Age: 48
Setting detail: THERAPIES SERIES
End: 2018-07-27
Attending: NURSE PRACTITIONER
Payer: COMMERCIAL

## 2018-07-27 PROCEDURE — 97112 NEUROMUSCULAR REEDUCATION: CPT | Mod: GO | Performed by: OCCUPATIONAL THERAPIST

## 2018-07-27 PROCEDURE — 40000125 ZZHC STATISTIC OT OUTPT VISIT: Performed by: OCCUPATIONAL THERAPIST

## 2018-08-20 ENCOUNTER — HOSPITAL ENCOUNTER (OUTPATIENT)
Dept: SPEECH THERAPY | Facility: CLINIC | Age: 48
Setting detail: THERAPIES SERIES
End: 2018-08-20
Attending: NURSE PRACTITIONER
Payer: COMMERCIAL

## 2018-08-20 PROCEDURE — 40000251 ZZH STATISTIC VOICE CENTER VISIT: Performed by: STUDENT IN AN ORGANIZED HEALTH CARE EDUCATION/TRAINING PROGRAM

## 2018-08-20 PROCEDURE — 92507 TX SP LANG VOICE COMM INDIV: CPT | Mod: GN | Performed by: STUDENT IN AN ORGANIZED HEALTH CARE EDUCATION/TRAINING PROGRAM

## 2018-08-23 ENCOUNTER — OFFICE VISIT (OUTPATIENT)
Dept: NEUROLOGY | Facility: CLINIC | Age: 48
End: 2018-08-23
Payer: COMMERCIAL

## 2018-08-23 VITALS
HEIGHT: 74 IN | HEART RATE: 65 BPM | DIASTOLIC BLOOD PRESSURE: 69 MMHG | SYSTOLIC BLOOD PRESSURE: 119 MMHG | WEIGHT: 183.2 LBS | OXYGEN SATURATION: 97 % | TEMPERATURE: 97.8 F | BODY MASS INDEX: 23.51 KG/M2

## 2018-08-23 DIAGNOSIS — F41.8 DEPRESSION WITH ANXIETY: ICD-10-CM

## 2018-08-23 DIAGNOSIS — G24.1 DYSTONIA DUE TO DYT-1 GENE MUTATION: Primary | ICD-10-CM

## 2018-08-23 DIAGNOSIS — G24.9 GENERALIZED DYSTONIA: ICD-10-CM

## 2018-08-23 ASSESSMENT — PAIN SCALES - GENERAL: PAINLEVEL: NO PAIN (0)

## 2018-08-23 NOTE — PROGRESS NOTES
[facility] Movement Disorders Clinic Follow-up    CC: dystonia DBS follow-up    HPI: Ronak Moreno is a 48 year-old male with a history of DYT-1 dystonia s/p bilateral Globus Pallidus Internus (Gpi) DBS lead placement left on 1/10/2017 & right on 11/16/201. He was last seen 5/10/18. He was having issues with his left hand, most notably writing. At that time his R brain GPI was turned off and handwriting assessed at 10 and 30 minutes with worsened writing. His frequency was increased bilaterally from 130 to 180Hz. Capsular threshold was reassessed for the right brain and found to be 5.0.    He reports the left hand is about the same, if not slightly worse. He cannot write or drink from a cup with it. It is, however the only hand he can type with. He reports the dysarthria has improved with Botox. He is due for his next dose in September, he tends to best when it is wearing-off.      The Fahn Kavon (BFM) Disability Score     Activity Severity Factor Score   A. Speech  0 - Normal  1 - Slightly involved; easily understood  2 - Some difficulty in understanding  3 - Marked difficulty in understanding  4 - Complete or almost complete anarthria       2   B. Handwriting   (tremor or dystonia) 0 - Normal  1 - Slight difficulty, legible  2 - Almost illegible  3 - Illegible  4 - Unable to grasp to maintain hold on pen    4   C. Feeding 0 - Normal  1 - Uses  tricks , independent  2 - Can feed, but not cut  3 - Finger food only  4 - Completely dependent       1   D. Eating/swallowing 0 - Normal  1 - Occasional choking  2 - Chokes frequently; difficulty swallowing  3 - Unable to swallow firm foods  4 - Marked difficulty swallowing soft foods and liquids       1   E. Hygiene 0 - Normal  1 - Clumsy, independent  2 - Needs help with some activities  3 - Needs help with most activities  4 - Needs help with all activities  1   F. Dressing 0 - Normal  1 - Clumsy, independent  2 - Needs help with some activities  3 - Needs  "help with most activities  4 - Helpless       1      G. Walking 0 - Normal  1 - Slightly abnormal; hardly noticeable  2 - Moderately abnormal; obvious to naïve observer  3 - Considerably abnormal  4 - Needs assistance to walk  6 - Wheelchair-bound       0      Total (maximum: 30) = 10          Medications:  Current Outpatient Prescriptions   Medication     botulinum toxin type A (BOTOX) 100 UNITS injection     DIAZEPAM 2 MG OR TABS     NEURONTIN 800 MG OR TABS     nicotine polacrilex (NICORETTE) 2 MG lozenge     REMERON 30 MG OR TABS     traMADol (ULTRAM) 50 MG tablet     UNABLE TO FIND     vitamin B complex with vitamin C (VITAMIN  B COMPLEX) TABS tablet     No current facility-administered medications for this visit.        Exam:  /69 (BP Location: Right arm, Patient Position: Sitting, Cuff Size: Adult Regular)  Pulse 65  Temp 97.8  F (36.6  C)  Ht 1.867 m (6' 1.5\")  Wt 83.1 kg (183 lb 3.2 oz)  SpO2 97%  BMI 23.84 kg/m2    Neurological Examination (R/L):  Mental Status: Awake, alert. Fluent. Affect normal.  Cranial Nerves: Versions full, saccades intact. Dystonic smile. Dystonic/strained speech, most difficulty with guttural sounds but 90% intelligible.  Motor: No tremor in right hand. Very slight postural and kinetic tremor of right hand. Mildly slowed grasping and finger tapping in left hand. Slight slowing with right hand.  Gait: Gait narrow-based with only slightly decreased right arm swing.    DBS Interrogation & Analysis:     Implanted generator:  Left chest [employer].  Lead placement:  Bilateral Globus Pallidus Internus (Gpi).     Therapy On:  100%  Battery Service Life:  OK.  2.90 volts.     Group A ACTIVE Group B INACTIVE    Left Brain  C +, 1 -, 2 -  Volts:  4.0 volts  PW:  60 msec  Rate:  180 Hz     Patient :  Upper Limit: 4.4 volts  Lower Limit: 0 volts     Electrode Impedance Check:   (Amplitude 3.0 volts: PW 80 msec: Rate 100 Hz)   Left Lead: No Problems Found.      Right Brain "   C +, 9 -, 10 -  Volts:  3.7 volts  PW:  60 msec  Rate:  180 Hz     Patient :  Upper Limit: 4.5 volts  Lower Limit: 0 volts Left Brain  C +, 1 -, 2 -  Volts:  4.0 volts  PW:  60 msec  Rate:  130 Hz     Patient :  Upper Limit: 4.0 volts  Lower Limit: 3.4 volts     Electrode Impedance Check:   (Amplitude 3.0 volts: PW 80 msec: Rate 100 Hz)   Left Lead: No Problems Found.      Right Brain   C +, 9 - 10 -  Volts:  3.6 volts  PW:  90 msec  Rate:  130 Hz     Patient :  Upper Limit: 3.8 volts  Lower Limit: 2.6 volts      Final settings:     Group A  Left Gpi     C +, 1 -, 2 -  Volts:  4.0 volts  PW:  60 msec  Rate:  180 Hz     Right Gpi     C +, 9 - 10 -  Volts:  3.9 volts  PW:  60 msec  Rate:  180 Hz           Patient :  Upper Limit: 4.4 volts  Lower Limit: 0.0 volts    Patient :  Upper Limit: 4.5 volts  Lower Limit: 0.0 volts          Assessment: 48 year-old male with a history of DYT-1 dystonia s/p bilateral Globus Pallidus Internus (Gpi) DBS who presents in follow-up. Still complains of difficulty using left hand and feels it has worsened since surgery. Will attempt to further increase voltage as he has room to increase it at this time. If this is not effective, may consider increasing pulse width at a later date.    Plan:   -increased R GPI voltage from 3.9 to 4.1    Follow-up 3 months    Frederick Cannon MD  Movement Disorders Fellow    The total amount of time spent with the patient was 40 minutes; 20 min in DBS programming and 20 min in addressing dystonia, fine hand coordinations & speech issues.  50% of the time was spent in counseling & coordination of care.       The patient was seen with the fellow. I was present for key portions of the history and physical examination and agree with the assessment and plan. Patient has DYT-1 dystonia with predominant dystonia involving the RUE with phasic movements, markedly garbled speech and some involuntary movement  involving the LUE. Following GPi DBS his right side is markedly better and speech showed some improvement. Given the noticeable improvement in speech with the first side DBS we elected to move forward with implanting the R GPi. Since implantation he has complained of some worsening in fine motor control in the L hand. The appears more with intention and there is a find dystonic tremor that is visible with fine motor control in the left hand. Overall the L proximal arm is improved however functionally he feels the left hand still given him trouble with voluntary movement.  There is still reasonable room to increase his voltage on the R Brain GPi to see if we can get control over the left hand and an adjustment was made today increasing the voltage by a couple of tenths- see note above.    Uche Loera MD PhD

## 2018-08-23 NOTE — LETTER
8/23/2018       RE: Ayad Moreno  [address]  [facility] [address]     Dear Colleague,    Thank you for referring your patient, Ayad Moreno, to the [facility] NEUROLOGY at [facility]. Please see a copy of my visit note below.    [facility] Movement Disorders Clinic Follow-up    CC: dystonia DBS follow-up    HPI: Ronak Moreno is a 48 year-old male with a history of DYT-1 dystonia s/p bilateral Globus Pallidus Internus (Gpi) DBS lead placement left on 1/10/2017 & right on 11/16/201. He was last seen 5/10/18. He was having issues with his left hand, most notably writing. At that time his R brain GPI was turned off and handwriting assessed at 10 and 30 minutes with worsened writing. His frequency was increased bilaterally from 130 to 180Hz. Capsular threshold was reassessed for the right brain and found to be 5.0.    He reports the left hand is about the same, if not slightly worse. He cannot write or drink from a cup with it. It is, however the only hand he can type with. He reports the dysarthria has improved with Botox. He is due for his next dose in September, he tends to best when it is wearing-off.      The Fahn Kavon (BFM) Disability Score     Activity Severity Factor Score   A. Speech  0 - Normal  1 - Slightly involved; easily understood  2 - Some difficulty in understanding  3 - Marked difficulty in understanding  4 - Complete or almost complete anarthria       2   B. Handwriting   (tremor or dystonia) 0 - Normal  1 - Slight difficulty, legible  2 - Almost illegible  3 - Illegible  4 - Unable to grasp to maintain hold on pen    4   C. Feeding 0 - Normal  1 - Uses  tricks , independent  2 - Can feed, but not cut  3 - Finger food only  4 - Completely dependent       1   D. Eating/swallowing 0 - Normal  1 - Occasional choking  2 - Chokes frequently; difficulty swallowing  3 - Unable to swallow firm foods  4 - Marked difficulty swallowing soft foods  and liquids       1   E. Hygiene 0 - Normal  1 - Clumsy, independent  2 - Needs help with some activities  3 - Needs help with most activities  4 - Needs help with all activities  1   F. Dressing 0 - Normal  1 - Clumsy, independent  2 - Needs help with some activities  3 - Needs help with most activities  4 - Helpless       1      G. Walking 0 - Normal  1 - Slightly abnormal; hardly noticeable  2 - Moderately abnormal; obvious to naïve observer  3 - Considerably abnormal  4 - Needs assistance to walk  6 - Wheelchair-bound       0      Total (maximum: 30) = 10          Medications:  Current Outpatient Prescriptions   Medication     botulinum toxin type A (BOTOX) 100 UNITS injection     DIAZEPAM 2 MG OR TABS     NEURONTIN 800 MG OR TABS     nicotine polacrilex (NICORETTE) 2 MG lozenge     REMERON 30 MG OR TABS     traMADol (ULTRAM) 50 MG tablet     UNABLE TO FIND     vitamin B complex with vitamin C (VITAMIN  B COMPLEX) TABS tablet     No current facility-administered medications for this visit.        Exam:  There were no vitals taken for this visit.    Neurological Examination (R/L):  Mental Status: Awake, alert. Fluent. Affect normal.  Cranial Nerves: Versions full, saccades intact. Dystonic smile. Dystonic/strained speech, most difficulty with guttural sounds but 90% intelligible.  Motor: No tremor in right hand. Very slight postural and kinetic tremor of right hand. Mildly slowed grasping and finger tapping in left hand. Slight slowing with right hand.  Gait: Gait narrow-based with only slightly decreased right arm swing.    DBS Interrogation & Analysis:     Implanted generator:  Left chest [employer].  Lead placement:  Bilateral Globus Pallidus Internus (Gpi).     Therapy On:  100%  Battery Service Life:  OK.  2.9 0 volts.     Group A ACTIVE Group B INACTIVE    Left Brain  C +, 1 -, 2 -  Volts:  4.0 volts  PW:  60 msec  Rate:  1 80 Hz     Patient :  Upper Limit: 4.4 volts  Lower Limit: 0  volts     Electrode Impedance Check:   (Amplitude 3.0 volts: PW 80 msec: Rate 100 Hz)   Left Lead: No Problems Found.      Right Brain   C +, 9 -, 10 -  Volts:  3.7 volts  PW:  60 msec  Rate:  1 80 Hz     Patient :  Upper Limit: 4.5 volts  Lower Limit: 0 volts Left Brain  C +, 1 -, 2 -  Volts:  4.0 volts  PW:  60 msec  Rate:  130 Hz     Patient :  Upper Limit: 4.0 volts  Lower Limit: 3.4 volts     Electrode Impedance Check:   (Amplitude 3.0 volts: PW 80 msec: Rate 100 Hz)   Left Lead: No Problems Found.      Right Brain   C +, 9 - 10 -  Volts:  3.6 volts  PW:  90 msec  Rate:  130 Hz     Patient :  Upper Limit: 3.8 volts  Lower Limit: 2.6 volts      Final settings:     Group A  Left Gpi     C +, 1 -, 2 -  Volts:  4.0 volts  PW:  60 msec  Rate:  180 Hz     Right Gpi     C +, 9 - 10 -  Volts:  3.9 volts  PW:  60 msec  Rate:  180 Hz           Patient :  Upper Limit: 4.4 volts  Lower Limit: 0.0 volts    Patient :  Upper Limit: 4.5 volts  Lower Limit: 0.0 volts          Assessment: 48 year-old male with a history of DYT-1 dystonia s/p bilateral Globus Pallidus Internus (Gpi) DBS who presents in follow-up. Still complains of difficulty using left hand and feels it has worsened since surgery. Will attempt to further increase voltage as he has room to increase it at this time. If this is not effective, may consider increasing pulse width at a later date.    Plan:   -increased R GPI voltage from 3.9 to 4.1    Follow-up 3 months    Frederick Cannon MD  Movement Disorders Fellow    The patient was seen with the fellow. I was present for key portions of the history and physical examination and agree with the assessment and plan. Patient has DYT-1 dystonia with predominant dystonia involving the RUE with phasic movements, markedly garbled speech and some involuntary movement involving the LUE. Following GPi DBS his right side is markedly better and speech showed some improvement.  Given the noticeable improvement in speech with the first side DBS we elected to move forward with implanting the R GPi. Since implantation he has complained of some worsening in fine motor control in the L hand. The appears more with intention and there is a find dystonic tremor that is visible with fine motor control in the left hand. Overall the L proximal arm is improved however functionally he feels the left hand still given him trouble with voluntary movement.  There is still reasonable room to increase his voltage on the R Brain GPi to see if we can get control over the left hand and an adjustment was made today increasing the voltage by a couple of tenths- see note above.    Again, thank you for allowing me to participate in the care of your patient.      Sincerely,    Uche Loera MD

## 2018-08-23 NOTE — NURSING NOTE
Chief Complaint   Patient presents with     RECHECK     [facility] RETURN - 6 month F/U Movement Disorder     Glynn Rodriguez, EMT

## 2018-08-24 ENCOUNTER — HOSPITAL ENCOUNTER (OUTPATIENT)
Dept: OCCUPATIONAL THERAPY | Facility: CLINIC | Age: 48
Setting detail: THERAPIES SERIES
End: 2018-08-24
Attending: NURSE PRACTITIONER
Payer: COMMERCIAL

## 2018-08-24 PROCEDURE — 40000125 ZZHC STATISTIC OT OUTPT VISIT: Performed by: OCCUPATIONAL THERAPIST

## 2018-08-24 PROCEDURE — 97112 NEUROMUSCULAR REEDUCATION: CPT | Mod: GO | Performed by: OCCUPATIONAL THERAPIST

## 2018-08-24 PROCEDURE — 97535 SELF CARE MNGMENT TRAINING: CPT | Mod: GO | Performed by: OCCUPATIONAL THERAPIST

## 2018-08-24 ASSESSMENT — PATIENT HEALTH QUESTIONNAIRE - PHQ9: SUM OF ALL RESPONSES TO PHQ QUESTIONS 1-9: 4

## 2018-08-28 ENCOUNTER — HOSPITAL ENCOUNTER (OUTPATIENT)
Dept: SPEECH THERAPY | Facility: CLINIC | Age: 48
Setting detail: THERAPIES SERIES
End: 2018-08-28
Attending: NURSE PRACTITIONER
Payer: COMMERCIAL

## 2018-08-28 PROCEDURE — 40000251 ZZH STATISTIC VOICE CENTER VISIT: Performed by: STUDENT IN AN ORGANIZED HEALTH CARE EDUCATION/TRAINING PROGRAM

## 2018-08-28 PROCEDURE — 92507 TX SP LANG VOICE COMM INDIV: CPT | Mod: GN | Performed by: STUDENT IN AN ORGANIZED HEALTH CARE EDUCATION/TRAINING PROGRAM

## 2018-08-28 NOTE — PROGRESS NOTES
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  He does not feel that the adjustment affected his speech in any way.  Overall, he feels that therapy has been beneficial in helping to improve his voice and speech.    Objective Measurements:      Pt participated in informal retesting of goal areas with results as follows.  SLP did not provide any cueing to use previously trained voice and speech techniques.  SPEECH: Apraxia Battery: Increasing word length: 10/10; Words Repetition: 10/10; Repeat Sentences: 5/5.  AIDS Words: 96% intelligibility.  AIDS Sentences: 98% intelligibility.  Speech characterized by slow rate, mild articulatory imprecision (especially for labial consonants), and intermittent hypernasality.  VOICE: Voice quality characterized by consistent mild strain and intermittent mild roughness vs glottal damon.  WFL volume.  Monotone intonation.  Maximum sustained /a/: 45.6 seconds with intermittent roughness.  S/Z ratio: 0.44 with longest Z of 61 seconds.              Goals:  Goal Identifier Generalization   Goal Description Patient will learn, implement, and demonstrate use of 2-3 voice and speech intelligibility strategies to increase intelligibility at the extended reading and spontaneous conversation level to >95% accy with min cues, so that patient is able to meet his daily communication needs.   Target Date   10/28/2018   Date Met      Progress:  Good, goal not met per objective measures above.     Goal Identifier Speech   Goal Description Patient will learn, implement, and demonstrate use of speech intelligibility strategies to improve speech  intelligibility at the phrase/sentence level (up to 10 words in length) to 90-95% accy with min cues to meet daily communication needs.   Target Date 07/09/18   Date Met  08/28/18   Progress:  Good, goal met per objective measures above.     Goal Identifier Voice   Goal Description In a 10-minute structured communication task (e.g. reading aloud paragraphs), patient will demonstrate a voice quality with roughness and strain that do not exceed a level of 2 out of 10, 90% of the time by SLP judgment, in order to improve voice quality for daily communication tasks.   Target Date   10/28/2018   Date Met      Progress:  Good, goal not met per objective measures above.       Progress Toward Goals:    Progress this reporting period: Good, Speech goal met.  Patient has demonstrated gradual improvement in speech intelligibility and voice quality throughout the course of therapy with the techniques and exercises learned, as well as continued medical intervention with Botox injections.  Patient is able to meet his speech intelligibility and voice quality needs in more structured tasks (e.g. Reading aloud words and sentences), although he continues to benefit from mod support from SLP to use techniques in longer narrative reading and conversation tasks.  Patient continues to benefit from skilled services targeting speech intelligibility and voice quality and is motivated to continue with therapy and a regular home program of practice.      Plan:  Continue therapy per current plan of care with adjusted goal target dates.  Recommend follow-ups every 6-8 weeks for the next reporting period.    Discharge:  No      Allison Alpers, B.A. (serg), M.A., CCC-SLP  Speech-Language Pathologist  Certificate of Vocology  [facility]  [phone]

## 2019-01-14 ENCOUNTER — TELEPHONE (OUTPATIENT)
Dept: NEUROLOGY | Facility: CLINIC | Age: 49
End: 2019-01-14

## 2019-01-14 DIAGNOSIS — G24.4 OROMANDIBULAR DYSTONIA: ICD-10-CM

## 2019-01-16 ENCOUNTER — MYC MEDICAL ADVICE (OUTPATIENT)
Dept: NEUROLOGY | Facility: CLINIC | Age: 49
End: 2019-01-16

## 2019-01-23 ENCOUNTER — OFFICE VISIT (OUTPATIENT)
Dept: NEUROLOGY | Facility: CLINIC | Age: 49
End: 2019-01-23
Payer: COMMERCIAL

## 2019-01-23 VITALS
DIASTOLIC BLOOD PRESSURE: 70 MMHG | SYSTOLIC BLOOD PRESSURE: 133 MMHG | OXYGEN SATURATION: 97 % | WEIGHT: 188 LBS | HEART RATE: 70 BPM | HEIGHT: 73 IN | BODY MASS INDEX: 24.92 KG/M2

## 2019-01-23 DIAGNOSIS — G24.9 DYSTONIA: Primary | ICD-10-CM

## 2019-01-23 ASSESSMENT — MIFFLIN-ST. JEOR: SCORE: 1778.7

## 2019-01-23 ASSESSMENT — PAIN SCALES - GENERAL: PAINLEVEL: NO PAIN (0)

## 2019-01-23 NOTE — LETTER
1/23/2019       RE: Ayad Moreno  [address]  [place] [facility] [address]     Dear Colleague,    Thank you for referring your patient, Ayad Moreno, to the [facility] NEUROLOGY at [facility]. Please see a copy of my visit note below.    Movement Disorders Botulinum Toxin Procedure Note    Chief Complaint: Dystonia    History of Present Illness:  Ayad Moreno is a 48 year old male who presents to clinic for botulinum toxin injections for treatment of dystonia including jaw dystonia facial dystonia and hyoid dystonia.    The patient reported an very good response to the last injection with Dr. Horne.    Comments: This patient has been receiving injections with Dr. Horne.  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  He also wants his injections for jaw opening and closing dystonia and right facial dystonia.    Complications: None    Botulinum toxin effect: Right orbicularis oris    Prior to the start of the procedure and with procedural staff participation, I verbally confirmed the patient s identity using two indicators, relevant allergies, that the procedure was appropriate and matched the consent or emergent situation, and that the correct equipment/implants were available. Immediately prior to starting the procedure I conducted the Time Out with the procedural staff and re-confirmed the patient s name, procedure, and site/side. (The Joint Commission universal protocol was followed.)  Yes    TOTAL DOSE ADMINISTERED:  Dose Administered:  78 units Botox    Diluent Used:  Preservative Free Normal Saline  Total Volume of Diluent Used:  2 ml  Lot #[id number]  with Expiration Date:  7/21       Medication guide was offered to patient and was declined.    CONSENT:  The risks, benefits, and treatment options were discussed with Ayad Moreno and she agreed to proceed.      Written consent was obtained  Kennteh    EQUIPMENT USED:  Needle-37mm stimulating/recording    SKIN PREPARATION:  Skin preparation was performed using an alcohol wipe.    GUIDANCE DESCRIPTION:  EMG guidance:Yes  Ultrasound guidance:No    AREA/MUSCLE INJECTED:          Muscles Injected Units Injected Number of Injections   Right masseter 20 2   Left masseter 20 2   Right orbicularis oris 10 4   Right lateral pterygoid 10 1   Left lateral pterygoid 10 1   Right sternohyoid 2 1   Left sternohyoid 2 1   Right omohyoid 2 1   Left omohyoid 2 1                  Total Units Injected: 78    Unavoidable Waste: 22    Total Units Billed 100      The patient tolerated the injections without difficulty.      Summary:    The patient was injected today with  78 units of Onabotulinumtoxin A (Botox) under EMG  guidance as treatment of d.  The patient tolerated the procedure well.  Plan  Follow-up in 3  months' time to consider repeat injections      Again, thank you for allowing me to participate in the care of your patient.      Sincerely,    Ignacio Zaidi MD

## 2019-04-15 ASSESSMENT — ENCOUNTER SYMPTOMS
NERVOUS/ANXIOUS: 1
NAIL CHANGES: 0
DECREASED CONCENTRATION: 1
POOR WOUND HEALING: 1
SKIN CHANGES: 1
INSOMNIA: 0
PANIC: 1
DEPRESSION: 1

## 2019-04-16 ENCOUNTER — OFFICE VISIT (OUTPATIENT)
Dept: NEUROLOGY | Facility: CLINIC | Age: 49
End: 2019-04-16
Payer: COMMERCIAL

## 2019-04-16 VITALS
BODY MASS INDEX: 25.18 KG/M2 | HEIGHT: 73 IN | TEMPERATURE: 98 F | SYSTOLIC BLOOD PRESSURE: 118 MMHG | RESPIRATION RATE: 15 BRPM | WEIGHT: 190 LBS | OXYGEN SATURATION: 98 % | DIASTOLIC BLOOD PRESSURE: 74 MMHG | HEART RATE: 88 BPM

## 2019-04-16 DIAGNOSIS — G24.4 OROMANDIBULAR DYSTONIA: Primary | ICD-10-CM

## 2019-04-16 ASSESSMENT — MIFFLIN-ST. JEOR: SCORE: 1790.71

## 2019-04-16 ASSESSMENT — PAIN SCALES - GENERAL: PAINLEVEL: MODERATE PAIN (4)

## 2019-04-16 ASSESSMENT — PATIENT HEALTH QUESTIONNAIRE - PHQ9: SUM OF ALL RESPONSES TO PHQ QUESTIONS 1-9: 8

## 2019-04-16 NOTE — NURSING NOTE
Chief Complaint   Patient presents with     Dystonia     [facility] RETURN MOVEMENT DISORDER F/U BOTOX- DYSTONIA     Botox       Esteban Proctor, EMT

## 2019-04-16 NOTE — PROGRESS NOTES
Movement Disorders Botulinum Toxin Procedure Note     Chief Complaint: Dystonia     History of Present Illness:  Ayad Moerno is a 48 year old male who presents to clinic for botulinum toxin injections for treatment of dystonia including jaw dystonia facial dystonia and hyoid dystonia.     The patient reported a very good response to the last injection of 1/23/2019     Comments: The patient felt his voice improved and was higher pitch after the hyoid injections.  He wanted to have injections into the left orbicularis oris as there was tremor there.  Also requested injections into the digastrics and nasalis muscles.     Complications: None     Botulinum toxin effect: Right orbicularis oris, right lateral ptyergoid     Prior to the start of the procedure and with procedural staff participation, I verbally confirmed the patient s identity using two indicators, relevant allergies, that the procedure was appropriate and matched the consent or emergent situation, and that the correct equipment/implants were available. Immediately prior to starting the procedure I conducted the Time Out with the procedural staff and re-confirmed the patient s name, procedure, and site/side. (The Joint Commission universal protocol was followed.)  Yes     TOTAL DOSE ADMINISTERED:  Dose Administered:  78 units Botox    Diluent Used:  Preservative Free Normal Saline  Total Volume of Diluent Used:  2 ml  Lot #[id number]  with Expiration Date:  4/21         Medication guide was offered to patient and was declined.     CONSENT:  The risks, benefits, and treatment options were discussed with Ayad Moreno and he agreed to proceed.      Written consent was obtained byYes     EQUIPMENT USED:  Needle-37mm stimulating/recording     SKIN PREPARATION:  Skin preparation was performed using an alcohol wipe.     GUIDANCE DESCRIPTION:  EMG guidance:Yes  Ultrasound guidance:No     AREA/MUSCLE INJECTED:             Muscles Injected Units Injected Number of  Injections   Right masseter 20 2   Left masseter 20 2   L Oribicularis Oris 5 2   Right orbicularis oris 10 4   Right lateral pterygoid 10 1   Left lateral pterygoid 10 1   Right sternohyoid 2 1   Left sternohyoid 2 1   Right omohyoid 2 1   Left omohyoid 2 1   R Anterior Digastrics  5  1   L Anterior Digastric  5 1    L Nasalis 5 1    R nasalis  5  1    Total Units Injected: 103     Unavoidable Waste: 97     Total Units Billed 200        The patient tolerated the injections without difficulty.        Summary:    The patient was injected today with  103 units of Onabotulinumtoxin A (Botox) under EMG  guidance as treatment of d.  The patient tolerated the procedure well.  Plan  Follow-up in 3  months' time to consider repeat injections

## 2019-04-16 NOTE — LETTER
4/16/2019       RE: Ayad Moreno  [address]  [facility] [address]     Dear Colleague,    Thank you for referring your patient, Ayad Moreno, to the [facility] NEUROLOGY at [facility]. Please see a copy of my visit note below.    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     The patient reported a very good response to the last injection of 1/23/2019     Comments:  The patient felt his voice improved and was higher pitch after the hyoid injections.  He wanted to have injections into the left orbicularis oris as there was tremor there.  Also requested injections into the digastrics and nasalis muscles.     Complications: None     Botulinum toxin effect: Right orbicularis oris, right lateral ptyergoid     Prior to the start of the procedure and with procedural staff participation, I verbally confirmed the patient s identity using two indicators, relevant allergies, that the procedure was appropriate and matched the consent or emergent situation, and that the correct equipment/implants were available. Immediately prior to starting the procedure I conducted the Time Out with the procedural staff and re-confirmed the patient s name, procedure, and site/side. (The Joint Commission universal protocol was followed.)  Yes     TOTAL DOSE ADMINISTERED:  Dose Administered:  78 units Botox    Diluent Used:  Preservative Free Normal Saline  Total Volume of Diluent Used:  2 ml  Lot #[id number]  with Expiration Date:   4/21         Medication guide was offered to patient and was declined.     CONSENT:  The risks, benefits, and treatment options were discussed with Ayad Moreno and he agreed to proceed.      Written consent was obtained  Kenneth     EQUIPMENT USED:  Needle-37mm stimulating/recording     SKIN PREPARATION:  Skin preparation was performed using an alcohol wipe.     GUIDANCE DESCRIPTION:  EMG guidance:Yes  Ultrasound guidance:No     AREA/MUSCLE INJECTED:    Muscles Injected Units Injected Number of Injections   Right masseter 20 2   Left masseter 20 2   L Oribicularis Oris 5 2   Right orbicularis oris 10 4   Right lateral pterygoid 10 1   Left lateral pterygoid 10 1   Right sternohyoid 2 1   Left sternohyoid 2 1   Right omohyoid 2 1   Left omohyoid 2 1   R Anterior Digastrics  5  1   L Anterior Digastric  5 1    L Nasalis 5 1    R nasalis  5  1    Total Units Injected: 103     Unavoidable Waste: 97     Total Units Billed 200        The patient tolerated the injections without difficulty.     Summary:    The patient was injected today with   103 units of Onabotulinumtoxin A (Botox) under EMG  guidance as treatment of d.  The patient tolerated the procedure well.  Plan  Follow-up in 3  months' time to consider repeat injections     Again, thank you for allowing me to participate in the care of your patient.      Ignacio Zaidi MD

## 2019-07-11 ASSESSMENT — ENCOUNTER SYMPTOMS
HEADACHES: 0
MEMORY LOSS: 0
DIZZINESS: 1
SEIZURES: 0
SORE THROAT: 0
TROUBLE SWALLOWING: 1
SINUS PAIN: 0
DEPRESSION: 1
SPEECH CHANGE: 1
NERVOUS/ANXIOUS: 1
DISTURBANCES IN COORDINATION: 1
LOSS OF CONSCIOUSNESS: 0
PARALYSIS: 0
INSOMNIA: 0
SMELL DISTURBANCE: 1
SINUS CONGESTION: 0
NECK MASS: 0
TREMORS: 1
TASTE DISTURBANCE: 0
TINGLING: 0
DECREASED CONCENTRATION: 1
PANIC: 1
WEAKNESS: 0
NUMBNESS: 0
HOARSE VOICE: 0

## 2019-07-16 ENCOUNTER — OFFICE VISIT (OUTPATIENT)
Dept: NEUROLOGY | Facility: CLINIC | Age: 49
End: 2019-07-16
Payer: COMMERCIAL

## 2019-07-16 VITALS
DIASTOLIC BLOOD PRESSURE: 74 MMHG | HEART RATE: 77 BPM | OXYGEN SATURATION: 97 % | BODY MASS INDEX: 24.25 KG/M2 | SYSTOLIC BLOOD PRESSURE: 131 MMHG | WEIGHT: 183.8 LBS

## 2019-07-16 DIAGNOSIS — G24.9 DYSTONIA: ICD-10-CM

## 2019-07-16 DIAGNOSIS — G20.A1 PARKINSON'S DISEASE (H): Primary | ICD-10-CM

## 2019-07-16 RX ORDER — MINOCYCLINE HYDROCHLORIDE 100 MG/1
100 CAPSULE ORAL 2 TIMES DAILY
Refills: 11 | COMMUNITY
Start: 2019-06-29 | End: 2019-09-17

## 2019-07-16 SDOH — HEALTH STABILITY: MENTAL HEALTH: HOW OFTEN DO YOU HAVE A DRINK CONTAINING ALCOHOL?: MONTHLY OR LESS

## 2019-07-16 ASSESSMENT — PAIN SCALES - GENERAL: PAINLEVEL: NO PAIN (0)

## 2019-07-16 NOTE — PROGRESS NOTES
Movement Disorders Botulinum Toxin Procedure Note     Chief Complaint: Dystonia     History of Present Illness:  yAad Moreno is a 48 year old male who presents to clinic for botulinum toxin injections for treatment of dystonia including jaw dystonia, facial dystonia and hyoid dystonia.     The patient reported a very good response to the last injection of 4/16/2019     Comments: The patient felt his his last injection was excellent.  He had very slight dysphagia which was not bothersome.  He would like another injection around the lateral nasalis as he feels tightness there.    Complications: None     Botulinum toxin effect: Right orbicularis oris, left lateral ptyergoid, left masseter     Prior to the start of the procedure and with procedural staff participation, I verbally confirmed the patient s identity using two indicators, relevant allergies, that the procedure was appropriate and matched the consent or emergent situation, and that the correct equipment/implants were available. Immediately prior to starting the procedure I conducted the Time Out with the procedural staff and re-confirmed the patient s name, procedure, and site/side. (The Joint Commission universal protocol was followed.)  Yes     TOTAL DOSE ADMINISTERED:  Dose Administered:  102 units Botox    Diluent Used:  Preservative Free Normal Saline  Total Volume of Diluent Used:  2 ml  Lot #[id number] with Expiration Date:  1/22         Medication guide was offered to patient and was declined.     CONSENT:  The risks, benefits, and treatment options were discussed with Ayad Moreno and he agreed to proceed.      Written consent was obtained byYes     EQUIPMENT USED:  Needle-37mm stimulating/recording     SKIN PREPARATION:  Skin preparation was performed using an alcohol wipe.     GUIDANCE DESCRIPTION:  EMG guidance:Yes  Ultrasound guidance:No     AREA/MUSCLE INJECTED:             Muscles Injected Units Injected Number of Injections   Right masseter 20  2   Left masseter 20 2     2   Right orbicularis oris 10 4   Right lateral pterygoid 10 1   Left lateral pterygoid 10 1   Right sternohyoid 2 1   Left sternohyoid 2 1   Right omohyoid 2 1   Left omohyoid 2 1   R Anterior Digastrics  5  1   L Anterior Digastric  5 1    L Nasalis 5 1   RNasalis 5    L Nasalis lateral 2    R nasalis lateral 2 1    Total Units Injected: 102     Unavoidable Waste: 97     Total Units Billed 200        The patient tolerated the injections without difficulty.        Summary:    The patient was injected today with  102 units of Onabotulinumtoxin A (Botox) under EMG  guidance as treatment of d.  The patient tolerated the procedure well.  Plan  Follow-up in 3  months' time to consider repeat injections       Answers for HPI/ROS submitted by the patient on 7/11/2019   General Symptoms: No  Skin Symptoms: No  HENT Symptoms: Yes  EYE SYMPTOMS: No  HEART SYMPTOMS: No  LUNG SYMPTOMS: No  INTESTINAL SYMPTOMS: No  URINARY SYMPTOMS: No  REPRODUCTIVE SYMPTOMS: No  SKELETAL SYMPTOMS: No  BLOOD SYMPTOMS: No  NERVOUS SYSTEM SYMPTOMS: Yes  MENTAL HEALTH SYMPTOMS: Yes  Ear pain: No  Ear discharge: No  Hearing loss: Yes  Tinnitus: No  Nosebleeds: No  Congestion: No  Sinus pain: No  Trouble swallowing: Yes   Voice hoarseness: No  Mouth sores: No  Sore throat: No  Tooth pain: No  Gum tenderness: No  Bleeding gums: No  Change in taste: No  Change in sense of smell: Yes  Dry mouth: No  Hearing aid used: No  Neck lump: No  Trouble with coordination: Yes  Dizziness or trouble with balance: Yes  Fainting or black-out spells: No  Memory loss: No  Headache: No  Seizures: No  Speech problems: Yes  Tingling: No  Tremor: Yes  Weakness: No  Difficulty walking: No  Paralysis: No  Numbness: No  Nervous or Anxious: Yes  Depression: Yes  Trouble sleeping: No  Trouble thinking or concentrating: Yes  Mood changes: Yes  Panic attacks: Yes

## 2019-07-16 NOTE — LETTER
7/16/2019    RE: Ayad Moreno  [address]  [facility] [address]     Dear Colleague,    Thank you for referring your patient, Ayad Moreno, to the [facility] NEUROLOGY at [facility]. Please see a copy of my visit note below.    Movement Disorders Botulinum Toxin Procedure Note     Chief Complaint: Dystonia     History of Present Illness:  Ayad Moreno is a 48 year old male who presents to clinic for botulinum toxin injections for treatment of dystonia including jaw dystonia, facial dystonia and hyoid dystonia.     The patient reported a very good response to the last injection of  4/16/2019     Comments: The patient felt his his last injection was excellent.  He had very slight dysphagia which was not bothersome.  He would like another injection around the lateral nasalis as he feels tightness there.    Complications: None     Botulinum toxin effect: Right orbicularis oris, left lateral ptyergoid, left masseter     Prior to the start of the procedure and with procedural staff participation, I verbally confirmed the patient s identity using two indicators, relevant allergies, that the procedure was appropriate and matched the consent or emergent situation, and that the correct equipment/implants were available. Immediately prior to starting the procedure I conducted the Time Out with the procedural staff and re-confirmed the patient s name, procedure, and site/side. (The Joint Commission universal protocol was followed.)  Yes     TOTAL DOSE ADMINISTERED:  Dose Administered:  102 units Botox    Diluent Used:  Preservative Free Normal Saline  Total Volume of Diluent Used:  2 ml  Lot #[id number] with Expiration Date:  1/22         Medication guide was offered to patient and was declined.     CONSENT:  The risks, benefits, and treatment options were discussed with Ayda Moreno and he agreed to proceed.      Written consent was obtained byYes     EQUIPMENT USED:  Needle-37mm  stimulating/recording     SKIN PREPARATION:  Skin preparation was performed using an alcohol wipe.     GUIDANCE DESCRIPTION:  EMG guidance:Yes  Ultrasound guidance:No     AREA/MUSCLE INJECTED:    Muscles Injected Units Injected Number of Injections   Right masseter 20 2   Left masseter 20 2     2   Right orbicularis oris 10 4   Right lateral pterygoid 10 1   Left lateral pterygoid 10 1   Right sternohyoid 2 1   Left sternohyoid 2 1   Right omohyoid 2 1   Left omohyoid 2 1   R Anterior Digastrics  5  1   L Anterior Digastric  5 1    L Nasalis 5 1   RNasalis 5    L Nasalis lateral 2    R nasalis lateral 2 1    Total Units Injected: 102     Unavoidable Waste: 97     Total Units Billed 200        The patient tolerated the injections without difficulty.     Summary:    The patient was injected today with  102 units of Onabotulinumtoxin A (Botox) under EMG  guidance as treatment of d.  The patient tolerated the procedure well.  Plan  Follow-up in 3  months' time to consider repeat injections     Again, thank you for allowing me to participate in the care of your patient.      Sincerely,    Ignacio Zaidi MD

## 2019-07-16 NOTE — NURSING NOTE
Chief Complaint   Patient presents with     Botox     [facility] RETURN - BOTOX       Glynn Rodriguez, EMT

## 2019-08-26 ENCOUNTER — MYC MEDICAL ADVICE (OUTPATIENT)
Dept: NEUROLOGY | Facility: CLINIC | Age: 49
End: 2019-08-26

## 2019-09-14 ENCOUNTER — MYC MEDICAL ADVICE (OUTPATIENT)
Dept: NEUROLOGY | Facility: CLINIC | Age: 49
End: 2019-09-14

## 2019-09-14 ENCOUNTER — MYC MEDICAL ADVICE (OUTPATIENT)
Dept: NEUROSURGERY | Facility: CLINIC | Age: 49
End: 2019-09-14

## 2019-09-17 ENCOUNTER — OFFICE VISIT (OUTPATIENT)
Dept: NEUROSURGERY | Facility: CLINIC | Age: 49
End: 2019-09-17
Payer: COMMERCIAL

## 2019-09-17 ENCOUNTER — OFFICE VISIT (OUTPATIENT)
Dept: FAMILY MEDICINE | Facility: CLINIC | Age: 49
End: 2019-09-17
Payer: COMMERCIAL

## 2019-09-17 VITALS
BODY MASS INDEX: 25.07 KG/M2 | HEART RATE: 55 BPM | WEIGHT: 188.1 LBS | DIASTOLIC BLOOD PRESSURE: 81 MMHG | SYSTOLIC BLOOD PRESSURE: 125 MMHG | OXYGEN SATURATION: 98 %

## 2019-09-17 VITALS
SYSTOLIC BLOOD PRESSURE: 119 MMHG | BODY MASS INDEX: 24.44 KG/M2 | WEIGHT: 184.4 LBS | HEART RATE: 67 BPM | DIASTOLIC BLOOD PRESSURE: 76 MMHG | OXYGEN SATURATION: 95 % | HEIGHT: 73 IN

## 2019-09-17 DIAGNOSIS — Z01.818 PREOPERATIVE EVALUATION TO RULE OUT SURGICAL CONTRAINDICATION: Primary | ICD-10-CM

## 2019-09-17 DIAGNOSIS — G24.9 GENERALIZED DYSTONIA: ICD-10-CM

## 2019-09-17 DIAGNOSIS — G24.4 OROFACIAL DYSKINESIA: ICD-10-CM

## 2019-09-17 DIAGNOSIS — Z01.818 PREOPERATIVE EXAMINATION: Primary | ICD-10-CM

## 2019-09-17 DIAGNOSIS — Z96.89 S/P DEEP BRAIN STIMULATOR PLACEMENT: ICD-10-CM

## 2019-09-17 DIAGNOSIS — Z23 NEED FOR IMMUNIZATION AGAINST INFLUENZA: ICD-10-CM

## 2019-09-17 DIAGNOSIS — G24.9 DYSTONIA: ICD-10-CM

## 2019-09-17 DIAGNOSIS — J45.20 MILD INTERMITTENT ASTHMA WITHOUT COMPLICATION: ICD-10-CM

## 2019-09-17 LAB
ALBUMIN UR-MCNC: NEGATIVE MG/DL
ANION GAP SERPL CALCULATED.3IONS-SCNC: 3 MMOL/L (ref 3–14)
APPEARANCE UR: CLEAR
APTT PPP: 31 SEC (ref 22–37)
BILIRUB UR QL STRIP: NEGATIVE
BUN SERPL-MCNC: 14 MG/DL (ref 7–30)
CALCIUM SERPL-MCNC: 9.1 MG/DL (ref 8.5–10.1)
CHLORIDE SERPL-SCNC: 106 MMOL/L (ref 94–109)
CO2 SERPL-SCNC: 30 MMOL/L (ref 20–32)
COLOR UR AUTO: YELLOW
CREAT SERPL-MCNC: 0.91 MG/DL (ref 0.66–1.25)
ERYTHROCYTE [DISTWIDTH] IN BLOOD BY AUTOMATED COUNT: 12.9 % (ref 10–15)
GFR SERPL CREATININE-BSD FRML MDRD: >90 ML/MIN/{1.73_M2}
GLUCOSE SERPL-MCNC: 94 MG/DL (ref 70–99)
GLUCOSE UR STRIP-MCNC: NEGATIVE MG/DL
HCT VFR BLD AUTO: 49.6 % (ref 40–53)
HGB BLD-MCNC: 16.9 G/DL (ref 13.3–17.7)
HGB UR QL STRIP: NEGATIVE
INR PPP: 1.01 (ref 0.86–1.14)
KETONES UR STRIP-MCNC: NEGATIVE MG/DL
LEUKOCYTE ESTERASE UR QL STRIP: NEGATIVE
MCH RBC QN AUTO: 30 PG (ref 26.5–33)
MCHC RBC AUTO-ENTMCNC: 34.1 G/DL (ref 31.5–36.5)
MCV RBC AUTO: 88 FL (ref 78–100)
NITRATE UR QL: NEGATIVE
PH UR STRIP: 7 PH (ref 5–7)
PLATELET # BLD AUTO: 191 10E9/L (ref 150–450)
POTASSIUM SERPL-SCNC: 4.6 MMOL/L (ref 3.4–5.3)
RBC # BLD AUTO: 5.63 10E12/L (ref 4.4–5.9)
RBC #/AREA URNS AUTO: 1 /HPF (ref 0–2)
SODIUM SERPL-SCNC: 140 MMOL/L (ref 133–144)
SOURCE: NORMAL
SP GR UR STRIP: 1.02 (ref 1–1.03)
SQUAMOUS #/AREA URNS AUTO: <1 /HPF (ref 0–1)
UROBILINOGEN UR STRIP-MCNC: 2 MG/DL (ref 0–2)
WBC # BLD AUTO: 5.9 10E9/L (ref 4–11)
WBC #/AREA URNS AUTO: <1 /HPF (ref 0–5)

## 2019-09-17 RX ORDER — MIRTAZAPINE 45 MG/1
45 TABLET, FILM COATED ORAL AT BEDTIME
Refills: 1 | COMMUNITY
Start: 2019-08-12 | End: 2020-01-09

## 2019-09-17 ASSESSMENT — PAIN SCALES - GENERAL
PAINLEVEL: NO PAIN (0)
PAINLEVEL: NO PAIN (0)

## 2019-09-17 ASSESSMENT — MIFFLIN-ST. JEOR: SCORE: 1759.35

## 2019-09-17 NOTE — LETTER
9/17/2019       RE: Ayad Moreno  [address]  [facility] [address]     Dear Colleague,    Thank you for referring your patient, Ayad Moreno, to the [facility] NEUROSURGERY at [facility]. Please see a copy of my visit note below.    HISTORY AND PHYSICAL EXAM    Chief Complaint   Patient presents with     Consult     [facility] NEW - BATTERY REPLACEMENT       HISTORY OF PRESENT ILLNESS  Mr. 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 Carolina. He now presents for IPG replacement on the left side. He recently checked his battery and it notified him that it was ADRIANNA. His most recent settings are:    L GPi: C+/1-, 2-; 4.0 V/60 us/180 Hz    R GPi: C+/9-, 10-; 3.9 V/60 us/180 Hz    Battery voltage is at 2.55 V    He prefers to switch to the rechargeable battery given the PC's short duration. He does understand that this means he will have to charge it about once/week for an hour or two. Of note, he does not take any antiplatelets or anticoagulation. He does take a multivitamin.      Past Medical History:   Diagnosis Date     Anxiety      Asthmas with exposure to cats      Depression      Generalized dystonia     DYT-1 genetic mutation     Lumbar disc herniation      OCD (obsessive compulsive disorder)      Rt ACL Tear        Past Surgical History:   Procedure Laterality Date     C REPAIR CRUCIATE LIGAMENT,KNEE  2008     IMPLANT DEEP BRAIN STIMULATION GENERATOR / BATTERY Left 1/20/2017    Procedure: IMPLANT DEEP BRAIN STIMULATION GENERATOR / BATTERY;  Surgeon: Duy Carolina MD;  Location: [facility] OR     OPTICAL TRACKING SYSTEM INSERTION DEEP BRAIN STIMULATION Left 1/10/2017    Procedure: OPTICAL TRACKING SYSTEM INSERTION DEEP BRAIN STIMULATION;  Surgeon: Duy Carolina MD;  Location: [facility] OR     OPTICAL TRACKING SYSTEM INSERTION DEEP BRAIN STIMULATION Right 11/16/2017    Procedure: OPTICAL TRACKING SYSTEM  INSERTION DEEP BRAIN STIMULATION;  Right Stealth Assisted Deep Brain Stimulator Placement, Phase I And II Combined, Placement Of Right Side Deep Brain Stimulator Electrode, Target Right Globus Pallidus Internus With Microelectrode Recording And Connection To The Existing Generator/Battery;  Surgeon: Duy Carolina MD;  Location: [facility] OR       Family History   Problem Relation Age of Onset     Diabetes Father      Cancer Paternal Grandfather         Stomach CA     Heart Disease Paternal Grandfather      Cancer Maternal Grandmother         Bladder     Depression Maternal Grandmother      Myocardial Infarction Maternal Grandfather         MI     Obsessive Compulsive Disorder Son      Genetic Disorder Son         Dystonia 2ndary to DYT 1 micah mutation     Hypertension Mother        Social History     Socioeconomic History     Marital status:      Spouse name: Lisseth     Number of children: 2     Years of education: Not on file     Highest education level: Not on file   Occupational History     Occupation: Teacher     Employer: [employer]     Comment: Teaches 6 grade. Math. Taught for 14 years.   Social Needs     Financial resource strain: Not on file     Food insecurity:     Worry: Not on file     Inability: Not on file     Transportation needs:     Medical: Not on file     Non-medical: Not on file   Tobacco Use     Smoking status: Former Smoker     Types: Cigarettes     Smokeless tobacco: Never Used     Tobacco comment: social   Substance and Sexual Activity     Alcohol use: Yes     Alcohol/week: 0.0 oz     Frequency: Monthly or less     Comment: None     Drug use: Not Currently     Types: Marijuana     Comment: vaping     Sexual activity: Yes     Partners: Female     Birth control/protection: Condom   Lifestyle     Physical activity:     Days per week: Not on file     Minutes per session: Not on file     Stress: Not on file   Relationships     Social connections:     Talks on phone: Not on file     Gets  together: Not on file     Attends Christianity service: Not on file     Active member of club or organization: Not on file     Attends meetings of clubs or organizations: Not on file     Relationship status: Not on file     Intimate partner violence:     Fear of current or ex partner: Not on file     Emotionally abused: Not on file     Physically abused: Not on file     Forced sexual activity: Not on file   Other Topics Concern     Parent/sibling w/ CABG, MI or angioplasty before 65F 55M? No   Social History Narrative     Not on file          Allergies   Allergen Reactions     Seasonal Allergies        Current Outpatient Medications   Medication     botulinum toxin type A (BOTOX) 100 units injection     DIAZEPAM 2 MG OR TABS     mirtazapine (REMERON) 45 MG tablet     NEURONTIN 800 MG OR TABS     nicotine polacrilex (NICORETTE) 2 MG lozenge     traMADol (ULTRAM) 50 MG tablet     UNABLE TO FIND     vitamin B complex with vitamin C (VITAMIN  B COMPLEX) TABS tablet     Current Facility-Administered Medications   Medication     botulinum toxin type A (BOTOX) 100 units injection 200 Units       REVIEW OF SYSTEMS  Constitutional: Negative for activity change, appetite change, chills, fatigue, fever and unexpected weight change.   HENT: Negative for trouble swallowing.    Eyes: Negative for visual disturbance.   Respiratory: Negative for shortness of breath.    Cardiovascular: Negative for leg swelling.  Gastrointestinal: Negative for abdominal pain, nausea and vomiting.   Endocrine: Negative for cold intolerance.  Genitourinary: Negative for incontinence, frequency and urgency.   Musculoskeletal: Negative for back pain. Negative for gait problem, neck pain and neck stiffness.  Skin: Negative for color change  Allergic/Immunologic: Negative for immunocompromised state.  Neurological: Negative for tremors, speech difficulty, weakness, numbness and headaches.   Hematological: Does not bruise/bleed easily.  "  Psychiatric/Behavioral: The patient is not nervous/anxious.     PHYSICAL EXAM  /76 (BP Location: Left arm, Patient Position: Sitting, Cuff Size: Adult Regular)   Pulse 67   Ht 1.845 m (6' 0.63\")   Wt 83.6 kg (184 lb 6.4 oz)   SpO2 95%   BMI 24.58 kg/m         General: Awake, alert, oriented. Well nourished, well developed, he is not in any acute distress.  HEENT: Head normocephalic, atraumatic. No carotid bruit. Neck supple. Good range of motion. No palpable thyroid mass.  Heart: Regular rhythm and rate. No murmurs.  Lungs: Clear to auscultation and percussion bilaterally. No ronchi, rales or wheeze.  Abdomen: Soft, non-tender, non-distended. No hepatosplenomegaly.  Extremity: Warm with no clubbing or cyanosis. No lower extremity edema.  Incisions: clean, dry, intact    Neurological:  Awake, alert and oriented to date, time, place and person. Speech fluent.   Pupils equal, round, reactive to light.  Extraocular movement intact.  Visual fields are full on confrontation.  Hearing is grossly normal to finger rub.   Facial sensation intact.  Face symmetric.  Tongue midline.  Uvula elevates equally.    Motor: full strength throughout.  Sensation: intact to light touch and pinpoint.      ASSESSMENT   49 year old male with a history of DYT-1 dystonia. s/p bilateral GPi DBS .  Depleted DBS battery/generator over the left chest wall.    During today's visit, we discussed the surgical procedure for replacing the DBS generator/battery over the left chest wall. . He currently has an Activa PC generator/battery, and this will be changed to an Activa RC generatory/battery during the upcoming procedure. Risks, benefits, alternative therapies were discussed with the patient, including but not limited to infection and bleeding. This surgical procedure will be performed under MAC with local anesthetic. The thinned part of the wound/pocket may be corrected with mobilization of the tissue under the incision. The pocket may " need to be enlarged to minimize the stress on the closure.  Surgical procedure was discussed in detail. All questions were answered, and he expressed understanding      PLAN:  1. He will undergo replacement of the DBS generator/battery over the left chest wall under MAC with local anesthetic.     Sebastian Ramos MD

## 2019-09-17 NOTE — PROGRESS NOTES
HISTORY AND PHYSICAL EXAM    Chief Complaint   Patient presents with     Consult     [facility] NEW - BATTERY REPLACEMENT       HISTORY OF PRESENT ILLNESS  Mr. 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 Carolina. He now presents for IPG replacement on the left side. He recently checked his battery and it notified him that it was ADRIANNA. His most recent settings are:    L GPi: C+/1-, 2-; 4.0 V/60 us/180 Hz    R GPi: C+/9-, 10-; 3.9 V/60 us/180 Hz    Battery voltage is at 2.55 V    He prefers to switch to the rechargeable battery given the PC's short duration. He does understand that this means he will have to charge it about once/week for an hour or two. Of note, he does not take any antiplatelets or anticoagulation. He does take a multivitamin.      Past Medical History:   Diagnosis Date     Anxiety      Asthmas with exposure to cats      Depression      Generalized dystonia     DYT-1 genetic mutation     Lumbar disc herniation      OCD (obsessive compulsive disorder)      Rt ACL Tear        Past Surgical History:   Procedure Laterality Date     C REPAIR CRUCIATE LIGAMENT,KNEE  2008     IMPLANT DEEP BRAIN STIMULATION GENERATOR / BATTERY Left 1/20/2017    Procedure: IMPLANT DEEP BRAIN STIMULATION GENERATOR / BATTERY;  Surgeon: Duy Carolina MD;  Location: [facility] OR     OPTICAL TRACKING SYSTEM INSERTION DEEP BRAIN STIMULATION Left 1/10/2017    Procedure: OPTICAL TRACKING SYSTEM INSERTION DEEP BRAIN STIMULATION;  Surgeon: Duy Carolina MD;  Location: [facility] OR     OPTICAL TRACKING SYSTEM INSERTION DEEP BRAIN STIMULATION Right 11/16/2017    Procedure: OPTICAL TRACKING SYSTEM INSERTION DEEP BRAIN STIMULATION;  Right Stealth Assisted Deep Brain Stimulator Placement, Phase I And II Combined, Placement Of Right Side Deep Brain Stimulator Electrode, Target Right Globus Pallidus Internus With Microelectrode Recording And Connection To The Existing  Generator/Battery;  Surgeon: Duy Carolina MD;  Location:  OR       Family History   Problem Relation Age of Onset     Diabetes Father      Cancer Paternal Grandfather         Stomach CA     Heart Disease Paternal Grandfather      Cancer Maternal Grandmother         Bladder     Depression Maternal Grandmother      Myocardial Infarction Maternal Grandfather         MI     Obsessive Compulsive Disorder Son      Genetic Disorder Son         Dystonia 2ndary to DYT 1 micah mutation     Hypertension Mother        Social History     Socioeconomic History     Marital status:      Spouse name: Lisseth     Number of children: 2     Years of education: Not on file     Highest education level: Not on file   Occupational History     Occupation: Teacher     Employer: [employer]     Comment: Teaches 6 grade. Math. Taught for 14 years.   Social Needs     Financial resource strain: Not on file     Food insecurity:     Worry: Not on file     Inability: Not on file     Transportation needs:     Medical: Not on file     Non-medical: Not on file   Tobacco Use     Smoking status: Former Smoker     Types: Cigarettes     Smokeless tobacco: Never Used     Tobacco comment: social   Substance and Sexual Activity     Alcohol use: Yes     Alcohol/week: 0.0 oz     Frequency: Monthly or less     Comment: None     Drug use: Not Currently     Types: Marijuana     Comment: vaping     Sexual activity: Yes     Partners: Female     Birth control/protection: Condom   Lifestyle     Physical activity:     Days per week: Not on file     Minutes per session: Not on file     Stress: Not on file   Relationships     Social connections:     Talks on phone: Not on file     Gets together: Not on file     Attends Denominational service: Not on file     Active member of club or organization: Not on file     Attends meetings of clubs or organizations: Not on file     Relationship status: Not on file     Intimate partner violence:     Fear of current or  "ex partner: Not on file     Emotionally abused: Not on file     Physically abused: Not on file     Forced sexual activity: Not on file   Other Topics Concern     Parent/sibling w/ CABG, MI or angioplasty before 65F 55M? No   Social History Narrative     Not on file          Allergies   Allergen Reactions     Seasonal Allergies        Current Outpatient Medications   Medication     botulinum toxin type A (BOTOX) 100 units injection     DIAZEPAM 2 MG OR TABS     mirtazapine (REMERON) 45 MG tablet     NEURONTIN 800 MG OR TABS     nicotine polacrilex (NICORETTE) 2 MG lozenge     traMADol (ULTRAM) 50 MG tablet     UNABLE TO FIND     vitamin B complex with vitamin C (VITAMIN  B COMPLEX) TABS tablet     Current Facility-Administered Medications   Medication     botulinum toxin type A (BOTOX) 100 units injection 200 Units       REVIEW OF SYSTEMS  Constitutional: Negative for activity change, appetite change, chills, fatigue, fever and unexpected weight change.   HENT: Negative for trouble swallowing.    Eyes: Negative for visual disturbance.   Respiratory: Negative for shortness of breath.    Cardiovascular: Negative for leg swelling.  Gastrointestinal: Negative for abdominal pain, nausea and vomiting.   Endocrine: Negative for cold intolerance.  Genitourinary: Negative for incontinence, frequency and urgency.   Musculoskeletal: Negative for back pain. Negative for gait problem, neck pain and neck stiffness.  Skin: Negative for color change  Allergic/Immunologic: Negative for immunocompromised state.  Neurological: Negative for tremors, speech difficulty, weakness, numbness and headaches.   Hematological: Does not bruise/bleed easily.   Psychiatric/Behavioral: The patient is not nervous/anxious.     PHYSICAL EXAM  /76 (BP Location: Left arm, Patient Position: Sitting, Cuff Size: Adult Regular)   Pulse 67   Ht 1.845 m (6' 0.63\")   Wt 83.6 kg (184 lb 6.4 oz)   SpO2 95%   BMI 24.58 kg/m        General: Awake, alert, " oriented. Well nourished, well developed, he is not in any acute distress.  HEENT: Head normocephalic, atraumatic. No carotid bruit. Neck supple. Good range of motion. No palpable thyroid mass.  Heart: Regular rhythm and rate. No murmurs.  Lungs: Clear to auscultation and percussion bilaterally. No ronchi, rales or wheeze.  Abdomen: Soft, non-tender, non-distended. No hepatosplenomegaly.  Extremity: Warm with no clubbing or cyanosis. No lower extremity edema.  Incisions: clean, dry, intact    Neurological:  Awake, alert and oriented to date, time, place and person. Speech fluent.   Pupils equal, round, reactive to light.  Extraocular movement intact.  Visual fields are full on confrontation.  Hearing is grossly normal to finger rub.   Facial sensation intact.  Face symmetric.  Tongue midline.  Uvula elevates equally.    Motor: full strength throughout.  Sensation: intact to light touch and pinpoint.      ASSESSMENT   49 year old male with a history of DYT-1 dystonia. s/p bilateral GPi DBS .  Depleted DBS battery/generator over the left chest wall.    During today's visit, we discussed the surgical procedure for replacing the DBS generator/battery over the left chest wall. . He currently has an Activa PC generator/battery, and this will be changed to an Activa RC generatory/battery during the upcoming procedure. Risks, benefits, alternative therapies were discussed with the patient, including but not limited to infection and bleeding. This surgical procedure will be performed under MAC with local anesthetic. The thinned part of the wound/pocket may be corrected with mobilization of the tissue under the incision. The pocket may need to be enlarged to minimize the stress on the closure.  Surgical procedure was discussed in detail. All questions were answered, and he expressed understanding      PLAN:  1. He will undergo replacement of the DBS generator/battery over the left chest wall under MAC with local anesthetic.        Sebastian Ramos MD

## 2019-09-17 NOTE — NURSING NOTE
Pre-op Teaching            Pre-op folder with specific written instructions    DBS Battery replacement   9/23/2019 at 2:10pm, arrive on unit 3C at 12:10 pm  Dr. Ramos    Discussed pre-op routine and requirements to include:  surgical procedure, post-op recovery and expectations, need for H&P (today at 10:30 in [facility]), labs, NPO prior to OR, pre-op antibacterial showers, pain control and importance of follow-up visits.  Surgery scheduling will coordinate OR time/date and update patient as appropriate.  3C will call with more instructions 24-48 hour pre-op.   Ample time was provided for patient questions and in-depth discussion of topics of heightened interest.  A four ounce bottle of antibacterial soap solution was given to patient as well as specific instructions for use.  Patient verbalized understanding of instructions.  Approximately 20 minutes spent with patient discussing and reviewing.

## 2019-09-17 NOTE — NURSING NOTE
Chief Complaint   Patient presents with     Consult     [facility] NEW - BATTERY REPLACEMENT       Glynn Rodriguez, EMT

## 2019-09-17 NOTE — PATIENT INSTRUCTIONS

## 2019-09-17 NOTE — PROGRESS NOTES
[facility]  [address]  5th Floor  [place] [id number] [phone]  Dept: [phone]    PRE-OP EVALUATION:  09/17/19    Ayad Moreno is 49 year old male presents for pre-operative evaluation assessment as requested by Dr. Akers.  He requires evaluation and anesthesia risk assessment prior to undergoing surgery/procedure for treatment of deep brain stimulation battery replacement .    History of Present Illness   HPI related to upcoming procedure: Mr. Moreno received DBS surgery in 2017 for his dystonia genetic disorder and he was notified that the device's battery is low. His surgeon recommends a surgery to replace the battery.  This will be completed on Monday 9/23/2019.     See problem list for active medical problems.  Problems all longstanding and stable, except as noted/documented.  See ROS for pertinent symptoms related to these conditions.    Asthma is under good control.   Usual his asthma exacerbations are due to cat dander exposure.  Otherwise, rare use of albuterol.  Current breathing status is stable.       Surgery Information     Primary Care Provider: Yves Pope  Proposed Surgery/Procedure: Deep brain stimulation battery replacement  Date of Surgery/Procedure: 09-23-19  Time of Surgery/Procedure: 2:10pm  Hospital/Surgical Facility: [facility]  Type of Anesthesia Anticipated: combined MAC/local     Preoperative Screening Questions     Preoperative Screening Questions:  Fax number for surgical facility:   Primary Physician: Yves Pope  Type of Anesthesia Anticipated: Local with MAC    Patient has a Health Care Directive or Living Will:  YES - is on file.     Preop Questions 9/17/2019   Who is doing your surgery? dr Akers   What are you having done? dbs battery replacement   Date of Surgery/Procedure: 09-17-19   Facility or Hospital where procedure/surgery will be performed: [facility]   1.  Do you have a history of  Heart attack, stroke, stent, coronary bypass surgery, or other heart surgery? No   2.  Do you ever have any pain or discomfort in your chest? No   3.  Do you have a history of  Heart Failure? No   4.   Are you troubled by shortness of breath when:  walking on a level surface, or up a slight hill, or at night? No   5.  Do you currently have a cold, bronchitis or other respiratory infection? No   6.  Do you have a cough, shortness of breath, or wheezing? No   7.  Do you sometimes get pains in the calves of your legs when you walk? No   8. Do you or anyone in your family have previous history of blood clots? No   9.  Do you or does anyone in your family have a serious bleeding problem such as prolonged bleeding following surgeries or cuts? No   10. Have you ever had problems with anemia or been told to take iron pills? No   11. Have you had any abnormal blood loss such as black, tarry or bloody stools? No   12. Have you ever had a blood transfusion? No   13. Have you or any of your relatives ever had problems with anesthesia? No   14. Do you have sleep apnea, excessive snoring or daytime drowsiness? No   15. Do you have any prosthetic heart valves? No   16. Do you have prosthetic joints? No        Audit C Alcohol Screening Tool  AUDIT   Questions 0 1 2 3 4 Score   1. How often do you have a drink  containing alcohol? Never Monthly or less 2 to 4  times a  month 2 to 3  times a  week 4 or more  times a  week 0   2. How many drinks containing alcohol  do you have on a typical day when you are drinking? 1 or 2 3 or 4 5 or 6 7 to 9 10 or more 0   3. How often do you have more than five  or more drinks on one occasion? Never Less  than  monthly Monthly Weekly Daily or  almost  daily 0   Scoring:   A score of 4 for adult men or 3 for adult women is an indication of hazardous drinking (risk for physical or physiological harm).   A score of 5 or more for adult men or 4 or more for adult women is an indication of an alcohol use  disorder.      Medical History     Past Medical History:   Diagnosis Date     Anxiety      Asthmas with exposure to cats      Depression      Generalized dystonia     DYT-1 genetic mutation     Lumbar disc herniation      OCD (obsessive compulsive disorder)      Rt ACL Tear      Past Surgical History:   Procedure Laterality Date     C REPAIR CRUCIATE LIGAMENT,KNEE  2008     IMPLANT DEEP BRAIN STIMULATION GENERATOR / BATTERY Left 1/20/2017    Procedure: IMPLANT DEEP BRAIN STIMULATION GENERATOR / BATTERY;  Surgeon: Duy Carolina MD;  Location: [facility] OR     OPTICAL TRACKING SYSTEM INSERTION DEEP BRAIN STIMULATION Left 1/10/2017    Procedure: OPTICAL TRACKING SYSTEM INSERTION DEEP BRAIN STIMULATION;  Surgeon: Duy Carolina MD;  Location: [facility] OR     OPTICAL TRACKING SYSTEM INSERTION DEEP BRAIN STIMULATION Right 11/16/2017    Procedure: OPTICAL TRACKING SYSTEM INSERTION DEEP BRAIN STIMULATION;  Right Stealth Assisted Deep Brain Stimulator Placement, Phase I And II Combined, Placement Of Right Side Deep Brain Stimulator Electrode, Target Right Globus Pallidus Internus With Microelectrode Recording And Connection To The Existing Generator/Battery;  Surgeon: Duy Carolina MD;  Location: [facility] OR        Problem (# of Occurrences) Relation (Name,Age of Onset)    Cancer (2) Paternal Grandfather (jd): Stomach CA, Maternal Grandmother (radha): Bladder    Depression (1) Maternal Grandmother (radha)    Diabetes (1) Father (Fredy)    Genetic Disorder (1) Son: Dystonia 2ndary to DYT 1 micah mutation    Heart Disease (1) Paternal Grandfather (jd)    Hypertension (1) Mother    Myocardial Infarction (1) Maternal Grandfather: MI    Obsessive Compulsive Disorder (1) Son        Social History     Tobacco Use     Smoking status: Former Smoker     Types: Cigarettes     Smokeless tobacco: Never Used     Tobacco comment: social   Substance Use Topics     Alcohol use: Yes     Alcohol/week: 0.0 oz     Frequency:  Monthly or less     Comment: None     History   Drug Use Unknown     Comment: vaping     Current Outpatient Medications   Medication     botulinum toxin type A (BOTOX) 100 units injection     DIAZEPAM 2 MG OR TABS     mirtazapine (REMERON) 45 MG tablet     NEURONTIN 800 MG OR TABS     nicotine polacrilex (NICORETTE) 2 MG lozenge     traMADol (ULTRAM) 50 MG tablet     UNABLE TO FIND     vitamin B complex with vitamin C (VITAMIN  B COMPLEX) TABS tablet     Current Facility-Administered Medications   Medication     botulinum toxin type A (BOTOX) 100 units injection 200 Units     OTC products: none.    Allergies   Allergen Reactions     Seasonal Allergies        Latex Allergy: NO      Review Of Systems   CONSTITUTIONAL: NEGATIVE for fever, chills, change in weight  INTEGUMENTARY/SKIN: NEGATIVE for worrisome rashes, moles or lesions  EYES: NEGATIVE for vision changes or irritation  ENT/MOUTH: NEGATIVE for ear, mouth and throat problems. POSITIVE for dystonia - see HPI.   RESP: NEGATIVE for significant cough or SOB  BREAST: NEGATIVE for masses, tenderness or discharge  CV: NEGATIVE for chest pain, palpitations or peripheral edema  GI: NEGATIVE for nausea, abdominal pain, heartburn, or change in bowel habits  : NEGATIVE for frequency, dysuria, or hematuria  MUSCULOSKELETAL: NEGATIVE for significant arthralgias or myalgia  NEURO: NEGATIVE for weakness, dizziness or paresthesias  ENDOCRINE: NEGATIVE for temperature intolerance, skin/hair changes  HEME: NEGATIVE for bleeding problems  PSYCHIATRIC: NEGATIVE for changes in mood or affect    Exam     Patient Vitals for the past 24 hrs:   BP Pulse SpO2 Weight   09/17/19 1033 125/81 55 98 % 85.3 kg (188 lb 1.6 oz)     Body mass index is 25.07 kg/m .    GENERAL APPEARANCE: healthy, alert and no distress     HENT: ear canals and TM's normal and nose and mouth without ulcers or lesions. Bilat TM were dull.     NECK: no adenopathy, no asymmetry, masses, or scars and thyroid normal  to palpation     RESP: lungs clear to auscultation - no rales, rhonchi or wheezes     CV: regular rates and rhythm, normal S1 S2, no S3 or S4 and no murmur, click or rub     ABDOMEN:  soft, nontender, no HSM or masses and bowel sounds normal     MS: extremities normal- no gross deformities noted, no evidence of inflammation in joints, FROM in all extremities.     SKIN: no suspicious lesions or rashes     NEURO: Normal strength and tone, sensory exam grossly normal, mentation intact and speech normal     PSYCH: mentation appears normal. and affect normal/bright     LYMPHATICS: No cervical adenopathy    Diagnostics     Labs Drawn and in Process:   Unresulted Labs Ordered in the Past 30 Days of this Admission     Date and Time Order Name Status Description    9/17/2019 1140 ROUTINE [facility] WITH MICROSCOPIC REFLEX TO CULTURE In process           Recent Labs   Lab Test 11/17/17  0821 11/09/17  1122  01/05/17  0931   HGB 16.7 17.2   < > 17.6    213   < > 207   INR  --  0.95  --  0.92    140   < > 142   POTASSIUM 4.0 4.4   < > 4.5   CR 0.92 1.00   < > 1.16    < > = values in this interval not displayed.     Lab work as ordered by neurosurgeon.      Risk Assessment     Surgical Risk:  The proposed surgical procedure is considered LOW risk.    Revised Cardiac Risk Index0 risks: Class I (very low risk - 0.4% complication rate)  The patient has the following serious cardiovascular risks for perioperative complications such as (MI, PE, VFib and 3  AV Block):  No serious cardiac risks    INTERPRETATION:     Lawson Activity Status Index  Total Points: 58.2  Total METs: 9    Functional capacity is classified as:  Excellent: >10 METs  Good: 7-10 METs   Moderate: 4 - 6 METs  Poor: <4 METs    The patient has the following additional risks for perioperative complications:  No identified additional risks  Asthma, well-controlled. Avoid exposures to cats.    Impression     For above listed surgery and anesthesia:   Patient is at  low risk for surgery/procedure and perioperative/procedure complications.    Recommendations     --Patient is to take all scheduled medications on the day of surgery EXCEPT for modifications listed below.      ICD-10-CM    1. Preoperative examination Z01.818    2. Need for immunization against influenza Z23 FLU VAC PRESRV FREE QUAD SPLIT VIR 3+YRS IM   3. Orofacial dyskinesia G24.4    4. Generalized dystonia G24.9    5. S/P deep brain stimulator placement Z96.89    6. Mild intermittent asthma without complication J45.20      Proceed with surgery as planned.  No food or liquids the morning of surgery.  No ibuprofen or aspirin products 7 to 10 days before surgery   Call surgeon if develops respiratory illness, fever, or other illness    Patient is at low risk for surgery/procedure and perioperative/procedure complications.    Clifton Dunlap DNP-FNP Student    I was present with the NPP student who participated in the service and in the documentation of the services provided. I have verified the history and personally performed the physical exam and medical decision making, as documented by the student and edited by CT Rivers CNP  09/17/19  12:10 PM       CT Salmeron, CNP

## 2019-09-17 NOTE — NURSING NOTE
49 year old  Chief Complaint   Patient presents with     Pre-Op Exam     Pt is here for a pre op.       Blood pressure 125/81, pulse 55, weight 85.3 kg (188 lb 1.6 oz), SpO2 98 %. Body mass index is 25.07 kg/m .  BP completed using cuff size:      Mariajose Mcmahon MA  September 17, 2019 10:38 AM

## 2019-09-19 ENCOUNTER — MYC MEDICAL ADVICE (OUTPATIENT)
Dept: NEUROSURGERY | Facility: CLINIC | Age: 49
End: 2019-09-19

## 2019-09-23 ENCOUNTER — ANESTHESIA EVENT (OUTPATIENT)
Dept: SURGERY | Facility: CLINIC | Age: 49
End: 2019-09-23
Payer: COMMERCIAL

## 2019-09-23 ENCOUNTER — ANESTHESIA (OUTPATIENT)
Dept: SURGERY | Facility: CLINIC | Age: 49
End: 2019-09-23
Payer: COMMERCIAL

## 2019-09-23 ENCOUNTER — HOSPITAL ENCOUNTER (OUTPATIENT)
Facility: CLINIC | Age: 49
Discharge: HOME | End: 2019-09-23
Attending: NEUROLOGICAL SURGERY | Admitting: NEUROLOGICAL SURGERY
Payer: COMMERCIAL

## 2019-09-23 VITALS
WEIGHT: 181.88 LBS | HEIGHT: 73 IN | SYSTOLIC BLOOD PRESSURE: 113 MMHG | BODY MASS INDEX: 24.11 KG/M2 | DIASTOLIC BLOOD PRESSURE: 79 MMHG | RESPIRATION RATE: 16 BRPM | OXYGEN SATURATION: 97 % | HEART RATE: 51 BPM | TEMPERATURE: 97.5 F

## 2019-09-23 DIAGNOSIS — Z96.89 S/P DEEP BRAIN STIMULATOR PLACEMENT: Primary | ICD-10-CM

## 2019-09-23 LAB — GLUCOSE BLDC GLUCOMTR-MCNC: 78 MG/DL (ref 70–99)

## 2019-09-23 PROCEDURE — 36000059 ZZH SURGERY LEVEL 3 EA 15 ADDTL MIN UMMC: Performed by: NEUROLOGICAL SURGERY

## 2019-09-23 PROCEDURE — 25000125 ZZHC RX 250: Performed by: NURSE ANESTHETIST, CERTIFIED REGISTERED

## 2019-09-23 PROCEDURE — 71000027 ZZH RECOVERY PHASE 2 EACH 15 MINS: Performed by: NEUROLOGICAL SURGERY

## 2019-09-23 PROCEDURE — 25000128 H RX IP 250 OP 636: Performed by: NURSE PRACTITIONER

## 2019-09-23 PROCEDURE — 82962 GLUCOSE BLOOD TEST: CPT

## 2019-09-23 PROCEDURE — 37000009 ZZH ANESTHESIA TECHNICAL FEE, EACH ADDTL 15 MIN: Performed by: NEUROLOGICAL SURGERY

## 2019-09-23 PROCEDURE — 25800030 ZZH RX IP 258 OP 636: Performed by: ANESTHESIOLOGY

## 2019-09-23 PROCEDURE — 36000057 ZZH SURGERY LEVEL 3 1ST 30 MIN - UMMC: Performed by: NEUROLOGICAL SURGERY

## 2019-09-23 PROCEDURE — C1820 GENERATOR NEURO RECHG BAT SY: HCPCS | Performed by: NEUROLOGICAL SURGERY

## 2019-09-23 PROCEDURE — 27211024 ZZHC OR SUPPLY OTHER OPNP: Performed by: NEUROLOGICAL SURGERY

## 2019-09-23 PROCEDURE — 37000008 ZZH ANESTHESIA TECHNICAL FEE, 1ST 30 MIN: Performed by: NEUROLOGICAL SURGERY

## 2019-09-23 PROCEDURE — 27210794 ZZH OR GENERAL SUPPLY STERILE: Performed by: NEUROLOGICAL SURGERY

## 2019-09-23 PROCEDURE — 25000125 ZZHC RX 250: Performed by: NEUROLOGICAL SURGERY

## 2019-09-23 PROCEDURE — 40000170 ZZH STATISTIC PRE-PROCEDURE ASSESSMENT II: Performed by: NEUROLOGICAL SURGERY

## 2019-09-23 PROCEDURE — 25000128 H RX IP 250 OP 636: Performed by: NEUROLOGICAL SURGERY

## 2019-09-23 PROCEDURE — 25000128 H RX IP 250 OP 636: Performed by: NURSE ANESTHETIST, CERTIFIED REGISTERED

## 2019-09-23 DEVICE — NEUROSTIMULATOR MEDT ACTIVA RC CHARGEABLE 37612: Type: IMPLANTABLE DEVICE | Site: CHEST  WALL | Status: FUNCTIONAL

## 2019-09-23 RX ORDER — CEFAZOLIN SODIUM 1 G/3ML
1 INJECTION, POWDER, FOR SOLUTION INTRAMUSCULAR; INTRAVENOUS SEE ADMIN INSTRUCTIONS
Status: DISCONTINUED | OUTPATIENT
Start: 2019-09-23 | End: 2019-09-23 | Stop reason: HOSPADM

## 2019-09-23 RX ORDER — CEPHALEXIN 500 MG/1
500 CAPSULE ORAL 4 TIMES DAILY
Qty: 28 CAPSULE | Refills: 0 | Status: SHIPPED | OUTPATIENT
Start: 2019-09-23 | End: 2019-09-30

## 2019-09-23 RX ORDER — ONDANSETRON 2 MG/ML
4 INJECTION INTRAMUSCULAR; INTRAVENOUS EVERY 30 MIN PRN
Status: DISCONTINUED | OUTPATIENT
Start: 2019-09-23 | End: 2019-09-23 | Stop reason: HOSPADM

## 2019-09-23 RX ORDER — ONDANSETRON 2 MG/ML
INJECTION INTRAMUSCULAR; INTRAVENOUS PRN
Status: DISCONTINUED | OUTPATIENT
Start: 2019-09-23 | End: 2019-09-23

## 2019-09-23 RX ORDER — PROPOFOL 10 MG/ML
INJECTION, EMULSION INTRAVENOUS CONTINUOUS PRN
Status: DISCONTINUED | OUTPATIENT
Start: 2019-09-23 | End: 2019-09-23

## 2019-09-23 RX ORDER — DEXMEDETOMIDINE HYDROCHLORIDE 4 UG/ML
0.2-1.2 INJECTION, SOLUTION INTRAVENOUS CONTINUOUS
Status: DISCONTINUED | OUTPATIENT
Start: 2019-09-23 | End: 2019-09-23 | Stop reason: HOSPADM

## 2019-09-23 RX ORDER — SODIUM CHLORIDE, SODIUM LACTATE, POTASSIUM CHLORIDE, CALCIUM CHLORIDE 600; 310; 30; 20 MG/100ML; MG/100ML; MG/100ML; MG/100ML
INJECTION, SOLUTION INTRAVENOUS CONTINUOUS
Status: DISCONTINUED | OUTPATIENT
Start: 2019-09-23 | End: 2019-09-23 | Stop reason: HOSPADM

## 2019-09-23 RX ORDER — FENTANYL CITRATE 50 UG/ML
INJECTION, SOLUTION INTRAMUSCULAR; INTRAVENOUS PRN
Status: DISCONTINUED | OUTPATIENT
Start: 2019-09-23 | End: 2019-09-23

## 2019-09-23 RX ORDER — LIDOCAINE HYDROCHLORIDE 20 MG/ML
INJECTION, SOLUTION INFILTRATION; PERINEURAL PRN
Status: DISCONTINUED | OUTPATIENT
Start: 2019-09-23 | End: 2019-09-23

## 2019-09-23 RX ORDER — GLYCOPYRROLATE 0.2 MG/ML
INJECTION, SOLUTION INTRAMUSCULAR; INTRAVENOUS PRN
Status: DISCONTINUED | OUTPATIENT
Start: 2019-09-23 | End: 2019-09-23

## 2019-09-23 RX ORDER — CEFAZOLIN SODIUM 2 G/100ML
2 INJECTION, SOLUTION INTRAVENOUS
Status: COMPLETED | OUTPATIENT
Start: 2019-09-23 | End: 2019-09-23

## 2019-09-23 RX ORDER — LIDOCAINE HYDROCHLORIDE AND EPINEPHRINE 10; 10 MG/ML; UG/ML
INJECTION, SOLUTION INFILTRATION; PERINEURAL PRN
Status: DISCONTINUED | OUTPATIENT
Start: 2019-09-23 | End: 2019-09-23 | Stop reason: HOSPADM

## 2019-09-23 RX ORDER — FENTANYL CITRATE 50 UG/ML
25-50 INJECTION, SOLUTION INTRAMUSCULAR; INTRAVENOUS
Status: DISCONTINUED | OUTPATIENT
Start: 2019-09-23 | End: 2019-09-23 | Stop reason: HOSPADM

## 2019-09-23 RX ORDER — ONDANSETRON 4 MG/1
4 TABLET, ORALLY DISINTEGRATING ORAL EVERY 30 MIN PRN
Status: DISCONTINUED | OUTPATIENT
Start: 2019-09-23 | End: 2019-09-23 | Stop reason: HOSPADM

## 2019-09-23 RX ORDER — NALOXONE HYDROCHLORIDE 0.4 MG/ML
.1-.4 INJECTION, SOLUTION INTRAMUSCULAR; INTRAVENOUS; SUBCUTANEOUS
Status: DISCONTINUED | OUTPATIENT
Start: 2019-09-23 | End: 2019-09-23 | Stop reason: HOSPADM

## 2019-09-23 RX ORDER — LIDOCAINE 40 MG/G
CREAM TOPICAL
Status: DISCONTINUED | OUTPATIENT
Start: 2019-09-23 | End: 2019-09-23 | Stop reason: HOSPADM

## 2019-09-23 RX ORDER — HYDROMORPHONE HYDROCHLORIDE 1 MG/ML
.3-.5 INJECTION, SOLUTION INTRAMUSCULAR; INTRAVENOUS; SUBCUTANEOUS EVERY 10 MIN PRN
Status: DISCONTINUED | OUTPATIENT
Start: 2019-09-23 | End: 2019-09-23 | Stop reason: HOSPADM

## 2019-09-23 RX ORDER — OXYCODONE HYDROCHLORIDE 5 MG/1
5 TABLET ORAL EVERY 6 HOURS PRN
Qty: 6 TABLET | Refills: 0 | Status: SHIPPED | OUTPATIENT
Start: 2019-09-23 | End: 2019-09-25

## 2019-09-23 RX ORDER — BUPIVACAINE HYDROCHLORIDE 5 MG/ML
INJECTION, SOLUTION PERINEURAL PRN
Status: DISCONTINUED | OUTPATIENT
Start: 2019-09-23 | End: 2019-09-23 | Stop reason: HOSPADM

## 2019-09-23 RX ORDER — PROPOFOL 10 MG/ML
INJECTION, EMULSION INTRAVENOUS PRN
Status: DISCONTINUED | OUTPATIENT
Start: 2019-09-23 | End: 2019-09-23

## 2019-09-23 RX ADMIN — GLYCOPYRROLATE 0.2 MG: 0.2 INJECTION, SOLUTION INTRAMUSCULAR; INTRAVENOUS at 14:59

## 2019-09-23 RX ADMIN — LIDOCAINE HYDROCHLORIDE 50 MG: 20 INJECTION, SOLUTION INFILTRATION; PERINEURAL at 14:45

## 2019-09-23 RX ADMIN — SODIUM CHLORIDE, POTASSIUM CHLORIDE, SODIUM LACTATE AND CALCIUM CHLORIDE: 600; 310; 30; 20 INJECTION, SOLUTION INTRAVENOUS at 14:36

## 2019-09-23 RX ADMIN — FENTANYL CITRATE 50 MCG: 50 INJECTION, SOLUTION INTRAMUSCULAR; INTRAVENOUS at 15:17

## 2019-09-23 RX ADMIN — GLYCOPYRROLATE 0.2 MG: 0.2 INJECTION, SOLUTION INTRAMUSCULAR; INTRAVENOUS at 15:08

## 2019-09-23 RX ADMIN — DEXMEDETOMIDINE HYDROCHLORIDE 0.9 MCG/KG/HR: 4 INJECTION, SOLUTION INTRAVENOUS at 14:44

## 2019-09-23 RX ADMIN — PROPOFOL 40 MG: 10 INJECTION, EMULSION INTRAVENOUS at 14:53

## 2019-09-23 RX ADMIN — ONDANSETRON 4 MG: 2 INJECTION INTRAMUSCULAR; INTRAVENOUS at 14:50

## 2019-09-23 RX ADMIN — PROPOFOL 50 MCG/KG/MIN: 10 INJECTION, EMULSION INTRAVENOUS at 14:44

## 2019-09-23 RX ADMIN — CEFAZOLIN SODIUM 2 G: 2 INJECTION, SOLUTION INTRAVENOUS at 14:50

## 2019-09-23 ASSESSMENT — MIFFLIN-ST. JEOR: SCORE: 1753.88

## 2019-09-23 ASSESSMENT — LIFESTYLE VARIABLES: TOBACCO_USE: 1

## 2019-09-23 NOTE — ANESTHESIA CARE TRANSFER NOTE
Patient: Ayad Moreno    Procedure(s):  Left Deep Brain Stimulator Generator Replacement    Diagnosis: Dystonia  Diagnosis Additional Information: No value filed.    Anesthesia Type:   MAC     Note:  Airway :Room Air  Patient transferred to:Phase II  Comments: To Phase II on supplemental O2 with + air exchange, placed on monitor. Report given to RN, questions answered, VSS, patient alert and comfortable.Handoff Report: Identifed the Patient, Identified the Reponsible Provider, Reviewed the pertinent medical history, Discussed the surgical course, Reviewed Intra-OP anesthesia mangement and issues during anesthesia, Set expectations for post-procedure period and Allowed opportunity for questions and acknowledgement of understanding      Vitals: (Last set prior to Anesthesia Care Transfer)    CRNA VITALS  9/23/2019 1549 - 9/23/2019 1624      9/23/2019             Resp Rate (set):  10            Electronically Signed By: CT Robles CRNA  September 23, 2019  4:24 PM

## 2019-09-23 NOTE — ANESTHESIA PREPROCEDURE EVALUATION
Anesthesia Pre-Procedure Evaluation    Patient: Ayad Moreno   MRN:     [id number] Gender:   male   Age:    49 year old :      1970        Preoperative Diagnosis: Dystonia   Procedure(s):  Left Deep Brain Stimulator Generator Replacement     Past Medical History:   Diagnosis Date     Anxiety      Asthmas with exposure to cats      Depression      Generalized dystonia     DYT-1 genetic mutation     Lumbar disc herniation      OCD (obsessive compulsive disorder)      Rt ACL Tear       Past Surgical History:   Procedure Laterality Date     C REPAIR CRUCIATE LIGAMENT,KNEE       IMPLANT DEEP BRAIN STIMULATION GENERATOR / BATTERY Left 2017    Procedure: IMPLANT DEEP BRAIN STIMULATION GENERATOR / BATTERY;  Surgeon: Duy Carolina MD;  Location: [facility] OR     OPTICAL TRACKING SYSTEM INSERTION DEEP BRAIN STIMULATION Left 1/10/2017    Procedure: OPTICAL TRACKING SYSTEM INSERTION DEEP BRAIN STIMULATION;  Surgeon: Duy Carolina MD;  Location: [facility] OR     OPTICAL TRACKING SYSTEM INSERTION DEEP BRAIN STIMULATION Right 2017    Procedure: OPTICAL TRACKING SYSTEM INSERTION DEEP BRAIN STIMULATION;  Right Stealth Assisted Deep Brain Stimulator Placement, Phase I And II Combined, Placement Of Right Side Deep Brain Stimulator Electrode, Target Right Globus Pallidus Internus With Microelectrode Recording And Connection To The Existing Generator/Battery;  Surgeon: Duy Carolina MD;  Location: [facility] OR          Anesthesia Evaluation     . Pt has had prior anesthetic. Type: General and MAC    No history of anesthetic complications          ROS/MED HX    ENT/Pulmonary:     (+)DUNCAN risk factors snores loudly, other ENT- difficult control of base of tongue, can obstruct swallowing and breathing, tobacco use, Past use asthma Last exacerbation: triggered by cats- last 14 yrs ago,, . .    Neurologic: Comment: Difficult speech    (+)other neuro DYT-1 genetic mutation generalized dystonia     Cardiovascular:  - neg cardiovascular ROS   (+) ----. : . . . :. . No previous cardiac testing       METS/Exercise Tolerance:  >4 METS   Hematologic:  - neg hematologic  ROS       Musculoskeletal:  - neg musculoskeletal ROS       GI/Hepatic:  - neg GI/hepatic ROS       Renal/Genitourinary:  - ROS Renal section negative       Endo:  - neg endo ROS       Psychiatric:     (+) psychiatric history depression and anxiety      Infectious Disease:  - neg infectious disease ROS       Malignancy:      - no malignancy   Other:    (+) No chance of pregnancy C-spine cleared: N/A, H/O Chronic Pain,no other significant disability                        PHYSICAL EXAM:   Mental Status/Neuro: A/A/O   Airway: Facies: Feasible  Mallampati: II  Mouth/Opening: Full  TM distance: > 6 cm  Neck ROM: Full   Respiratory:   Resp. Rate: Normal     Resp. Effort: Normal      CV:    Comments:      Dental: Normal Dentition                LABS:  CBC:   Lab Results   Component Value Date    WBC 5.9 09/17/2019    WBC 8.2 11/17/2017    HGB 16.9 09/17/2019    HGB 16.7 11/17/2017    HCT 49.6 09/17/2019    HCT 49.7 11/17/2017     09/17/2019     11/17/2017     BMP:   Lab Results   Component Value Date     09/17/2019     11/17/2017    POTASSIUM 4.6 09/17/2019    POTASSIUM 4.0 11/17/2017    CHLORIDE 106 09/17/2019    CHLORIDE 107 11/17/2017    CO2 30 09/17/2019    CO2 25 11/17/2017    BUN 14 09/17/2019    BUN 9 11/17/2017    CR 0.91 09/17/2019    CR 0.92 11/17/2017    GLC 94 09/17/2019     (H) 11/17/2017     COAGS:   Lab Results   Component Value Date    PTT 31 09/17/2019    INR 1.01 09/17/2019     POC:   Lab Results   Component Value Date    BGM 78 09/23/2019     OTHER:   Lab Results   Component Value Date    JOSÉ MIGUEL 9.1 09/17/2019        Preop Vitals    BP Readings from Last 3 Encounters:   09/23/19 117/87   09/17/19 125/81   09/17/19 119/76    Pulse Readings from Last 3 Encounters:   09/23/19 71   09/17/19 55   09/17/19 67  "     Resp Readings from Last 3 Encounters:   09/23/19 14   04/16/19 15   12/13/17 18    SpO2 Readings from Last 3 Encounters:   09/23/19 100%   09/17/19 98%   09/17/19 95%      Temp Readings from Last 1 Encounters:   09/23/19 36.5  C (97.7  F) (Oral)    Ht Readings from Last 1 Encounters:   09/23/19 1.854 m (6' 1\")      Wt Readings from Last 1 Encounters:   09/23/19 82.5 kg (181 lb 14.1 oz)    Estimated body mass index is 24 kg/m  as calculated from the following:    Height as of this encounter: 1.854 m (6' 1\").    Weight as of this encounter: 82.5 kg (181 lb 14.1 oz).     LDA:  Peripheral IV 11/16/17 Left Lower forearm (Active)   Number of days: 676       Peripheral IV 11/16/17 Right Lower forearm (Active)   Number of days: 676       Peripheral IV 09/23/19 Right Hand (Active)   Site Assessment WDL 9/23/2019  1:12 PM   Line Status Saline locked 9/23/2019  1:12 PM   Phlebitis Scale 0-->no symptoms 9/23/2019  1:12 PM   Infiltration Scale 0 9/23/2019  1:12 PM   Number of days: 0        Assessment:   ASA SCORE: 3    H&P: History and physical reviewed and following examination; no interval change.   Smoking Status:  Non-Smoker/Unknown   NPO Status: NPO Appropriate     Plan:   Anes. Type:  MAC   Pre-Medication: None   Induction:  N/a   Airway: Native Airway   Access/Monitoring: PIV   Maintenance: Propofol Sedation     Drips/Meds: Dexmedetomidine     Postop Plan:   Postop Pain: None  Postop Sedation/Airway: Not planned  Disposition: Outpatient     PONV Management:   Adult Risk Factors:, Non-Smoker   Prevention: Ondansetron, Propofol     CONSENT: Direct conversation   Plan and risks discussed with: Patient   Blood Products: Consent Deferred (Minimal Blood Loss)                   Alberto Frias MD  "

## 2019-09-23 NOTE — DISCHARGE INSTRUCTIONS
REMEMBER: SAME DAY SURGERY IS NOT SAME DAY RECOVERY. TAKE IT EASY, GET PLENTY OF REST, AND HAVE SOMEONE WITH YOU FOR 24 HOURS. FOLLOW THESE INSTRUCTIONS AND PLEASE DO NOT HESITATE TO CALL WITH ANY QUESTIONS.       [facility], [place]  Same-Day Surgery   Adult Discharge Orders & Instructions     For 24 hours after surgery    1. Get plenty of rest.  A responsible adult must stay with you for at least 24 hours after you leave the hospital.   2. Do not drive or use heavy equipment.  If you have weakness or tingling, don't drive or use heavy equipment until this feeling goes away.  3. Do not drink alcohol.  4. Avoid strenuous or risky activities.  Ask for help when climbing stairs.   5. You may feel lightheaded.  IF so, sit for a few minutes before standing.  Have someone help you get up.   6. If you have nausea (feel sick to your stomach): Drink only clear liquids such as apple juice, ginger ale, broth or 7-Up.  Rest may also help.  Be sure to drink enough fluids.  Move to a regular diet as you feel able.  7. You may have a slight fever. Call the doctor if your fever is over 100 F (37.7 C) (taken under the tongue) or lasts longer than 24 hours.  8. You may have a dry mouth, a sore throat, muscle aches or trouble sleeping.  These should go away after 24 hours.  9. Do not make important or legal decisions.   Call your doctor for any of the followin.  Signs of infection (fever, growing tenderness at the surgery site, a large amount of drainage or bleeding, severe pain, foul-smelling drainage, redness, swelling).    2. It has been over 8 to 10 hours since surgery and you are still not able to urinate (pass water).    3.  Headache for over 24 hours.    To contact a doctor, call Dr. Ramos [phone] [CLINIC] or:        [phone] and ask for the resident on call for Neurosurgery (answered 24 hours a day)      Emergency Department:    [facility]: [phone]       (TTY for  hearing impaired: [phone])       14-Aug-2019 14:51

## 2019-09-23 NOTE — ANESTHESIA POSTPROCEDURE EVALUATION
Anesthesia POST Procedure Evaluation    Patient: Ayad Moreno   MRN:     [id number] Gender:   male   Age:    49 year old :      1970        Preoperative Diagnosis: Dystonia   Procedure(s):  Left Deep Brain Stimulator Generator Replacement   Postop Comments: No value filed.       Anesthesia Type:  Not documented  MAC    Reportable Event: NO     PAIN: Uncomplicated   Sign Out status: Comfortable, Well controlled pain     PONV: No PONV   Sign Out status:  No Nausea or Vomiting     Neuro/Psych: Uneventful perioperative course   Sign Out Status: Preoperative baseline; Age appropriate mentation     Airway/Resp.: Uneventful perioperative course   Sign Out Status: Non labored breathing, age appropriate RR; Resp. Status within EXPECTED Parameters     CV: Uneventful perioperative course   Sign Out status: Appropriate BP and perfusion indices; Appropriate HR/Rhythm     Disposition:   Sign Out in:  PACU  Disposition:  Phase II; Home  Recovery Course: Uneventful  Follow-Up: Not required           Last Anesthesia Record Vitals:  [facility] VITALS  2019 1549 - 2019 1646      2019             Resp Rate (set):  10          Last PACU Vitals:  Vitals Value Taken Time   /59 2019  4:24 PM   Temp     Pulse 60 2019  4:24 PM   Resp     SpO2 95 % 2019  4:24 PM   Temp src     NIBP 99/63 2019  4:16 PM   Pulse 60 2019  4:19 PM   SpO2 95 % 2019  4:19 PM   Resp     Temp     Ht Rate 60 2019  4:18 PM   Temp 2     Vitals shown include unvalidated device data.      Electronically Signed By: Piotr Taylor MD, 2019, 4:46 PM

## 2019-09-23 NOTE — OR NURSING
Text page sent to Neurosurgery [phone] pager regarding patient's needs for preop orders. Will wait to hear back from neuro team.

## 2019-09-23 NOTE — BRIEF OP NOTE
[facility], [place]    Brief Operative Note    Pre-operative diagnosis: Dystonia  Post-operative diagnosis * No post-op diagnosis entered *  Procedure: Procedure(s):  Left Deep Brain Stimulator Generator Replacement  Surgeon: Surgeon(s) and Role:     * Sebastian Ramos MD - Primary     * Valeriano Morton MD - Resident - Assisting  Anesthesia: Combined MAC with Local   Estimated blood loss: 5 mL  Drains: None  Specimens: * No specimens in log *  Findings:   None.  Complications: None.  Implants:    Implant Name Type Inv. Item Serial No.  Lot No. LRB No. Used   NEUROSTIMULATOR MEDT ACTIVA RC CHARGEABLE [id number] Neurology device NEUROSTIMULATOR MEDT ACTIVA RC CHARGEABLE 29741 [id number] [employer]NEURO  Left 1   NEUROSTIMULATOR MEDT ACTIVA PC 76948 Neurology device NEUROSTIMULATOR MEDT ACTIVA PC [id number] [employer]NEUR[employer]  Left 1

## 2019-09-23 NOTE — OR NURSING
Discharge instructions to pt and responsible adult.  All questions regarding discharge information answered.  Pt and responsible adult verbalized understanding of all discharge instruction information given. Patient's wife picked up discharge prescriptions.

## 2019-09-23 NOTE — OR NURSING
Courtesy text message sent to Neurosurgery pager [phone] notifying patient still needs consent signed.

## 2019-09-24 ENCOUNTER — TELEPHONE (OUTPATIENT)
Dept: NEUROSURGERY | Facility: CLINIC | Age: 49
End: 2019-09-24

## 2019-09-24 NOTE — TELEPHONE ENCOUNTER
Callback to patient, left detailed message please return call, my phone is available until 5pm today.  Call Berta , helping Hoda ARMANDO.

## 2019-09-24 NOTE — TELEPHONE ENCOUNTER
[facility] Call Center    Phone Message    May a detailed message be left on voicemail: yes    Reason for Call: Other: Questions about yesterday's surgery with Dr. Sebastian Ramos and wound care.  Please call pt back asap.  Call pt's mobile #. Thank you..  Pt reported that he was supposed to get a call today, but he hasn't gotten it yet. Pt is getting concerned. Please call pt TODAY. Thank you!     Action Taken: Message routed to:  [facility] ([facility]):  Neurosurgery

## 2019-09-25 ENCOUNTER — PATIENT OUTREACH (OUTPATIENT)
Dept: NEUROSURGERY | Facility: CLINIC | Age: 49
End: 2019-09-25

## 2019-09-25 DIAGNOSIS — Z96.89 S/P DEEP BRAIN STIMULATOR PLACEMENT: ICD-10-CM

## 2019-09-25 RX ORDER — OXYCODONE HYDROCHLORIDE 5 MG/1
5 TABLET ORAL EVERY 6 HOURS PRN
Qty: 6 TABLET | Refills: 0 | Status: SHIPPED | OUTPATIENT
Start: 2019-09-25 | End: 2020-11-13

## 2019-09-25 NOTE — PROGRESS NOTES
Pt s/p DBS battery replacement on 9/23/19. Left VM to check on his postop status; my colleague Berta tried to reach pt yesterday as well. Left my direct number and requested pt call at his earliest convenience.

## 2019-09-25 NOTE — PROGRESS NOTES
[facility]: Post-Discharge Note  SITUATION                                                      Admission:    Admission Date: 09/23/19   Reason for Admission: Left DBS battery replacement  Discharge:   Discharge Date: 09/23/19  Discharge Diagnosis: Dystonia  Discharge Service: Neurosurgery  Discharge Plan: routine post op followup    BACKGROUND                                                      Neurosurgery Discharge Coordination Note     Attending physician: Dr. Ramos  Discharge to: Home     Current status: Patient states his incision site is sore, especially at night as he sleeps on his left side usually. Taking oxycodone as needed with adequate relief and is requesting refill of 6 more tablets. Dr. Ramos approved the refill, but I will need a physician to sign off on the prescription. Pt expressed understanding.     Pt removed the dressing yesterday and also removed the steri strips. After he did so, he realized he shoudn't have and put a dressing back over the incision. I discussed with him that the purpose of the steri strips is to approximate the edges of the incision and that I would f/u with Dr. Ramos. Dr. Ramos would like pt to send a photo of the incision and next steps will be discussed after reviewing it. Pt agrees to send a photo via email and understands email is not HIPAA compliant. Advised pt to keep incision dry for now. Denies redness, swelling, increased tenderness, drainage today (scant bleeding yesterday, per pt), incision opening or elevated temp. Reports Incision CDI without signs of infection.  Denies current bowel or bladder issues.    Discharge instructions and medications reviewed with patient.  Follow up appointments/imaging/tests needed: 2 week post op with Dr. Ramos on 10/8/19.     RN triage/on call number given: [phone]/ [phone]        ASSESSMENT      Discharge Assessment  Patient reports symptoms are: Improved  Does the patient have all  of their medications?: Yes  Does patient know what their new medications are for?: Yes  Does patient have a follow-up appointment scheduled?: Yes  Does patient have any other questions or concerns?: Yes(pt removed steri strips)    Post-op  Did the patient have surgery or a procedure: Yes  Incision: closed;healing  Drainage: No  Bleeding: scant  Fever: No  Chills: No  Redness: No  Warmth: No  Swelling: No  Incision site pain: Yes  Closure: suture;dissolving  Eating & Drinking: eating and drinking without complaints/concerns  PO Intake: regular diet  Bowel Function: normal  Urinary Status: voiding without complaint/concerns        PLAN                                                      Outpatient Plan:  Plan for incision to be determined. Routine postop follow up otherwise.     Future Appointments   Date Time Provider Department Center   10/8/2019 12:00 PM Sebastian Ramos MD [facility]   1/9/2020 10:00 AM Uche Loera MD [facility]           CANDACE DELGADO RN

## 2019-09-29 ENCOUNTER — HEALTH MAINTENANCE LETTER (OUTPATIENT)
Age: 49
End: 2019-09-29

## 2019-10-07 ASSESSMENT — ENCOUNTER SYMPTOMS
INSOMNIA: 0
DEPRESSION: 1
PANIC: 1
FLANK PAIN: 0
NERVOUS/ANXIOUS: 1
DYSURIA: 0
HEMATURIA: 0
DECREASED CONCENTRATION: 0
DIFFICULTY URINATING: 1

## 2019-10-08 ENCOUNTER — OFFICE VISIT (OUTPATIENT)
Dept: NEUROSURGERY | Facility: CLINIC | Age: 49
End: 2019-10-08
Payer: COMMERCIAL

## 2019-10-08 VITALS
RESPIRATION RATE: 16 BRPM | SYSTOLIC BLOOD PRESSURE: 122 MMHG | HEART RATE: 87 BPM | OXYGEN SATURATION: 96 % | DIASTOLIC BLOOD PRESSURE: 79 MMHG

## 2019-10-08 DIAGNOSIS — G24.1 DYSTONIA DUE TO DYT-1 GENE MUTATION: Primary | ICD-10-CM

## 2019-10-08 ASSESSMENT — PAIN SCALES - GENERAL: PAINLEVEL: NO PAIN (0)

## 2019-10-08 NOTE — NURSING NOTE
Chief Complaint   Patient presents with     RECHECK     [facility] RETURN 2 WEEK POST OP     Laurie Ring CMA

## 2019-10-08 NOTE — LETTER
10/8/2019       RE: Ayad Moreno  [address]  [place] [address]     Dear Colleague,    Thank you for referring your patient, Ayad Moreno, to the [facility] NEUROSURGERY at [facility]. Please see a copy of my visit note below.    Neurosurgery Attending DBS Follow-Up Note    HPI: 48 y/o gentleman with DYT-1 dystonia s/p bilateral GPi DBS who is now 2 weeks s/p left IPG upgrade/replacement to Activa RC. He is doing quite well. No pain. His dystonia symptoms are improved since replacement and he has recharged the device once without difficulty. He took off his steri-strips early, but has noticed no wound problems. On exam, his wound is healing well without erythema, swelling or pain.     A/P: 48 y/o gentleman with DYT-1 dystonia s/p left IPG upgrade/replacement, doing well.    - RTC prn    Answers for HPI/ROS submitted by the patient on 10/7/2019   General Symptoms: No  Skin Symptoms: No  HENT Symptoms: No  EYE SYMPTOMS: No  HEART SYMPTOMS: No  LUNG SYMPTOMS: No  INTESTINAL SYMPTOMS: No  URINARY SYMPTOMS: Yes  REPRODUCTIVE SYMPTOMS: No  SKELETAL SYMPTOMS: No  BLOOD SYMPTOMS: No  NERVOUS SYSTEM SYMPTOMS: No  MENTAL HEALTH SYMPTOMS: Yes  Trouble holding urine or incontinence: No  Pain or burning: No  Trouble starting or stopping: Yes  Increased frequency of urination: No  Blood in urine: No  Decreased frequency of urination: No  Frequent nighttime urination: No  Flank pain: No  Difficulty emptying bladder: Yes  Nervous or Anxious: Yes  Depression: Yes  Trouble sleeping: No  Trouble thinking or concentrating: No  Mood changes: No  Panic attacks: Yes      Again, thank you for allowing me to participate in the care of your patient.      Sincerely,    Sebastian Ramos MD

## 2019-10-10 NOTE — OP NOTE
DATE OF SURGERY: 9/23/2019    PREOPERATIVE DIAGNOSIS:  1. DYT-1 generalized dystonia  2. Left deep brain stimulator in left globus pallidus internus.  3. Depleted deep brain stimulator generator/battery over the left chest wall    POSTOPERATIVE DIAGNOSIS:  1. DYT-1 generalized dystonia  2. Left deep brain stimulator in left globus pallidus internus.  3. Depleted deep brain stimulator generator/battery over the left chest wall    PROCEDURE PERFORMED:  1. Replacement of deep brain stimulator generator/battery over the left chest wall and connection to one electrode array.  2. Intraoperative electrical interrogation and analysis of the leftdeep brain stimulator system.    ATTENDING SURGEON: Sebastian Ramos    ASSISTANT SURGEON: Valeriano Morton    ANESTHESIA: Monitored anesthesia care.    ESTIMATED BLOOD LOSS: 5 mL    COMPLICATIONS: None    INDICATION: Mr. 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 Carolina. He now presents for IPG replacement on the left side. He recently checked his battery and it notified him that it was ADRIANNA. His most recent settings are:     L GPi: C+/1-, 2-; 4.0 V/60 us/180 Hz     R GPi: C+/9-, 10-; 3.9 V/60 us/180 Hz     Battery voltage is at 2.55 V     He prefers to switch to the rechargeable battery given the PC's short duration. He does understand that this means he will have to charge it about once/week for an hour or two. Of note, he does not take any antiplatelets or anticoagulation. He does take a multivitamin.    DESCRIPTION OF PROCEDURE: After informed consent was verified, Mr. Ybarra was brought to the operating room where he was transferred to the operating table in the supine position and underwent MAC anesthesia. All pressure points were well padded. His left chest wall area was cleaned, prepped and draped in the usual sterile fashion. A timeout was performed confirming the patient's identity, procedure to be performed,  site and side of the surgery, and the administration of preoperative prophylactic antibiotic administration.     At this time, 7 mL of 1% Lidocaine with epinephrine mixed with 0.25% Marcaine plain, 50:50 mix, was injected into the intended incision site which was the well healed previous incision over the left chest wall. The incision was opened with sharp dissection using a #10 scalpel and his oxygen was decreased to 30%. Monopolar cautery was used sparingly to continue subcutaneous dissection as well as hemostasis. The capsule was opened sharply with a combination of monopolar cautery and the #15 scalpel. The old Activa PC generator/battery was removed without any difficulty, along with the excess wires. The wires were inspected and were not found to have any areas of breakdown. The pocket was without any obvious signs of infection. The pocket was generously irrigated and dried and meticulous hemostasis was obtained.    The wire, which was coiled within the scar tissue at the superior aspect of the pocket, was freed from the scar. Then, the extension wire was disconnected from the old Activa PC generator/battery. At this time, the new Activa RC generator was brought onto the field. The contacts of the extension wire was cleaned and inserted into the new Activa RC generator/battery and was secured with the screw.     The pocket was again washed and dried and the superior aspect of the pocket was mobilized so that the closure would be less stressed and ample tissue would protect the hardware. The wound was irrigated with normal saline with bacitracin, and hemostasis was obtained with monopolar cautery. There was very little bleeding encountered. The extension wire implantable pulse generator/battery were placed back into the pocket with the wire being coiled behind and around the generator/battery.    The system was electrically interrogated, analyzed and tested for function and impedance. The test revealed that all  the impedances were within normal. No new problems were found.    The new generator/battery was secured within the pocket using one 2-0 Ethibond suture at the anchor points. Then, the wound was closed. The capsule was closed with inverted 3-0 undyed Vicryl sutures in an interrupted fashion. Subcutaneous tissue was closed with interrupted, 3-0 inverted undyed Vicryl sutures, and the skin was closed using a running subcuticular 4-0 Monocryl suture. The wound was cleaned, dried, reprepped with ChorPrep and then covered with steri-strips, Telfa and tegaderm.    The surrounding area was cleaned again and the patient awoke without any difficulties. He was awakened and taken to the recovery area in a stable condition. There were no complications.    All counts including needle, sponge and instrument counts were correct at the end of the case x 2.      I, Sebastian Ramos., Neurosurgery Attending, was present and scrubbed for the key portions of the case and immediately available for the entire case from opening until closure.

## 2019-10-10 NOTE — PROGRESS NOTES
Neurosurgery Attending DBS Follow-Up Note    HPI: 48 y/o gentleman with DYT-1 dystonia s/p bilateral GPi DBS who is now 2 weeks s/p left IPG upgrade/replacement to Activa RC. He is doing quite well. No pain. His dystonia symptoms are improved since replacement and he has recharged the device once without difficulty. He took off his steri-strips early, but has noticed no wound problems. On exam, his wound is healing well without erythema, swelling or pain.     A/P: 48 y/o gentleman with DYT-1 dystonia s/p left IPG upgrade/replacement, doing well.    - RTC prn      Answers for HPI/ROS submitted by the patient on 10/7/2019   General Symptoms: No  Skin Symptoms: No  HENT Symptoms: No  EYE SYMPTOMS: No  HEART SYMPTOMS: No  LUNG SYMPTOMS: No  INTESTINAL SYMPTOMS: No  URINARY SYMPTOMS: Yes  REPRODUCTIVE SYMPTOMS: No  SKELETAL SYMPTOMS: No  BLOOD SYMPTOMS: No  NERVOUS SYSTEM SYMPTOMS: No  MENTAL HEALTH SYMPTOMS: Yes  Trouble holding urine or incontinence: No  Pain or burning: No  Trouble starting or stopping: Yes  Increased frequency of urination: No  Blood in urine: No  Decreased frequency of urination: No  Frequent nighttime urination: No  Flank pain: No  Difficulty emptying bladder: Yes  Nervous or Anxious: Yes  Depression: Yes  Trouble sleeping: No  Trouble thinking or concentrating: No  Mood changes: No  Panic attacks: Yes

## 2019-10-28 ENCOUNTER — TELEPHONE (OUTPATIENT)
Dept: NEUROLOGY | Facility: CLINIC | Age: 49
End: 2019-10-28

## 2019-10-28 NOTE — TELEPHONE ENCOUNTER
[facility] Call Center    Phone Message    May a detailed message be left on voicemail: yes    Reason for Call: Other: Ayad calling to request an appointment for Botox with Dr. Zaidi.  Please call him back to schedule     Action Taken: Message routed to:  [facility]  Neurology

## 2019-10-30 NOTE — TELEPHONE ENCOUNTER
[facility] Call Center    Phone Message    May a detailed message be left on voicemail: yes    Reason for Call: Other: Ayad calling back. He would like to schedule a sooner appr with Dr. Perez for botox. Please call him back to discuss.      Action Taken: Message routed to:  [facility] raheem neuro

## 2019-11-05 ENCOUNTER — OFFICE VISIT (OUTPATIENT)
Dept: NEUROLOGY | Facility: CLINIC | Age: 49
End: 2019-11-05
Payer: COMMERCIAL

## 2019-11-05 VITALS
DIASTOLIC BLOOD PRESSURE: 79 MMHG | WEIGHT: 188.6 LBS | HEART RATE: 68 BPM | TEMPERATURE: 98 F | OXYGEN SATURATION: 96 % | BODY MASS INDEX: 24.88 KG/M2 | SYSTOLIC BLOOD PRESSURE: 126 MMHG

## 2019-11-05 DIAGNOSIS — G24.4 OROMANDIBULAR DYSTONIA: Primary | ICD-10-CM

## 2019-11-05 ASSESSMENT — PAIN SCALES - GENERAL: PAINLEVEL: MILD PAIN (3)

## 2019-11-05 ASSESSMENT — PATIENT HEALTH QUESTIONNAIRE - PHQ9: SUM OF ALL RESPONSES TO PHQ QUESTIONS 1-9: 4

## 2019-11-05 NOTE — LETTER
11/5/2019       RE: Ayad Moreno  [address]  [facility] [address]     Dear Colleague,    Thank you for referring your patient, Ayad Moreno, to the [facility] NEUROLOGY at [facility]. Please see a copy of my visit note below.    Movement Disorders Botulinum Toxin Procedure Note     Chief Complaint: Dystonia     History of Present Illness:  Ayad Moreno is a 48 year old male who presents to clinic for botulinum toxin injections for treatment of dystonia including jaw dystonia, facial dystonia and hyoid dystonia.     The patient reported a very good response to the last injection of 4/16/2019     Comments: The patient felt his his last injection was excellent.   Only minimal if any dysphagia   Complications: None     Botulinum toxin effect: Right orbicularis oris, left lateral ptyergoid, left masseter     Prior to the start of the procedure and with procedural staff participation, I verbally confirmed the patient s identity using two indicators, relevant allergies, that the procedure was appropriate and matched the consent or emergent situation, and that the correct equipment/implants were available. Immediately prior to starting the procedure I conducted the Time Out with the procedural staff and re-confirmed the patient s name, procedure, and site/side. (The Joint Commission universal protocol was followed.)  Yes     TOTAL DOSE ADMINISTERED:  Dose Administered:  102 units Botox    Diluent Used:  Preservative Free Normal Saline  Total Volume of Diluent Used:  2 ml  Lot #[id number] with Expiration Date:  4/22      Medication guide was offered to patient and was declined.     CONSENT:  The risks, benefits, and treatment options were discussed with Ayad Moreno and he agreed to proceed.      Written consent was obtained byYes     EQUIPMENT USED:  Needle-37mm stimulating/recording     SKIN PREPARATION:  Skin preparation was performed using an alcohol wipe.     GUIDANCE  DESCRIPTION:  EMG guidance:Yes  Ultrasound guidance:No     AREA/MUSCLE INJECTED:       Muscles Injected Units Injected Number of Injections   Right masseter 20 1   Left masseter 20 1          Right orbicularis oris 10 4   Right lateral pterygoid 10 1   Left lateral pterygoid 10 1   Right sternohyoid 2 1   Left sternohyoid 2 1   Right omohyoid 2 1   Left omohyoid 2 1   R Anterior Digastrics  5  1   L Anterior Digastric  5 1    L Nasalis 5 1   RNasalis 5     L Nasalis lateral 2     R nasalis lateral 2 1    Total Units Injected: 102     Unavoidable Waste: 98     Total Units Billed 200        The patient tolerated the injections without difficulty.     Summary:    The patient was injected today with  102 units of Onabotulinumtoxin A (Botox) under EMG  guidance as treatment of d.  The patient tolerated the procedure well.  Plan  Follow-up in 3  months' time to consider repeat injections    Ignacio Zaidi MD

## 2019-11-05 NOTE — PROGRESS NOTES
Movement Disorders Botulinum Toxin Procedure Note     Chief Complaint: Dystonia     History of Present Illness:  Ayad Moreno is a 48 year old male who presents to clinic for botulinum toxin injections for treatment of dystonia including jaw dystonia, facial dystonia and hyoid dystonia.     The patient reported a very good response to the last injection of 4/16/2019     Comments: The patient felt his his last injection was excellent.  Only minimal if any dysphagia   Complications: None     Botulinum toxin effect: Right orbicularis oris, left lateral ptyergoid, left masseter     Prior to the start of the procedure and with procedural staff participation, I verbally confirmed the patient s identity using two indicators, relevant allergies, that the procedure was appropriate and matched the consent or emergent situation, and that the correct equipment/implants were available. Immediately prior to starting the procedure I conducted the Time Out with the procedural staff and re-confirmed the patient s name, procedure, and site/side. (The Joint Commission universal protocol was followed.)  Yes     TOTAL DOSE ADMINISTERED:  Dose Administered:  102 units Botox    Diluent Used:  Preservative Free Normal Saline  Total Volume of Diluent Used:  2 ml  Lot #[id number] with Expiration Date:  4/22         Medication guide was offered to patient and was declined.     CONSENT:  The risks, benefits, and treatment options were discussed with Ayad Moreno and he agreed to proceed.      Written consent was obtained byYes     EQUIPMENT USED:  Needle-37mm stimulating/recording     SKIN PREPARATION:  Skin preparation was performed using an alcohol wipe.     GUIDANCE DESCRIPTION:  EMG guidance:Yes  Ultrasound guidance:No     AREA/MUSCLE INJECTED:             Muscles Injected Units Injected Number of Injections   Right masseter 20 1   Left masseter 20 1          Right orbicularis oris 10 4   Right lateral pterygoid 10 1   Left lateral  pterygoid 10 1   Right sternohyoid 2 1   Left sternohyoid 2 1   Right omohyoid 2 1   Left omohyoid 2 1   R Anterior Digastrics  5  1   L Anterior Digastric  5 1    L Nasalis 5 1   RNasalis 5     L Nasalis lateral 2     R nasalis lateral 2 1    Total Units Injected: 102     Unavoidable Waste: 98     Total Units Billed 200        The patient tolerated the injections without difficulty.        Summary:    The patient was injected today with  102 units of Onabotulinumtoxin A (Botox) under EMG  guidance as treatment of d.  The patient tolerated the procedure well.  Plan  Follow-up in 3  months' time to consider repeat injections    Answers for HPI/ROS submitted by the patient on 11/5/2019   General Symptoms: No  Skin Symptoms: No  HENT Symptoms: No  EYE SYMPTOMS: No  HEART SYMPTOMS: No  LUNG SYMPTOMS: No  INTESTINAL SYMPTOMS: No  URINARY SYMPTOMS: No  REPRODUCTIVE SYMPTOMS: No  SKELETAL SYMPTOMS: No  BLOOD SYMPTOMS: No  NERVOUS SYSTEM SYMPTOMS: No  MENTAL HEALTH SYMPTOMS: No

## 2019-11-05 NOTE — NURSING NOTE
Chief Complaint   Patient presents with     Botox     [facility] RETURN BOTOX FOR MOVEMENT DISORDER       Bridgette Bradshaw, EMT

## 2020-01-09 ENCOUNTER — TELEPHONE (OUTPATIENT)
Dept: NEUROLOGY | Facility: CLINIC | Age: 50
End: 2020-01-09

## 2020-01-09 ENCOUNTER — OFFICE VISIT (OUTPATIENT)
Dept: NEUROLOGY | Facility: CLINIC | Age: 50
End: 2020-01-09
Payer: COMMERCIAL

## 2020-01-09 VITALS
BODY MASS INDEX: 25.94 KG/M2 | OXYGEN SATURATION: 99 % | SYSTOLIC BLOOD PRESSURE: 142 MMHG | WEIGHT: 196.6 LBS | DIASTOLIC BLOOD PRESSURE: 94 MMHG | HEART RATE: 90 BPM

## 2020-01-09 DIAGNOSIS — G24.1 DYSTONIA DUE TO DYT-1 GENE MUTATION: ICD-10-CM

## 2020-01-09 DIAGNOSIS — G24.9 DYSTONIA: Primary | ICD-10-CM

## 2020-01-09 RX ORDER — GABAPENTIN 800 MG/1
800 TABLET ORAL 3 TIMES DAILY
Qty: 90 TABLET | Refills: 3 | Status: SHIPPED | OUTPATIENT
Start: 2020-01-09 | End: 2020-04-28

## 2020-01-09 RX ORDER — MIRTAZAPINE 45 MG/1
45 TABLET, FILM COATED ORAL AT BEDTIME
Qty: 30 TABLET | Refills: 3 | Status: SHIPPED | OUTPATIENT
Start: 2020-01-09 | End: 2021-01-04

## 2020-01-09 RX ORDER — DIAZEPAM 2 MG
4 TABLET ORAL 3 TIMES DAILY
Qty: 90 TABLET | Refills: 5 | Status: SHIPPED | OUTPATIENT
Start: 2020-01-09 | End: 2020-05-11

## 2020-01-09 ASSESSMENT — ENCOUNTER SYMPTOMS
NUMBNESS: 0
PARALYSIS: 0
MEMORY LOSS: 0
SEIZURES: 0
LOSS OF CONSCIOUSNESS: 0
SPEECH CHANGE: 1
TINGLING: 0
DECREASED CONCENTRATION: 0
WEAKNESS: 0
INSOMNIA: 0
DISTURBANCES IN COORDINATION: 1
PANIC: 1
NERVOUS/ANXIOUS: 1
TREMORS: 1
DEPRESSION: 1
DIZZINESS: 0
HEADACHES: 0

## 2020-01-09 ASSESSMENT — PAIN SCALES - GENERAL: PAINLEVEL: NO PAIN (0)

## 2020-01-09 NOTE — LETTER
2020       RE: Ayad Moreno  [address]  [facility] [address]     Dear Colleague,    Thank you for referring your patient, Ayad Moreno, to the [facility] NEUROLOGY at [facility]. Please see a copy of my visit note below.    Department of Neurology  Movement Disorders Division     Patient: Ayad Morneo   MRN: [id number]   : 1970   Date of Visit: 2020     Reason for visit: dystonia    History of Present Illness  Mr. 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     He was last seen 2018.  He has been following up with Dr. Mckenzie Vicente for medication refills and also is seeing Dr. Zaidi on a regular basis for botulinum toxin injections for treatment of dystonia including jaw dystonia, facial dystonia and hyoid dystonia. Last injections were on 2019 and he reports today great response and no side effects.  He states that he is fine , he gets irritated sometimes by his speech especially when the right corner of the mouth is pulling.He thinks he has more dystonia in his tongue, but no trouble swallowing, no choking, speech is very understandable.   He is drinking coffee during the visit and able to use the right hand to hold the cup .   He is asking if he can consolidate his care here now and he would like to have his Rx for Diazepam, Neurontin and Remeron renewed. He likes Remeron since per patient this  the only serotonin specific reuptake inhibitor that dose not cause worsening of his dystonia, we told home we are not aware that any particular serotonin specific reuptake inhibitor have any particular effect on dystonia but we will be happy to continue the prescription for Remeron.  Today in clinic he was able to write holding the pen with his right hand and did not appreciate any dystonic tremor.  He is overall pleased  "with how DBS affected his life, \" I feel taller after DBS\".    He has his IPG recently replaced to a rechargeable one. HE dose not have any problems charging, he likes to have his IPG fully charged .    Review of Systems:  Other than that mentioned above, the remainder of 12 systems reviewed were negative.    Medications:  Current Outpatient Medications   Medication Sig Dispense Refill     botulinum toxin type A (BOTOX) 100 units injection Inject 800 units of botulinum toxin as 200 units every 3 months 2 Units 3     diazepam (VALIUM) 2 MG tablet Take 2 tablets (4 mg) by mouth 3 times daily 90 tablet 5     gabapentin (NEURONTIN) 800 MG tablet Take 1 tablet (800 mg) by mouth 3 times daily 90 tablet 3     mirtazapine (REMERON) 45 MG tablet Take 1 tablet (45 mg) by mouth At Bedtime 30 tablet 3     nicotine polacrilex (NICORETTE) 2 MG lozenge Place 2 mg inside cheek every hour as needed for smoking cessation (10x's/day)       vitamin B complex with vitamin C (VITAMIN  B COMPLEX) TABS tablet Take 1 tablet by mouth every morning        VITAMIN D PO        oxyCODONE (ROXICODONE) 5 MG tablet Take 1 tablet (5 mg) by mouth every 6 hours as needed for pain 6 tablet 0     traMADol (ULTRAM) 50 MG tablet Take 50 mg by mouth every 8 hours as needed       UNABLE TO FIND Take 1 teaspoonful by mouth 2 times daily MEDICATION NAME: Marijuana (CBD) Oil- NOT THC          Movement Disorder-related Medications                   8 am noon HS   Diazepam 4 mg TID PRN      Neurontin 800 mg TID  1 1 1   Remeron 45 mg    1        Physical Exam:  The patient's  weight is 89.2 kg (196 lb 9.6 oz). His blood pressure is 142/94 (abnormal) and his pulse is 90. His oxygen saturation is 99%.    Ayad is alert and oriented , provides excellent details of interval medical hx , speech is understandable, at times voice is strained.  CN: EOMI in al directions of the gaze, dystonic smile with right side of the mouth/face being pulled to the right, tongue " protrudes to midline, strong shoulder shrug.  Motor exam: no resting tremor, right hand flexed and with lateral deviation at the wrist, was able to hold a pen with the right hand and write his name, no problems holding a cup. He has postural tremor in wing eating position L>R. Very slight kinetic tremor on the left. Slow with finger tapping and  hand movements but with no decrements.  Gait:narroe based, decreased arm swing on the right .    DBS interrogation:    Device Activa RC, implant date 09/23/2019  Battery level 50%  ADRIANNA date 09/20/2033    Initial and final programming:     Group A ACTIVE Group B INACTIVE    Left Brain  C +, 1 -, 2 -  Volts:  4.0 volts  PW:  60 msec  Rate:  180 Hz     Patient :  Upper Limit: 4.6 volts  Lower Limit: 0 .2 volts     Electrode Impedance Check:   752 Ohms, 5.2 mA     Left Lead: No Problems Found.      Right Brain   C +, 9 -, 10 -  Volts:  3.6 volts  PW:  60 msec  Rate:  180 Hz     Patient :  Upper Limit: 4. 3 volts  Lower Limit: 0 volts Left Brain  C +, 1 -, 2 -  Volts:  4.0 volts  PW:  60 msec  Rate:  130 Hz     Patient :  Upper Limit: 4.2 volts  Lower Limit: 3.6 volts     Electrode Impedance Check:   Amplitude 5.1 mA, 686 Ohms    Left Lead: No Problems Found.      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    Plan:     1. DBS was checked and no open or short circuits found.  2. Renewed Rx for Remeron, Diazepam and Neurontin.      RTC 6 months.    Richa Ascencio MD  Movement Disorders Fellow    Patient seen and discussed with Dr. Loera.    The  patient was seen with the fellow. I was present for key portions of the history and physical examination and agree with the assessment and plan.    Uche Loera MD, PhD    Summary of orders placed this encounter:  No orders of the defined types were placed in this encounter.

## 2020-01-09 NOTE — PROGRESS NOTES
"  Department of Neurology  Movement Disorders Division     Patient: Ayad Moreno   MRN: [id number]   : 1970   Date of Visit: 2020     Reason for visit: dystonia    History of Present Illness  Mr. 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     He was last seen 2018.  He has been following up with Dr. Mckenzie Vicente for medication refills and also is seeing Dr. Zaidi on a regular basis for botulinum toxin injections for treatment of dystonia including jaw dystonia, facial dystonia and hyoid dystonia. Last injections were on 2019 and he reports today great response and no side effects.  He states that he is fine , he gets irritated sometimes by his speech especially when the right corner of the mouth is pulling.He thinks he has more dystonia in his tongue, but no trouble swallowing, no choking, speech is very understandable.   He is drinking coffee during the visit and able to use the right hand to hold the cup .   He is asking if he can consolidate his care here now and he would like to have his Rx for Diazepam, Neurontin and Remeron renewed. He likes Remeron since per patient this  the only serotonin specific reuptake inhibitor that dose not cause worsening of his dystonia, we told home we are not aware that any particular serotonin specific reuptake inhibitor have any particular effect on dystonia but we will be happy to continue the prescription for Remeron.  Today in clinic he was able to write holding the pen with his right hand and did not appreciate any dystonic tremor.  He is overall pleased with how DBS affected his life, \" I feel taller after DBS\".    He has his IPG recently replaced to a rechargeable one. HE dose not have any problems charging, he likes to have his IPG fully charged .    Review of Systems:  Other than that mentioned above, the remainder of 12 " systems reviewed were negative.    Medications:  Current Outpatient Medications   Medication Sig Dispense Refill     botulinum toxin type A (BOTOX) 100 units injection Inject 800 units of botulinum toxin as 200 units every 3 months 2 Units 3     diazepam (VALIUM) 2 MG tablet Take 2 tablets (4 mg) by mouth 3 times daily 90 tablet 5     gabapentin (NEURONTIN) 800 MG tablet Take 1 tablet (800 mg) by mouth 3 times daily 90 tablet 3     mirtazapine (REMERON) 45 MG tablet Take 1 tablet (45 mg) by mouth At Bedtime 30 tablet 3     nicotine polacrilex (NICORETTE) 2 MG lozenge Place 2 mg inside cheek every hour as needed for smoking cessation (10x's/day)       vitamin B complex with vitamin C (VITAMIN  B COMPLEX) TABS tablet Take 1 tablet by mouth every morning        VITAMIN D PO        oxyCODONE (ROXICODONE) 5 MG tablet Take 1 tablet (5 mg) by mouth every 6 hours as needed for pain 6 tablet 0     traMADol (ULTRAM) 50 MG tablet Take 50 mg by mouth every 8 hours as needed       UNABLE TO FIND Take 1 teaspoonful by mouth 2 times daily MEDICATION NAME: Marijuana (CBD) Oil- NOT THC            Movement Disorder-related Medications                   8 am noon HS   Diazepam 4 mg TID PRN      Neurontin 800 mg TID  1 1 1   Remeron 45 mg    1        Physical Exam:  The patient's  weight is 89.2 kg (196 lb 9.6 oz). His blood pressure is 142/94 (abnormal) and his pulse is 90. His oxygen saturation is 99%.    Ayad is alert and oriented , provides excellent details of interval medical hx , speech is understandable, at times voice is strained.  CN: EOMI in al directions of the gaze, dystonic smile with right side of the mouth/face being pulled to the right, tongue protrudes to midline, strong shoulder shrug.  Motor exam: no resting tremor, right hand flexed and with lateral deviation at the wrist, was able to hold a pen with the right hand and write his name, no problems holding a cup. He has postural tremor in wing eating position L>R.  Very slight kinetic tremor on the left. Slow with finger tapping and  hand movements but with no decrements.  Gait:narroe based, decreased arm swing on the right .    DBS interrogation:    Device Activa RC, implant date 09/23/2019  Battery level 50%  ADRIANNA date 09/20/2033    Initial and final programming:     Group A ACTIVE Group B INACTIVE    Left Brain  C +, 1 -, 2 -  Volts:  4.0 volts  PW:  60 msec  Rate:  180 Hz     Patient :  Upper Limit: 4.6 volts  Lower Limit: 0.2 volts     Electrode Impedance Check:   752 Ohms, 5.2 mA     Left Lead: No Problems Found.      Right Brain   C +, 9 -, 10 -  Volts:  3.6 volts  PW:  60 msec  Rate:  180 Hz     Patient :  Upper Limit: 4.3 volts  Lower Limit: 0 volts Left Brain  C +, 1 -, 2 -  Volts:  4.0 volts  PW:  60 msec  Rate:  130 Hz     Patient :  Upper Limit: 4.2 volts  Lower Limit: 3.6 volts     Electrode Impedance Check:   Amplitude 5.1 mA, 686 Ohms    Left Lead: No Problems Found.      Right Brain   C +, 9 - 10 -  Volts: 3.6 volts  PW:  90 msec  Rate:  130 Hz     Patient :  Upper Limit: 4.4 volts  Lower Limit: 3.2 volts        Impression:  Ayad Moreno is a 49 year old male with PMH of DYT-1 dystonia s/p bilateral Globus Pallidus Internus (Gpi) DBS lead placement left on 1/10/2017 & right on 11/16/201 and with recent IPG replacement to Activa RC who continues to do well and would like to consolidate all his care at [facility].    Plan:     1. DBS was checked and no open or short circuits found.  2. Renewed Rx for Remeron, Diazepam and Neurontin.      RTC 6 months.    Richa Ascencio MD  Movement Disorders Fellow    Patient seen and discussed with Dr. Loera.    The patient was seen with the fellow. I was present for key portions of the history and physical examination and agree with the assessment and plan.    Uche Loera MD, PhD    Summary of orders placed this encounter:  No orders of the defined types were placed in this  encounter.

## 2020-01-09 NOTE — TELEPHONE ENCOUNTER
Called [employer] to find out if the patient has a pocket adapter with his Activa RC. He had a battery replacement back in 9/2019. He now has Activa RC 21391. He is MRI full body eligible provided there are no open or short circuits.    MRI eligibility form filled out and scanned into Epic. Dabble message to be sent to patient letting him know.

## 2020-02-11 ENCOUNTER — OFFICE VISIT (OUTPATIENT)
Dept: OTOLARYNGOLOGY | Facility: CLINIC | Age: 50
End: 2020-02-11
Payer: COMMERCIAL

## 2020-02-11 VITALS
HEIGHT: 73 IN | SYSTOLIC BLOOD PRESSURE: 126 MMHG | HEART RATE: 73 BPM | BODY MASS INDEX: 25.98 KG/M2 | WEIGHT: 196 LBS | DIASTOLIC BLOOD PRESSURE: 84 MMHG

## 2020-02-11 DIAGNOSIS — Z53.9 ERRONEOUS ENCOUNTER--DISREGARD: ICD-10-CM

## 2020-02-11 DIAGNOSIS — G24.4 OROFACIAL DYSKINESIA: Primary | ICD-10-CM

## 2020-02-11 ASSESSMENT — PAIN SCALES - GENERAL: PAINLEVEL: NO PAIN (0)

## 2020-02-11 ASSESSMENT — MIFFLIN-ST. JEOR: SCORE: 1812.93

## 2020-02-11 NOTE — NURSING NOTE
Invasive Procedure Safety Checklist  Procedure:  botox    Responsible person(s):  Complete sections as appropriate and electronically sign and date below.    Staff/Provider  Consent documentation on chart:  YES  H&P is not applicable (when straight local anesthesia is used).    Procedure Team  Completed by comparing informed consent documentation, information on the patient record and/or the marked surgical site, and discussion with the patient/guardian.     Verified:  (Select all that apply)  Patient identification (two indicators)  Procedure to be performed  Procedure site and /or laterality and/or level  Consent  Procedure site:  Site can not be marked due to location.  Provider [place] - Site/Laterality/Level:  No Level or Structure  Staff/Provider:  No images    Procedure Team:  *Pause for the Cause* verbal and active participation of team members- verify:  Patient name:  YES  Procedure to be performed:  YES  Site, laterality and level, noting patient position:  YES    Above steps completed as applicable (Electronic Signature, Title, Date):    TR Boucher    Note:  Any incidents of wrong patient, wrong procedure, or wrong site are reported using the Occurrence Process already in place.  The occurrence form is required to be completed immediately with this type of event.

## 2020-02-11 NOTE — LETTER
2/11/2020       RE: Ayad Moreno  [address]  [facility] [address]     Dear Colleague,    Thank you for referring your patient, Ayad Moreno, to the [facility] EAR NOSE AND THROAT at [facility]. Please see a copy of my visit note below.    Ayad Moreno is a 50 year old male with a history of oral mandibular dystonia.  He was last injected by me at the [facility] on 3/8/2018.  Injections were 25 units of botulinum a toxin into each masseter muscle, 5 units into each anterior digastric muscle, 1 units into each sternohyoid muscle, 5 units into the levator anguli oris muscle 5 units in the depressor anguli oris muscle and 10 units into each lateral pterygoid muscle.  He had bilateral globus pallidus internus deep brain stimulators placed.  Left side was placed in January 2017 and the right side was in November 2017.  He had a left DBS battery replacement on 9/23/2019 he has been receiving botulinum toxins through the neurophysiology department here at the [facility] with Dr. Zaidi.  Most recent injection was on 11/5/2019 with a good response.    He is having some extra pulling in the right lower lip and has requesting that we address this area. He would like us also to treat   his masseter muscles , pterygoid muscles, anterior digastric muscles, the   right platysma muscle and the nasalis muscles bilaterally.  Our plan is to give 20 units into each masseter muscle, 5 units into each anterior digastric muscle, 5 units into the each nasalis muscle., 10 units into the depressor anguli oris muscle, 10 units into each lateral pterygoid muscle and 10 units into the right side of the platysma muscle.       PROCEDURE:  After obtaining informed consent, ground and reference electrodes were placed. The skin area of the injection was prepped with an alcohol swab.  Treatment sites that required electromyographic guidance used a 26 gauge electromyographic injection needle.   Treatment sites that did not require electromyographic guidance used a 26-gauge half inch needle attached to a tuberculin syringe.  Multiple injection sites where treated. Sites treated with Botulinum toxin A ( BTA) included:    Masseter muscle  :EMG guided,  bilateral, 20 units each site in divided doses with a strong EMG signal - total dose 40 units BTA.     Anterior digastric muscle:EMG guided, bilateral, 5 units each side with strong EMG response - total dose 10 units BTA.     Nasalis muscle. Non EMG guided, bilateral, 5 units each side  - total dose 10 units BTA.    Depressor anguli oris:Non EMG guided, unilateral, right .- total dose  10 units BTA    Lateral pterygoid muscle; EMG guided, bilateral. 10 units each side - total dose 20 units    Platysma muscle: EMG guided, unilateral, right, with strong EMG response .- total dose  10 units BTA    The total amount of botulinum A toxin given today was 100 units.  The EMG was necessary, specifically in this case to identify the lateral pterygoid muscle on the left side which is in the pterygopalatine fossa not otherwise accessible without EMG.  Additionally, EMG was needed to find the areas of greatest activity within the masseter, platysma and anterior digastric muscles bilaterally.  He tolerated the procedure well and left our clinic after a short observation.         PLAN: I will have him  follow up on a PRN basis.      Again, thank you for allowing me to participate in the care of your patient.      Sincerely,    Yves Horne MD

## 2020-02-11 NOTE — PROGRESS NOTES
Ayad Moreno is a 50 year old male with a history of oral mandibular dystonia.  He was last injected by me at the [facility] on 3/8/2018.  Injections were 25 units of botulinum a toxin into each masseter muscle, 5 units into each anterior digastric muscle, 1 units into each sternohyoid muscle, 5 units into the levator anguli oris muscle 5 units in the depressor anguli oris muscle and 10 units into each lateral pterygoid muscle.  He had bilateral globus pallidus internus deep brain stimulators placed.  Left side was placed in January 2017 and the right side was in November 2017.  He had a left DBS battery replacement on 9/23/2019 he has been receiving botulinum toxins through the neurophysiology department here at the [facility] with Dr. Zaidi.  Most recent injection was on 11/5/2019 with a good response.    He is having some extra pulling in the right lower lip and has requesting that we address this area. He would like us also to treat   his masseter muscles , pterygoid muscles, anterior digastric muscles, the   right platysma muscle and the nasalis muscles bilaterally.  Our plan is to give 20 units into each masseter muscle, 5 units into each anterior digastric muscle, 5 units into the each nasalis muscle., 10 units into the depressor anguli oris muscle, 10 units into each lateral pterygoid muscle and 10 units into the right side of the platysma muscle.       PROCEDURE:  After obtaining informed consent, ground and reference electrodes were placed. The skin area of the injection was prepped with an alcohol swab.  Treatment sites that required electromyographic guidance used a 26 gauge electromyographic injection needle.  Treatment sites that did not require electromyographic guidance used a 26-gauge half inch needle attached to a tuberculin syringe.  Multiple injection sites where treated. Sites treated with Botulinum toxin A ( BTA) included:    Masseter muscle  :EMG guided,  bilateral, 20 units each site  in divided doses with a strong EMG signal - total dose 40 units BTA.     Anterior digastric muscle:EMG guided, bilateral, 5 units each side with strong EMG response - total dose 10 units BTA.     Nasalis muscle. Non EMG guided, bilateral, 5 units each side  - total dose 10 units BTA.    Depressor anguli oris:Non EMG guided, unilateral, right .- total dose  10 units BTA    Lateral pterygoid muscle; EMG guided, bilateral. 10 units each side - total dose 20 units    Platysma muscle: EMG guided, unilateral, right, with strong EMG response .- total dose  10 units BTA    The total amount of botulinum A toxin given today was 100 units.  The EMG was necessary, specifically in this case to identify the lateral pterygoid muscle on the left side which is in the pterygopalatine fossa not otherwise accessible without EMG.  Additionally, EMG was needed to find the areas of greatest activity within the masseter, platysma and anterior digastric muscles bilaterally.  He tolerated the procedure well and left our clinic after a short observation.         PLAN: I will have him  follow up on a PRN basis.

## 2020-03-30 ENCOUNTER — TELEPHONE (OUTPATIENT)
Dept: OTOLARYNGOLOGY | Facility: CLINIC | Age: 50
End: 2020-03-30

## 2020-03-30 NOTE — TELEPHONE ENCOUNTER
Returned patient's call and left a .    Informed him that we are currently scheduling botox appointments in May due to COVID-19, and provided my direct line to call back and schedule.

## 2020-03-30 NOTE — TELEPHONE ENCOUNTER
[facility] Call Center    Phone Message    May a detailed message be left on voicemail: yes     Reason for Call: Other: Patient called to schedule a botox injection with Dr. Horne.  Please follow up with patient.   Thank you!     Action Taken: Message routed to:  [facility]    [place] Screening: Not Applicable

## 2020-04-28 ENCOUNTER — MYC MEDICAL ADVICE (OUTPATIENT)
Dept: NEUROLOGY | Facility: CLINIC | Age: 50
End: 2020-04-28

## 2020-04-28 DIAGNOSIS — G24.9 DYSTONIA: ICD-10-CM

## 2020-04-28 RX ORDER — GABAPENTIN 800 MG/1
800 TABLET ORAL 3 TIMES DAILY
Qty: 90 TABLET | Refills: 5 | Status: SHIPPED | OUTPATIENT
Start: 2020-04-28 | End: 2020-10-12

## 2020-04-28 NOTE — TELEPHONE ENCOUNTER
Nurse received refill request for: Gabapentin    Last re-ordered: 1/9/2020 for a 30 day supply with 3 refills    Last appointment: 1/9/2020    Next appointment: None scheduled    Action taken: Sent to be signed by the provider

## 2020-05-05 ENCOUNTER — TELEPHONE (OUTPATIENT)
Dept: OTOLARYNGOLOGY | Facility: CLINIC | Age: 50
End: 2020-05-05

## 2020-05-05 NOTE — TELEPHONE ENCOUNTER
Spoke with patient to inform that upcoming botox appointment will need to be cancelled since we are unable to do procedures currently in clinic due to COVID-19 precautions. Informed that patient will be added to a list and we will reach out to schedule an appointment when we are able to see patients in clinic again. Patient expressed understanding.    Teresa Quintero, EMT

## 2020-05-11 DIAGNOSIS — G24.1 DYSTONIA DUE TO DYT-1 GENE MUTATION: ICD-10-CM

## 2020-05-11 RX ORDER — DIAZEPAM 2 MG
4 TABLET ORAL 3 TIMES DAILY
Qty: 90 TABLET | Refills: 5
Start: 2020-05-11 | End: 2020-06-08

## 2020-05-11 NOTE — TELEPHONE ENCOUNTER
diazepam (VALIUM) 2 MG tablet  90 tablet  5  5/11/2020   No    Sig - Route: Take 2 tablets (4 mg) by mouth 3 times daily As needed - Oral      [facility] and left a message for the pharmacist to fill the above prescription.

## 2020-05-11 NOTE — TELEPHONE ENCOUNTER
Nurse received refill request for: Diazepam    Last re-ordered: 1/9/2020 for a 15 day supply with 5 refills    Last appointment: 1/9/2020    Next appointment: None scheduled    Action taken: Sent to be signed by the provider.

## 2020-05-14 ENCOUNTER — VIRTUAL VISIT (OUTPATIENT)
Dept: NEUROLOGY | Facility: CLINIC | Age: 50
End: 2020-05-14
Payer: COMMERCIAL

## 2020-05-14 DIAGNOSIS — G24.9 DYSTONIA: ICD-10-CM

## 2020-05-14 DIAGNOSIS — G24.1 DYSTONIA DUE TO DYT-1 GENE MUTATION: Primary | ICD-10-CM

## 2020-05-14 NOTE — LETTER
"2020     RE: Ayad Moreno  [address]  [facility] [address]     Dear Colleague,    Thank you for referring your patient, Ayad Moreno, to the [facility] NEUROLOGY at [facility]. Please see a copy of my visit note below.    Department of Neurology  Movement Disorders Division   Telehealth Video Evaluation - Follow-up    Patient: Ayad Moreno   MRN: [id number]   : 1970   Date of Visit: 2020      Ayad Moreno is a 50 year old male who is being evaluated via a billable video visit due to the Covid 19 pandemic.         The patient has been notified of following:     \"This video visit will be conducted via a call between you and your physician/provider. We have found that certain health care needs can be provided without the need for an in-person physical exam.  This service lets us provide the care you need with a video conversation.  If a prescription is necessary we can send it directly to your pharmacy.  If lab work is needed we can place an order for that and you can then stop by our lab to have the test done at a later time.    Video visits are billed at different rates depending on your insurance coverage.  Please reach out to your insurance provider with any questions.    If during the course of the call the physician/provider feels a video visit is not appropriate, you will not be charged for this service.\"    Patient has given verbal consent for Video visit? Yes    How would you like to obtain your AVS? [facility]    Patient would like the video invitation sent by: Send to e-mail at: [email or web address]    Will anyone else be joining your video visit? No        Chief Complaint: dystonia    History of Present Illness  Mr. 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     Interval History: He was last seen on 2020 when he " presented to re establish regular follow up visits  with us. Prior to that he was following with Dr. Vicente.  During last clinic visit he stated that he was doing fine and was having some frustration when the. corner of the mouth was pulling and his speech ws affected . He denied trouble swallowing, no choking events .  Diazepam, Neurontin and Remeron  have been renewed and he was overall pleased with how DBS affected his life.   His IPG was replaced with a rechargeable one ,no issues charging.    Since last clinic visit he is doing well, the same . The pulling on the right side of the mouth are not as bad as used to. He is getting Botulinum toxin injections for jaw dystonia with Dr. Yves Horne , ENT .  Speech is good, swallowing is not affected ,only sometimes after the round of botulinum toxin injections may have trouble swallowing.Last Botulinum toxin injections was 3 months ago.  As usual has good and bad days , but overall is doing well, no issues or concerns expressed regarding his dystonia symptoms.  Writing.g is slow, but able to write a check , has to brace using his left hand .  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   Charging is going well, charges every 5 days, it takes 5 hrs to charge form 25 % to a full battery.    Review of Systems:  Other than that mentioned above and at the end of this note, the remainder of 12 systems reviewed were negative.    Medications:  Current Outpatient Medications   Medication Sig Dispense Refill     botulinum toxin type A (BOTOX) 100 units injection Inject 800 units of botulinum toxin as 200 units every 3 months 2 Units 3     diazepam (VALIUM) 2 MG tablet Take 2 tablets (4 mg) by mouth 3 times daily As needed 90 tablet 5     gabapentin (NEURONTIN) 800 MG tablet Take 1 tablet (800 mg) by mouth 3 times daily 90 tablet 5     mirtazapine (REMERON) 45 MG tablet Take 1 tablet (45 mg) by mouth At Bedtime 30 tablet 3     nicotine  polacrilex (NICORETTE) 2 MG lozenge Place 2 mg inside cheek every hour as needed for smoking cessation (10x's/day)       oxyCODONE (ROXICODONE) 5 MG tablet Take 1 tablet (5 mg) by mouth every 6 hours as needed for pain 6 tablet 0     traMADol (ULTRAM) 50 MG tablet Take 50 mg by mouth every 8 hours as needed       UNABLE TO FIND Take 1 teaspoonful by mouth 2 times daily MEDICATION NAME: Marijuana (CBD) Oil- NOT THC       vitamin B complex with vitamin C (VITAMIN  B COMPLEX) TABS tablet Take 1 tablet by mouth every morning        VITAMIN D PO             Movement Disorder-related Medications                   8 am noon HS   Diazepam 4 mg TID PRN         Neurontin 800 mg TID  1 1 1   Remeron 45 mg      1       Allergies: is allergic to seasonal allergies.    Past Medical History:   Past Medical History:   Diagnosis Date     Anxiety      Asthmas with exposure to cats      Depression      Generalized dystonia     DYT-1 genetic mutation     Lumbar disc herniation      OCD (obsessive compulsive disorder)      Rt ACL Tear        Past Surgical History:   Past Surgical History:   Procedure Laterality Date     C REPAIR CRUCIATE LIGAMENT,KNEE  2008     IMPLANT DEEP BRAIN STIMULATION GENERATOR / BATTERY Left 1/20/2017    Procedure: IMPLANT DEEP BRAIN STIMULATION GENERATOR / BATTERY;  Surgeon: Duy Carolina MD;  Location: [facility] OR     OPTICAL TRACKING SYSTEM INSERTION DEEP BRAIN STIMULATION Left 1/10/2017    Procedure: OPTICAL TRACKING SYSTEM INSERTION DEEP BRAIN STIMULATION;  Surgeon: Duy Carolina MD;  Location: [facility] OR     OPTICAL TRACKING SYSTEM INSERTION DEEP BRAIN STIMULATION Right 11/16/2017    Procedure: OPTICAL TRACKING SYSTEM INSERTION DEEP BRAIN STIMULATION;  Right Stealth Assisted Deep Brain Stimulator Placement, Phase I And II Combined, Placement Of Right Side Deep Brain Stimulator Electrode, Target Right Globus Pallidus Internus With Microelectrode Recording And Connection To The Existing  Generator/Battery;  Surgeon: Duy Carolina MD;  Location: [facility] OR     REPLACE DEEP BRAIN STIMULATION GENERATOR / BATTERY Left 9/23/2019    Procedure: Left Deep Brain Stimulator Generator Replacement;  Surgeon: Sebastian Ramos MD;  Location: [facility] OR       Social History:   Social History     Socioeconomic History     Marital status:      Spouse name: Lisseth     Number of children: 2     Years of education: Not on file     Highest education level: Not on file   Occupational History     Occupation: Teacher     Employer: [employer]     Comment: Teaches 6 grade. Math. Taught for 14 years.   Social Needs     Financial resource strain: Not on file     Food insecurity     Worry: Not on file     Inability: Not on file     Transportation needs     Medical: Not on file     Non-medical: Not on file   Tobacco Use     Smoking status: Former Smoker     Types: Cigarettes     Smokeless tobacco: Never Used     Tobacco comment: social   Substance and Sexual Activity     Alcohol use: Not Currently     Alcohol/week: 0.0 standard drinks     Frequency: Monthly or less     Comment: None     Drug use: Not Currently     Types: Marijuana     Comment: vaping     Sexual activity: Yes     Partners: Female     Birth control/protection: Condom   Lifestyle     Physical activity     Days per week: Not on file     Minutes per session: Not on file     Stress: Not on file   Relationships     Social connections     Talks on phone: Not on file     Gets together: Not on file     Attends Bahai service: Not on file     Active member of club or organization: Not on file     Attends meetings of clubs or organizations: Not on file     Relationship status: Not on file     Intimate partner violence     Fear of current or ex partner: Not on file     Emotionally abused: Not on file     Physically abused: Not on file     Forced sexual activity: Not on file   Other Topics Concern     Parent/sibling w/ CABG, MI or angioplasty before 65F 55M? No    Social History Narrative     Not on file       Family History:  Family History   Problem Relation Age of Onset     Diabetes Father      Cancer Paternal Grandfather         Stomach CA     Heart Disease Paternal Grandfather      Cancer Maternal Grandmother         Bladder     Depression Maternal Grandmother      Myocardial Infarction Maternal Grandfather         MI     Obsessive Compulsive Disorder Son      Genetic Disorder Son         Dystonia 2ndary to DYT 1 micah mutation     Hypertension Mother           Physical Exam:  The patient's  vitals were not taken for this visit.    Neurological Examination: Limited exam through video visit.  Mr. Ribera is alert and oriented, speech is is very understandable, only at times with strained voice.  Extraocular movements are intact in all direction of gaze do not smile at times with right/face being pulled to the right reduced to midline, repetitive strong shoulder shrugs.  Murmur.  No dysmetria on finger-to-nose.  He showed me he is holding up and doing writing, he has to brace using the left hand.  There is postural tremor in wing beating position left more than right.  He is overall slow with finger tapping and hand movements no decrements.  There is  right hand ulnar deviation with outstretched arms.  I witnessed hm l getting off the chair and moving around in his room looking  for his  and his gait seems to be narrow based.        DBS interrogation:     Device Activa RC, implant date 09/23/2019  Battery level low, he was actively charging during video visit appointment     Initial and final programming: No changes were made to the DBS settings      Group A ACTIVE Group B INACTIVE    Left Brain  C +, 1 -, 2 -  Volts:  4.0 volts  PW:  60 msec  Rate:  180 Hz     Patient :  Upper Limit: 4.6 volts  Lower Limit: 0.2 volts            Right Brain   C +, 9 -, 10 -  Volts:  3.6 volts  PW:  60 msec  Rate:  180 Hz     Patient :  Upper  Limit: 4.3 volts  Lower Limit: 0 volts Left Brain  C +, 1 -, 2 -  Volts:  4.0 volts  PW:  60 msec  Rate:  130 Hz     Patient :  Upper Limit: 4.2 volts  Lower Limit: 3.6 volts           Right Brain   C +, 9 - 10 -  Volts: 3.6 volts  PW:  90 msec  Rate:  130 Hz     Patient :  Upper Limit: 4.4 volts  Lower Limit: 3.2 volts         Impression:  Ayad Moreno is a 50 year old male with PMH of DYT-1 dystonia s/p bilateral Globus Pallidus Internus (Gpi) DBS lead placement left on 1/10/2017 & right on 11/16/201 and with recent IPG replacement to Activa RC who continues to do well on oral medication and stable DBS settings and  did not require any adjustments.     Recommendations:     1.  No changes were made to your medication regimen.  2.  No changes were made to your DBS settings.  3.  Continue charging.  4.  Will return to clinic in 6 months or sooner as needed    Richa Ascencio MD  Fellow     Time was a vasquez factor in today's visit, and greater than 50% of this 45 minute  visit was spent discussing the therapeutic plan, counseling, and coordinating care.      30 minutes were spent in documentation review on May 14, 2020.  The elements of this review, including dates and types of diagnostic testing, impressions of prior consultations,   hospital visits, data submitted by the patient and by the referring physician have been incorporated into this consultation report.       Video-Visit Details    Type of service:  Video Visit    Start: 05/14/2020 10:49 am  Stop: 05/14/2020 11:34 am    The patient was seen with the fellow. I was present for key portions of the history and physical examination and agree with the assessment and plan    Uche Loera MD, PhD    Originating Location (pt. Location): Home    Distant Location (provider location):  [facility] NEUROLOGY     Mode of Communication:  Video Conference via [place]                Ayad Moreno is a 50 year old male who is being evaluated via a  "billable video visit.      The patient has been notified of following:     \"This video visit will be conducted via a call between you and your physician/provider. We have found that certain health care needs can be provided without the need for an in-person physical exam.  This service lets us provide the care you need with a video conversation.  If a prescription is necessary we can send it directly to your pharmacy.  If lab work is needed we can place an order for that and you can then stop by our lab to have the test done at a later time.    Video visits are billed at different rates depending on your insurance coverage.  Please reach out to your insurance provider with any questions.    If during the course of the call the physician/provider feels a video visit is not appropriate, you will not be charged for this service.\"    Patient has given verbal consent for Video visit? Yes    How would you like to obtain your AVS?    Patient would like the video invitation sent by: [email or web address]    Will anyone else be joining your video visit?         Video-Visit Details    Type of service:  Video Visit    Video Start Time:  Video End Time:     Originating Location (pt. Location):  Distant Location (provider location):  [facility] NEUROLOGY     Platform used for Video Visit:  Esteban Proctor EMT    "

## 2020-05-14 NOTE — PATIENT INSTRUCTIONS
1.No changes to DBS settings.    2. No changes to medications.    3.Call us with any questions or concerns.    4.Will return to clinic in 6 months or sooner as needed

## 2020-05-14 NOTE — PROGRESS NOTES
"Department of Neurology  Movement Disorders Division   Telehealth Video Evaluation - Follow-up    Patient: Ayad Moreno   MRN: [id number]   : 1970   Date of Visit: 2020      Ayad Moreno is a 50 year old male who is being evaluated via a billable video visit due to the Covid 19 pandemic.         The patient has been notified of following:     \"This video visit will be conducted via a call between you and your physician/provider. We have found that certain health care needs can be provided without the need for an in-person physical exam.  This service lets us provide the care you need with a video conversation.  If a prescription is necessary we can send it directly to your pharmacy.  If lab work is needed we can place an order for that and you can then stop by our lab to have the test done at a later time.    Video visits are billed at different rates depending on your insurance coverage.  Please reach out to your insurance provider with any questions.    If during the course of the call the physician/provider feels a video visit is not appropriate, you will not be charged for this service.\"    Patient has given verbal consent for Video visit? Yes    How would you like to obtain your AVS? [facility]    Patient would like the video invitation sent by: Send to e-mail at: [email or web address]    Will anyone else be joining your video visit? No        Chief Complaint: dystonia    History of Present Illness  Mr. 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     Interval History: He was last seen on 2020 when he presented to re establish regular follow up visits  with us. Prior to that he was following with Dr. Vicente.  During last clinic visit he stated that he was doing fine and was having some frustration when the. corner of the mouth was pulling and his speech ws affected . He " denied trouble swallowing, no choking events .  Diazepam, Neurontin and Remeron  have been renewed and he was overall pleased with how DBS affected his life.   His IPG was replaced with a rechargeable one ,no issues charging.    Since last clinic visit he is doing well, the same . The pulling on the right side of the mouth are not as bad as used to. He is getting Botulinum toxin injections for jaw dystonia with Dr. Yves Horne , ENT .  Speech is good, swallowing is not affected ,only sometimes after the round of botulinum toxin injections may have trouble swallowing.Last Botulinum toxin injections was 3 months ago.  As usual has good and bad days , but overall is doing well, no issues or concerns expressed regarding his dystonia symptoms.  Writing.amy is slow, but able to write a check , has to brace using his left hand .  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   Charging is going well, charges every 5 days, it takes 5 hrs to charge form 25 % to a full battery.    Review of Systems:  Other than that mentioned above and at the end of this note, the remainder of 12 systems reviewed were negative.    Medications:  Current Outpatient Medications   Medication Sig Dispense Refill     botulinum toxin type A (BOTOX) 100 units injection Inject 800 units of botulinum toxin as 200 units every 3 months 2 Units 3     diazepam (VALIUM) 2 MG tablet Take 2 tablets (4 mg) by mouth 3 times daily As needed 90 tablet 5     gabapentin (NEURONTIN) 800 MG tablet Take 1 tablet (800 mg) by mouth 3 times daily 90 tablet 5     mirtazapine (REMERON) 45 MG tablet Take 1 tablet (45 mg) by mouth At Bedtime 30 tablet 3     nicotine polacrilex (NICORETTE) 2 MG lozenge Place 2 mg inside cheek every hour as needed for smoking cessation (10x's/day)       oxyCODONE (ROXICODONE) 5 MG tablet Take 1 tablet (5 mg) by mouth every 6 hours as needed for pain 6 tablet 0     traMADol (ULTRAM) 50 MG tablet Take 50 mg by  mouth every 8 hours as needed       UNABLE TO FIND Take 1 teaspoonful by mouth 2 times daily MEDICATION NAME: Marijuana (CBD) Oil- NOT THC       vitamin B complex with vitamin C (VITAMIN  B COMPLEX) TABS tablet Take 1 tablet by mouth every morning        VITAMIN D PO             Movement Disorder-related Medications                   8 am noon HS   Diazepam 4 mg TID PRN         Neurontin 800 mg TID  1 1 1   Remeron 45 mg      1       Allergies: is allergic to seasonal allergies.    Past Medical History:   Past Medical History:   Diagnosis Date     Anxiety      Asthmas with exposure to cats      Depression      Generalized dystonia     DYT-1 genetic mutation     Lumbar disc herniation      OCD (obsessive compulsive disorder)      Rt ACL Tear        Past Surgical History:   Past Surgical History:   Procedure Laterality Date     C REPAIR CRUCIATE LIGAMENT,KNEE  2008     IMPLANT DEEP BRAIN STIMULATION GENERATOR / BATTERY Left 1/20/2017    Procedure: IMPLANT DEEP BRAIN STIMULATION GENERATOR / BATTERY;  Surgeon: Duy Carolina MD;  Location: [facility] OR     OPTICAL TRACKING SYSTEM INSERTION DEEP BRAIN STIMULATION Left 1/10/2017    Procedure: OPTICAL TRACKING SYSTEM INSERTION DEEP BRAIN STIMULATION;  Surgeon: Duy Carolina MD;  Location: [facility] OR     OPTICAL TRACKING SYSTEM INSERTION DEEP BRAIN STIMULATION Right 11/16/2017    Procedure: OPTICAL TRACKING SYSTEM INSERTION DEEP BRAIN STIMULATION;  Right Stealth Assisted Deep Brain Stimulator Placement, Phase I And II Combined, Placement Of Right Side Deep Brain Stimulator Electrode, Target Right Globus Pallidus Internus With Microelectrode Recording And Connection To The Existing Generator/Battery;  Surgeon: Duy Carolina MD;  Location: [facility] OR     REPLACE DEEP BRAIN STIMULATION GENERATOR / BATTERY Left 9/23/2019    Procedure: Left Deep Brain Stimulator Generator Replacement;  Surgeon: Sebastian Ramos MD;  Location: [facility] OR       Social History:   Social  History     Socioeconomic History     Marital status:      Spouse name: Lisseth     Number of children: 2     Years of education: Not on file     Highest education level: Not on file   Occupational History     Occupation: Teacher     Employer: [employer]     Comment: Teaches 6 grade. Math. Taught for 14 years.   Social Needs     Financial resource strain: Not on file     Food insecurity     Worry: Not on file     Inability: Not on file     Transportation needs     Medical: Not on file     Non-medical: Not on file   Tobacco Use     Smoking status: Former Smoker     Types: Cigarettes     Smokeless tobacco: Never Used     Tobacco comment: social   Substance and Sexual Activity     Alcohol use: Not Currently     Alcohol/week: 0.0 standard drinks     Frequency: Monthly or less     Comment: None     Drug use: Not Currently     Types: Marijuana     Comment: vaping     Sexual activity: Yes     Partners: Female     Birth control/protection: Condom   Lifestyle     Physical activity     Days per week: Not on file     Minutes per session: Not on file     Stress: Not on file   Relationships     Social connections     Talks on phone: Not on file     Gets together: Not on file     Attends Bahai service: Not on file     Active member of club or organization: Not on file     Attends meetings of clubs or organizations: Not on file     Relationship status: Not on file     Intimate partner violence     Fear of current or ex partner: Not on file     Emotionally abused: Not on file     Physically abused: Not on file     Forced sexual activity: Not on file   Other Topics Concern     Parent/sibling w/ CABG, MI or angioplasty before 65F 55M? No   Social History Narrative     Not on file       Family History:  Family History   Problem Relation Age of Onset     Diabetes Father      Cancer Paternal Grandfather         Stomach CA     Heart Disease Paternal Grandfather      Cancer Maternal Grandmother         Bladder     Depression  Maternal Grandmother      Myocardial Infarction Maternal Grandfather         MI     Obsessive Compulsive Disorder Son      Genetic Disorder Son         Dystonia 2ndary to DYT 1 micah mutation     Hypertension Mother           Physical Exam:  The patient's  vitals were not taken for this visit.    Neurological Examination: Limited exam through video visit.  Mr. Ribera is alert and oriented, speech is is very understandable, only at times with strained voice.  Extraocular movements are intact in all direction of gaze do not smile at times with right/face being pulled to the right reduced to midline, repetitive strong shoulder shrugs.  Murmur.  No dysmetria on finger-to-nose.  He showed me he is holding up and doing writing, he has to brace using the left hand.  There is postural tremor in wing beating position left more than right.  He is overall slow with finger tapping and hand movements no decrements.  There is  right hand ulnar deviation with outstretched arms.  I witnessed hm l getting off the chair and moving around in his room looking  for his  and his gait seems to be narrow based.        DBS interrogation:     Device Activa RC, implant date 09/23/2019  Battery level low, he was actively charging during video visit appointment     Initial and final programming: No changes were made to the DBS settings      Group A ACTIVE Group B INACTIVE    Left Brain  C +, 1 -, 2 -  Volts:  4.0 volts  PW:  60 msec  Rate:  180 Hz     Patient :  Upper Limit: 4.6 volts  Lower Limit: 0.2 volts            Right Brain   C +, 9 -, 10 -  Volts:  3.6 volts  PW:  60 msec  Rate:  180 Hz     Patient :  Upper Limit: 4.3 volts  Lower Limit: 0 volts Left Brain  C +, 1 -, 2 -  Volts:  4.0 volts  PW:  60 msec  Rate:  130 Hz     Patient :  Upper Limit: 4.2 volts  Lower Limit: 3.6 volts           Right Brain   C +, 9 - 10 -  Volts: 3.6 volts  PW:  90 msec  Rate:  130 Hz     Patient :  Upper  Limit: 4.4 volts  Lower Limit: 3.2 volts         Impression:  Ayad Moreno is a 50 year old male with PMH of DYT-1 dystonia s/p bilateral Globus Pallidus Internus (Gpi) DBS lead placement left on 1/10/2017 & right on 11/16/201 and with recent IPG replacement to Activa RC who continues to do well on oral medication and stable DBS settings and  did not require any adjustments.     Recommendations:     1.  No changes were made to your medication regimen.  2.  No changes were made to your DBS settings.  3.  Continue charging.  4.  Will return to clinic in 6 months or sooner as needed    Richa Ascencio MD  Fellow     Time was a vasquez factor in today's visit, and greater than 50% of this 45 minute  visit was spent discussing the therapeutic plan, counseling, and coordinating care.      30 minutes were spent in documentation review on May 14, 2020.  The elements of this review, including dates and types of diagnostic testing, impressions of prior consultations,   hospital visits, data submitted by the patient and by the referring physician have been incorporated into this consultation report.       Video-Visit Details    Type of service:  Video Visit    Start: 05/14/2020 10:49 am  Stop: 05/14/2020 11:34 am    The patient was seen with the fellow. I was present for key portions of the history and physical examination and agree with the assessment and plan    Uche Loera MD, PhD    Originating Location (pt. Location): Home    Distant Location (provider location):  [facility] NEUROLOGY     Mode of Communication:  Video Conference via [employer]

## 2020-05-14 NOTE — PROGRESS NOTES
"Ayad Moreno is a 50 year old male who is being evaluated via a billable video visit.      The patient has been notified of following:     \"This video visit will be conducted via a call between you and your physician/provider. We have found that certain health care needs can be provided without the need for an in-person physical exam.  This service lets us provide the care you need with a video conversation.  If a prescription is necessary we can send it directly to your pharmacy.  If lab work is needed we can place an order for that and you can then stop by our lab to have the test done at a later time.    Video visits are billed at different rates depending on your insurance coverage.  Please reach out to your insurance provider with any questions.    If during the course of the call the physician/provider feels a video visit is not appropriate, you will not be charged for this service.\"    Patient has given verbal consent for Video visit? Yes    How would you like to obtain your AVS?    Patient would like the video invitation sent by: [email or web address]    Will anyone else be joining your video visit?         Video-Visit Details    Type of service:  Video Visit    Video Start Time:  Video End Time:     Originating Location (pt. Location):  Distant Location (provider location):  [facility] NEUROLOGY     Platform used for Video Visit:  KATT Ragland        "

## 2020-06-07 ENCOUNTER — NURSE TRIAGE (OUTPATIENT)
Dept: NURSING | Facility: CLINIC | Age: 50
End: 2020-06-07

## 2020-06-07 ENCOUNTER — MYC MEDICAL ADVICE (OUTPATIENT)
Dept: NEUROLOGY | Facility: CLINIC | Age: 50
End: 2020-06-07

## 2020-06-07 DIAGNOSIS — G24.1 DYSTONIA DUE TO DYT-1 GENE MUTATION: ICD-10-CM
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
## 2020-06-08 RX ORDER — DIAZEPAM 2 MG
4 TABLET ORAL EVERY 8 HOURS PRN
Qty: 90 TABLET | Refills: 5 | Status: SHIPPED | OUTPATIENT
Start: 2020-06-08 | End: 2020-10-28

## 2020-06-08 RX ORDER — DIAZEPAM 2 MG
4 TABLET ORAL EVERY 8 HOURS PRN
Qty: 90 TABLET | Refills: 5
Start: 2020-06-08 | End: 2020-06-08

## 2020-06-30 ENCOUNTER — OFFICE VISIT (OUTPATIENT)
Dept: OTOLARYNGOLOGY | Facility: CLINIC | Age: 50
End: 2020-06-30
Payer: COMMERCIAL

## 2020-06-30 VITALS
RESPIRATION RATE: 18 BRPM | SYSTOLIC BLOOD PRESSURE: 127 MMHG | HEART RATE: 69 BPM | WEIGHT: 197 LBS | BODY MASS INDEX: 25.99 KG/M2 | DIASTOLIC BLOOD PRESSURE: 80 MMHG

## 2020-06-30 DIAGNOSIS — G24.4 OROFACIAL DYSKINESIA: Primary | ICD-10-CM

## 2020-06-30 ASSESSMENT — PAIN SCALES - GENERAL: PAINLEVEL: MILD PAIN (2)

## 2020-06-30 NOTE — NURSING NOTE
Invasive Procedure Safety Checklist  Procedure:  botox    Responsible person(s):  Complete sections as appropriate and electronically sign and date below.    Staff/Provider  Consent documentation on chart:  YES  H&P is not applicable (when straight local anesthesia is used).    Procedure Team  Completed by comparing informed consent documentation, information on the patient record and/or the marked surgical site, and discussion with the patient/guardian.     Verified:  (Select all that apply)  Patient identification (two indicators)  Procedure to be performed  Procedure site and /or laterality and/or level  Consent  Procedure site:  Site can not be marked due to location.  Provider [place] - Site/Laterality/Level:  No Level or Structure  Staff/Provider:  No images    Procedure Team:  *Pause for the Cause* verbal and active participation of team members- verify:  Patient name:  YES  Procedure to be performed:  YES  Site, laterality and level, noting patient position:  YES    Above steps completed as applicable (Electronic Signature, Title, Date):    Susan Keith LPN      Note:  Any incidents of wrong patient, wrong procedure, or wrong site are reported using the Occurrence Process already in place.  The occurrence form is required to be completed immediately with this type of event.

## 2020-06-30 NOTE — LETTER
6/30/2020       RE: Ayad Moreno  [address]  [place] [facility] [address]     Dear Colleague,    Thank you for referring your patient, Ayad Moreno, to the [facility] EAR NOSE AND THROAT at [facility]. Please see a copy of my visit note below.    Ayad Moreno is a 50 year old male with a history of oral mandibular dystonia.  He was last injected by me at the [facility] on 3/8/2018.  Injections were 25 units of botulinum a toxin into each masseter muscle, 5 units into each anterior digastric muscle, 1 units into each sternohyoid muscle, 5 units into the levator anguli oris muscle 5 units in the depressor anguli oris muscle and 10 units into each lateral pterygoid muscle.  He had bilateral globus pallidus internus deep brain stimulators placed.  Left side was placed in January 2017 and the right side was in November 2017.  He had a left DBS battery replacement on 9/23/2019 he has been receiving botulinum toxins through the neurophysiology department here at the [facility] with Dr. Zaidi.  Most recent injection was on 2/11/2020 with a good response.   On our last visit he was having some extra pulling in the right lower lip and we address this area as well.  Injections on that date included 20 units into each masseter muscle, 5 units into each anterior digastric muscle, 5 units into the each nasalis muscle., 10 units into the depressor anguli oris muscle, 10 units into each lateral pterygoid muscle and 10 units into the right side of the platysma muscle. We aso treated his masseter muscles, pterygoid muscles anterior digastric muscles and the right platysma muscle and the nasalis muscles bilaterally.  Our plan is to give a similar dose today.          PROCEDURE:  After obtaining informed consent, ground and reference electrodes were placed. The skin area of the injection was prepped with an alcohol swab.  Treatment sites that required electromyographic guidance used a 26  gauge electromyographic injection needle.  Treatment sites that did not require electromyographic guidance used a 26-gauge half inch needle attached to a tuberculin syringe.  Multiple injection sites where treated. Sites treated with Botulinum toxin A ( BTA) included:     Masseter muscle  :EMG guided,  bilateral, 20 units each site in divided doses with a strong EMG signal - total dose 40 units BTA.      Anterior digastric muscle:EMG guided, bilateral, 5 units each side with strong EMG response - total dose 10 units BTA.      Nasalis muscle. Non EMG guided, bilateral, 5 units each side  - total dose 10 units BTA.     Depressor anguli oris:Non EMG guided, unilateral, right .- total dose  10 units BTA     Lateral pterygoid muscle; EMG guided, bilateral. 10 units each side - total dose 20 units     Platysma muscle: EMG guided, unilateral, right, with strong EMG response .- total dose  10 units BTA.      The total amount of botulinum A toxin given today was 100 units.  The EMG was necessary, specifically in this case to identify the lateral pterygoid muscle on the left side which is in the pterygopalatine fossa not otherwise accessible without EMG.  Additionally, EMG was needed to find the areas of greatest activity within the masseter, platysma and anterior digastric muscles bilaterally.  He tolerated the procedure well and left our clinic after a short observation.  The lot number for the botulinum toxin was  [id number].  Expiration date was August 2022.      PLAN: I will have him  follow up on a PRN basis.    Again, thank you for allowing me to participate in the care of your patient.      Sincerely,    Yves Horne MD

## 2020-06-30 NOTE — PATIENT INSTRUCTIONS
1.  You were seen in the ENT Clinic today by . If you have any questions or concerns after your appointment, please call [phone]. Press option #1 for scheduling related needs. Press option #3 for Nurse advice.    2.  Plan is to return to clinic as needed for repeat botox treatment    Susan Keith LPN  [facility] - Otolaryngology

## 2020-06-30 NOTE — PROGRESS NOTES
Ayad Moreno is a 50 year old male with a history of oral mandibular dystonia.  He was last injected by me at the [facility] on 3/8/2018.  Injections were 25 units of botulinum a toxin into each masseter muscle, 5 units into each anterior digastric muscle, 1 units into each sternohyoid muscle, 5 units into the levator anguli oris muscle 5 units in the depressor anguli oris muscle and 10 units into each lateral pterygoid muscle.  He had bilateral globus pallidus internus deep brain stimulators placed.  Left side was placed in January 2017 and the right side was in November 2017.  He had a left DBS battery replacement on 9/23/2019 he has been receiving botulinum toxins through the neurophysiology department here at the [facility] with Dr. Zaidi.  Most recent injection was on 2/11/2020 with a good response.   On our last visit he was having some extra pulling in the right lower lip and we address this area as well.  Injections on that date included 20 units into each masseter muscle, 5 units into each anterior digastric muscle, 5 units into the each nasalis muscle., 10 units into the depressor anguli oris muscle, 10 units into each lateral pterygoid muscle and 10 units into the right side of the platysma muscle. We aso treated his masseter muscles, pterygoid muscles anterior digastric muscles and the right platysma muscle and the nasalis muscles bilaterally.  Our plan is to give a similar dose today.          PROCEDURE:  After obtaining informed consent, ground and reference electrodes were placed. The skin area of the injection was prepped with an alcohol swab.  Treatment sites that required electromyographic guidance used a 26 gauge electromyographic injection needle.  Treatment sites that did not require electromyographic guidance used a 26-gauge half inch needle attached to a tuberculin syringe.  Multiple injection sites where treated. Sites treated with Botulinum toxin A ( BTA) included:     Masseter muscle   :EMG guided,  bilateral, 20 units each site in divided doses with a strong EMG signal - total dose 40 units BTA.      Anterior digastric muscle:EMG guided, bilateral, 5 units each side with strong EMG response - total dose 10 units BTA.      Nasalis muscle. Non EMG guided, bilateral, 5 units each side  - total dose 10 units BTA.     Depressor anguli oris:Non EMG guided, unilateral, right .- total dose  10 units BTA     Lateral pterygoid muscle; EMG guided, bilateral. 10 units each side - total dose 20 units     Platysma muscle: EMG guided, unilateral, right, with strong EMG response .- total dose  10 units BTA.      The total amount of botulinum A toxin given today was 100 units.  The EMG was necessary, specifically in this case to identify the lateral pterygoid muscle on the left side which is in the pterygopalatine fossa not otherwise accessible without EMG.  Additionally, EMG was needed to find the areas of greatest activity within the masseter, platysma and anterior digastric muscles bilaterally.  He tolerated the procedure well and left our clinic after a short observation.  The lot number for the botulinum toxin was  [id number].  Expiration date was August 2022.      PLAN: I will have him  follow up on a PRN basis.

## 2020-08-18 ENCOUNTER — TELEPHONE (OUTPATIENT)
Dept: NEUROLOGY | Facility: CLINIC | Age: 50
End: 2020-08-18

## 2020-08-18 NOTE — TELEPHONE ENCOUNTER
Ayad received Diazepam 2 mg on 6/8/2020 for a 15 day supply with 5 refills. Ayad should have 1 refill left.    Called [facility] and spoke to Ana Laura (pharmacist). Ayad picked up his last prescription on 8/4 for 90 tablets. Ana Laura stated Dr. Ascencio's DEENA number is inactive and they need a new script.  I asked if she got the last RX from [facility] on 6/8/2020 for 90 tablets with 5 refills. She reported Ayad has 4 refills left. She will start filling this and will have it ready in an hour for Ayad.

## 2020-08-18 NOTE — TELEPHONE ENCOUNTER
[facility] Call Center    Phone Message    May a detailed message be left on voicemail: yes     Reason for Call: Medication Refill Request    Has the patient contacted the pharmacy for the refill? Yes   Name of medication being requested: diazepam (VALIUM) 2 MG tablet   Provider who prescribed the medication: Dr. Manning  Pharmacy: [id number] - [place], [place] - [address] AT [facility] OF [facility] [address]     Pharmacy stated that they need a DEENA verification form. Their phone number is [phone].    Date medication is needed: 8/18/20     Ayad stated that he doesn't have any pills left and needs this medication by tonight.      Action Taken: Message routed to:  [facility] ([facility]  NEUROLOGY    Travel Screening: Not Applicable

## 2020-09-02 ENCOUNTER — ALLIED HEALTH/NURSE VISIT (OUTPATIENT)
Dept: NEUROLOGY | Facility: CLINIC | Age: 50
End: 2020-09-02
Payer: COMMERCIAL

## 2020-09-02 DIAGNOSIS — G24.9 GENERALIZED DYSTONIA: Primary | ICD-10-CM

## 2020-09-02 NOTE — PROGRESS NOTES
Interrogated patient's DBS Medtronic RC battery. Patient has BGpi. All impedances were checked at 1.5 Volts and were WNL. See neuromodulation sheets scanned. Patient informed.    Group A  LGpi Therapy impedance = 705 ohms, 5.6 mA    Contacts used = C+ 1-2-   (double monopolar)  Upper limit = 4.6  Lower limit = 0.2  Current Setting = 4.0    Model [id number]    Battery voltage Rechargeable    Group A  RGpi Therapy impedance = 684 ohms, 5 mA    Contacts used = C+1-2-   (double monopolar)  Upper limit = 4.3  Lower limit = 0  Current Setting = 3.5    Medtronic MRI Eligibility form faxed to Magdalena at [facility] MRI department [phone].

## 2020-09-23 ENCOUNTER — DOCUMENTATION ONLY (OUTPATIENT)
Dept: NEUROLOGY | Facility: CLINIC | Age: 50
End: 2020-09-23

## 2020-09-23 NOTE — PROGRESS NOTES
Medical Examination Report has been received, reviewed and signed by provider.Form has been mailed [facility]. Form has been scanned to chart.

## 2020-10-01 ENCOUNTER — MYC MEDICAL ADVICE (OUTPATIENT)
Dept: NEUROLOGY | Facility: CLINIC | Age: 50
End: 2020-10-01

## 2020-10-12 ENCOUNTER — MYC REFILL (OUTPATIENT)
Dept: NEUROLOGY | Facility: CLINIC | Age: 50
End: 2020-10-12

## 2020-10-12 DIAGNOSIS — G24.1 DYSTONIA DUE TO DYT-1 GENE MUTATION: ICD-10-CM

## 2020-10-12 RX ORDER — DIAZEPAM 2 MG
4 TABLET ORAL EVERY 8 HOURS PRN
Qty: 180 TABLET | Refills: 5 | Status: CANCELLED | OUTPATIENT
Start: 2020-10-12

## 2020-10-13 NOTE — TELEPHONE ENCOUNTER
Spoke to Albaro at [facility]. He stated that someone grabbed the wrong prescription and that one had no refills left. He sees the prescription with the 5 remaining refills. He will fill that and their automated system will call the patient.    diazepam (VALIUM) 2 MG tablet 90 tablet 5 6/8/2020  No   Sig - Route: Take 2 tablets (4 mg) by mouth every 8 hours as needed for anxiety or muscle spasms As needed - Oral   Sent to pharmacy as: diazePAM 2 MG Oral Tablet (VALIUM)   Class: E-Prescribe   Order: [id number]   E-Prescribing Status: Receipt confirmed by pharmacy (6/8/2020  8:36 PM CDT)     Called Ayad to let him know what happened. No answer, left voice mail.

## 2020-10-13 NOTE — TELEPHONE ENCOUNTER
[facility] Call Center    Phone Message    May a detailed message be left on voicemail: yes     Reason for Call: Other: Ayad calling to request a call back. He states it has been 24 hours since he last took his medication. He is completely out. Please refill medication as soon as possible.      Action Taken: Message routed to:  [facility]  neuro     Travel Screening: Not Applicable

## 2020-10-22 NOTE — ADDENDUM NOTE
Encounter addended by: Alpers, Allison E, SLP on: 10/22/2020 2:34 PM   Actions taken: Episode resolved

## 2020-10-28 DIAGNOSIS — G24.1 DYSTONIA DUE TO DYT-1 GENE MUTATION: ICD-10-CM

## 2020-10-28 RX ORDER — DIAZEPAM 2 MG
4 TABLET ORAL EVERY 8 HOURS PRN
Qty: 90 TABLET | Refills: 5 | Status: SHIPPED | OUTPATIENT
Start: 2020-10-28 | End: 2021-01-22

## 2020-10-28 RX ORDER — DIAZEPAM 2 MG
4 TABLET ORAL EVERY 8 HOURS PRN
Qty: 90 TABLET | Refills: 5 | Status: CANCELLED | OUTPATIENT
Start: 2020-10-28

## 2020-10-28 NOTE — TELEPHONE ENCOUNTER
diazepam (VALIUM) 2 MG tablet 90 tablet 5 6/8/2020  No   Sig - Route: Take 2 tablets (4 mg) by mouth every 8 hours as needed for anxiety or muscle spasms As needed - Oral     Max of 6 tablets per day. 90 tablets = 15 day supply. New prescription needed.

## 2020-10-28 NOTE — TELEPHONE ENCOUNTER
[facility] Call Center    Phone Message    May a detailed message be left on voicemail: yes     Reason for Call: Medication Refill Request    Has the patient contacted the pharmacy for the refill? Yes   Name of medication being requested: diazepam (VALIUM) 2 MG tablet  Provider who prescribed the medication: [facility]  Pharmacy: [id number] - [place], [place] - [address] AT [facility] [address]  Date medication is needed: ASAP     Ayad stated that he has been out of this medication for 24 hours.    Action Taken: Message routed to:  [facility]    Travel Screening: Not Applicable

## 2020-10-28 NOTE — TELEPHONE ENCOUNTER
diazepam (VALIUM) 2 MG tablet 90 tablet 5 6/8/2020  No   Sig - Route: Take 2 tablets (4 mg) by mouth every 8 hours as needed for anxiety or muscle spasms As needed - Oral

## 2020-11-03 NOTE — MR AVS SNAPSHOT
After Visit Summary   3/8/2018    Ayad Moreno    MRN: [id number]           Patient Information     Date Of Birth          1970        Visit Information        Provider Department      3/8/2018 2:00 PM Yves Horne MD [facility] Ear Nose and Throat        Today's Diagnoses     Orofacial dyskinesia    -  1      Care Instructions    Today you have had a  Botox injection  1. Do not expect any effect for about two days  2. You might have a period of breathiness for a few days up to a week. You might have no breathiness, everyone is different  3. When the Botox starts to wear off, call the clinic to schedule another appointment. ON THE AVERAGE, that is about every 3-4 months  4. Scheduling number is [phone], option 1  5. If the botox schedule is full, ask the  to talk to Erika, the nurse who works with Dr Horne  6. Dr Bates normal Botox Clinics are the first Thursday of every month, in the afternoon and the third Tuesday of the month, in the morning.  7. Please call the triage RN with questions/concerns: [phone] option 2.            Follow-ups after your visit        Follow-up notes from your care team     Return if symptoms worsen or fail to improve.      Your next 10 appointments already scheduled     Mar 16, 2018  7:00 AM CDT   (Arrive by 6:45 AM)   Return Movement Disorder with [facility])    [address] [place] [id number] [phone]            May 10, 2018  8:00 AM CDT   (Arrive by 7:45 AM)   Return Movement Disorder with Uche Loera MD   [facility])    [address] [place] [id number] [phone]              Who to contact     Please call your clinic at [phone] to:    Ask questions about your health    Make or cancel appointments    Discuss your medicines    Learn about  Patient called back.   Patient has not been taking venlaxine, last day she took this was 10/17.    Patient was taking Venlafaxine 75 mg and 150 mg together up until 10/15.    Then took Venlafaxine 75 mg with the Bupropion for 4 days, then after that only took bupropion (Wellbutrin) since then.     Since then not sleeping well and having bad dreams.     Patient only has the Venlafaxine 150 mg at home, not sure if she should restart Venlafaxine 150 mg and 75 mg together, only 150 mg? Please advise.    your test results    Speak to your doctor            Additional Information About Your Visit        Webstephart Information     Gone! gives you secure access to your electronic health record. If you see a primary care provider, you can also send messages to your care team and make appointments. If you have questions, please call your primary care clinic.  If you do not have a primary care provider, please call [phone] and they will assist you.      Gone! is an electronic gateway that provides easy, online access to your medical records. With Gone!, you can request a clinic appointment, read your test results, renew a prescription or communicate with your care team.     To access your existing account, please contact your [facility] or call [phone] for assistance.        Care EveryWhere ID     This is your Care EveryWhere ID. This could be used by other organizations to access your [facility] medical records  [id number]         Blood Pressure from Last 3 Encounters:   03/06/18 111/72   02/16/18 111/72   01/12/18 128/78    Weight from Last 3 Encounters:   03/06/18 91.1 kg (200 lb 14.4 oz)   02/16/18 89.3 kg (196 lb 13.9 oz)   01/12/18 89.3 kg (196 lb 14.4 oz)              We Performed the Following     CHEMODENERVATION, FACE NERVE MUSCLE     NEEDLE EMG GUIDE W CHEMODENERVATION        Primary Care Provider Office Phone # Fax #    Yves Pope [phone]       [facility] [address] [place] [address]        Equal Access to Services     DORIS PASCAL : Hadii aad ku hadasho Soomaali, waaxda luqadaha, qaybta kaalmada adeegyada, waxrishi alamoin haykrsytyna lopez . So [facility] [phone].    ATENCIÓN: Si habla español, tiene a loving disposición servicios gratuitos de asistencia lingüística. Llame al [phone].    We comply with applicable federal civil rights laws and [place] laws. We do not discriminate on the basis of  race, color, national origin, age, disability, sex, sexual orientation, or gender identity.            Thank you!     Thank you for choosing [facility] EAR NOSE AND THROAT  for your care. Our goal is always to provide you with excellent care. Hearing back from our patients is one way we can continue to improve our services. Please take a few minutes to complete the written survey that you may receive in the mail after your visit with us. Thank you!             Your Updated Medication List - Protect others around you: Learn how to safely use, store and throw away your medicines at www.disposemymeds.org.          This list is accurate as of 3/8/18 11:59 PM.  Always use your most recent med list.                   Brand Name Dispense Instructions for use Diagnosis    BOTOX 100 UNITS injection   Generic drug:  botulinum toxin type A     95 each    every 3 months Inject 95 units    Oromandibular dystonia       diazepam 2 MG tablet    VALIUM    60    Take 4 mg by mouth 2 times daily Takes 2 pills at a time        NEURONTIN 800 MG tablet   Generic drug:  gabapentin     90    Take 800 mg by mouth 3 times daily        NICORETTE 2 MG lozenge   Generic drug:  nicotine polacrilex      Place 2 mg inside cheek every hour as needed for smoking cessation (10x's/day)        REMERON 30 MG tablet   Generic drug:  mirtazapine     30    Take 1 tablet by mouth At Bedtime        traMADol 50 MG tablet    ULTRAM     Take 50 mg by mouth every 8 hours as needed        UNABLE TO FIND      Take 1 teaspoonful by mouth 2 times daily MEDICATION NAME: Marijuana (CBD) Oil- NOT THC        vitamin B complex with vitamin C Tabs tablet      Take 1 tablet by mouth every morning

## 2020-11-12 ASSESSMENT — MOVEMENT DISORDERS SOCIETY - UNIFIED PARKINSONS DISEASE RATING SCALE (MDS-UPDRS)
SPEECH: MODERATE: MY SPEECH IS UNCLEAR ENOUGH THAT OTHERS ASK ME TO REPEAT MYSELF EVERY DAY EVEN THOUGH MOST OF MY SPEECH IS UNDERSTOOD.
SALIVA_AND_DROOLING: MODERATE: I HAVE SOME DROOLING WHEN I AM AWAKE, BUT I USUALLY DO NOT NEED TISSUES OR A HANDKERCHIEF.
HANDWRITING: MILD: SOME WORDS ARE UNCLEAR AND DIFFICULT TO READ.
WALKING_AND_BALANCE: SLIGHT: I AM SLIGHTLY SLOW OR MAY DRAG A LEG.  I NEVER USE A WALKING AID.
TURNING_IN_BED: SLIGHT: I HAVE A BIT OF TROUBLE TURNING, BUT I DO NOT NEED ANY HELP.
HYGIENE: SLIGHT:  I AM SLOW, BUT I DO NOT NEED ANY HELP.
CHEWING_AND_SWALLOWING: MILD: I NEED TO HAVE MY PILLS CUT OR MY FOOD SPECIALLY PREPARED BECAUSE OF CHEWING OR SWALLOWING PROBLEMS, BUT I HAVE NOT CHOKED OVER THE PAST WEEK.
EATING_TASKS: MILD: I AM SLOW WITH MY EATING AND HAVE OCCASIONAL FOOD SPILLS.  I MAY NEED HELP WITH A FEW TASKS SUCH AS CUTTING MEAT.
TREMOR: MILD: SHAKING OR TREMOR CAUSES PROBLEMS WITH ONLY A FEW ACTIVITIES.
TOTAL_SCORE: 23
GETTING_OUT_OF_BED_CAR_DEEP_CHAIR: MILD: I NEED MORE THAN ONE TRY TO GET UP OR NEED OCCASIONAL HELP.
HOBBIES_AND_OTHER_ACTIVITIES: MILD: I HAVE SOME DIFFICULTY DOING THESE ACTIVITIES.
FREEZING: NORMAL: NOT AT ALL (NO PROBLEMS).
DRESSING: MILD: I AM SLOW AND NEED HELP FOR A FEW DRESSING TASKS (BUTTONS, BRACELETS).

## 2020-11-13 ENCOUNTER — OFFICE VISIT (OUTPATIENT)
Dept: NEUROLOGY | Facility: CLINIC | Age: 50
End: 2020-11-13
Payer: COMMERCIAL

## 2020-11-13 VITALS
BODY MASS INDEX: 26.51 KG/M2 | HEART RATE: 82 BPM | SYSTOLIC BLOOD PRESSURE: 122 MMHG | HEIGHT: 73 IN | OXYGEN SATURATION: 97 % | DIASTOLIC BLOOD PRESSURE: 81 MMHG | WEIGHT: 200 LBS | RESPIRATION RATE: 16 BRPM

## 2020-11-13 DIAGNOSIS — G24.9 GENERALIZED DYSTONIA: ICD-10-CM

## 2020-11-13 DIAGNOSIS — F41.9 ANXIETY: ICD-10-CM

## 2020-11-13 DIAGNOSIS — G24.1 DYSTONIA DUE TO DYT-1 GENE MUTATION: Primary | ICD-10-CM

## 2020-11-13 DIAGNOSIS — Z96.89 S/P DEEP BRAIN STIMULATOR PLACEMENT: ICD-10-CM

## 2020-11-13 PROCEDURE — 99214 OFFICE O/P EST MOD 30 MIN: CPT | Performed by: NURSE PRACTITIONER

## 2020-11-13 PROCEDURE — 95970 ALYS NPGT W/O PRGRMG: CPT | Performed by: NURSE PRACTITIONER

## 2020-11-13 ASSESSMENT — MIFFLIN-ST. JEOR: SCORE: 1831.07

## 2020-11-13 ASSESSMENT — PAIN SCALES - GENERAL: PAINLEVEL: MODERATE PAIN (4)

## 2020-11-13 NOTE — PROGRESS NOTES
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     HPI: Mr. Ayad Moreno is a 50 year old left - handed  male who came to the [facility] neurology clinic by himself for a follow up visit.  He is s/p bilateral Globus Pallidus Internus (Gpi) deep brain stimulation (DBS) lead placement left on 1/10/2017 & right on 2017 by Dr. Carolina.      He is taking two classes for counseling.     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     Dystonia symptoms are stable.  The benefit he got from DBS has sustained.  He is charging his DBS weekly.  One time, he forgot to charge it and noticed his speech getting much worse. He slows down his speech to better articulate and annunciate.     He reports getting teeth grinding when the Botox wears off.  Last injection was on 2020.  He has an appointment on 2020.     His children are going to school form home.    He might have back surgery due to stress fracture.     No falls.     Mood stable.     He left his disability paper work in the car and wanted to mail it.       MEDICATIONS:   Outpatient Medications Marked as Taking for the 20 encounter (Office Visit) with Cindy Manning APRN CNP   Medication Sig     diazepam (VALIUM) 2 MG tablet Take 2 tablets (4 mg) by mouth every 8 hours as needed for anxiety or muscle spasms As needed     gabapentin (NEURONTIN) 800 MG tablet Take 1 tablet (800 mg) by mouth 3 times daily     mirtazapine (REMERON) 45 MG tablet Take 1 tablet (45 mg) by mouth At Bedtime     nicotine polacrilex (NICORETTE) 2 MG lozenge Place 2 mg inside cheek every hour as needed for smoking cessation (10x's/day)     traMADol (ULTRAM) 50 MG tablet Take 50 mg by mouth every 8 hours as needed     UNABLE TO FIND Take 1 teaspoonful by mouth 2 times daily MEDICATION NAME: Marijuana (CBD)  "Oil- NOT THC     vitamin B complex with vitamin C (VITAMIN  B COMPLEX) TABS tablet Take 1 tablet by mouth every morning      VITAMIN D PO      Current Facility-Administered Medications for the 11/13/20 encounter (Office Visit) with Cindy Manning APRN CNP   Medication     botulinum toxin type A (BOTOX) 100 units injection 200 Units       ALLERGIES: Seasonal allergies      PHYSICAL EXAM:    VITAL SIGNS:  Blood pressure 122/81, pulse 82, resp. rate 16, height 1.854 m (6' 1\"), weight 90.7 kg (200 lb), SpO2 97 %., Body mass index is 26.39 kg/m .    GENERAL:  Mr. Moreno is a pleasant  male who is well-groomed and well-developed sitting comfortably in the exam room without any distress.  Affect is appropriate.    DBS Interrogation & Analysis:    Implanted generator:  Left chest Activa-RC.  Lead target:  Bilateral Globus Pallidus Internus (Gpi).    Battery Service Life:  50 volts.Battery Service Life:  50 volts.    GROUP A  ACTIVE    Left Brain  Lead placement date:  1/10/2017  Generator placement date:  9/20/2019    C +, 1 -, 2 -  Voltage:  4.0 volts  PW:  60 msec  Rate:  180 Hz    Therapy impedance:  689 Ohms  Therapy Current:  5.7 mA      Patient :  Upper Limit: 4.6 volts  Lower Limit: 0.2 volts    Electrode Impedance Check:    Left Lead: No Problems Found.       Right Brain   Lead placement date:  11/16/2017    C +, 9 -, 10 -  Voltage:  3.5 volts  PW:  60 msec  Rate:  180 Hz    Therapy impedance:  649 Ohms  Therapy Current:  5.2 mA    Patient :  Upper Limit: 4.3 volts  Lower Limit: 0.0 volts    Electrode Impedance Check:    Right Lead: No Problems Found.      See scanned report for impedance details.      MOVEMENT DISORDERS ASSESSMENT:   Speech: Dysarthric,  Most words are understandable.   Rest Tremor: Absent.   Action/Postural Tremor: Absent.    Finger Taps: Mild slowing with normal amplitude bilaterally.  Hand opening & closing: Mild slowing with some fatiguing after some " repetition bilaterally.   Hand pronation & supination:  Mild slowing with normal amplitude bilaterally.   Toe Tapping:  Normal.    Leg Agility: Normal.   Arising from chair - arms folded across chest: Normal.  Posture: Normal erect.  Gait: Slightly wide-based.  Right hand dystonic posturing.  Normal strides.   Freezing of gait:  Absent.    Body Bradykinesia: None.  Dyskinesias:  Absent.     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     2)  S/p Bilateral DBS lead implantation:  Normal impedance and battery life.  No DBS programming changes made.     3)  Dysarthria:  Most words are understandable.  He has learned to speak slowly to articulate his words.     4)  Anxiety/Mood disorder:  Stable.       PLAN:    __  May return for a f/u via virtual to see Dr. Loera.     __  Return in clinic 6 - 9 months to check his DBS.  Will return sooner as needed.    __  Will stay on the same medications.     __  Will continue using the tricks he has been to improve speech.    __  Encouraged to charge his battery weekly.     __  Business card given to mail his disability form.     The total amount of time spent with the patient was 30 minutes; 15 min in DBS interrogation & analysis and 15 min in addressing dystonia & speech issues.  Over 50% of the time was spent in counseling & coordination of care.     CT See,  CNP  [facility]    9:27 AM  9:57 AM

## 2020-11-13 NOTE — LETTER
2020       RE: Ayad Moreno  [address]  [place] [place] [address]     Dear Colleague,    Thank you for referring your patient, Ayad Moreno, to the [facility] NEUROLOGY CLINIC [place] [facility]. Please see a copy of my visit note below.    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     HPI: Mr. Ayad Moreno is a 50 year old left - handed  male who came to the [facility] neurology clinic by himself for a follow up visit.  He is s/p bilateral Globus Pallidus Internus (Gpi) deep brain stimulation (DBS) lead placement left on 1/10/2017 & right on 2017 by Dr. Carolina.      He is taking two classes for counseling.     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     Dystonia symptoms are stable.  The benefit he got from DBS has sustained.  He is charging his DBS weekly.  One time, he forgot to charge it and noticed his speech getting much worse. He slows down his speech to better articulate and annunciate.     He reports getting teeth grinding when the Botox wears off.  Last injection was on 2020.  He has an appointment on 2020.     His children are going to school form home.    He might have back surgery due to stress fracture.     No falls.     Mood stable.     He left his disability paper work in the car and wanted to mail it.       MEDICATIONS:   Outpatient Medications Marked as Taking for the 20 encounter (Office Visit) with Cindy Manning APRN CNP   Medication Sig     diazepam (VALIUM) 2 MG tablet Take 2 tablets (4 mg) by mouth every 8 hours as needed for anxiety or muscle spasms As needed     gabapentin (NEURONTIN) 800 MG tablet Take 1 tablet (800 mg) by mouth 3 times daily     mirtazapine (REMERON) 45 MG tablet Take 1 tablet (45 mg) by mouth At  "Bedtime     nicotine polacrilex (NICORETTE) 2 MG lozenge Place 2 mg inside cheek every hour as needed for smoking cessation (10x's/day)     traMADol (ULTRAM) 50 MG tablet Take 50 mg by mouth every 8 hours as needed     UNABLE TO FIND Take 1 teaspoonful by mouth 2 times daily MEDICATION NAME: Marijuana (CBD) Oil- NOT THC     vitamin B complex with vitamin C (VITAMIN  B COMPLEX) TABS tablet Take 1 tablet by mouth every morning      VITAMIN D PO      Current Facility-Administered Medications for the 11/13/20 encounter (Office Visit) with Cindy Manning APRN CNP   Medication     botulinum toxin type A (BOTOX) 100 units injection 200 Units     ALLERGIES: Seasonal allergies    PHYSICAL EXAM:    VITAL SIGNS:  Blood pressure 122/81, pulse 82, resp. rate 16, height 1.854 m (6' 1\"), weight 90.7 kg (200 lb), SpO2 97 %., Body mass index is 26.39 kg/m .    GENERAL:  Mr. Moreno is a pleasant  male who is well-groomed and well-developed sitting comfortably in the exam room without any distress.  Affect is appropriate.    DBS Interrogation & Analysis:    Implanted generator:  Left chest Activa-RC.  Lead target:  Bilateral Globus Pallidus Internus (Gpi).    Battery Service Life:  50 volts.Battery Service Life:  50 volts.    GROUP A  ACTIVE    Left Brain  Lead placement date:  1/10/2017  Generator placement date:  9/20/2019    C +, 1 -, 2 -  Voltage:  4.0 volts  PW:  60 msec  Rate:  180 Hz    Therapy impedance:  689 Ohms  Therapy Current:  5.7 mA      Patient :  Upper Limit: 4.6 volts  Lower Limit: 0.2 volts    Electrode Impedance Check:    Left Lead: No Problems Found.       Right Brain   Lead placement date:  11/16/2017    C +, 9 -, 10 -  Voltage:  3.5 volts  PW:  60 msec  Rate:  180 Hz    Therapy impedance:  649 Ohms  Therapy Current:  5.2 mA    Patient :  Upper Limit: 4.3 volts  Lower Limit: 0.0 volts    Electrode Impedance Check:    Right Lead: No Problems Found.      See scanned report for " impedance details.    MOVEMENT DISORDERS ASSESSMENT:   Speech: Dysarthric,  Most words are understandable.   Rest Tremor: Absent.   Action/Postural Tremor: Absent.    Finger Taps: Mild slowing with normal amplitude bilaterally.  Hand opening & closing: Mild slowing with some fatiguing after some repetition bilaterally.   Hand pronation & supination:  Mild slowing with normal amplitude bilaterally.   Toe Tapping:  Normal.    Leg Agility: Normal.   Arising from chair - arms folded across chest: Normal.  Posture: Normal erect.  Gait: Slightly wide-based.  Right hand dystonic posturing.  Normal strides.   Freezing of gait:  Absent.    Body Bradykinesia: None.  Dyskinesias:  Absent.     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     2)  S/p Bilateral DBS lead implantation:  Normal impedance and battery life.  No DBS programming changes made.     3)  Dysarthria:  Most words are understandable.  He has learned to speak slowly to articulate his words.     4)  Anxiety/Mood disorder:  Stable.       PLAN:    __  May return for a f/u via virtual to see Dr. Loera.     __  Return in clinic 6 - 9 months to check his DBS.  Will return sooner as needed.    __  Will stay on the same medications.     __  Will continue using the tricks he has been to improve speech.    __  Encouraged to charge his battery weekly.     __  Business card given to mail his disability form.     The total amount of time spent with the patient was 30 minutes; 15 min in DBS interrogation & analysis and 15 min in addressing dystonia & speech issues.  Over 50% of the time was spent in counseling & coordination of care.     CT See,  CNP  [facility]    9:27 AM  9:57 AM

## 2020-11-13 NOTE — PATIENT INSTRUCTIONS
November 13, 2020    Dear Mr. Ayad Moreno,    Thank you for coming today.  During your visit, we have discussed the following:     __ Your DBS electrical system function (impedance) is normal.      __  Stay on the same medications.     __ Mail the disability forms.     __  Return in 6 months for virtual visit to see Dr. Loera & 9 months to the clinic to check your DBS.     __  You may return sooner as needed.     To contact our clinic, you may call [phone] or use Harri message.     It's good to see you!      CT See, CNP  [facility]

## 2020-11-17 ENCOUNTER — OFFICE VISIT (OUTPATIENT)
Dept: OTOLARYNGOLOGY | Facility: CLINIC | Age: 50
End: 2020-11-17
Payer: COMMERCIAL

## 2020-11-17 VITALS
TEMPERATURE: 97.5 F | WEIGHT: 201 LBS | OXYGEN SATURATION: 99 % | BODY MASS INDEX: 26.64 KG/M2 | HEIGHT: 73 IN | HEART RATE: 85 BPM

## 2020-11-17 DIAGNOSIS — G24.4 OROFACIAL DYSKINESIA: Primary | ICD-10-CM

## 2020-11-17 PROCEDURE — 95874 GUIDE NERV DESTR NEEDLE EMG: CPT | Mod: 26 | Performed by: OTOLARYNGOLOGY

## 2020-11-17 PROCEDURE — 99207 PR NO CHARGE LOS: CPT | Performed by: OTOLARYNGOLOGY

## 2020-11-17 PROCEDURE — 64612 DESTROY NERVE FACE MUSCLE: CPT | Mod: 50 | Performed by: OTOLARYNGOLOGY

## 2020-11-17 ASSESSMENT — PAIN SCALES - GENERAL: PAINLEVEL: MODERATE PAIN (4)

## 2020-11-17 ASSESSMENT — MIFFLIN-ST. JEOR: SCORE: 1835.61

## 2020-11-17 NOTE — PROGRESS NOTES
Ayad Moreno is a 50 year old male with a history of  oral mandibular dystonia.  He was last injected by me at the [facility] on 3/8/2018.  Injections were 25 units of botulinum a toxin into each masseter muscle, 5 units into each anterior digastric muscle, 1 units into each sternohyoid muscle, 5 units into the levator anguli oris muscle 5 units in the depressor anguli oris muscle and 10 units into each lateral pterygoid muscle.  He had bilateral globus pallidus internus deep brain stimulators placed.  Left side was placed in January 2017 and the right side was in November 2017.  He had a left DBS battery replacement on 9/23/2019 he has been receiving botulinum toxins through the neurophysiology department here at the [facility] with Dr. Zaidi.    On our last visit (6/30/2020)  he was having some extra pulling in the right lower lip and we address this area as well.  Injections on that date included 20 units into each masseter muscle, 5 units into each anterior digastric muscle, 5 units into the each nasalis muscle., 10 units into the depressor anguli oris muscle, 10 units into each lateral pterygoid muscle and 10 units into the right side of the platysma muscle.   Our plan is to give a similar dose today.    PROCEDURE:  After obtaining informed consent, ground and reference electrodes were placed. The skin area of the injection was prepped with an alcohol swab.  Treatment sites that required electromyographic guidance used a 26 gauge electromyographic injection needle.  Treatment sites that did not require electromyographic guidance used a 26-gauge half inch needle attached to a tuberculin syringe.  Multiple injection sites where treated. Sites treated with Botulinum toxin A ( BTA) included:     Masseter muscle  :EMG guided,  bilateral, 20 units each site in divided doses with a strong EMG signal - total dose 40 units BTA.      Anterior digastric muscle:EMG guided, bilateral, 5 units each side with strong  EMG response - total dose 10 units BTA.      Nasalis muscle. Non EMG guided, bilateral, 5 units each side  - total dose 10 units BTA.     Depressor anguli oris:Non EMG guided, unilateral, right .- total dose  10 units BTA     Lateral pterygoid muscle; EMG guided, bilateral. 10 units each side - total dose 20 units BTA     Platysma muscle: EMG guided, unilateral, right, with strong EMG response .- total dose  15 units BTA.      The total amount of botulinum A toxin given today was 105 units.  The EMG was necessary, specifically in this case to identify the lateral pterygoid muscle on the left side which is in the pterygopalatine fossa not otherwise accessible without EMG.  Additionally, EMG was needed to find the areas of greatest activity within the masseter, platysma and anterior digastric muscles bilaterally.  An additional 95 units of Botulinum toxin was necessarily wasted in preparation for the injection.  He tolerated the procedure well and left our clinic after a short observation.  The lot number for the botulinum toxin was [id number] and [id number].  Expiration date was June 2023 and August 2023 respectively.            PLAN: I will have him  follow up on a PRN basis. It is anticipated that repeat injections will be required over the long term.

## 2020-11-17 NOTE — PATIENT INSTRUCTIONS
1.  You were seen in the ENT Clinic today by Dr. Horne.     3.  Plan is to return to clinic as needed for repeat botox injection.    If you have any questions or concerns after your appointment, please call [phone]. Press option #1 for scheduling related needs. Press option #3 for Nurse advice.        Marybel MARSHALL RN  [facility] - Otolaryngology

## 2020-11-17 NOTE — NURSING NOTE
Invasive Procedure Safety Checklist  Procedure:  botox    Responsible person(s):  Complete sections as appropriate and electronically sign and date below.    Staff/Provider  Consent documentation on chart:  YES  H&P is not applicable (when straight local anesthesia is used).    Procedure Team  Completed by comparing informed consent documentation, information on the patient record and/or the marked surgical site, and discussion with the patient/guardian.     Verified:  (Select all that apply)  Patient identification (two indicators)  Procedure to be performed  Procedure site and /or laterality and/or level  Consent  Procedure site:  Site can not be marked due to location.  Provider [place] - Site/Laterality/Level:  No Level or Structure  Staff/Provider:  No images    Procedure Team:  *Pause for the Cause* verbal and active participation of team members- verify:  Patient name:  YES  Procedure to be performed:  YES  Site, laterality and level, noting patient position:  YES    Above steps completed as applicable (Electronic Signature, Title, Date):    Marybel Vazquez RN      Note:  Any incidents of wrong patient, wrong procedure, or wrong site are reported using the Occurrence Process already in place.  The occurrence form is required to be completed immediately with this type of event.

## 2020-11-17 NOTE — LETTER
11/17/2020       RE: Ayad Moreno  [address]  [facility] [address]     Dear Colleague,    Thank you for referring your patient, Ayad Moreno, to the [facility] [place] [facility]. Please see a copy of my visit note below.    Ayad Moreno is a 50 year old male with a history of  oral mandibular dystonia.  He was last injected by me at the [facility] on 3/8/2018.  Injections were 25 units of botulinum a toxin into each masseter muscle, 5 units into each anterior digastric muscle, 1 units into each sternohyoid muscle, 5 units into the levator anguli oris muscle 5 units in the depressor anguli oris muscle and 10 units into each lateral pterygoid muscle.  He had bilateral globus pallidus internus deep brain stimulators placed.  Left side was placed in January 2017 and the right side was in November 2017.  He had a left DBS battery replacement on 9/23/2019 he has been receiving botulinum toxins through the neurophysiology department here at the [facility] with Dr. Zaidi.    On our last visit (6/30/2020)  he was having some extra pulling in the right lower lip and we address this area as well.  Injections on that date included 20 units into each masseter muscle, 5 units into each anterior digastric muscle, 5 units into the each nasalis muscle., 10 units into the depressor anguli oris muscle, 10 units into each lateral pterygoid muscle and 10 units into the right side of the platysma muscle.   Our plan is to give a similar dose today.    PROCEDURE:  After obtaining informed consent, ground and reference electrodes were placed. The skin area of the injection was prepped with an alcohol swab.  Treatment sites that required electromyographic guidance used a 26 gauge electromyographic injection needle.  Treatment sites that did not require electromyographic guidance used a 26-gauge half inch needle attached to a tuberculin  syringe.  Multiple injection sites where treated. Sites treated with Botulinum toxin A ( BTA) included:     Masseter muscle  :EMG guided,  bilateral, 20 units each site in divided doses with a strong EMG signal - total dose 40 units BTA.      Anterior digastric muscle:EMG guided, bilateral, 5 units each side with strong EMG response - total dose 10 units BTA.      Nasalis muscle. Non EMG guided, bilateral, 5 units each side  - total dose 10 units BTA.     Depressor anguli oris:Non EMG guided, unilateral, right .- total dose  10 units BTA     Lateral pterygoid muscle; EMG guided, bilateral. 10 units each side - total dose 20 units BTA     Platysma muscle: EMG guided, unilateral, right, with strong EMG response .- total dose  15 units BTA.      The total amount of botulinum A toxin given today was 105 units.  The EMG was necessary, specifically in this case to identify the lateral pterygoid muscle on the left side which is in the pterygopalatine fossa not otherwise accessible without EMG.  Additionally, EMG was needed to find the areas of greatest activity within the masseter, platysma and anterior digastric muscles bilaterally.  An additional 95 units of Botulinum toxin was necessarily wasted in preparation for the injection.  He tolerated the procedure well and left our clinic after a short observation.  The lot number for the botulinum toxin was [id number] and [id number].  Expiration date was June 2023 and August 2023 respectively.            PLAN: I will have him  follow up on a PRN basis. It is anticipated that repeat injections will be required over the long term.            Again, thank you for allowing me to participate in the care of your patient.      Sincerely,    Yves Horne MD       mask, [employer]

## 2020-12-04 ENCOUNTER — MYC MEDICAL ADVICE (OUTPATIENT)
Dept: NEUROLOGY | Facility: CLINIC | Age: 50
End: 2020-12-04

## 2020-12-04 NOTE — TELEPHONE ENCOUNTER
Form was printed out, form will be filled out and given to provider for review and signature, fax to fax number provided on the form, copy will be mailed to patient homr for records

## 2020-12-07 ENCOUNTER — DOCUMENTATION ONLY (OUTPATIENT)
Dept: NEUROLOGY | Facility: CLINIC | Age: 50
End: 2020-12-07

## 2021-01-04 DIAGNOSIS — G24.9 DYSTONIA: ICD-10-CM

## 2021-01-04 RX ORDER — MIRTAZAPINE 45 MG/1
45 TABLET, FILM COATED ORAL AT BEDTIME
Qty: 30 TABLET | Refills: 11 | Status: SHIPPED | OUTPATIENT
Start: 2021-01-04 | End: 2021-12-01

## 2021-01-14 ENCOUNTER — HEALTH MAINTENANCE LETTER (OUTPATIENT)
Age: 51
End: 2021-01-14

## 2021-01-22 DIAGNOSIS — G24.1 DYSTONIA DUE TO DYT-1 GENE MUTATION: ICD-10-CM

## 2021-01-22 RX ORDER — DIAZEPAM 2 MG
4 TABLET ORAL EVERY 8 HOURS PRN
Qty: 180 TABLET | Refills: 3 | Status: SHIPPED | OUTPATIENT
Start: 2021-01-22 | End: 2021-05-13

## 2021-01-22 NOTE — TELEPHONE ENCOUNTER
Diazepam was last prescribed on 10/28/2020 for a 15 day supply with 5 refills. New RX is needed.    diazepam (VALIUM) 2 MG tablet 90 tablet 5 10/28/2020  No   Sig - Route: Take 2 tablets (4 mg) by mouth every 8 hours as needed for anxiety or muscle spasms As needed - Oral

## 2021-01-22 NOTE — TELEPHONE ENCOUNTER
Rx Authorization:    Requested Medication/ Dose diazepam (VALIUM) 2 MG tablet    Date last refill ordered: 10/28/20    Quantity ordered: 90    # refills: 5    Date of last clinic visit with ordering provider: 11/13/20    Date of next clinic visit with ordering provider:     All pertinent protocol data (lab date/result):     Include pertinent information from patients message:

## 2021-03-19 ENCOUNTER — IMMUNIZATION (OUTPATIENT)
Dept: NURSING | Facility: CLINIC | Age: 51
End: 2021-03-19
Payer: COMMERCIAL

## 2021-03-19 PROCEDURE — 91301 PR COVID VAC MODERNA 100 MCG/0.5 ML IM: CPT

## 2021-03-19 PROCEDURE — 0011A PR COVID VAC MODERNA 100 MCG/0.5 ML IM: CPT

## 2021-04-05 ENCOUNTER — TELEPHONE (OUTPATIENT)
Dept: NEUROLOGY | Facility: CLINIC | Age: 51
End: 2021-04-05

## 2021-04-05 NOTE — TELEPHONE ENCOUNTER
"Per Dr. Loera's request, I called Ayad and informed him that Dr. Tejinder Valerio and Dr. Bal Cannon have been working on dystonia videos.  I asked him permission if his dystonia  videos could be used for the [employer] website. He was informed that He agreed and jokingly stated \"if my hair looks good.\" He was informed that the videos will be blurred and would not show his face. He agreed.   "

## 2021-04-06 ENCOUNTER — TELEPHONE (OUTPATIENT)
Dept: NEUROLOGY | Facility: CLINIC | Age: 51
End: 2021-04-06

## 2021-04-06 DIAGNOSIS — G24.9 DYSTONIA: ICD-10-CM

## 2021-04-06 RX ORDER — GABAPENTIN 800 MG/1
800 TABLET ORAL 3 TIMES DAILY
Qty: 90 TABLET | Refills: 11 | Status: SHIPPED | OUTPATIENT
Start: 2021-04-06 | End: 2021-12-01

## 2021-04-06 NOTE — TELEPHONE ENCOUNTER
Health Call Center    Phone Message    May a detailed message be left on voicemail: yes     Reason for Call: Medication Refill Request    Has the patient contacted the pharmacy for the refill? Yes   Name of medication being requested: gabapentin (NEURONTIN) 800 MG tablet  Provider who prescribed the medication: Dulce Manning  Pharmacy: [facility] on [address] in [place] (pt reporting that he switched pharmacy location)  Date medication is needed: pt reporting that he has a few days left. Thank you.         Action Taken: Message routed to:  [facility] ([facility]    Travel Screening: Not Applicable

## 2021-04-06 NOTE — TELEPHONE ENCOUNTER
Rx Authorization:    Requested Medication/ Dose: Neurontin 800MG    Date last refill ordered: 10/12/20    Quantity ordered: 90 tabs    # refills: 5    Date of last clinic visit with ordering provider: 11/13/20    Date of next clinic visit with ordering provider: F/U 1 year    All pertinent protocol data (lab date/result):     Include pertinent information from patients message:

## 2021-04-16 ENCOUNTER — IMMUNIZATION (OUTPATIENT)
Dept: NURSING | Facility: CLINIC | Age: 51
End: 2021-04-16
Attending: NURSE PRACTITIONER
Payer: COMMERCIAL

## 2021-04-16 PROCEDURE — 0012A PR COVID VAC MODERNA 100 MCG/0.5 ML IM: CPT

## 2021-04-16 PROCEDURE — 91301 PR COVID VAC MODERNA 100 MCG/0.5 ML IM: CPT

## 2021-04-29 DIAGNOSIS — G24.4 OROFACIAL DYSKINESIA: Primary | ICD-10-CM

## 2021-05-04 ENCOUNTER — OFFICE VISIT (OUTPATIENT)
Dept: OTOLARYNGOLOGY | Facility: CLINIC | Age: 51
End: 2021-05-04
Payer: COMMERCIAL

## 2021-05-04 VITALS — BODY MASS INDEX: 27.93 KG/M2 | HEIGHT: 73 IN | OXYGEN SATURATION: 99 % | WEIGHT: 210.76 LBS | HEART RATE: 69 BPM

## 2021-05-04 DIAGNOSIS — G24.4 OROFACIAL DYSKINESIA: Primary | ICD-10-CM

## 2021-05-04 PROCEDURE — 99207 PR NO CHARGE LOS: CPT | Performed by: OTOLARYNGOLOGY

## 2021-05-04 PROCEDURE — 95874 GUIDE NERV DESTR NEEDLE EMG: CPT | Performed by: OTOLARYNGOLOGY

## 2021-05-04 PROCEDURE — 64612 DESTROY NERVE FACE MUSCLE: CPT | Mod: 50 | Performed by: OTOLARYNGOLOGY

## 2021-05-04 ASSESSMENT — MIFFLIN-ST. JEOR: SCORE: 1874.88

## 2021-05-04 ASSESSMENT — PAIN SCALES - GENERAL: PAINLEVEL: NO PAIN (0)

## 2021-05-04 NOTE — PATIENT INSTRUCTIONS
1.  You were seen in the ENT Clinic today by Dr. Horne.     2.  Plan is to return to clinic as needed for repeat botox injection.    If you have any questions or concerns after your appointment, please call the clinic .   - Clinic phone: [phone]. Press option #1 for scheduling related needs. Press option #3 for Nurse advice.  - Direct phone: [phone]      Marybel Finley RN, BSN  [phone]  [facility]  Department of Otolaryngology  [facility]  [email or web address]

## 2021-05-04 NOTE — PROGRESS NOTES
Ayad Moreno is a 51 year old male with a history of oral mandibular dystonia.  he was last injected on 11/17/2020. he had a good response to the last injection. The last dose given was 20 units into each masseter muscle, 5 units into each digastric muscle, 5 units into each nasalis muscle, 10 units into the right depressor anguli oris muscle, 10 units into the lateral pterygoid muscles bilaterally and 15 units into the platysma muscle on the right side.  The total amount of botulinum toxin given was 105 units.  After the injection he did not have any dysphagia, breathing difficulties or breathiness.   The response lasted for 4 months.  He has also had a deep brain stimulator placed.  He notes that his master and jaw motion are improved as are his neck tension.  He would like to concentrate on the anterior digastric's and the facial muscles today.  Our plan is to address the anterior digastric muscle with 5 units each side, the nasalis muscle with 5 units each side the depressor anguli oris on the right side with 10 units and the orbicularis oris in the midline with 5 units.     PROCEDURE:  After obtaining informed consent, ground and reference electrodes were placed. The skin area of the injection was prepped with an alcohol swab.  Treatment sites that required electromyographic guidance used a 26 gauge electromyographic injection needle.  Treatment sites that did not require electromyographic guidance used a 26-gauge half inch needle attached to a tuberculin syringe.  Multiple injection sites where treated. Sites treated with Botulinum toxin A ( BTA) included:         Anterior digastric muscle:EMG guided, bilateral, 5 units each side with strong EMG response - total dose 10 units BTA.      Nasalis muscle. Non EMG guided, bilateral, 5 units each side  - total dose 10 units BTA.     Depressor anguli oris:Non EMG guided, unilateral, right .- total dose  10 units BTA       Orbicularis oris muscle: non EMG guided,  unilateral, inferior- right, 5 units.- total dose  5 units BTA.      The total amount of botulinum A toxin given today was 35 units.  The EMG was necessary, specifically in this case to identify the anterior digastric muscles bilaterally.  An additional 65 units of Botulinum toxin was necessarily wasted in preparation for the injection.  He tolerated the procedure well and left our clinic after a short observation.          PLAN: I will have him  follow up on a PRN basis. It is anticipated that repeat injections will be required over the long term.

## 2021-05-04 NOTE — LETTER
5/4/2021       RE: Ayad Moreno  [address]  [facility] [address]     Dear Colleague,    Thank you for referring your patient, Ayad Moreno, to the [facility] NOSE [facility] THROAT CLINIC [place] at [facility]. Please see a copy of my visit note below.    Ayad Moreno is a 51 year old male with a history of oral mandibular dystonia.  he was last injected on 11/17/2020. he had a good response to the last injection. The last dose given was 20 units into each masseter muscle, 5 units into each digastric muscle, 5 units into each nasalis muscle, 10 units into the right depressor anguli oris muscle, 10 units into the lateral pterygoid muscles bilaterally and 15 units into the platysma muscle on the right side.  The total amount of botulinum toxin given was 105 units.  After the injection he did not have any dysphagia, breathing difficulties or breathiness.   The response lasted for 4 months.  He has also had a deep brain stimulator placed.  He notes that his master and jaw motion are improved as are his neck tension.  He would like to concentrate on the anterior digastric's and the facial muscles today.  Our plan is to address the anterior digastric muscle with 5 units each side, the nasalis muscle with 5 units each side the depressor anguli oris on the right side with 10 units and the orbicularis oris in the midline with 5 units.     PROCEDURE:  After obtaining informed consent, ground and reference electrodes were placed. The skin area of the injection was prepped with an alcohol swab.  Treatment sites that required electromyographic guidance used a 26 gauge electromyographic injection needle.  Treatment sites that did not require electromyographic guidance used a 26-gauge half inch needle attached to a tuberculin syringe.  Multiple injection sites where treated. Sites treated with Botulinum toxin A ( BTA) included:         Anterior digastric  muscle:EMG guided, bilateral, 5 units each side with strong EMG response - total dose 10 units BTA.      Nasalis muscle. Non EMG guided, bilateral, 5 units each side  - total dose 10 units BTA.     Depressor anguli oris:Non EMG guided, unilateral, right .- total dose  10 units BTA       Orbicularis oris muscle: non EMG guided, unilateral, inferior- right, 5 units.- total dose  5 units BTA.      The total amount of botulinum A toxin given today was 35 units.  The EMG was necessary, specifically in this case to identify the anterior digastric muscles bilaterally.  An additional 65 units of Botulinum toxin was necessarily wasted in preparation for the injection.  He tolerated the procedure well and left our clinic after a short observation.      PLAN: I will have him  follow up on a PRN basis. It is anticipated that repeat injections will be required over the long term.    Again, thank you for allowing me to participate in the care of your patient.      Sincerely,    Yves Horne MD

## 2021-05-13 DIAGNOSIS — G24.1 DYSTONIA DUE TO DYT-1 GENE MUTATION: ICD-10-CM

## 2021-05-13 NOTE — TELEPHONE ENCOUNTER
Rx Authorization:    Requested Medication/ Dose diazepam (VALIUM) 2 MG tablet    Date last refill ordered: 1/22/21    Quantity ordered: 180    # refills: 3    Date of last clinic visit with ordering provider: 11/13/20    Date of next clinic visit with ordering provider:     All pertinent protocol data (lab date/result):     Include pertinent information from patients message:

## 2021-05-14 RX ORDER — DIAZEPAM 2 MG
TABLET ORAL
Qty: 180 TABLET | Refills: 5 | Status: SHIPPED | OUTPATIENT
Start: 2021-05-14 | End: 2021-11-01

## 2021-05-27 ASSESSMENT — MOVEMENT DISORDERS SOCIETY - UNIFIED PARKINSONS DISEASE RATING SCALE (MDS-UPDRS)
CHEWING_AND_SWALLOWING: SLIGHT: I AM AWARE OF SLOWNESS IN MY CHEWING OR INCREASED EFFORT AT SWALLOWING, BUT I DO NOT CHOKE OR NEED TO HAVE MY FOOD SPECIALLY PREPARED.
GETTING_OUT_OF_BED_CAR_DEEP_CHAIR: SLIGHT: I AM SLOW OR AWKWARD, BUT I USUALLY CAN DO IT ON MY FIRST TRY.
TREMOR: MILD: SHAKING OR TREMOR CAUSES PROBLEMS WITH ONLY A FEW ACTIVITIES.
HANDWRITING: MILD: SOME WORDS ARE UNCLEAR AND DIFFICULT TO READ.
SALIVA_AND_DROOLING: MODERATE: I HAVE SOME DROOLING WHEN I AM AWAKE, BUT I USUALLY DO NOT NEED TISSUES OR A HANDKERCHIEF.
TOTAL_SCORE: 18
WALKING_AND_BALANCE: SLIGHT: I AM SLIGHTLY SLOW OR MAY DRAG A LEG.  I NEVER USE A WALKING AID.
SPEECH: MILD: MY SPEECH CAUSES PEOPLE TO ASK ME TO OCCASIONALLY REPEAT MYSELF, BUT NOT EVERYDAY.
HYGIENE: SLIGHT:  I AM SLOW, BUT I DO NOT NEED ANY HELP.
DRESSING: SLIGHT: I AM SLOW, BUT I DO NOT NEED HELP.
FREEZING: NORMAL: NOT AT ALL (NO PROBLEMS).
EATING_TASKS: SLIGHT: I AM SLOW, BUT I DO NOT NEED ANY HELP HANDLING MY FOOD AND HAVE NOT HAD FOOD SPILLS WHILE EATING.
HOBBIES_AND_OTHER_ACTIVITIES: MILD: I HAVE SOME DIFFICULTY DOING THESE ACTIVITIES.
TURNING_IN_BED: SLIGHT: I HAVE A BIT OF TROUBLE TURNING, BUT I DO NOT NEED ANY HELP.

## 2021-06-02 ENCOUNTER — OFFICE VISIT (OUTPATIENT)
Dept: NEUROLOGY | Facility: CLINIC | Age: 51
End: 2021-06-02
Payer: COMMERCIAL

## 2021-06-02 VITALS
WEIGHT: 209 LBS | SYSTOLIC BLOOD PRESSURE: 119 MMHG | BODY MASS INDEX: 27.57 KG/M2 | HEART RATE: 65 BPM | DIASTOLIC BLOOD PRESSURE: 82 MMHG | OXYGEN SATURATION: 97 %

## 2021-06-02 DIAGNOSIS — G24.9 GENERALIZED DYSTONIA: ICD-10-CM

## 2021-06-02 DIAGNOSIS — Z96.89 S/P DEEP BRAIN STIMULATOR PLACEMENT: ICD-10-CM

## 2021-06-02 DIAGNOSIS — G24.1 DYSTONIA DUE TO DYT-1 GENE MUTATION: Primary | ICD-10-CM

## 2021-06-02 DIAGNOSIS — F41.9 ANXIETY: ICD-10-CM

## 2021-06-02 PROCEDURE — 99213 OFFICE O/P EST LOW 20 MIN: CPT | Mod: 25 | Performed by: NURSE PRACTITIONER

## 2021-06-02 PROCEDURE — 95970 ALYS NPGT W/O PRGRMG: CPT | Performed by: NURSE PRACTITIONER

## 2021-06-02 ASSESSMENT — PATIENT HEALTH QUESTIONNAIRE - PHQ9: SUM OF ALL RESPONSES TO PHQ QUESTIONS 1-9: 0

## 2021-06-02 ASSESSMENT — PAIN SCALES - GENERAL: PAINLEVEL: NO PAIN (0)

## 2021-06-02 NOTE — PATIENT INSTRUCTIONS
Dear Mr. Ayad Moreno,    Thank you for coming today.  During your visit, we have discussed the following:     __ Your DBS electrical system function (impedance) is normal.      __  Stay on the same medications.     __  Return in 6 - 9 months to the clinic to check your DBS.     __  You may return sooner as needed.     To contact our clinic, you may call [phone] or use [employer] message.     It's good to see you!      CT See, CNP  [facility]

## 2021-06-02 NOTE — Clinical Note
6/2/2021       RE: Ayad Moreno  [address]  [facility] [address]     Dear Colleague,    Thank you for referring your patient, Ayad Moreno, to the [facility]UROLOG[facility]. Please see a copy of my visit note below.    No notes on file    Again, thank you for allowing me to participate in the care of your patient.      Sincerely,    CT Campbell CNP

## 2021-06-02 NOTE — PROGRESS NOTES
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 Getting Botox. Generalized body dystonia symptoms are improved on bilateral Gpi DBS therapy.     2)  S/p Bilateral DBS lead implantation:  Normal impedance and battery life.  No DBS programming changes made.      3)  Anxiety/Mood disorder:  Controlled.       PLAN:    __  Will stay on the same medications. No refill needed at this time.     __  Will continue with Botox injections to improve facial/jaw tightness.     __  Will continue to charge his battery regularly.      __  Return in clinic 6 - 9 months to check his DBS.  Will return sooner as needed.          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     HPI: Mr. Ayad Moreno is a 51-year-old left - handed  male who came to the [facility] neurology clinic by himself for a follow up visit.  He is s/p bilateral Globus Pallidus Internus (Gpi) deep brain stimulation (DBS) lead placement left on 1/10/2017 & right on 2017 by Dr. Carolina.  I saw him last on 2020.     Generalized dystonia symptoms are stable.  He continues getting facial/jaw tightness and getting Botox injections.  No other worsening symptoms. He sees Dr. Horne for Botox injection.  Last visit was on 2021. Per Dr. Horne's note, he had injections in his anterior digastric muscle, nasalis muscle, depressor anguli oris, & orbicularis oris muscle.     He is using the Marijuana (CBD) oil to help him calm.     He had lumbar fusion (Decompression L4-L5, Posterior Spine Fusion with Instrumentation L4-L5) on  at [facility]. It was a successful surgery.    Anxiety is manageable. He has continued taking night classes for counseling. 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         MEDICATIONS:   Outpatient Medications Marked as Taking for the 6/2/21 encounter (Office Visit) with Cindy Manning APRN CNP   Medication Sig     botulinum toxin type A (BOTOX) 100 units injection Inject 800 units of botulinum toxin as 200 units every 3 months     diazepam (VALIUM) 2 MG tablet TAKE 2 TABLETS(4 MG) BY MOUTH EVERY 8 HOURS AS NEEDED FOR ANXIETY OR MUSCLE SPASMS     gabapentin (NEURONTIN) 800 MG tablet Take 1 tablet (800 mg) by mouth 3 times daily     mirtazapine (REMERON) 45 MG tablet Take 1 tablet (45 mg) by mouth At Bedtime     nicotine polacrilex (NICORETTE) 2 MG lozenge Place 2 mg inside cheek every hour as needed for smoking cessation (10x's/day)     traMADol (ULTRAM) 50 MG tablet Take 50 mg by mouth every 8 hours as needed     UNABLE TO FIND Take 1 teaspoonful by mouth 2 times daily MEDICATION NAME: Marijuana (CBD) Oil- NOT THC     vitamin B complex with vitamin C (VITAMIN  B COMPLEX) TABS tablet Take 1 tablet by mouth every morning      VITAMIN D PO        ALLERGIES: Seasonal allergies      PHYSICAL EXAM:    VITAL SIGNS:  Blood pressure 119/82, pulse 65, weight 94.8 kg (209 lb), SpO2 97 %. Body mass index is 27.57 kg/m .    GENERAL:  Mr. Moreno is a pleasant  male who is well-groomed and well-developed sitting comfortably in the exam room without any distress. Affect is appropriate.    DBS Interrogation & Analysis:    Implanted generator:  Left chest [employer]iva-RC.  Lead target:  Bilateral Globus Pallidus Internus (Gpi).    Battery Level:  75%    GROUP A  ACTIVE    Left Brain  Lead placement date:  1/10/2017  Generator placement date:  9/20/2019    C +, 1 -, 2 -  Voltage:  4.0 volts  PW:  60 msec  Rate:  180 Hz    Therapy impedance:  673 Ohms  Therapy Current:  5.8 mA      Patient :  Upper Limit: 4.6 volts  Lower Limit: 0.2 volts    Electrode Impedance Check:    Left Lead: No Problems Found.       Right Brain   Lead placement date:  11/16/2017    C +, 9 -, 10  -  Voltage:  3.5 volts  PW:  60 msec  Rate:  180 Hz    Therapy impedance:  715 Ohms  Therapy Current:  4.8 mA    Patient :  Upper Limit: 4.3 volts  Lower Limit: 0.0 volts    Electrode Impedance Check:    Right Lead: No Problems Found.      See scanned report for impedance details.      MOVEMENT DISORDERS ASSESSMENT: Speech is dysarthric,  Most words are understandable.   No rest tremor. No dyskinesias. He has dystonic posturing in his right hand & foot at rest.     The total amount of time spent with the patient was 35 minutes; 15 min in DBS interrogation & analysis and 25 min in addressing dystonia & speech issues.  Over 50% of the time was spent in counseling & coordination of care.     Today I spent 35 minutes caring for the patient. 15 minutes was spent in DBS interrogation and analysis programming.  20 minutes was spent with reviewing records, meeting with the patient, and documentation.    CT See,  CNP  [facility]

## 2021-06-21 ENCOUNTER — DOCUMENTATION ONLY (OUTPATIENT)
Dept: NEUROLOGY | Facility: CLINIC | Age: 51
End: 2021-06-21

## 2021-06-21 NOTE — PROGRESS NOTES
Received form from [employer], form was completed and mailed to patient address, copy of form was sent to be scanned into patient chart.    Another form was sent, completed [facility] [address] [address] [place] [address]

## 2021-09-14 ENCOUNTER — OFFICE VISIT (OUTPATIENT)
Dept: OTOLARYNGOLOGY | Facility: CLINIC | Age: 51
End: 2021-09-14
Payer: COMMERCIAL

## 2021-09-14 DIAGNOSIS — G24.4 OROFACIAL DYSKINESIA: Primary | ICD-10-CM

## 2021-09-14 PROCEDURE — 95874 GUIDE NERV DESTR NEEDLE EMG: CPT | Performed by: OTOLARYNGOLOGY

## 2021-09-14 PROCEDURE — 99207 PR NO CHARGE LOS: CPT | Performed by: OTOLARYNGOLOGY

## 2021-09-14 PROCEDURE — 64612 DESTROY NERVE FACE MUSCLE: CPT | Mod: 50 | Performed by: OTOLARYNGOLOGY

## 2021-09-14 NOTE — PATIENT INSTRUCTIONS
1.  You were seen in the ENT Clinic today by . If you have any questions or concerns after your appointment, please call [phone]. Press option #1 for scheduling related needs. Press option #3 for Nurse advice.    2.  Plan is to return to clinic 1/4/21 for repeat botox injection      Susan Keith LPN  [phone]  [facility] - Otolaryngology

## 2021-09-14 NOTE — LETTER
9/14/2021       RE: Aayd Moreno  [address]  [facility] [address]     Dear Colleague,    Thank you for referring your patient, Ayad Moreno, to the [facility] E[facility] NOSE AND THROAT CLINIC [place] at [facility]. Please see a copy of my visit note below.    Ayad Moreno is a 51 year old male with a history of oromandibular dystonia .  he was last injected on 5/4/2021. he had a good response to the last injection. The last dose given was anterior digastric muscle with 5 units bilaterally, nasalis muscle bilateral 5 units each side.  Depressor anguli oris 10 units to the right side, orbicularis oris 5 units total.  Amount of botulinum toxin given on that day was 35 units.  Is also had a deep brain stimulator placed.  He is jaw motion particularly jaw opening is more prominent today and would like to concentrate on that as well as the facial musculature.        PROCEDURE:  After obtaining informed consent, ground and reference electrodes were placed. The skin area of the injection was prepped with an alcohol swab.  Treatment sites that required electromyographic guidance used a 26 gauge electromyographic injection needle.  Treatment sites that did not require electromyographic guidance used a 26-gauge half inch needle attached to a tuberculin syringe.  Multiple injection sites where treated. Sites treated with Botulinum toxin A ( BTA) included:         Anterior digastric muscle:EMG guided, bilateral, 5 units each side with strong EMG response - total dose 10 units BTA.      Nasalis muscle. Non EMG guided, bilateral, 5 units each side  - total dose 10 units BTA.     Depressor anguli oris:Non EMG guided, unilateral, right .- total dose  10 units BTA     Orbicularis oris muscle: non EMG guided, unilateral, inferior- right, 2.5 units.- total dose  2.5 units BTA.    Lateral pterygoid muscles: EMG guided, bilateral, 10 units each side with strong EMG  response - total dose 20 units BTA.      The total amount of botulinum A toxin given today was 52.5 units.  The EMG was necessary, specifically in this case to identify the anterior digastric muscles and lateral pterygoid muscles bilaterally.  An additional 20 units of Botulinum toxin was necessarily wasted in preparation for the injection.  He tolerated the procedure well and left our clinic after a short observation.       PLAN: I will have him  follow up on a PRN basis. It is anticipated that repeat injections will be required over the long term.      Again, thank you for allowing me to participate in the care of your patient.      Sincerely,    Yves Horne MD

## 2021-09-14 NOTE — PROGRESS NOTES
Ayad Moreno is a 51 year old male with a history of oromandibular dystonia .  he was last injected on 5/4/2021. he had a good response to the last injection. The last dose given was anterior digastric muscle with 5 units bilaterally, nasalis muscle bilateral 5 units each side.  Depressor anguli oris 10 units to the right side, orbicularis oris 5 units total.  Amount of botulinum toxin given on that day was 35 units.  Is also had a deep brain stimulator placed.  He is jaw motion particularly jaw opening is more prominent today and would like to concentrate on that as well as the facial musculature.        PROCEDURE:  After obtaining informed consent, ground and reference electrodes were placed. The skin area of the injection was prepped with an alcohol swab.  Treatment sites that required electromyographic guidance used a 26 gauge electromyographic injection needle.  Treatment sites that did not require electromyographic guidance used a 26-gauge half inch needle attached to a tuberculin syringe.  Multiple injection sites where treated. Sites treated with Botulinum toxin A ( BTA) included:         Anterior digastric muscle:EMG guided, bilateral, 5 units each side with strong EMG response - total dose 10 units BTA.      Nasalis muscle. Non EMG guided, bilateral, 5 units each side  - total dose 10 units BTA.     Depressor anguli oris:Non EMG guided, unilateral, right .- total dose  10 units BTA     Orbicularis oris muscle: non EMG guided, unilateral, inferior- right, 2.5 units.- total dose  2.5 units BTA.    Lateral pterygoid muscles: EMG guided, bilateral, 10 units each side with strong EMG response - total dose 20 units BTA.      The total amount of botulinum A toxin given today was 52.5 units.  The EMG was necessary, specifically in this case to identify the anterior digastric muscles and lateral pterygoid muscles bilaterally.  An additional 20 units of Botulinum toxin was necessarily wasted in preparation for the  injection.  He tolerated the procedure well and left our clinic after a short observation.       PLAN: I will have him  follow up on a PRN basis. It is anticipated that repeat injections will be required over the long term.

## 2021-10-24 ENCOUNTER — HEALTH MAINTENANCE LETTER (OUTPATIENT)
Age: 51
End: 2021-10-24

## 2021-10-31 DIAGNOSIS — G24.1 DYSTONIA DUE TO DYT-1 GENE MUTATION: ICD-10-CM

## 2021-11-01 ENCOUNTER — TELEPHONE (OUTPATIENT)
Dept: NEUROLOGY | Facility: CLINIC | Age: 51
End: 2021-11-01

## 2021-11-01 RX ORDER — DIAZEPAM 2 MG
2 TABLET ORAL EVERY 8 HOURS PRN
Qty: 180 TABLET | Refills: 3 | Status: SHIPPED | OUTPATIENT
Start: 2021-11-01 | End: 2021-12-02

## 2021-11-01 NOTE — TELEPHONE ENCOUNTER
[facility] Call Center    Phone Message    May a detailed message be left on voicemail: yes     Reason for Call: Medication Refill Request    Has the patient contacted the pharmacy for the refill? Yes   Name of medication being requested: Diazepam  Provider who prescribed the medication: Cindy Manning   Pharmacy: [id number] - [place], [place] - [address] AT [facility] [address]  Date medication is needed: as soon as possible per patient he is all out         Action Taken: Message routed to:  [facility]y    Travel Screening: Not Applicable

## 2021-11-01 NOTE — TELEPHONE ENCOUNTER
Rx Authorization:  Requested Medication/ Dose: Diazepam 2MG tabs  Date last refill ordered: 5/14/21  Quantity ordered: 180 tabs  # refills: 5  Date of last clinic visit with ordering provider: 6/2/21  Date of next clinic visit with ordering provider: F/U 6-9 months  All pertinent protocol data (lab date/result):   Include pertinent information from patients message:

## 2021-11-19 ENCOUNTER — DOCUMENTATION ONLY (OUTPATIENT)
Dept: NEUROLOGY | Facility: CLINIC | Age: 51
End: 2021-11-19
Payer: COMMERCIAL

## 2021-11-28 ASSESSMENT — MOVEMENT DISORDERS SOCIETY - UNIFIED PARKINSONS DISEASE RATING SCALE (MDS-UPDRS)
HOBBIES_AND_OTHER_ACTIVITIES: MILD: I HAVE SOME DIFFICULTY DOING THESE ACTIVITIES.
FREEZING: NORMAL: NOT AT ALL (NO PROBLEMS).
SALIVA_AND_DROOLING: MODERATE: I HAVE SOME DROOLING WHEN I AM AWAKE, BUT I USUALLY DO NOT NEED TISSUES OR A HANDKERCHIEF.
HYGIENE: SLIGHT:  I AM SLOW, BUT I DO NOT NEED ANY HELP.
TOTAL_SCORE: 16
CHEWING_AND_SWALLOWING: SLIGHT: I AM AWARE OF SLOWNESS IN MY CHEWING OR INCREASED EFFORT AT SWALLOWING, BUT I DO NOT CHOKE OR NEED TO HAVE MY FOOD SPECIALLY PREPARED.
DRESSING: MILD: I AM SLOW AND NEED HELP FOR A FEW DRESSING TASKS (BUTTONS, BRACELETS).
TREMOR: SLIGHT: SHAKING OR TREMOR OCCURS BUT DOES NOT CAUSE PROBLEMS WITH ANY ACTIVITIES.
EATING_TASKS: SLIGHT: I AM SLOW, BUT I DO NOT NEED ANY HELP HANDLING MY FOOD AND HAVE NOT HAD FOOD SPILLS WHILE EATING.
WALKING_AND_BALANCE: NORMAL: NOT AT ALL (NO PROBLEMS).
HANDWRITING: SLIGHT: MY WRITING IS SLOW, CLUMSY OR UNEVEN, BUT ALL WORDS ARE CLEAR.
GETTING_OUT_OF_BED_CAR_DEEP_CHAIR: SLIGHT: I AM SLOW OR AWKWARD, BUT I USUALLY CAN DO IT ON MY FIRST TRY.
TURNING_IN_BED: SLIGHT: I HAVE A BIT OF TROUBLE TURNING, BUT I DO NOT NEED ANY HELP.
SPEECH: MILD: MY SPEECH CAUSES PEOPLE TO ASK ME TO OCCASIONALLY REPEAT MYSELF, BUT NOT EVERYDAY.

## 2021-12-01 ENCOUNTER — DOCUMENTATION ONLY (OUTPATIENT)
Dept: NEUROLOGY | Facility: CLINIC | Age: 51
End: 2021-12-01

## 2021-12-01 ENCOUNTER — MYC MEDICAL ADVICE (OUTPATIENT)
Dept: NEUROLOGY | Facility: CLINIC | Age: 51
End: 2021-12-01

## 2021-12-01 ENCOUNTER — OFFICE VISIT (OUTPATIENT)
Dept: NEUROLOGY | Facility: CLINIC | Age: 51
End: 2021-12-01
Payer: COMMERCIAL

## 2021-12-01 VITALS
SYSTOLIC BLOOD PRESSURE: 114 MMHG | DIASTOLIC BLOOD PRESSURE: 78 MMHG | BODY MASS INDEX: 27.71 KG/M2 | OXYGEN SATURATION: 96 % | WEIGHT: 210 LBS | HEART RATE: 67 BPM

## 2021-12-01 DIAGNOSIS — G24.9 DYSTONIA: ICD-10-CM

## 2021-12-01 DIAGNOSIS — G24.9 GENERALIZED DYSTONIA: Primary | ICD-10-CM

## 2021-12-01 DIAGNOSIS — F41.9 ANXIETY: ICD-10-CM

## 2021-12-01 DIAGNOSIS — Z96.89 S/P DEEP BRAIN STIMULATOR PLACEMENT: ICD-10-CM

## 2021-12-01 DIAGNOSIS — G24.1 DYSTONIA DUE TO DYT-1 GENE MUTATION: ICD-10-CM

## 2021-12-01 PROCEDURE — 95970 ALYS NPGT W/O PRGRMG: CPT | Performed by: NURSE PRACTITIONER

## 2021-12-01 PROCEDURE — 99215 OFFICE O/P EST HI 40 MIN: CPT | Mod: 25 | Performed by: NURSE PRACTITIONER

## 2021-12-01 RX ORDER — GABAPENTIN 800 MG/1
800 TABLET ORAL 3 TIMES DAILY
Qty: 90 TABLET | Refills: 11 | Status: SHIPPED | OUTPATIENT
Start: 2021-12-01 | End: 2022-12-02

## 2021-12-01 RX ORDER — CHOLECALCIFEROL (VITAMIN D3) 100000/G
1 POWDER (GRAM) MISCELLANEOUS DAILY PRN
COMMUNITY

## 2021-12-01 RX ORDER — MIRTAZAPINE 45 MG/1
45 TABLET, FILM COATED ORAL AT BEDTIME
Qty: 30 TABLET | Refills: 11 | Status: SHIPPED | OUTPATIENT
Start: 2021-12-01 | End: 2022-12-07

## 2021-12-01 NOTE — PROGRESS NOTES
Received disability form from [facility], form was completed and signed by provider, and mailed with enclosed envelope to [employer] [address], [facility], [address], copy wa mailed to patient home

## 2021-12-01 NOTE — PATIENT INSTRUCTIONS
__  Will stay on the same medications. Refills were ordered today.     __  Will continue with Botox injections to improve facial/jaw tightness.     __  Will continue to charge his battery regularly.      __  Return in clinic 6 months to check his DBS.  Will return sooner as needed.

## 2021-12-01 NOTE — PROGRESS NOTES
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 Getting Botox. Overall, generalized body dystonia symptoms are improved on bilateral Gpi DBS therapy; however, with prolonged talking and repetitive hand movements, dystonic symptoms recur and interfere with speech and writing.     2)  S/p Bilateral DBS lead implantation:  Normal impedance and battery life.  No DBS programming changes made. Patients has his battery completely depleted and turned off twice.     3)  Anxiety/Mood disorder:  He has psychosocial stressors.  He has a psychotherapist.  Mood is controlled.       PLAN:    __  Will stay on the same medications. Refills were ordered today.     __  Will continue with Botox injections to improve facial/jaw tightness.     __  Will continue to charge his battery regularly.  He was informed that if his battery depletes one more time, he won't be able to recharge it again, and it has to be replaced; therefore, he was urged to pay close attention to charge is weekly.    __  Will continue to see his therapist.     __  Disability paperwork partially completed.  Johnna Urban CMA will complete the rest and fax the for as instructed. Patient will also get a copy.     __  Return in clinic 6 - 9 months to check his DBS.  Will return sooner as needed.          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     HPI: Mr. Ayad Moreno is a 51-year-old right-handed  male who came to the [facility] neurology clinic by himself for a follow up visit.  He is s/p bilateral Globus Pallidus Internus (Gpi) deep brain stimulation (DBS) lead placement left on 1/10/2017 & right on 2017 by Dr. Carolina.  He was last seen on 21.    He thinks the tightness in face/jaw is better in the mornings but  "still there. He's not looking forward to winter because \"it's not kind to dystonia\". He thinks the diazepam is the most helpful - he takes 2 tablets three times per day. Overall, he thinks generalized dystonia symptoms are mostly stable. He reports with prolonged talking and repetitive hand movements, dystonic symptoms recur and interfere with speech and writing.    He has had significant social stressors recently. 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 He also has difficulty with writing - his right hand quickly becomes dystonic with writing and he will switch to the left hand, but also has dystonia with that hand. Prolonged actions (e.g. speaking or using hands) makes the dystonia worse and he has to take frequent breaks from these activities. 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    He continues getting facial/jaw tightness and getting Botox injections.  No other worsening symptoms. He sees Dr. Horne for Botox injection. Last visit was on 9/14/2021. Per Dr. Horne's note, he had injections in his anterior digastric muscle, nasalis muscle, depressor anguli oris, orbicularis oris & lateral pterygoid muscles.     He uses a sticker to charge his DBS - charges once per week for 4-5 hours. He reports that he has had his DBS generator completely depleted twice. He asked what would happen if it is depleted again.     He continues to take marijuana (CBD) oil, diazepam 2 mg TID, gabapentin 800 mg TID, and mirtazapine 45 mg at bedtime. He also takes tramadol 60 mg TID.     Anxiety is manageable. Seeing a therapist. He has continued taking night classes.     During today's visit, Johnna Urban CMA came and talked to patient regarding his disability paperwork to insure the form is completed correctly.     CURRENT " MEDICATIONS:  Outpatient Medications Marked as Taking for the 12/1/21 encounter (Office Visit) with Cindy Manning APRN CNP   Medication Sig     botulinum toxin type A (BOTOX) 100 units injection Inject 800 units of botulinum toxin as 200 units every 3 months     Cholecalciferol (VITAMIN D3) POWD Take 1 tablet by mouth daily as needed     diazepam (VALIUM) 2 MG tablet Take 1 tablet (2 mg) by mouth every 8 hours as needed for anxiety or muscle spasms     gabapentin (NEURONTIN) 800 MG tablet Take 1 tablet (800 mg) by mouth 3 times daily     mirtazapine (REMERON) 45 MG tablet Take 1 tablet (45 mg) by mouth At Bedtime     nicotine polacrilex (NICORETTE) 2 MG lozenge Place 2 mg inside cheek every hour as needed for smoking cessation (10x's/day)     traMADol (ULTRAM) 50 MG tablet Take 50 mg by mouth every 8 hours as needed     UNABLE TO FIND Take 1 teaspoonful by mouth 2 times daily MEDICATION NAME: Marijuana (CBD) Oil- NOT THC     vitamin B complex with vitamin C (VITAMIN  B COMPLEX) TABS tablet Take 1 tablet by mouth every morning      VITAMIN D PO      Current Facility-Administered Medications for the 12/1/21 encounter (Office Visit) with Cindy Manning APRN CNP   Medication     botulinum toxin type A (BOTOX) 100 units injection 105 Units     botulinum toxin type A (BOTOX) 100 units injection 200 Units     ALLERGIES: Seasonal allergies    PHYSICAL EXAM:    /78   Pulse 67   Wt 95.3 kg (210 lb)   SpO2 96%   BMI 27.71 kg/m      GENERAL:  Mr. Moreno is a pleasant  male who is well-groomed and well-developed sitting comfortably in the exam room without any distress. Affect is appropriate.    DBS Interrogation & Analysis:    Implanted generator:  Left chest Activa-RC.  Lead target:  Bilateral Globus Pallidus Internus (Gpi)    Battery Level:  75%    GROUP A ACTIVE    Left Brain  Lead placement date:  1/10/2017  Generator placement date:  9/20/2019    C +, 1 -, 2 -  Voltage:  4.0 volts  PW:  60  msec  Rate:  180 Hz    Therapy impedance:  658 Ohms  Therapy Current:  5.9 mA      Patient :  Upper Limit: 4.6 volts  Lower Limit: 0.2 volts    Electrode Impedance Check:    Left Lead: No Problems Found.       Right Brain   Lead placement date:  11/16/2017    C +, 9 -, 10 -  Voltage:  3.5 volts  PW:  60 msec  Rate:  180 Hz    Therapy impedance:  660 Ohms  Therapy Current:  5.2 mA    Patient :  Upper Limit: 4.3 volts  Lower Limit: 0.0 volts    Electrode Impedance Check:    Right Lead: No Problems Found.      See scanned report for impedance details.      MOVEMENT DISORDERS ASSESSMENT: Speech is dysarthric,  Most words are understandable.   No rest tremor. No dyskinesias. He has dystonic posturing in right hand & right foot at rest and left hand with action.     Patient and plan was examined & discussed with CT Prado, FRANKLIN.  Aislinn Messina MD  Movement Disorder Fellow    Patient was seen with Dr. Messina. Today I spent 45 minutes caring for the patient. 20 minutes was spent in DBS interrogation and analysis.  25 minutes was spent with reviewing records, meeting with the patient, answering questions, examining, refilling/ordering medication, and documentation.    CT See, CNP  [facility]rology [facility]

## 2021-12-02 RX ORDER — DIAZEPAM 2 MG
4 TABLET ORAL EVERY 8 HOURS PRN
Qty: 540 TABLET | Refills: 1 | Status: SHIPPED | OUTPATIENT
Start: 2021-12-02 | End: 2022-05-23

## 2021-12-02 NOTE — TELEPHONE ENCOUNTER
[facility] Call Center    Phone Message    May a detailed message be left on voicemail: yes     Reason for Call: Other: Patient is calling back, informs writer that he is out of his medication Tramadol, pt is asking to speak to care team and is needing refills.     Action Taken: Message routed to:  [facility] neurology adult [facility]sc    Travel Screening: Not Applicable

## 2021-12-02 NOTE — CONFIDENTIAL NOTE
Per Office visit note from yesterday with Dulce Lion reported taking 4 mg TID of diazepam. Ayad confirmed this is how he is taking it. He reported he did not ask about tramadol, the only issue is the diazepam. I will send a refill to Dulce to sign and send Ayad a [employer] message once it is refills.

## 2021-12-17 ENCOUNTER — MYC MEDICAL ADVICE (OUTPATIENT)
Dept: NEUROLOGY | Facility: CLINIC | Age: 51
End: 2021-12-17
Payer: COMMERCIAL

## 2022-01-04 ENCOUNTER — OFFICE VISIT (OUTPATIENT)
Dept: OTOLARYNGOLOGY | Facility: CLINIC | Age: 52
End: 2022-01-04
Payer: COMMERCIAL

## 2022-01-04 VITALS — BODY MASS INDEX: 27.83 KG/M2 | WEIGHT: 210 LBS | HEIGHT: 73 IN

## 2022-01-04 DIAGNOSIS — G24.4 OROFACIAL DYSKINESIA: Primary | ICD-10-CM

## 2022-01-04 PROCEDURE — 95874 GUIDE NERV DESTR NEEDLE EMG: CPT | Performed by: OTOLARYNGOLOGY

## 2022-01-04 PROCEDURE — 64612 DESTROY NERVE FACE MUSCLE: CPT | Mod: 50 | Performed by: OTOLARYNGOLOGY

## 2022-01-04 ASSESSMENT — MIFFLIN-ST. JEOR: SCORE: 1871.43

## 2022-01-04 ASSESSMENT — PAIN SCALES - GENERAL: PAINLEVEL: NO PAIN (0)

## 2022-01-04 NOTE — PROGRESS NOTES
Ayad Moreno is a 51 year old male with a history of adductor laryngeal dystonia and laryngeal spasm.  he was last injected on 9/14/2022. he had a good response to the last injection. The last dose given was lateral pterygoid muscles 10 units each side, anterior digastric muscle with 10 units bilaterally, nasalis muscle bilateral 5 units each side.  Depressor anguli oris 10 units to the right side, orbicularis oris 2.5 units total.  Amount of botulinum toxin given on that day was 52.5 units.  He also has a deep brain stimulator placed.   The response lasted for 3 months.   Our plan today is to give the same dose and location as the 9/14/2021 visit.      PROCEDURE: After obtaining consent, the patient was placed in the supine position. Ground and reference electrodes were applied. The skin area of the injection  was cleaned with an alcohol swab.  Treatment sites required electromyographic guidance were performed using a 27-gauge, unipolar electromyography needle.  Treatment sites that did not require electromyographic guidance using the 26-gauge half inch needle attached to a tuberculin syringe.  Multiple injection sites were treated sites treated with botulinum a toxin included    Anterior digastric muscle:EMG guided, bilateral, 5 units each side with strong EMG response - total dose 10 units BTA.      Nasalis muscle. Non EMG guided, bilateral, 5 units each side  - total dose 10 units BTA.     Depressor anguli oris:Non EMG guided, unilateral, right .- total dose  10 units BTA     Orbicularis oris muscle: non EMG guided, unilateral, inferior- right, 2.5 units.- total dose  2.5 units BTA.     Lateral pterygoid muscles: EMG guided, bilateral, 10 units each side with strong EMG response - total dose 20 units BTA.    The total amount of botulinum A toxin delivered today was 52.5 units. An additional 20 units of botulinum A toxin was necessarily wasted in preparation for the injection. he tolerated the procedure well  and left the clinic after a short observation period.         The EMG was necessary specifically in this case to identify areas of muscle overactivity as well as to access the inferotemporal fossa which is deep to the zygomatic arch  and is not otherwise directly accessible without EMG guidance.    PLAN: I will have him  follow up on a PRN basis. It is anticipated that repeat injections will be required over the long term.

## 2022-01-04 NOTE — NURSING NOTE
Invasive Procedure Safety Checklist  Procedure:  botox    Responsible person(s):  Complete sections as appropriate and electronically sign and date below.    Staff/Provider  Consent documentation on chart:  YES  H&P is not applicable (when straight local anesthesia is used).    Procedure Team  Completed by comparing informed consent documentation, information on the patient record and/or the marked surgical site, and discussion with the patient/guardian.     Verified:  (Select all that apply)  Patient identification (two indicators)  Procedure to be performed  Procedure site and /or laterality and/or level  Consent  Procedure site:  Site can not be marked due to location.  Provider [place] - Site/Laterality/Level:  No Level or Structure  Staff/Provider:  No images    Procedure Team:  *Pause for the Cause* verbal and active participation of team members- verify:  Patient name:  YES  Procedure to be performed:  YES  Site, laterality and level, noting patient position:  YES    Above steps completed as applicable (Electronic Signature, Title, Date):    Susan Keith LPN      Note:  Any incidents of wrong patient, wrong procedure, or wrong site are reported using the Occurrence Process already in place.  The occurrence form is required to be completed immediately with this type of event.       negative...

## 2022-01-04 NOTE — LETTER
1/4/2022       RE: Ayad Moreno  [address]  [facility] [address]     Dear Colleague,    Thank you for referring your patient, Ayad Moreno, to the [facility] EAR NOSE AND THROAT CLINIC [place] [facility]. Please see a copy of my visit note below.    Ayad Moreno is a 51 year old male with a history of adductor laryngeal dystonia and laryngeal spasm.  he was last injected on 9/14/2022. he had a good response to the last injection. The last dose given was lateral pterygoid muscles 10 units each side, anterior digastric muscle with 10 units bilaterally, nasalis muscle bilateral 5 units each side.  Depressor anguli oris 10 units to the right side, orbicularis oris 2.5 units total.  Amount of botulinum toxin given on that day was 52.5 units.  He also has a deep brain stimulator placed.   The response lasted for 3 months.   Our plan today is to give the same dose and location as the 9/14/2021 visit.      PROCEDURE: After obtaining consent, the patient was placed in the supine position. Ground and reference electrodes were applied. The skin area of the injection  was cleaned with an alcohol swab.  Treatment sites required electromyographic guidance were performed using a 27-gauge, unipolar electromyography needle.  Treatment sites that did not require electromyographic guidance using the 26-gauge half inch needle attached to a tuberculin syringe.  Multiple injection sites were treated sites treated with botulinum a toxin included    Anterior digastric muscle:EMG guided, bilateral, 5 units each side with strong EMG response - total dose 10 units BTA.      Nasalis muscle. Non EMG guided, bilateral, 5 units each side  - total dose 10 units BTA.     Depressor anguli oris:Non EMG guided, unilateral, right .- total dose  10 units BTA     Orbicularis oris muscle: non EMG guided, unilateral, inferior- right, 2.5 units.- total dose  2.5 units  BTA.     Lateral pterygoid muscles: EMG guided, bilateral, 10 units each side with strong EMG response - total dose 20 units BTA.    The total amount of botulinum A toxin delivered today was 52.5 units. An additional 20 units of botulinum A toxin was necessarily wasted in preparation for the injection. he tolerated the procedure well and left the clinic after a short observation period.         The EMG was necessary specifically in this case to identify areas of muscle overactivity as well as to access the inferotemporal fossa which is deep to the zygomatic arch  and is not otherwise directly accessible without EMG guidance.    PLAN: I will have him  follow up on a PRN basis. It is anticipated that repeat injections will be required over the long term.      Again, thank you for allowing me to participate in the care of your patient.      Sincerely,    Yves Horne MD

## 2022-01-04 NOTE — PATIENT INSTRUCTIONS
1.  You were seen in the ENT Clinic today by . If you have any questions or concerns after your appointment, please call [phone]. Press option #1 for scheduling related needs. Press option #3 for Nurse advice.    2.  Plan is to return to clinic 4/5/22 for repeat botox injection      Susan Keith LPN  [phone]  [facility] - Otolaryngology

## 2022-01-07 ENCOUNTER — DOCUMENTATION ONLY (OUTPATIENT)
Dept: NEUROLOGY | Facility: CLINIC | Age: 52
End: 2022-01-07
Payer: COMMERCIAL

## 2022-01-07 NOTE — PROGRESS NOTES
Received FMLA from [facility] from was completed and fax to [phone], sent to be scanned, and mailed to patient home for records

## 2022-02-12 ENCOUNTER — HEALTH MAINTENANCE LETTER (OUTPATIENT)
Age: 52
End: 2022-02-12

## 2022-03-07 DIAGNOSIS — G24.9 DYSTONIA: ICD-10-CM

## 2022-03-07 RX ORDER — GABAPENTIN 800 MG/1
TABLET ORAL
Qty: 90 TABLET | Refills: 11 | OUTPATIENT
Start: 2022-03-07

## 2022-03-07 NOTE — TELEPHONE ENCOUNTER
Rx Authorization:  Requested Medication/ Dose GABAPENTIN 800MG TABLETS  Date last refill ordered: 12/1/21  Quantity ordered: 90 tabs  # refills: 11  Date of last clinic visit with ordering provider: 12/1/21  Date of next clinic visit with ordering provider: 6/1/22  All pertinent protocol data (lab date/result):   Include pertinent information from patients message:

## 2022-04-19 ENCOUNTER — TELEPHONE (OUTPATIENT)
Dept: OTOLARYNGOLOGY | Facility: CLINIC | Age: 52
End: 2022-04-19
Payer: COMMERCIAL

## 2022-04-19 NOTE — TELEPHONE ENCOUNTER
[facility] Call Center    Phone Message    May a detailed message be left on voicemail: yes     Reason for Call: Symptoms or Concerns     If patient has red-flag symptoms, warm transfer to triage line    Current symptom or concern: Face is falling on the right side.     Symptoms have been present for:  Noticed 1 week ago    Has patient previously been seen for this? Yes    By: Dr. Horne    Date: 1/04/2022    Are there any new or worsening symptoms? Yes: Patient states the facial droop he has been getting Botox injections for is more significant and is needing a sooner appt. Than 6/14/2022. Please reach out to patient if sooner appt options are available. Thank you       Action Taken: Message routed to:  [facility] ENT    Travel Screening: Not Applicable

## 2022-05-10 ENCOUNTER — OFFICE VISIT (OUTPATIENT)
Dept: OTOLARYNGOLOGY | Facility: CLINIC | Age: 52
End: 2022-05-10
Payer: COMMERCIAL

## 2022-05-10 VITALS — WEIGHT: 210 LBS | BODY MASS INDEX: 27.83 KG/M2 | HEIGHT: 73 IN

## 2022-05-10 DIAGNOSIS — G24.4 OROFACIAL DYSKINESIA: Primary | ICD-10-CM

## 2022-05-10 PROCEDURE — 99207 PR NO CHARGE LOS: CPT | Performed by: OTOLARYNGOLOGY

## 2022-05-10 PROCEDURE — 64612 DESTROY NERVE FACE MUSCLE: CPT | Mod: 50 | Performed by: OTOLARYNGOLOGY

## 2022-05-10 ASSESSMENT — PAIN SCALES - GENERAL: PAINLEVEL: NO PAIN (0)

## 2022-05-10 NOTE — PROGRESS NOTES
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  He has had a deep brain stimulator placed.  He was last injected on 1/4/2022. he had a good response to the last injection. The last dose given was:     Anterior digastric muscle: bilateral, 5 units each side  - total dose 10 units BTA.   Nasalis muscle.  bilateral, 5 units each side  - total dose 10 units BTA.   Depressor anguli oris: 10 units  unilateral, right .- total dose  10 units BTA   Orbicularis oris muscle:  2.5 units unilateral, inferior- right,.- total dose  2.5 units BTA.   Lateral pterygoid muscles: 10 units  bilateral,   - total dose 20 units BTA.       The response lasted for 3.5 months. There was no Dysphagia or Dyspnea related to the injection.  The same dosage that we gave on his last visit.  In addition we will add an additional.5 units to the right depressor anguli oris.      PROCEDURE: After obtaining informed consent, ground and reference electrodes were placed. The skin area of the injection was prepped with an alcohol swab.  Treatment sites that required electromyographic guidance used a 26 gauge electromyographic injection needle.  Treatment sites that did not require electromyographic guidance used a 26-gauge half inch needle attached to a tuberculin syringe.  Multiple injection sites where treated. Sites treated with Botulinum toxin A ( BTA) included:    Anterior digastric muscle:EMG guided, bilateral, 5 units each side with Moderate EMG response - total dose 10 units BTA.      Nasalis muscle. Non EMG guided, bilateral, 5 units each side  - total dose 10 units BTA.     Depressor anguli oris:Non EMG guided, unilateral, right .- total dose  10 units BTA     Depressor anguli oris:Non EMG guided, unilateral, left .- total dose  5 units BTA    Orbicularis oris muscle: non EMG guided, unilateral, inferior- right, 2.5 units.- total dose  2.5 units BTA.     Lateral pterygoid muscles: EMG  guided, bilateral, 10 units each side with Strong EMG response - total dose 20 units BTA.      The total amount of botulinum A toxin delivered today was 57.5 units. An additional 30 units of botulinum A toxin was necessarily wasted in preparation for the injection. he tolerated the procedure well and left the clinic after a short observation period.       The EMG was necessary specifically in this case to identify areas of greatest  muscle activity as well as to access the lateral pterygoid muscle which is within the infratemporal fossa and  not otherwise directly accessible without EMG guidance.    PLAN: I will have him  follow up on a PRN basis. It is anticipated that repeat injections will be required over the long term.

## 2022-05-10 NOTE — PATIENT INSTRUCTIONS
1.  You were seen in the ENT Clinic today by . If you have any questions or concerns after your appointment, please call [phone]. Press option #1 for scheduling related needs. Press option #3 for Nurse advice.    2. Plan is to return to clinic 8/9/22 for repeat botox injection      Susan Keith LPN  [phone]  [facility] - Otolaryngology

## 2022-05-10 NOTE — NURSING NOTE
"Chief Complaint   Patient presents with     Procedure     botox    Height 1.854 m (6' 1\"), weight 95.3 kg (210 lb). Vida Gatica, EMT  "

## 2022-05-10 NOTE — LETTER
5/10/2022       RE: Ayad Moreno  [address]  [place] [facility] [address]     Dear Colleague,    Thank you for referring your patient, Ayad Moreno, to the [facility] EAR NOSE AND THROAT [facility]INIC [place] [facility]. Please see a copy of my visit note below.    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  He has had a deep brain stimulator placed.  He was last injected on 1/4/2022. he had a good response to the last injection. The last dose given was:     Anterior digastric muscle: bilateral, 5 units each side  - total dose 10 units BTA.   Nasalis muscle.  bilateral, 5 units each side  - total dose 10 units BTA.   Depressor anguli oris: 10 units  unilateral, right .- total dose  10 units BTA   Orbicularis oris muscle:  2.5 units unilateral, inferior- right,.- total dose  2.5 units BTA.   Lateral pterygoid muscles: 10 units  bilateral,   - total dose 20 units BTA.       The response lasted for 3.5 months. There was no Dysphagia or Dyspnea related to the injection.  The same dosage that we gave on his last visit.  In addition we will add an additional.5 units to the right depressor anguli oris.      PROCEDURE: After obtaining informed consent, ground and reference electrodes were placed. The skin area of the injection was prepped with an alcohol swab.  Treatment sites that required electromyographic guidance used a 26 gauge electromyographic injection needle.  Treatment sites that did not require electromyographic guidance used a 26-gauge half inch needle attached to a tuberculin syringe.  Multiple injection sites where treated. Sites treated with Botulinum toxin A ( BTA) included:    Anterior digastric muscle:EMG guided, bilateral, 5 units each side with Moderate EMG response - total dose 10 units BTA.      Nasalis muscle. Non EMG guided, bilateral, 5 units each side  - total dose 10 units  BTA.     Depressor anguli oris:Non EMG guided, unilateral, right .- total dose  10 units BTA     Depressor anguli oris:Non EMG guided, unilateral, left .- total dose  5 units BTA    Orbicularis oris muscle: non EMG guided, unilateral, inferior- right, 2.5 units.- total dose  2.5 units BTA.     Lateral pterygoid muscles: EMG guided, bilateral, 10 units each side with Strong EMG response - total dose 20 units BTA.      The total amount of botulinum A toxin delivered today was 57.5 units. An additional 30 units of botulinum A toxin was necessarily wasted in preparation for the injection. he tolerated the procedure well and left the clinic after a short observation period.       The EMG was necessary specifically in this case to identify areas of greatest  muscle activity as well as to access the lateral pterygoid muscle which is within the infratemporal fossa and  not otherwise directly accessible without EMG guidance.    PLAN: I will have him  follow up on a PRN basis. It is anticipated that repeat injections will be required over the long term.        Again, thank you for allowing me to participate in the care of your patient.      Sincerely,    Yves Horne MD

## 2022-05-23 DIAGNOSIS — G24.1 DYSTONIA DUE TO DYT-1 GENE MUTATION: ICD-10-CM

## 2022-05-23 RX ORDER — DIAZEPAM 2 MG
4 TABLET ORAL EVERY 8 HOURS PRN
Qty: 540 TABLET | Refills: 1 | Status: SHIPPED | OUTPATIENT
Start: 2022-05-23 | End: 2022-11-07

## 2022-05-23 NOTE — TELEPHONE ENCOUNTER
[facility] Call Center    Phone Message    May a detailed message be left on voicemail: yes     Reason for Call: Medication Question or concern regarding medication   Prescription Clarification  Name of Medication: diazepam (VALIUM) 2 MG tablet  Prescribing Provider: Cindy Manning APRN [facility]   Pharmacy: [id number] - [place], [place] - [address] AT [facility] [address]   What on the order needs clarification? Pt is calling to ask for a refill on his diazepam. He will be out after tonight. Pt says he is out a little sooner than he should be because he did take 3 tablets as needed instead of 2. He is hoping he can get a Rx sent to his pharmacy today.    Please contact pt once Rx has been sent.  Ph: [phone]    Action Taken: Message routed to:  [facility] Neurology    Travel Screening: Not Applicable

## 2022-05-24 RX ORDER — DIAZEPAM 2 MG
TABLET ORAL
Qty: 540 TABLET | OUTPATIENT
Start: 2022-05-24

## 2022-05-24 NOTE — TELEPHONE ENCOUNTER
[facility] Call Center    Phone Message    May a detailed message be left on voicemail: yes     Reason for Call: Medication Refill Request    Has the patient contacted the pharmacy for the refill? Yes   Name of medication being requested: diazepam (VALIUM) 2 MG tablet  Provider who prescribed the medication: Cindy FOFANA [facility]  Pharmacy: [facility][id number] - [place], [place] - [address] AT [facility] [address]  Date medication is needed: ASAP    Pt stated that he is completely out of the medication and they will not give him his new prescription for several days because it is too early for a refill. Pt stated that he is going out of town for a wedding and needs the medication refilled ASAP.   Action Taken: Message routed to:  [facility] Neurology    Travel Screening: Not Applicable

## 2022-05-24 NOTE — TELEPHONE ENCOUNTER
There policy is that they can refill Diazpam up to 5 days early and 5/27 will be 5 days before he is suppose to be due for a refill. Spoke to Ana Laura (pharmacist) and she stated she can authorize an early refill for him given he is going out of town. They will have it ready in about 1 hour.    Spoke to Ayad regarding taking is diazepam differently than prescribed. Sometimes he will take 3 tablets on bad days and on good days will take 1 tablet. He feels he has been having worsening symptoms, leading him to run out sooner than usual. His Deep Brain Stimulation is working fine and charging normally, he has no concerns around his Deep Brain Stimulation. He will see Dulce on 6/1. Instructed him to discuss this further with her.

## 2022-06-07 ENCOUNTER — OFFICE VISIT (OUTPATIENT)
Dept: NEUROLOGY | Facility: CLINIC | Age: 52
End: 2022-06-07
Payer: COMMERCIAL

## 2022-06-07 VITALS
HEIGHT: 72 IN | OXYGEN SATURATION: 93 % | HEART RATE: 92 BPM | BODY MASS INDEX: 26.99 KG/M2 | SYSTOLIC BLOOD PRESSURE: 124 MMHG | WEIGHT: 199.3 LBS | DIASTOLIC BLOOD PRESSURE: 77 MMHG

## 2022-06-07 DIAGNOSIS — G24.1 DYSTONIA DUE TO DYT-1 GENE MUTATION: Primary | ICD-10-CM

## 2022-06-07 DIAGNOSIS — Z96.89 S/P DEEP BRAIN STIMULATOR PLACEMENT: ICD-10-CM

## 2022-06-07 PROCEDURE — 99212 OFFICE O/P EST SF 10 MIN: CPT | Performed by: NURSE PRACTITIONER

## 2022-06-07 PROCEDURE — 95983 ALYS BRN NPGT PRGRMG 15 MIN: CPT | Performed by: NURSE PRACTITIONER

## 2022-06-07 PROCEDURE — 95984 ALYS BRN NPGT PRGRMG ADDL 15: CPT | Performed by: NURSE PRACTITIONER

## 2022-06-07 ASSESSMENT — PAIN SCALES - GENERAL: PAINLEVEL: NO PAIN (0)

## 2022-06-07 NOTE — LETTER
2022       RE: Ayad Moreno  [address]  [facility] [address]     Dear Colleague,    Thank you for referring your patient, Ayad Moreno, to the [facility] NEUROLOGY CLINIC [facility]. Please see a copy of my visit note below.    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 DBS programming done to improve Rt hand dystonia.     3)  Anxiety/Mood disorder:       PLAN:    The following patient instructions provided: -    __ Your DBS electrical system function (impedance) is normal. The battery life is also good.    __ You have two programs.    You have been in Program A for a while   Left GPi (Right body) voltage has increased from 4.0 to 4.1 volts.  No change in the Right GPi (Left body).    Program B has a wider pulse width. You can try this.     __ Please call if your symptoms worsen. You can reduce the voltage if you experience side effects.     __  Return in 6 months.          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     HPI: Mr. Ayad Moreno is a 52-year-old right-handed  male who came to the [facility] neurology clinic by himself for a follow up visit.  He is s/p bilateral Globus Pallidus Internus (Gpi) deep brain stimulation (DBS) lead placement left on 1/10/2017 & right on 2017 by Dr. Carolina.  I saw him last in the clinic 2021.    Overall, he is doing pretty good. His dysarthria is unchanged. The Right thumb dystonia has gotten worse. He finds hit Rt thumb tucked as if he wants to make a fist. 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 During last Rx refill he was out 5 days earlier. His insurance doesn't pay more than 6 tabs /day. Anxiety has been good.     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 He types using his two index fingers.    He works part time walking a dog.    His DBS battery drained and turned itself off for the 3rd time. It went OFF in the middle of the night. He couldn't talk. \"It was awful\" He had spasms in his mouth and tongue. He woke up and charged it to 50% to be able to turn it ON. Although he has scheduled it to charge his battery, sometimes he forgets.  He has called [employer] and was told that his DBS would continue to charge after it is depleted as long as he doesn't take a long time after the battery depletion.     CURRENT MEDICATIONS:  Outpatient Medications Marked as Taking for the 6/7/22 encounter (Office Visit) with Cindy Manning APRN CNP   Medication Sig     botulinum toxin type A (BOTOX) 100 units injection Inject 800 units of botulinum toxin as 200 units every 3 months     Cholecalciferol (VITAMIN D3) POWD Take 1 tablet by mouth daily as needed     diazepam (VALIUM) 2 MG tablet Take 2 tablets (4 mg) by mouth every 8 hours as needed for anxiety or muscle spasms     gabapentin (NEURONTIN) 800 MG tablet Take 1 tablet (800 mg) by mouth 3 times daily     mirtazapine (REMERON) 45 MG tablet Take 1 tablet (45 mg) by mouth At Bedtime     nicotine (COMMIT) 2 MG lozenge Place 2 mg inside cheek every hour as needed for smoking cessation (10x's/day)     traMADol (ULTRAM) 50 MG tablet Take 50 mg by mouth every 8 hours as needed     UNABLE TO FIND Take 1 teaspoonful by mouth 2 times daily MEDICATION NAME: Marijuana (CBD) Oil- NOT THC     vitamin B complex with vitamin C (STRESS TAB) tablet Take 1 tablet by mouth every morning      VITAMIN D PO      Current Facility-Administered Medications for the 6/7/22 encounter (Office Visit) with Kerri, " CT Larios CNP   Medication     botulinum toxin type A (BOTOX) 100 units injection 105 Units     botulinum toxin type A (BOTOX) 100 units injection 200 Units     botulinum toxin type A (BOTOX) 100 units injection 52.5 Units         ALLERGIES: Seasonal allergies    PHYSICAL EXAM:    /77 (BP Location: Right arm, Patient Position: Sitting, Cuff Size: Adult Regular)   Pulse 92   Ht 1.829 m (6')   Wt 90.4 kg (199 lb 4.8 oz)   SpO2 93%   BMI 27.03 kg/m      GENERAL:  Mr. Moreno is a pleasant  male who is well-groomed and well-developed sitting comfortably in the exam room without any distress. Affect is appropriate.    His Right hand dose show dystonic posturing and seems to be tucked in touching the middle of his Rt hand.     DBS Interrogation & Analysis:    Implanted generator:  Left chest [employer]-RC.  Lead target:  Bilateral Globus Pallidus Internus (Gpi)    Battery Level:  100%  ADRIANNA Date: Sep 19, 2033    GROUP A ACTIVE    Left Brain  Lead placement date:  1/10/2017  Generator placement date:  9/20/2019    C +, 1 -, 2 -  Voltage:  4.0 volts  PW:  60 msec  Rate:  180 Hz    Therapy impedance:  663 Ohms  Therapy Current:  5.9 mA      Patient :  Upper Limit: 4.6 volts  Lower Limit: 0.2 volts    Electrode Impedance Check:    Left Lead: No Problems Found.       Right Brain   Lead placement date:  11/16/2017    C +, 9 -, 10 -  Voltage:  3.5 volts  PW:  60 msec  Rate:  180 Hz    Therapy impedance:  684 Ohms  Therapy Current:  5.0 mA    Patient :  Upper Limit: 4.3 volts  Lower Limit: 0.0 volts    Electrode Impedance Check:    Right Lead: No Problems Found.      See scanned report for impedance details.    DBS PROGRAMMING  Switched to Group B to see if he gets benefit with a wider PW (from 60 to 90 msec)  Left GPi   PW increased from 60 to 70, then to 80. He didn't tolerate 80 Hz and had increased dysarthria.   PW was turned down to 70 Hz.    Group B: FINAL SETTINGS    Left GPi  C  +, 1 -, 2 -  Voltage:  4.0 volts  PW:  70 msec  Rate:  130 Hz  Range: 2.0 - 4.2 volts    Right GPi  C +, 9 -, 10 -  Voltage:  3.6 volts  PW:  90 msec  Rate:  130 Hz  Range: 2.0 - 4.4 volts    He was switched back to Group A. He left the clinic in Group A.  Left GPi   Voltage increased from 4.0 to 4.1 volts to improve the Rt hand dystonia.   Range: 2.1 - 4.5 volts    Today I spent 59 minutes caring for the patient. 40 minutes was spent in DBS interrogation and analysis /programming.  19 minutes was spent with reviewing records, meeting with the patient, answering questions, examining, and documentation.      CT See, CNP  [facility] Neurology Clinic

## 2022-06-07 NOTE — LETTER
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 DBS programming done to improve Rt hand dystonia.     3)  Anxiety/Mood disorder:       PLAN:    The following patient instructions provided: -    __ Your DBS electrical system function (impedance) is normal. The battery life is also good.    __ You have two programs.    You have been in Program A for a while   Left GPi (Right body) voltage has increased from 4.0 to 4.1 volts.  No change in the Right GPi (Left body).    Program B has a wider pulse width. You can try this.     __ Please call if your symptoms worsen. You can reduce the voltage if you experience side effects.     __  Return in 6 months.          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     HPI: Mr. Ayad Moreno is a 52-year-old right-handed  male who came to the [facility] neurology clinic by himself for a follow up visit.  He is s/p bilateral Globus Pallidus Internus (Gpi) deep brain stimulation (DBS) lead placement left on 1/10/2017 & right on 2017 by Dr. Carolina.  I saw him last in the clinic 2021.    Overall, he is doing pretty good. His dysarthria is unchanged. The Right thumb dystonia has gotten worse. He finds hit Rt thumb tucked as if he wants to make a fist. 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 During last Rx refill he was out 5 days earlier. His insurance doesn't pay more than 6 tabs /day. Anxiety has been good.     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  "He types using his two index fingers.    He works part time walking a dog.    His DBS battery drained and turned itself off for the 3rd time. It went OFF in the middle of the night. He couldn't talk. \"It was awful\" He had spasms in his mouth and tongue. He woke up and charged it to 50% to be able to turn it ON. Although he has scheduled it to charge his battery, sometimes he forgets.  He has called [employer] and was told that his DBS would continue to charge after it is depleted as long as he doesn't take a long time after the battery depletion.     CURRENT MEDICATIONS:  Outpatient Medications Marked as Taking for the 6/7/22 encounter (Office Visit) with Cindy Manning APRN CNP   Medication Sig     botulinum toxin type A (BOTOX) 100 units injection Inject 800 units of botulinum toxin as 200 units every 3 months     Cholecalciferol (VITAMIN D3) POWD Take 1 tablet by mouth daily as needed     diazepam (VALIUM) 2 MG tablet Take 2 tablets (4 mg) by mouth every 8 hours as needed for anxiety or muscle spasms     gabapentin (NEURONTIN) 800 MG tablet Take 1 tablet (800 mg) by mouth 3 times daily     mirtazapine (REMERON) 45 MG tablet Take 1 tablet (45 mg) by mouth At Bedtime     nicotine (COMMIT) 2 MG lozenge Place 2 mg inside cheek every hour as needed for smoking cessation (10x's/day)     traMADol (ULTRAM) 50 MG tablet Take 50 mg by mouth every 8 hours as needed     UNABLE TO FIND Take 1 teaspoonful by mouth 2 times daily MEDICATION NAME: Marijuana (CBD) Oil- NOT THC     vitamin B complex with vitamin C (STRESS TAB) tablet Take 1 tablet by mouth every morning      VITAMIN D PO      Current Facility-Administered Medications for the 6/7/22 encounter (Office Visit) with Cindy Manning APRN CNP   Medication     botulinum toxin type A (BOTOX) 100 units injection 105 Units     botulinum toxin type A (BOTOX) 100 units injection 200 Units     botulinum toxin type A (BOTOX) 100 units injection 52.5 Units "         ALLERGIES: Seasonal allergies    PHYSICAL EXAM:    /77 (BP Location: Right arm, Patient Position: Sitting, Cuff Size: Adult Regular)   Pulse 92   Ht 1.829 m (6')   Wt 90.4 kg (199 lb 4.8 oz)   SpO2 93%   BMI 27.03 kg/m      GENERAL:  Mr. Moreno is a pleasant  male who is well-groomed and well-developed sitting comfortably in the exam room without any distress. Affect is appropriate.    His Right hand dose show dystonic posturing and seems to be tucked in touching the middle of his Rt hand.     DBS Interrogation & Analysis:    Implanted generator:  Left chest [employer]iva-RC.  Lead target:  Bilateral Globus Pallidus Internus (Gpi)    Battery Level:  100%  ADRIANNA Date: Sep 19, 2033    GROUP A ACTIVE    Left Brain  Lead placement date:  1/10/2017  Generator placement date:  9/20/2019    C +, 1 -, 2 -  Voltage:  4.0 volts  PW:  60 msec  Rate:  180 Hz    Therapy impedance:  663 Ohms  Therapy Current:  5.9 mA      Patient :  Upper Limit: 4.6 volts  Lower Limit: 0.2 volts    Electrode Impedance Check:    Left Lead: No Problems Found.       Right Brain   Lead placement date:  11/16/2017    C +, 9 -, 10 -  Voltage:  3.5 volts  PW:  60 msec  Rate:  180 Hz    Therapy impedance:  684 Ohms  Therapy Current:  5.0 mA    Patient :  Upper Limit: 4.3 volts  Lower Limit: 0.0 volts    Electrode Impedance Check:    Right Lead: No Problems Found.      See scanned report for impedance details.    DBS PROGRAMMING  Switched to Group B to see if he gets benefit with a wider PW (from 60 to 90 msec)  Left GPi   PW increased from 60 to 70, then to 80. He didn't tolerate 80 Hz and had increased dysarthria.   PW was turned down to 70 Hz.    Group B: FINAL SETTINGS    Left GPi  C +, 1 -, 2 -  Voltage:  4.0 volts  PW:  70 msec  Rate:  130 Hz  Range: 2.0 - 4.2 volts    Right GPi  C +, 9 -, 10 -  Voltage:  3.6 volts  PW:  90 msec  Rate:  130 Hz  Range: 2.0 - 4.4 volts    He was switched back to Group A. He left  the clinic in Group A.  Left GPi   Voltage increased from 4.0 to 4.1 volts to improve the Rt hand dystonia.   Range: 2.1 - 4.5 volts    Today I spent 59 minutes caring for the patient. 40 minutes was spent in DBS interrogation and analysis /programming.  19 minutes was spent with reviewing records, meeting with the patient, answering questions, examining, and documentation.    CT See, CNP  [facility] Neurology Clinic        CT Campbell CNP

## 2022-06-07 NOTE — PROGRESS NOTES
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 DBS programming done to improve Rt hand dystonia.     3)  Anxiety/Mood disorder:       PLAN:    The following patient instructions provided: -    __ Your DBS electrical system function (impedance) is normal. The battery life is also good.    __ You have two programs.    You have been in Program A for a while   Left GPi (Right body) voltage has increased from 4.0 to 4.1 volts.  No change in the Right GPi (Left body).    Program B has a wider pulse width. You can try this.     __ Please call if your symptoms worsen. You can reduce the voltage if you experience side effects.     __  Return in 6 months.          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     HPI: Mr. Ayad Moreno is a 52-year-old right-handed  male who came to the [facility] neurology clinic by himself for a follow up visit.  He is s/p bilateral Globus Pallidus Internus (Gpi) deep brain stimulation (DBS) lead placement left on 1/10/2017 & right on 2017 by Dr. Carolina.  I saw him last in the clinic 2021.    Overall, he is doing pretty good. His dysarthria is unchanged. The Right thumb dystonia has gotten worse. He finds hit Rt thumb tucked as if he wants to make a fist. 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 During last Rx refill he was out 5 days earlier. His insurance doesn't pay more than 6 tabs /day. Anxiety has been good.     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 He types using his two index fingers.    He works part time walking a dog.    His DBS battery drained and turned  "itself off for the 3rd time. It went OFF in the middle of the night. He couldn't talk. \"It was awful\" He had spasms in his mouth and tongue. He woke up and charged it to 50% to be able to turn it ON. Although he has scheduled it to charge his battery, sometimes he forgets.  He has called [employer] and was told that his DBS would continue to charge after it is depleted as long as he doesn't take a long time after the battery depletion.     CURRENT MEDICATIONS:  Outpatient Medications Marked as Taking for the 6/7/22 encounter (Office Visit) with Cindy Manning APRN CNP   Medication Sig     botulinum toxin type A (BOTOX) 100 units injection Inject 800 units of botulinum toxin as 200 units every 3 months     Cholecalciferol (VITAMIN D3) POWD Take 1 tablet by mouth daily as needed     diazepam (VALIUM) 2 MG tablet Take 2 tablets (4 mg) by mouth every 8 hours as needed for anxiety or muscle spasms     gabapentin (NEURONTIN) 800 MG tablet Take 1 tablet (800 mg) by mouth 3 times daily     mirtazapine (REMERON) 45 MG tablet Take 1 tablet (45 mg) by mouth At Bedtime     nicotine (COMMIT) 2 MG lozenge Place 2 mg inside cheek every hour as needed for smoking cessation (10x's/day)     traMADol (ULTRAM) 50 MG tablet Take 50 mg by mouth every 8 hours as needed     UNABLE TO FIND Take 1 teaspoonful by mouth 2 times daily MEDICATION NAME: Marijuana (CBD) Oil- NOT THC     vitamin B complex with vitamin C (STRESS TAB) tablet Take 1 tablet by mouth every morning      VITAMIN D PO      Current Facility-Administered Medications for the 6/7/22 encounter (Office Visit) with Cindy Manning APRN CNP   Medication     botulinum toxin type A (BOTOX) 100 units injection 105 Units     botulinum toxin type A (BOTOX) 100 units injection 200 Units     botulinum toxin type A (BOTOX) 100 units injection 52.5 Units         ALLERGIES: Seasonal allergies    PHYSICAL EXAM:    /77 (BP Location: Right arm, Patient Position: " Sitting, Cuff Size: Adult Regular)   Pulse 92   Ht 1.829 m (6')   Wt 90.4 kg (199 lb 4.8 oz)   SpO2 93%   BMI 27.03 kg/m      GENERAL:  Mr. Moreno is a pleasant  male who is well-groomed and well-developed sitting comfortably in the exam room without any distress. Affect is appropriate.    His Right hand dose show dystonic posturing and seems to be tucked in touching the middle of his Rt hand.     DBS Interrogation & Analysis:    Implanted generator:  Left chest [employer]-RC.  Lead target:  Bilateral Globus Pallidus Internus (Gpi)    Battery Level:  100%  ADRIANNA Date: Sep [birth year], 2033    GROUP A ACTIVE    Left Brain  Lead placement date:  1/10/2017  Generator placement date:  9/20/2019    C +, 1 -, 2 -  Voltage:  4.0 volts  PW:  60 msec  Rate:  180 Hz    Therapy impedance:  663 Ohms  Therapy Current:  5.9 mA      Patient :  Upper Limit: 4.6 volts  Lower Limit: 0.2 volts    Electrode Impedance Check:    Left Lead: No Problems Found.       Right Brain   Lead placement date:  11/16/2017    C +, 9 -, 10 -  Voltage:  3.5 volts  PW:  60 msec  Rate:  180 Hz    Therapy impedance:  684 Ohms  Therapy Current:  5.0 mA    Patient :  Upper Limit: 4.3 volts  Lower Limit: 0.0 volts    Electrode Impedance Check:    Right Lead: No Problems Found.      See scanned report for impedance details.    DBS PROGRAMMING  Switched to Group B to see if he gets benefit with a wider PW (from 60 to 90 msec)  Left GPi   PW increased from 60 to 70, then to 80. He didn't tolerate 80 Hz and had increased dysarthria.   PW was turned down to 70 Hz.    Group B: FINAL SETTINGS    Left GPi  C +, 1 -, 2 -  Voltage:  4.0 volts  PW:  70 msec  Rate:  130 Hz  Range: 2.0 - 4.2 volts    Right GPi  C +, 9 -, 10 -  Voltage:  3.6 volts  PW:  90 msec  Rate:  130 Hz  Range: 2.0 - 4.4 volts    He was switched back to Group A. He left the clinic in Group A.  Left GPi   Voltage increased from 4.0 to 4.1 volts to improve the Rt hand dystonia.    Range: 2.1 - 4.5 volts    Today I spent 59 minutes caring for the patient. 40 minutes was spent in DBS interrogation and analysis /programming.  19 minutes was spent with reviewing records, meeting with the patient, answering questions, examining, and documentation.    CT See, CNP  [facility]

## 2022-06-07 NOTE — PATIENT INSTRUCTIONS
Dear Mr. Ayad Moreno,    Thank you for coming today.  During your visit, we have discussed the following:     __ Your DBS electrical system function (impedance) is normal. The battery life is also good.    __ You have two programs.    You have been in Program A for a while   Left GPi (Right body) voltage has increased from 4.0 to 4.1 volts.  No change in the Right GPi (Left body).    Program B has a wider pulse width. You can try this.     __ Please call if your symptoms worsen. You can reduce the voltage if you experience side effects.     __  Return in 6 months.        To contact our clinic, you may call [phone] or use [employer] message.     CT See, CNP  [facility]

## 2022-06-19 ENCOUNTER — MYC MEDICAL ADVICE (OUTPATIENT)
Dept: NEUROLOGY | Facility: CLINIC | Age: 52
End: 2022-06-19
Payer: COMMERCIAL

## 2022-06-20 NOTE — CONFIDENTIAL NOTE
Staff called and spoke with Ayad, patient stated that he placed FMLA papers in the mail, staff let Ayad know that when forms came in the mail staff would let him know

## 2022-07-06 ENCOUNTER — DOCUMENTATION ONLY (OUTPATIENT)
Dept: NEUROLOGY | Facility: CLINIC | Age: 52
End: 2022-07-06

## 2022-07-06 NOTE — PROGRESS NOTES
Recevied FMLA form, completed, will be signed by provider and fax to fax number, copy will be mailed to patient, and copy will be sent to be scanned     Received form back, signed by provider and fax to [phone], copy was mailed to patient home and scanned into patient chart

## 2022-07-15 ENCOUNTER — TELEPHONE (OUTPATIENT)
Dept: OTOLARYNGOLOGY | Facility: CLINIC | Age: 52
End: 2022-07-15

## 2022-07-19 ENCOUNTER — TELEPHONE (OUTPATIENT)
Dept: OTOLARYNGOLOGY | Facility: CLINIC | Age: 52
End: 2022-07-19

## 2022-07-19 NOTE — TELEPHONE ENCOUNTER
Left vm message for patient in regards to rescheduling botox appt as provider will not be in clinic. Writers call back number provided on vm.

## 2022-07-25 ENCOUNTER — TELEPHONE (OUTPATIENT)
Dept: OTOLARYNGOLOGY | Facility: CLINIC | Age: 52
End: 2022-07-25

## 2022-07-25 NOTE — TELEPHONE ENCOUNTER
Spoke to patient in regards to scheduling botox appt. Pt was at work and asked to call writer back tomorrow when in clinic to discuss. No further action taken

## 2022-08-08 NOTE — PROGRESS NOTES
Received disability forms form [employer]. Form was filled out, and is waiting for provider signature    Form ws signed by provider and fax to fax number on the form [phone], original was mailed to patient and sent to be scanned, patient has been notified forms have been completed thru his mychart   No

## 2022-09-18 NOTE — NURSING NOTE
"Chief Complaint   Patient presents with     RECHECK     Botox         Pulse 69, height 1.854 m (6' 1\"), weight 95.6 kg (210 lb 12.2 oz), SpO2 99 %.    Vida Cannon, EMT    "
Invasive Procedure Safety Checklist  Procedure: Botox    Responsible person(s):  Complete sections as appropriate and electronically sign and date below.    Staff/Provider  Consent documentation on chart:  YES  H&P is not applicable (when straight local anesthesia is used).    Procedure Team  Completed by comparing informed consent documentation, information on the patient record and/or the marked surgical site, and discussion with the patient/guardian.     Verified:  (Select all that apply)  Patient identification (two indicators)  Procedure to be performed  Procedure site and /or laterality and/or level  Consent  Procedure site:  Site can not be marked due to location.  Provider [place] - Site/Laterality/Level:  No Level or Structure  Staff/Provider:  No images    Procedure Team:  *Pause for the Cause* verbal and active participation of team members- verify:  Patient name:  YES  Procedure to be performed:  YES  Site, laterality and level, noting patient position:  YES    Above steps completed as applicable (Electronic Signature, Title, Date):    Marybel Finley RN on 5/4/2021 at 8:57 AM      Note:  Any incidents of wrong patient, wrong procedure, or wrong site are reported using the Occurrence Process already in place.  The occurrence form is required to be completed immediately with this type of event.    
Stable.

## 2022-10-10 ENCOUNTER — HEALTH MAINTENANCE LETTER (OUTPATIENT)
Age: 52
End: 2022-10-10

## 2022-10-31 PROCEDURE — 99283 EMERGENCY DEPT VISIT LOW MDM: CPT | Performed by: EMERGENCY MEDICINE

## 2022-10-31 PROCEDURE — 12002 RPR S/N/AX/GEN/TRNK2.6-7.5CM: CPT | Performed by: EMERGENCY MEDICINE

## 2022-11-01 ENCOUNTER — APPOINTMENT (OUTPATIENT)
Dept: CT IMAGING | Facility: CLINIC | Age: 52
End: 2022-11-01
Attending: EMERGENCY MEDICINE
Payer: COMMERCIAL

## 2022-11-01 ENCOUNTER — HOSPITAL ENCOUNTER (EMERGENCY)
Facility: CLINIC | Age: 52
Discharge: HOME | End: 2022-11-01
Attending: EMERGENCY MEDICINE | Admitting: EMERGENCY MEDICINE
Payer: COMMERCIAL

## 2022-11-01 ENCOUNTER — APPOINTMENT (OUTPATIENT)
Dept: GENERAL RADIOLOGY | Facility: CLINIC | Age: 52
End: 2022-11-01
Attending: EMERGENCY MEDICINE
Payer: COMMERCIAL

## 2022-11-01 VITALS
OXYGEN SATURATION: 97 % | RESPIRATION RATE: 16 BRPM | DIASTOLIC BLOOD PRESSURE: 82 MMHG | SYSTOLIC BLOOD PRESSURE: 126 MMHG | HEART RATE: 77 BPM

## 2022-11-01 DIAGNOSIS — S01.01XA LACERATION OF SCALP WITHOUT FOREIGN BODY, INITIAL ENCOUNTER: ICD-10-CM

## 2022-11-01 PROCEDURE — 71046 X-RAY EXAM CHEST 2 VIEWS: CPT

## 2022-11-01 PROCEDURE — 70450 CT HEAD/BRAIN W/O DYE: CPT

## 2022-11-01 PROCEDURE — 250N000013 HC RX MED GY IP 250 OP 250 PS 637: Performed by: EMERGENCY MEDICINE

## 2022-11-01 RX ORDER — IBUPROFEN 600 MG/1
600 TABLET, FILM COATED ORAL ONCE
Status: COMPLETED | OUTPATIENT
Start: 2022-11-01 | End: 2022-11-01

## 2022-11-01 RX ORDER — LIDOCAINE HYDROCHLORIDE AND EPINEPHRINE 10; 10 MG/ML; UG/ML
INJECTION, SOLUTION INFILTRATION; PERINEURAL
Status: DISCONTINUED
Start: 2022-11-01 | End: 2022-11-01 | Stop reason: HOSPADM

## 2022-11-01 RX ADMIN — IBUPROFEN 600 MG: 600 TABLET ORAL at 02:56

## 2022-11-01 ASSESSMENT — ACTIVITIES OF DAILY LIVING (ADL): ADLS_ACUITY_SCORE: 35

## 2022-11-01 NOTE — DISCHARGE INSTRUCTIONS
Please make an appointment to follow up with Your Primary Care Provider in 7 days for suture removal.    Return to the emergency department if you develop worsening symptoms or if you have any further concerns    If Ayad has discomfort from fever or other pain, he can have:  Acetaminophen (Tylenol) every 6 hours as needed. His dose is:    2 regular strength tabs (650 mg)                                     (43.2+ kg/96+ lb)    NOTE: If your acetaminophen (Tylenol) came with a dropper marked with 0.4 and 0.8 ml, call us ([phone]) or check with your doctor about the dose before using it.     AND/OR      Ibuprofen (Advil, Motrin) every 6 hours as needed. His/her dose is:    2 regular strength tabs (400 mg)                                                                         (40-60 kg/ lb)

## 2022-11-01 NOTE — ED PROVIDER NOTES
[facility] EMERGENCY DEPARTMENT ([facility])    10/31/22      ED Provider Note      History     Chief Complaint   Patient presents with     Head Injury     Onset one hour ago, fell hitting head, laceration above right eye, bleeding controlled.     HPI  Ayad Moreno is a 52 year old male with a past medical history of lumbar stenosis with neurogenic claudication s/p lumbar fusion (2021), generalized dystonia s/p DBS lead implantation (2017) on Diazepam, anxiety/mood disorder and on Tramadol 50 mg (chronic, continuous use of opioids) presenting with fall.  He did not lose consciousness.  No chest pain, shortness of breath, palpitations before or after falling.  He feels fine.  No recent vomiting or diarrhea.  He says he tripped over his own feet.  He remembers the entire event.  No drugs or alcohol tonight.  He does have a degree deep brain stimulation.  He does have dystonia.  Patient is not on blood thinners    Family states he is at his baseline.  Family states he does often trip over his feet.    Past Medical History  Past Medical History:   Diagnosis Date     Anxiety      Asthmas with exposure to cats      Depression      Generalized dystonia     DYT-1 genetic mutation     Lumbar disc herniation      OCD (obsessive compulsive disorder)      Rt ACL Tear      Past Surgical History:   Procedure Laterality Date     Decompression L4-L5, Posterior Spine Fusion  01/08/2021     IMPLANT DEEP BRAIN STIMULATION GENERATOR / BATTERY Left 01/20/2017    Procedure: IMPLANT DEEP BRAIN STIMULATION GENERATOR / BATTERY;  Surgeon: Duy Carolina MD;  Location: [facility] OR     OPTICAL TRACKING SYSTEM INSERTION DEEP BRAIN STIMULATION Left 01/10/2017    Procedure: OPTICAL TRACKING SYSTEM INSERTION DEEP BRAIN STIMULATION;  Surgeon: Duy Carolina MD;  Location: [facility] OR     OPTICAL TRACKING SYSTEM INSERTION DEEP BRAIN STIMULATION Right 11/16/2017    Procedure: OPTICAL TRACKING SYSTEM INSERTION DEEP BRAIN STIMULATION;   Right Stealth Assisted Deep Brain Stimulator Placement, Phase I And II Combined, Placement Of Right Side Deep Brain Stimulator Electrode, Target Right Globus Pallidus Internus With Microelectrode Recording And Connection To The Existing Generator/Battery;  Surgeon: Duy Carolina MD;  Location: [facility] OR     REPLACE DEEP BRAIN STIMULATION GENERATOR / BATTERY Left 09/23/2019    Procedure: Left Deep Brain Stimulator Generator Replacement;  Surgeon: Sebastian Ramos MD;  Location: [facility] OR     ZZC REPAIR CRUCIATE LIGAMENT,KNEE  2008     botulinum toxin type A (BOTOX) 100 units injection  Cholecalciferol (VITAMIN D3) POWD  diazepam (VALIUM) 2 MG tablet  gabapentin (NEURONTIN) 800 MG tablet  mirtazapine (REMERON) 45 MG tablet  nicotine (COMMIT) 2 MG lozenge  traMADol (ULTRAM) 50 MG tablet  UNABLE TO FIND  vitamin B complex with vitamin C (STRESS TAB) tablet  VITAMIN D PO      Allergies   Allergen Reactions     Seasonal Allergies      Family History  Family History   Problem Relation Age of Onset     Diabetes Father      Cancer Paternal Grandfather         Stomach CA     Heart Disease Paternal Grandfather      Cancer Maternal Grandmother         Bladder     Depression Maternal Grandmother      Myocardial Infarction Maternal Grandfather         MI     Obsessive Compulsive Disorder Son      Genetic Disorder Son         Dystonia 2ndary to DYT 1 micah mutation     Hypertension Mother      Social History   Social History     Tobacco Use     Smoking status: Former     Types: Cigarettes     Smokeless tobacco: Never     Tobacco comments:     social   Substance Use Topics     Alcohol use: Not Currently     Alcohol/week: 0.0 standard drinks     Comment: None     Drug use: Not Currently     Types: Marijuana     Comment: vaping      Past medical history, past surgical history, medications, allergies, family history, and social history were reviewed with the patient. No additional pertinent items.       Review of Systems  A  complete review of systems was performed with pertinent positives and negatives noted in the HPI, and all other systems negative.    Physical Exam   BP: 121/78  Pulse: 65  Resp: 16  SpO2: 98 %  Physical Exam  Physical Exam   Constitutional: oriented to person, place, and time. appears well-developed and well-nourished.   HENT:   Head: Normocephalic and atraumatic. 2 cm laceration above the right eyebrow that is well approximated, nonbleeding, no muscle involvement.  Neck: Normal range of motion. No nuchal rigidity.  No tenderness over the C-spine.  Pulmonary/Chest: Effort normal. No respiratory distress. Clear lungs bilaterally.  Tenderness over the posterior right chest wall.  No crepitance.  Cardiac: No murmurs, rubs, gallops. RRR.  Abdominal: Abdomen soft, nontender, nondistended. No rebound tenderness.  MSK: Long bones without deformity or evidence of trauma  Neurological: alert and oriented to person, place, and time. Gait stable.  Sensation intact in all extremities.  Cranial nerves II through XII normal.  Moving all extremities.  Skin: Skin is warm and dry.   Psychiatric:  normal mood and affect.  behavior is normal. Thought content normal.     ED Course      [facility]    -Laceration Repair    Date/Time: 11/1/2022 4:22 AM  Performed by: Suman Morton MD  Authorized by: Suman Mortno MD     Risks, benefits and alternatives discussed.      ANESTHESIA (see MAR for exact dosages):     Anesthesia method:  Local infiltration    Local anesthetic:  Lidocaine 1% WITH epi  LACERATION DETAILS     Location:  Scalp    Scalp location:  Frontal    Length (cm):  3    REPAIR TYPE:     Repair type:  Simple      EXPLORATION:     Wound extent: no signs of injury, no nerve damage, no tendon damage, no underlying fracture and no vascular damage      Contaminated: no      TREATMENT:     Area cleansed with:  Saline    Amount of cleaning:  Standard    Irrigation  solution:  Sterile saline    Irrigation method:  Syringe    SKIN REPAIR     Repair method:  Sutures    Suture size:  5-0    Suture material:  Prolene    Suture technique:  Simple interrupted    Number of sutures:  7    APPROXIMATION     Approximation:  Close    POST-PROCEDURE DETAILS     Dressing:  Antibiotic ointment        PROCEDURE    Patient Tolerance:  Patient tolerated the procedure well with no immediate complications                No results found for any visits on 10/31/22.  Medications - No data to display     Assessments & Plan (with Medical Decision Making)   MDM  Patient presenting with head injury.  Head CT is negative for any acute injury.  Patient otherwise appears quite well.  Neurologic exam is normal.  Laceration repaired as above.  Discussed that he may have a small fracture in his ribs however no pneumothorax.  Chest x-ray is otherwise clean.  Discussed follow-up for suture removal.  He will return if he has any further concerns.    I have reviewed the nursing notes. I have reviewed the findings, diagnosis, plan and need for follow up with the patient.    New Prescriptions    No medications on file       Final diagnoses:   Laceration of scalp without foreign body, initial encounter       --  Suman Morton MD    [facility] EMERGENCY DEPARTMENT  10/31/2022     Suman Morton MD  11/01/22 0424

## 2022-11-06 ENCOUNTER — MYC MEDICAL ADVICE (OUTPATIENT)
Dept: NEUROLOGY | Facility: CLINIC | Age: 52
End: 2022-11-06

## 2022-11-06 DIAGNOSIS — G24.1 DYSTONIA DUE TO DYT-1 GENE MUTATION: ICD-10-CM

## 2022-11-07 RX ORDER — DIAZEPAM 2 MG
4 TABLET ORAL EVERY 8 HOURS PRN
Qty: 540 TABLET | Refills: 1 | Status: SHIPPED | OUTPATIENT
Start: 2022-11-07 | End: 2023-04-28

## 2022-11-08 ENCOUNTER — TELEPHONE (OUTPATIENT)
Dept: NEUROLOGY | Facility: CLINIC | Age: 52
End: 2022-11-08

## 2022-11-08 NOTE — TELEPHONE ENCOUNTER
[facility] Call Center    Phone Message    May a detailed message be left on voicemail: yes     Reason for Call: Medication Refill Request    Has the patient contacted the pharmacy for the refill? Yes   Name of medication being requested: diazepam (VALIUM) 2 MG tablet  Provider who prescribed the medication: Cindy Manning  Pharmacy: [id number] - [place], [place] - [address] AT [facility] [address]  Date medication is needed: asap       Patient is requesting a early refill for the medication above. Patient states he is out and the pharmacy will not refill unless approved by the physician. Please call patient back to advise at # [phone].      Action Taken: Message routed to:  [facility] neurology     Travel Screening: Not Applicable

## 2022-11-08 NOTE — TELEPHONE ENCOUNTER
Thank you so much George Pete, please call the pharmacy and inform them that it is okay to dispense his Diazepam early. Also inform them what was noted on the  report to ensure the number of tablets dispensed are accurate. Thank you.

## 2022-11-08 NOTE — TELEPHONE ENCOUNTER
[facility] Call Center    Phone Message    May a detailed message be left on voicemail: yes     Reason for Call: Other: Pt calling in to ask for refill early Ok from doctor. says he took to many and is now out. Needs them before the 9th when it is set to refill. Chevy contact patient with any questions. This writer did inform patient that refill request was sent yesterday but he says they will not refill until the 19th. Thank you     Action Taken: Message routed to:  [facility] neurology    Travel Screening: Not Applicable

## 2022-11-08 NOTE — TELEPHONE ENCOUNTER
diazepam (VALIUM) 2 MG tablet 540 tablet 1 11/7/2022  No   Sig - Route: Take 2 tablets (4 mg) by mouth every 8 hours as needed for anxiety or muscle spasms - Oral   Sent to pharmacy as: diazePAM 2 MG Oral Tablet (VALIUM)   Class: E-Prescribe   Order: [id number]   E-Prescribing Status: Receipt confirmed by pharmacy (11/7/2022  9:13 AM CST)     Patient asking for provider to OK early refill.

## 2022-11-08 NOTE — TELEPHONE ENCOUNTER
"Called pharmacy to give approval for patient to obtain his diazepam prescription early.    Informed pharmacist that the  report said the last fill was #510 and not #540. He said, \"It looks like it is good to go.\"    "

## 2022-11-08 NOTE — TELEPHONE ENCOUNTER
Reviewed  report per Dulce Manning's request. It appears that the patient has been consistently refilling prescriptions of diazepam, tramadol, and gabapentin. Concomitant use of these medications may increase the risk of CNS depression and respiratory depression.     I would defer to Dulce as to whether he may fill the prescription for diazepam early. It may be helpful to specify on the prescription what the maximum days' supply is. I see that the last prescription was filled for quantity of #510 rather than #540 as prescribed, which may explain the request for early refill if he is taking 6 tablets/day.    Cydney Bhatti, Pharm.D.  Medication Therapy Management Pharmacist  [facility]

## 2022-11-08 NOTE — TELEPHONE ENCOUNTER
Yanci,   Please let him know that I have refilled his Rx. Also, inform him that he is 2 weeks early and to use the Rx as prescribed.  Thank you.       Cydney,  I'm getting alerts about Ayad from the [place] Prescription Monitoring Program due to his Benzo and Opioid use.    From what I could see, he is taking the Valium Rx I've prescribed for him. Can you please double check and see if there is anything more I need to do?  Thank you. Dulce

## 2022-11-11 ENCOUNTER — OFFICE VISIT (OUTPATIENT)
Dept: URGENT CARE | Facility: URGENT CARE | Age: 52
End: 2022-11-11
Payer: COMMERCIAL

## 2022-11-11 DIAGNOSIS — Z53.9 DIAGNOSIS NOT YET DEFINED: Primary | ICD-10-CM

## 2022-11-11 NOTE — PROGRESS NOTES
Ayad Moreno presents to the clinic for removal of sutures and sutures,staples, steri strips. The patient has had sutures in place for 10 days. There has been no patient reported signs or symptoms of infection or drainage. 4  sutures and sutures,staples, staple, steri strips are seen and located on the Rt eye browm. Tetanus status is up to date. All sutures and sutures,staples, steri strips were easily removed today. Routine wound care discussed by the RN or provider. The patient will follow up as needed.    Edgard Koch Ma 2:57pm 11/11/2022

## 2022-12-02 DIAGNOSIS — G24.9 DYSTONIA: ICD-10-CM

## 2022-12-02 RX ORDER — GABAPENTIN 800 MG/1
TABLET ORAL
Qty: 90 TABLET | Refills: 11 | Status: SHIPPED | OUTPATIENT
Start: 2022-12-02 | End: 2023-11-28

## 2022-12-02 NOTE — TELEPHONE ENCOUNTER
Rx Authorization:  Requested Medication/ Dose Gabapentin 800mg  Date last refill ordered: 12/1/21  Quantity ordered: 90  # refills: 11  Date of last clinic visit with ordering provider: 6/7/22  Date of next clinic visit with ordering provider: 12/7/22  All pertinent protocol data (lab date/result):   Include pertinent information from patients message:

## 2022-12-02 NOTE — TELEPHONE ENCOUNTER
[facility] Call Center    Phone Message    May a detailed message be left on voicemail: yes     Reason for Call: Medication Refill Request    Has the patient contacted the pharmacy for the refill? Yes   Name of medication being requested: gabapentin (NEURONTIN) 800 MG tablet  Provider who prescribed the medication: Dr. Messina  Pharmacy: [id number] - [place], [place] - [address] AT [facility] OF [address]  Date medication is needed: ASAP - Pt will run out of this medication this weekend, so needs filled today.         Action Taken: Message routed to:  [facility] Neurology    Travel Screening: Not Applicable

## 2022-12-04 ASSESSMENT — MOVEMENT DISORDERS SOCIETY - UNIFIED PARKINSONS DISEASE RATING SCALE (MDS-UPDRS)
WALKING_AND_BALANCE: SLIGHT: I AM SLIGHTLY SLOW OR MAY DRAG A LEG.  I NEVER USE A WALKING AID.
FREEZING: NORMAL: NOT AT ALL (NO PROBLEMS).
GETTING_OUT_OF_BED_CAR_DEEP_CHAIR: NORMAL: NOT AT ALL (NO PROBLEMS).
HOBBIES_AND_OTHER_ACTIVITIES: SLIGHT: I AM A BIT SLOW BUT DO THESE ACTIVITIES EASILY.
SPEECH: MILD: MY SPEECH CAUSES PEOPLE TO ASK ME TO OCCASIONALLY REPEAT MYSELF, BUT NOT EVERYDAY.
DRESSING: SLIGHT: I AM SLOW, BUT I DO NOT NEED HELP.
TREMOR: MODERATE: SHAKING OR TREMOR CAUSES PROBLEMS WITH MANY OF MY DAILY ACTIVITIES.
TOTAL_SCORE: 15
EATING_TASKS: SLIGHT: I AM SLOW, BUT I DO NOT NEED ANY HELP HANDLING MY FOOD AND HAVE NOT HAD FOOD SPILLS WHILE EATING.
HYGIENE: SLIGHT:  I AM SLOW, BUT I DO NOT NEED ANY HELP.
HANDWRITING: MILD: SOME WORDS ARE UNCLEAR AND DIFFICULT TO READ.
SALIVA_AND_DROOLING: SLIGHT: I HAVE TOO MUCH SALIVA, BUT DO NOT DROOL.
TURNING_IN_BED: SLIGHT: I HAVE A BIT OF TROUBLE TURNING, BUT I DO NOT NEED ANY HELP.
CHEWING_AND_SWALLOWING: SLIGHT: I AM AWARE OF SLOWNESS IN MY CHEWING OR INCREASED EFFORT AT SWALLOWING, BUT I DO NOT CHOKE OR NEED TO HAVE MY FOOD SPECIALLY PREPARED.

## 2022-12-07 ENCOUNTER — OFFICE VISIT (OUTPATIENT)
Dept: NEUROLOGY | Facility: CLINIC | Age: 52
End: 2022-12-07
Payer: COMMERCIAL

## 2022-12-07 VITALS
BODY MASS INDEX: 26.45 KG/M2 | SYSTOLIC BLOOD PRESSURE: 110 MMHG | RESPIRATION RATE: 16 BRPM | OXYGEN SATURATION: 97 % | WEIGHT: 195 LBS | HEART RATE: 68 BPM | DIASTOLIC BLOOD PRESSURE: 72 MMHG

## 2022-12-07 DIAGNOSIS — G24.9 GENERALIZED DYSTONIA: Primary | ICD-10-CM

## 2022-12-07 DIAGNOSIS — G24.1 DYSTONIA DUE TO DYT-1 GENE MUTATION: ICD-10-CM

## 2022-12-07 DIAGNOSIS — Z96.89 S/P DEEP BRAIN STIMULATOR PLACEMENT: ICD-10-CM

## 2022-12-07 DIAGNOSIS — F41.9 ANXIETY: ICD-10-CM

## 2022-12-07 PROCEDURE — 99214 OFFICE O/P EST MOD 30 MIN: CPT | Mod: 25 | Performed by: NURSE PRACTITIONER

## 2022-12-07 PROCEDURE — 95983 ALYS BRN NPGT PRGRMG 15 MIN: CPT | Performed by: NURSE PRACTITIONER

## 2022-12-07 RX ORDER — MIRTAZAPINE 45 MG/1
45 TABLET, FILM COATED ORAL AT BEDTIME
Qty: 30 TABLET | Refills: 11 | Status: SHIPPED | OUTPATIENT
Start: 2022-12-07 | End: 2023-12-07

## 2022-12-07 ASSESSMENT — PAIN SCALES - GENERAL: PAINLEVEL: NO PAIN (0)

## 2022-12-07 NOTE — PATIENT INSTRUCTIONS
Dear Mr. Ayad Moreno,    Thank you for coming today.  During your visit, we have discussed the following:     __ Your DBS electrical system function (impedance) is normal. The battery life is also good.    __ You have left the clinic in GROUP A, the program you can in with.    __ Your GROUP B pulse width was adjusted. When you get home, switch to GROUP B and stay on it.  If you dystonia gets worse, increase the voltage.  If you have side effects, reduce the voltage.     __  Return in 6 weeks for programming. You may return or contact us sooner as needed.     To contact our clinic, you may call [phone] or use "[employer]" message.     CT See, CNP  [facility]

## 2022-12-07 NOTE — LETTER
"2022       RE: Ayad Moreno  [address]  [facility] [address]     Dear Colleague,    Thank you for referring your patient, Ayad Moreno, to the [facility] NEUROLOGY CLINIC [place] at [facility]. Please see a copy of my visit note below.    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     2)  S/p Bilateral DBS lead implantation:  Normal impedance and battery life. DBS programming done.     3)  Anxiety/Mood disorder: Going through a divorce. He is adjusting to the changes.       PLAN:    The following patient instructions provided: -      __ Your DBS electrical system function (impedance) is normal. The battery life is also good.    __ You have left the clinic in GROUP A, the program you can in with.    __ Your GROUP B pulse width was adjusted. When you get home, switch to GROUP B and stay on it.  If you dystonia gets worse, increase the voltage.  If you have side effects, reduce the voltage.     __  Return in 6 weeks for programming. You may return or contact us sooner as needed.           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     HPI: Mr. Ayad Moreno is a 52-year-old right-handed  male who came to the [facility] neurology clinic by himself for a follow up visit.     He is s/p bilateral Globus Pallidus Internus (Gpi) deep brain stimulation (DBS) lead placement left on 1/10/2017 & right on 2017 by Dr. Carolina.  I saw him last in the clinic 2022.    His dysarthria is unchanged. He reports the Right thumb dystonia is getting worse affecting his handwriting.    Anxiety is \"so so.\" His wife is  him. He reports managing the changes. He took " it hard initially, but he is going better now.    Pt is still using the CBD.    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. 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    He reports no issues charging his DBS battery.    He asked if he could try different DBS programs.    CURRENT MEDICATIONS:  Outpatient Medications Marked as Taking for the 12/7/22 encounter (Office Visit) with Cindy Manning APRN CNP   Medication Sig     botulinum toxin type A (BOTOX) 100 units injection Inject 800 units of botulinum toxin as 200 units every 3 months     Cholecalciferol (VITAMIN D3) POWD Take 1 tablet by mouth daily as needed     diazepam (VALIUM) 2 MG tablet Take 2 tablets (4 mg) by mouth every 8 hours as needed for anxiety or muscle spasms     gabapentin (NEURONTIN) 800 MG tablet TAKE 1 TABLET(800 MG) BY MOUTH THREE TIMES DAILY     mirtazapine (REMERON) 45 MG tablet Take 1 tablet (45 mg) by mouth At Bedtime     nicotine (COMMIT) 2 MG lozenge Place 2 mg inside cheek every hour as needed for smoking cessation (10x's/day)     traMADol (ULTRAM) 50 MG tablet Take 50 mg by mouth every 8 hours as needed     UNABLE TO FIND Take 1 teaspoonful by mouth 2 times daily MEDICATION NAME: Marijuana (CBD) Oil- NOT THC     vitamin B complex with vitamin C (STRESS TAB) tablet Take 1 tablet by mouth every morning      VITAMIN D PO      Current Facility-Administered Medications for the 12/7/22 encounter (Office Visit) with Cindy Manning APRN CNP   Medication     botulinum toxin type A (BOTOX) 100 units injection 105 Units     botulinum toxin type A (BOTOX) 100 units injection 200 Units     botulinum toxin type A (BOTOX) 100 units injection 52.5 Units         ALLERGIES: Seasonal allergies    PHYSICAL EXAM:    /72   Pulse 68   Resp 16   Wt 88.5 kg (195 lb)   SpO2 97%   BMI 26.45 kg/m      GENERAL:  Mr. Moreno is a pleasant  male who is well-groomed and well-developed sitting comfortably in  the exam room without any distress. Affect is appropriate.    His Right hand has dystonic posturing. and seems to be tucked in touching the middle of his Rt hand.       Procedure: DBS Interrogation & Analysis:    Lead(s):    Left Right   DBS Target GPi GPi   DBS Lead Type Company: [employer]  Model: 3389  Contacts: 0-3 Company: [employer]  Model: 3389  Contacts: 8-11   Lead Placement Date 1/10/2017 11/16/2017     IPG(s):   1   IPG Company: [email or web address]  Activa-RC  Model: Activa RC  S/N: [id number]   IPG Implant Date Sep 23, 2019   Location Left chest   Battery (V) Battery Level: 50%  Rechargeable  ADRIANNA: 9/19/2033       GROUP A ACTIVE    Left Brain    C +, 1 -, 2 -  Voltage:  4.2 volts  PW:  60 msec  Rate:  180 Hz    Therapy impedance: 616 Ohms  Therapy Current: 6.7 mA      Patient :  Upper Limit: 4.5 volts  Lower Limit: 2.1  volts    Electrode Impedance Check:    Left Lead: No Problems Found.       Right Brain     C +, 9 -, 10 -  Voltage:  3.7 volts  PW:  60 msec  Rate:  180 Hz    Therapy impedance:  650 Ohms  Therapy Current:  5.0 mA    Patient :  Upper Limit: 4.3 volts  Lower Limit: 0.0 volts    Electrode Impedance Check:    Right Lead: No Problems Found.      See scanned report for impedance details.    DBS PROGRAMMING  In the past, he didn't tolerate Group B.  The contacts and frequency of Group A and B are the same.  To minimize side effects, the PW on the Right GPi reduced from 90 msec to 70 msec, which is the same as the Left GPi.  No other changes were made.      Group B: FINAL SETTINGS    Left GPi (No Change)  C +, 1 -, 2 -  Voltage:  4.1 volts  PW:  70 msec  Rate:  130 Hz  Range: 2.0 - 4.2 volts    Right GPi (PW reduced from 90 to 70 to minimize side effects)  C +, 9 -, 10 -  Voltage:  3.6 volts  PW:  70 msec  Rate:  130 Hz  Range: 2.0 - 4.4 volts    __  Discussed about trying different contacts to improve the right hand dystonia, (Left GPi.) Patient would like to explore other programming  options.     Today I spent 30 minutes caring for the patient. 20 minutes was spent in DBS interrogation and analysis /programming.  10 minutes was spent with documentation.          Again, thank you for allowing me to participate in the care of your patient.      Sincerely,    CT Campbell CNP

## 2022-12-07 NOTE — PROGRESS NOTES
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     2)  S/p Bilateral DBS lead implantation:  Normal impedance and battery life. DBS programming done.     3)  Anxiety/Mood disorder: Going through a divorce. He is adjusting to the changes.       PLAN:    The following patient instructions provided: -      __ Your DBS electrical system function (impedance) is normal. The battery life is also good.    __ You have left the clinic in GROUP A, the program you can in with.    __ Your GROUP B pulse width was adjusted. When you get home, switch to GROUP B and stay on it.  If you dystonia gets worse, increase the voltage.  If you have side effects, reduce the voltage.     __  Return in 6 weeks for programming. You may return or contact us sooner as needed.           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     HPI: Mr. Ayad Moreno is a 52-year-old right-handed  male who came to the [facility] neurology clinic by himself for a follow up visit.     He is s/p bilateral Globus Pallidus Internus (Gpi) deep brain stimulation (DBS) lead placement left on 1/10/2017 & right on 2017 by Dr. Carolina.  I saw him last in the clinic 2022.    His dysarthria is unchanged. He reports the Right thumb dystonia is getting worse affecting his handwriting.    Anxiety is \"so so.\" His wife is  him. He reports managing the changes. He took it hard initially, but he is going better now.    Pt is still using the CBD.    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    He reports no issues charging his DBS battery.    He asked if he could try different DBS programs.    CURRENT " MEDICATIONS:  Outpatient Medications Marked as Taking for the 12/7/22 encounter (Office Visit) with Cindy Manning APRN CNP   Medication Sig     botulinum toxin type A (BOTOX) 100 units injection Inject 800 units of botulinum toxin as 200 units every 3 months     Cholecalciferol (VITAMIN D3) POWD Take 1 tablet by mouth daily as needed     diazepam (VALIUM) 2 MG tablet Take 2 tablets (4 mg) by mouth every 8 hours as needed for anxiety or muscle spasms     gabapentin (NEURONTIN) 800 MG tablet TAKE 1 TABLET(800 MG) BY MOUTH THREE TIMES DAILY     mirtazapine (REMERON) 45 MG tablet Take 1 tablet (45 mg) by mouth At Bedtime     nicotine (COMMIT) 2 MG lozenge Place 2 mg inside cheek every hour as needed for smoking cessation (10x's/day)     traMADol (ULTRAM) 50 MG tablet Take 50 mg by mouth every 8 hours as needed     UNABLE TO FIND Take 1 teaspoonful by mouth 2 times daily MEDICATION NAME: Marijuana (CBD) Oil- NOT THC     vitamin B complex with vitamin C (STRESS TAB) tablet Take 1 tablet by mouth every morning      VITAMIN D PO      Current Facility-Administered Medications for the 12/7/22 encounter (Office Visit) with Cindy Manning APRN CNP   Medication     botulinum toxin type A (BOTOX) 100 units injection 105 Units     botulinum toxin type A (BOTOX) 100 units injection 200 Units     botulinum toxin type A (BOTOX) 100 units injection 52.5 Units         ALLERGIES: Seasonal allergies    PHYSICAL EXAM:    /72   Pulse 68   Resp 16   Wt 88.5 kg (195 lb)   SpO2 97%   BMI 26.45 kg/m      GENERAL:  Mr. Moreno is a pleasant  male who is well-groomed and well-developed sitting comfortably in the exam room without any distress. Affect is appropriate.    His Right hand has dystonic posturing. and seems to be tucked in touching the middle of his Rt hand.       Procedure: DBS Interrogation & Analysis:    Lead(s):    Left Right   DBS Target GPi GPi   DBS Lead Type Company: [employer]  Model:  3389  Contacts: 0-3 Company: [employer]  Model: 3389  Contacts: 8-11   Lead Placement Date 1/10/2017 11/16/2017     IPG(s):   1   IPG Company: [employer]  Activa-RC  Model: Activa RC  S/N: [id number]   IPG Implant Date Sep 23, 2019   Location Left chest   Battery (V) Battery Level: 50%  Rechargeable  ADRIANNA: 9/19/2033       GROUP A ACTIVE    Left Brain    C +, 1 -, 2 -  Voltage:  4.2 volts  PW:  60 msec  Rate:  180 Hz    Therapy impedance: 616 Ohms  Therapy Current: 6.7 mA      Patient :  Upper Limit: 4.5 volts  Lower Limit: 2.1  volts    Electrode Impedance Check:    Left Lead: No Problems Found.       Right Brain     C +, 9 -, 10 -  Voltage:  3.7 volts  PW:  60 msec  Rate:  180 Hz    Therapy impedance:  650 Ohms  Therapy Current:  5.0 mA    Patient :  Upper Limit: 4.3 volts  Lower Limit: 0.0 volts    Electrode Impedance Check:    Right Lead: No Problems Found.      See scanned report for impedance details.    DBS PROGRAMMING  In the past, he didn't tolerate Group B.  The contacts and frequency of Group A and B are the same.  To minimize side effects, the PW on the Right GPi reduced from 90 msec to 70 msec, which is the same as the Left GPi.  No other changes were made.      Group B: FINAL SETTINGS    Left GPi (No Change)  C +, 1 -, 2 -  Voltage:  4.1 volts  PW:  70 msec  Rate:  130 Hz  Range: 2.0 - 4.2 volts    Right GPi (PW reduced from 90 to 70 to minimize side effects)  C +, 9 -, 10 -  Voltage:  3.6 volts  PW:  70 msec  Rate:  130 Hz  Range: 2.0 - 4.4 volts    __  Discussed about trying different contacts to improve the right hand dystonia, (Left GPi.) Patient would like to explore other programming options.     Today I spent 30 minutes caring for the patient. 20 minutes was spent in DBS interrogation and analysis /programming.  10 minutes was spent with documentation.    Dulce Manning, APRN, CNP  [facility] Neurology Clinic

## 2023-01-25 ENCOUNTER — OFFICE VISIT (OUTPATIENT)
Dept: NEUROLOGY | Facility: CLINIC | Age: 53
End: 2023-01-25
Payer: COMMERCIAL

## 2023-01-25 VITALS
WEIGHT: 197.2 LBS | DIASTOLIC BLOOD PRESSURE: 79 MMHG | HEART RATE: 73 BPM | BODY MASS INDEX: 26.75 KG/M2 | SYSTOLIC BLOOD PRESSURE: 126 MMHG | OXYGEN SATURATION: 97 %

## 2023-01-25 DIAGNOSIS — G24.9 GENERALIZED DYSTONIA: Primary | ICD-10-CM

## 2023-01-25 DIAGNOSIS — G24.1 DYSTONIA DUE TO DYT-1 GENE MUTATION: ICD-10-CM

## 2023-01-25 DIAGNOSIS — Z96.89 S/P DEEP BRAIN STIMULATOR PLACEMENT: ICD-10-CM

## 2023-01-25 PROCEDURE — 95984 ALYS BRN NPGT PRGRMG ADDL 15: CPT | Performed by: NURSE PRACTITIONER

## 2023-01-25 PROCEDURE — 99207 PR BUNDLED PROCEDURE IN GLOBAL PKG: CPT | Mod: 25 | Performed by: NURSE PRACTITIONER

## 2023-01-25 PROCEDURE — 95983 ALYS BRN NPGT PRGRMG 15 MIN: CPT | Performed by: NURSE PRACTITIONER

## 2023-01-25 ASSESSMENT — PAIN SCALES - GENERAL: PAINLEVEL: NO PAIN (0)

## 2023-01-25 NOTE — LETTER
2023       RE: Ayad Moreno  [address]  [facility] [address]     Dear Colleague,    Thank you for referring your patient, Ayad Moreno, to the [facility] NEUROLOGY CLINIC [facility]. Please see a copy of my visit note below.    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     2)  S/p Bilateral DBS lead implantation:  Normal impedance and battery life. DBS programming completed.          PLAN:    The following patient instructions provided: -    __ Your DBS electrical system function (impedance) is normal. The battery life is also good.    __ The changes that were made today include:     __  You have a New GROUP B    Left GPi (Right Body) Final Setting:     C +, 2 -, 3 -  Voltage:  4.0 volts  PW:  60 msec  Rate:  180 Hz    Right GPi (Left Body) Final Setting: No Change.      C +, 9 -, 10 -  Voltage: 3.6 volts  PW: 60 msec  Rate: 180 Hz    __  Patient left the clinic in GROUP B    __  Print out of his DBS Programming provided per patient request.    __  He knows how to change between programs as needed.     __ Please will call/send a [employer] message if symptoms worsen or he experience side effects.    __  He opted to call to make a f/u appointment depending on how he is doing on the new programming settings.     __  Return visit per patient.  May return or contact us sooner as needed.         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     HPI: Mr. Ayad Moreno is a 52-year-old right-handed  male who came to the [facility] neurology clinic by himself for a follow up visit.     He is s/p bilateral Globus Pallidus Internus (Gpi) deep brain stimulation  "(DBS) lead placement left on 1/10/2017 & right on 11/16/2017 by Dr. Carolina.  I saw him last in the clinic on December 7, 2022.    His dysarthria is unchanged. He is seeing Dr. Horne on 2/7 for Botox injection to improve her speech.    He reports the Right thumb dystonia is getting worse affecting his handwriting. He is here for the Left GPi programming to improve the Rt hand dystonia.     Since his last visit, he has tried Group B, \"he felt like his tongue became tired and retracted.\"  He lowered the voltage and tried it for a day, without improvement.   He has switched to Group A.      PHYSICAL EXAM:    /79 (BP Location: Right arm, Patient Position: Sitting, Cuff Size: Adult Regular)   Pulse 73   Wt 89.4 kg (197 lb 3.2 oz)   SpO2 97%   BMI 26.75 kg/m      GENERAL:  Mr. Moreno is a pleasant  male who is well-groomed and well-developed sitting comfortably in the exam room without any distress. Affect is appropriate.    His Right hand has dystonic posturing in a fist position.     Procedure: DBS Interrogation & Analysis:    Lead(s):    Left Right   DBS Target GPi GPi   DBS Lead Type Company: [employer]  Model: 3389  Contacts: 0-3 Company: [employer]  Model: 3389  Contacts: 8-11   Lead Placement Date 1/10/2017 11/16/2017     IPG(s):   1   IPG Company: [employer]  Activa-RC  Model: Activa RC  S/N: [id number]   IPG Implant Date Sep 23, 2019   Location Left chest   Battery (V) Battery Level: 75%  Rechargeable  ADRIANNA: 9/20/2033       GROUP A ACTIVE    Left Brain    C +, 1 -, 2 -  Voltage:  4.3 volts  PW:  60 msec  Rate:  180 Hz    Therapy impedance: 600 Ohms  Therapy Current: 7.0 mA      Patient :  Upper Limit: 4.5 volts  Lower Limit: 2.1  volts    Electrode Impedance Check:    Left Lead: No Problems Found.       Right Brain     C +, 9 -, 10 -  Voltage:  3.6 volts  PW:  60 msec  Rate:  180 Hz    Therapy impedance: 692 Ohms  Therapy Current: 5.1 mA    Patient :  Upper Limit: 4.3 volts  Lower " Limit: 0.0 volts    Electrode Impedance Check:    Right Lead: No Problems Found.      See scanned report for impedance details.    DBS PROGRAMMING: Left GPi    __ Monopolar Survey from 2/7/2017 reviewed.    Threshold: -   C +, 0 - = 5.0  C +, 1 - = 6.0  C +, 2 - = 6.0  C +, 3 - = 7.0      2/7/2017 4/3/2017 5/4/2017 1/12/2018 3/16/2018 5/10/2018     Left Gpi  C +, 1 -, 2 -  Volts:  2.5 volts  PW:  60 msec  Rate:  130 Hz     Left Gpi  C +, 1 -, 2 -  Volts:  3.2 volts  PW:  60 msec  Rate:  130 Hz     Left Brain  C +, 1 -, 2 -  Volts:  3.7 volts  PW:  60 msec  Rate:  130 Hz   Left Brain  C +, 1 -, 2 -  Volts:  3.8 volts  PW:  60 msec  Rate:  130 Hz   Left Brain  C +, 1 -, 2 -  Volts:  3.6 volts  PW:  60 msec  Rate:  130 Hz   Left Gpi  C +, 1 -, 2 -  Volts:  4.0 volts  PW:  60 msec  Rate:  180 Hz     Left GPi    Using Group B: The following contact programmed:     C +, 2 -, 3 - // PW:  60 msec // Rate:  180 Hz  __ 0.0 -- Jaw tremor started.   __  1.5 volts: -- Jaw tremor stopped.  __  2.0 V, 3.0 V, 4.0 V - No change.  __  5.0 V, 5.5 V  __  5.7 V -- Facial pulling.       Left GPi Final Setting:     C +, 2 -, 3 -  Voltage:  4.0 volts  PW:  60 msec  Rate:  180 Hz    Right GPi Final Setting: No Change.      C +, 9 -, 10 -  Voltage: 3.6 volts  PW: 60 msec  Rate: 180 Hz      Today I spent 57 minutes caring for the patient. 40 minutes was spent in DBS programming.  17 minutes was spent with reviewing records, meeting with the patient, answering questions, examining, and documentation.    Dulce Manning, APRN, CNP  [facility] Neurology Clinic

## 2023-01-25 NOTE — PROGRESS NOTES
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     2)  S/p Bilateral DBS lead implantation:  Normal impedance and battery life. DBS programming completed.          PLAN:    The following patient instructions provided: -    __ Your DBS electrical system function (impedance) is normal. The battery life is also good.    __ The changes that were made today include:     __  You have a New GROUP B    Left GPi (Right Body) Final Setting:     C +, 2 -, 3 -  Voltage:  4.0 volts  PW:  60 msec  Rate:  180 Hz    Right GPi (Left Body) Final Setting: No Change.      C +, 9 -, 10 -  Voltage: 3.6 volts  PW: 60 msec  Rate: 180 Hz    __  Patient left the clinic in GROUP B    __  Print out of his DBS Programming provided per patient request.    __  He knows how to change between programs as needed.     __ Please will call/send a [employer] message if symptoms worsen or he experience side effects.    __  He opted to call to make a f/u appointment depending on how he is doing on the new programming settings.     __  Return visit per patient.  May return or contact us sooner as needed.         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     HPI: Mr. Ayad Moreno is a 52-year-old right-handed  male who came to the [facility] neurology clinic by himself for a follow up visit.     He is s/p bilateral Globus Pallidus Internus (Gpi) deep brain stimulation (DBS) lead placement left on 1/10/2017 & right on 2017 by Dr. Carolina.  I saw him last in the clinic on 2022.    His dysarthria is unchanged. He is seeing Dr. Horne on  for Botox injection to improve her speech.    He reports the Right thumb dystonia is getting worse affecting his handwriting. He  "is here for the Left GPi programming to improve the Rt hand dystonia.     Since his last visit, he has tried Group B, \"he felt like his tongue became tired and retracted.\"  He lowered the voltage and tried it for a day, without improvement.   He has switched to Group A.      PHYSICAL EXAM:    /79 (BP Location: Right arm, Patient Position: Sitting, Cuff Size: Adult Regular)   Pulse 73   Wt 89.4 kg (197 lb 3.2 oz)   SpO2 97%   BMI 26.75 kg/m      GENERAL:  Mr. Moreno is a pleasant  male who is well-groomed and well-developed sitting comfortably in the exam room without any distress. Affect is appropriate.    His Right hand has dystonic posturing in a fist position.     Procedure: DBS Interrogation & Analysis:    Lead(s):    Left Right   DBS Target GPi GPi   DBS Lead Type Company: [employer]  Model: 3389  Contacts: 0-3 Company: [employer]  Model: 3389  Contacts: 8-11   Lead Placement Date 1/10/2017 11/16/2017     IPG(s):   1   IPG Company: [employer]  Activa-RC  Model: Activa RC  S/N: [id number]   IPG Implant Date Sep 23, 2019   Location Left chest   Battery (V) Battery Level: 75%  Rechargeable  ADRIANNA: 9/20/2033       GROUP A ACTIVE    Left Brain    C +, 1 -, 2 -  Voltage:  4.3 volts  PW:  60 msec  Rate:  180 Hz    Therapy impedance: 600 Ohms  Therapy Current: 7.0 mA      Patient :  Upper Limit: 4.5 volts  Lower Limit: 2.1  volts    Electrode Impedance Check:    Left Lead: No Problems Found.       Right Brain     C +, 9 -, 10 -  Voltage:  3.6 volts  PW:  60 msec  Rate:  180 Hz    Therapy impedance: 692 Ohms  Therapy Current: 5.1 mA    Patient :  Upper Limit: 4.3 volts  Lower Limit: 0.0 volts    Electrode Impedance Check:    Right Lead: No Problems Found.      See scanned report for impedance details.    DBS PROGRAMMING: Left GPi    __ Monopolar Survey from 2/7/2017 reviewed.    Threshold: -   C +, 0 - = 5.0  C +, 1 - = 6.0  C +, 2 - = 6.0  C +, 3 - = 7.0      2/7/2017 4/3/2017 5/4/2017 " 1/12/2018 3/16/2018 5/10/2018     Left Gpi  C +, 1 -, 2 -  Volts:  2.5 volts  PW:  60 msec  Rate:  130 Hz     Left Gpi  C +, 1 -, 2 -  Volts:  3.2 volts  PW:  60 msec  Rate:  130 Hz     Left Brain  C +, 1 -, 2 -  Volts:  3.7 volts  PW:  60 msec  Rate:  130 Hz   Left Brain  C +, 1 -, 2 -  Volts:  3.8 volts  PW:  60 msec  Rate:  130 Hz   Left Brain  C +, 1 -, 2 -  Volts:  3.6 volts  PW:  60 msec  Rate:  130 Hz   Left Gpi  C +, 1 -, 2 -  Volts:  4.0 volts  PW:  60 msec  Rate:  180 Hz     Left GPi    Using Group B: The following contact programmed:     C +, 2 -, 3 - // PW:  60 msec // Rate:  180 Hz  __ 0.0 -- Jaw tremor started.   __  1.5 volts: -- Jaw tremor stopped.  __  2.0 V, 3.0 V, 4.0 V - No change.  __  5.0 V, 5.5 V  __  5.7 V -- Facial pulling.       Left GPi Final Setting:     C +, 2 -, 3 -  Voltage:  4.0 volts  PW:  60 msec  Rate:  180 Hz    Right GPi Final Setting: No Change.      C +, 9 -, 10 -  Voltage: 3.6 volts  PW: 60 msec  Rate: 180 Hz      Today I spent 57 minutes caring for the patient. 40 minutes was spent in DBS programming.  17 minutes was spent with reviewing records, meeting with the patient, answering questions, examining, and documentation.    Dulce Manning, APRN, CNP  [facility]

## 2023-02-01 NOTE — PATIENT INSTRUCTIONS
Dear Mr. Ayad Moreno,    Thank you for coming today.  During your visit, we have discussed the following:     __ Your DBS electrical system function (impedance) is normal. The battery life is also good.    __ The changes that were made today include:     __  You have a New GROUP B    Left GPi (Right Body) Final Setting:     C +, 2 -, 3 -  Voltage:  4.0 volts  PW:  60 msec  Rate:  180 Hz    Right GPi (Left Body) Final Setting: No Change.      C +, 9 -, 10 -  Voltage: 3.6 volts  PW: 60 msec  Rate: 180 Hz    __  Patient left the clinic in GROUP B    __  Print out of his DBS Programming provided per patient request.    __  He knows how to change between programs as needed.     __ Please will call/send a [employer] message if symptoms worsen or he experience side effects.    __  He opted to call to make a f/u appointment depending on how he is doing on the new programming settings.     __  Return visit per patient.  May return or contact us sooner as needed.     To contact our clinic, you may call [phone] or use [employer] message.     CT See, CNP  [facility]

## 2023-02-03 NOTE — PROGRESS NOTES
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  He has had a deep brain stimulator placed.  He was last injected on 5/10/2022. he had a good response to the last injection. The last dose given was:  Anterior digastric muscle:EMG guided, bilateral, 5 units each side with Moderate EMG response - total dose 10 units BTA.      Nasalis muscle. Non EMG guided, bilateral, 5 units each side  - total dose 10 units BTA.     Depressor anguli oris:Non EMG guided, unilateral, right .- total dose  10 units BTA      Depressor anguli oris:Non EMG guided, unilateral, left .- total dose  5 units BTA  Right  Orbicularis oris muscle: non EMG guided, unilateral, inferior- right, 2.5 units.- total dose  2.5 units BTA.     Lateral pterygoid muscles: EMG guided, bilateral, 10 units each side with Strong EMG response - total dose 20 units BTA.    The total dose of botulinum toxin delivered was 57.5 units.    His return to clinic was delayed.  He comes in with additional concerns.  In particular this involves his voice.  He has vocal fatigue and dysarthria and a higher/tinny quality to his voice.  He has no airway difficulties.  Mild swallowing difficulties which are stable.        PAST MEDICAL HISTORY:   Past Medical History:   Diagnosis Date     Anxiety      Asthmas with exposure to cats      Depression      Generalized dystonia     DYT-1 genetic mutation     Lumbar disc herniation      OCD (obsessive compulsive disorder)      Rt ACL Tear        PAST SURGICAL HISTORY:   Past Surgical History:   Procedure Laterality Date     Decompression L4-L5, Posterior Spine Fusion  01/08/2021     IMPLANT DEEP BRAIN STIMULATION GENERATOR / BATTERY Left 01/20/2017    Procedure: IMPLANT DEEP BRAIN STIMULATION GENERATOR / BATTERY;  Surgeon: Duy Carolina MD;  Location: [facility]     OPTICAL TRACKING SYSTEM INSERTION DEEP BRAIN STIMULATION Left 01/10/2017    Procedure: OPTICAL TRACKING SYSTEM  INSERTION DEEP BRAIN STIMULATION;  Surgeon: Duy Carolina MD;  Location: [facility]     OPTICAL TRACKING SYSTEM INSERTION DEEP BRAIN STIMULATION Right 11/16/2017    Procedure: OPTICAL TRACKING SYSTEM INSERTION DEEP BRAIN STIMULATION;  Right Stealth Assisted Deep Brain Stimulator Placement, Phase I And II Combined, Placement Of Right Side Deep Brain Stimulator Electrode, Target Right Globus Pallidus Internus With Microelectrode Recording And Connection To The Existing Generator/Battery;  Surgeon: Duy Carolina MD;  Location: [facility] OR     REPLACE DEEP BRAIN STIMULATION GENERATOR / BATTERY Left 09/23/2019    Procedure: Left Deep Brain Stimulator Generator Replacement;  Surgeon: Sebastian Ramos MD;  Location: [facility] OR     ZZC REPAIR CRUCIATE LIGAMENT,KNEE  2008       FAMILY HISTORY:   Family History   Problem Relation Age of Onset     Diabetes Father      Cancer Paternal Grandfather         Stomach CA     Heart Disease Paternal Grandfather      Cancer Maternal Grandmother         Bladder     Depression Maternal Grandmother      Myocardial Infarction Maternal Grandfather         MI     Obsessive Compulsive Disorder Son      Genetic Disorder Son         Dystonia 2ndary to DYT 1 micah mutation     Hypertension Mother        SOCIAL HISTORY:   Social History     Tobacco Use     Smoking status: Former     Types: Cigarettes     Smokeless tobacco: Never     Tobacco comments:     social   Substance Use Topics     Alcohol use: Not Currently     Alcohol/week: 0.0 standard drinks     Comment: None       REVIEW OF SYSTEMS: Ten point review of systems was performed and is negative except for: No flowsheet data found.     ALLERGIES: Seasonal allergies    MEDICATIONS:   Current Outpatient Medications   Medication Sig Dispense Refill     botulinum toxin type A (BOTOX) 100 units injection Inject 800 units of botulinum toxin as 200 units every 3 months 2 Units 3     Cholecalciferol (VITAMIN D3) POWD Take 1 tablet by mouth  daily as needed       diazepam (VALIUM) 2 MG tablet Take 2 tablets (4 mg) by mouth every 8 hours as needed for anxiety or muscle spasms 540 tablet 1     gabapentin (NEURONTIN) 800 MG tablet TAKE 1 TABLET(800 MG) BY MOUTH THREE TIMES DAILY 90 tablet 11     mirtazapine (REMERON) 45 MG tablet Take 1 tablet (45 mg) by mouth At Bedtime 30 tablet 11     nicotine (COMMIT) 2 MG lozenge Place 2 mg inside cheek every hour as needed for smoking cessation (10x's/day)       traMADol (ULTRAM) 50 MG tablet Take 50 mg by mouth every 8 hours as needed       UNABLE TO FIND Take 1 teaspoonful by mouth 2 times daily MEDICATION NAME: Marijuana (CBD) Oil- NOT THC       vitamin B complex with vitamin C (STRESS TAB) tablet Take 1 tablet by mouth every morning        VITAMIN D PO            PHYSICAL EXAMINATION:  He  is awake, alert and in no apparent distress.    His tympanic membranes are clear and intact bilaterally. External auditory canals are clear.  Nasal exam shows a mild septal deviation without obstruction.  Examination of the oral cavity shows no suspicious lesions.  There is symmetric movement of the tongue and soft palate.    The oropharynx is clear.  His neck is supple without significant adenopathy.  Pulse is regular.  Upper airway is clear.  Cranial nerves II-XII are grossly intact.       PROCEDURE: A flexible laryngoscopy  was performed.  Informed consent was obtained and a time out was performed. 2% lidocaine and 0.025% oxymetazoline was sprayed into the nasal cavity and allowed 3 to 5 minutes for effect. The scope was passed through the right sided nostril. Examination showed the vocal folds to be mobile and meet in the midline.  There was frequent mucus collection on the vocal folds which was difficult to completely clear.  No nodules, polyps or ulcerations are seen.  There is mild inflammation at the posterior commissure.   The vocal folds show mild inflammation. With phonation there is moderate to severe contraction of  the supraglottic larynx.  Posterior displacement of the epiglottis during much of his connected speech limited view of the vocal folds.  He did respond to therapy probes.  Saliva was mildly thickened throughout the hypopharynx.  Pooling was present and more prominent on the left side than the right.  There was some spillage of saliva into the laryngeal introitus but no aspiration.  It was recognized that the topical spray applied could be affecting the amount of saliva seen.  The hypopharynx is otherwise clear as is the subglottis.     2/7/2023 Exam  Abduction. Best view. Mild mucus on right vocal fold.       Connected speech best view. Pooling of saliva          Response to therapy probe       IMPRESSION/PLAN: Orofacial dyskinesia with dysarthria primarily involving the lips and the tongue.  He has a muscle tension dysphonia.  Vocal fold function is good and he did respond well to therapy probes.  I believe he would do well with a trial of speech therapy which she is agreeable to do.  He will continue to have his mild dysarthria.  Our plan is to continue with Botox injections today.  We will focus on the right side of his face as well as the pterygoids and anterior digastric muscles.        PROCEDURE: After obtaining informed consent, ground and reference electrodes were placed. The skin area of the injection was prepped with an alcohol swab.  Treatment sites that required electromyographic guidance used a 26 gauge electromyographic injection needle.  Treatment sites that did not require electromyographic guidance used a 26-gauge half inch needle attached to a tuberculin syringe.  Multiple injection sites where treated. Sites treated with Botulinum toxin A ( BTA) included: Images were recorded but unable to be printed for this note.     Anterior digastric muscle:EMG guided, bilateral, 5 units each side with Moderate EMG response - total dose 10 units BTA.      Nasalis muscle. Non EMG guided, 5 units right side  -  total dose 5 units BTA.     Depressor anguli oris:Non EMG guided, unilateral, right .- total dose  10 units BTA     Orbicularis oris muscle: non EMG guided, unilateral, inferior- right, 2.5 units.- total dose  2.5 units BTA.     Lateral pterygoid muscles: EMG guided, bilateral, 10 units each side with Strong EMG response - total dose 20 units BTA.    The total dose of botulinum a toxin delivered today was 47.5 units.  He tolerated the procedure well and left the clinic after a short observation period      The EMG was necessary specifically in this case to identify areas of muscle overactivity as well as to access the lateral pterygoid muscles which are located in the deep pterygoid palatine fossa and not otherwise accessible without EMG guidance.         PLAN: I will have him  follow up on a PRN basis. It is anticipated that repeat injections will be required over the long term.

## 2023-02-07 ENCOUNTER — OFFICE VISIT (OUTPATIENT)
Dept: OTOLARYNGOLOGY | Facility: CLINIC | Age: 53
End: 2023-02-07
Payer: COMMERCIAL

## 2023-02-07 VITALS
OXYGEN SATURATION: 100 % | TEMPERATURE: 98.6 F | BODY MASS INDEX: 26.68 KG/M2 | HEIGHT: 72 IN | WEIGHT: 197 LBS | HEART RATE: 87 BPM | DIASTOLIC BLOOD PRESSURE: 76 MMHG | SYSTOLIC BLOOD PRESSURE: 112 MMHG

## 2023-02-07 DIAGNOSIS — G24.4 OROFACIAL DYSKINESIA: ICD-10-CM

## 2023-02-07 DIAGNOSIS — J38.7 LARYNGEAL HYPERFUNCTION: ICD-10-CM

## 2023-02-07 DIAGNOSIS — R49.0 DYSPHONIA: Primary | ICD-10-CM

## 2023-02-07 DIAGNOSIS — R47.1 DYSARTHRIA: ICD-10-CM

## 2023-02-07 PROCEDURE — 64612 DESTROY NERVE FACE MUSCLE: CPT | Mod: 50 | Performed by: OTOLARYNGOLOGY

## 2023-02-07 PROCEDURE — 92524 BEHAVRAL QUALIT ANALYS VOICE: CPT | Mod: GN | Performed by: SPEECH-LANGUAGE PATHOLOGIST

## 2023-02-07 PROCEDURE — 99202 OFFICE O/P NEW SF 15 MIN: CPT | Mod: 25 | Performed by: OTOLARYNGOLOGY

## 2023-02-07 PROCEDURE — 31575 DIAGNOSTIC LARYNGOSCOPY: CPT | Mod: 51 | Performed by: OTOLARYNGOLOGY

## 2023-02-07 ASSESSMENT — PAIN SCALES - GENERAL: PAINLEVEL: NO PAIN (0)

## 2023-02-07 NOTE — LETTER
2/7/2023       RE: Ayad Moreno  [address]  [facility] [id number]     Dear Colleague,    Thank you for referring your patient, Ayad Moreno, to the [facility] [place] at [facility]. Please see a copy of my visit note below.    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  He has had a deep brain stimulator placed.  He was last injected on 5/10/2022. he had a good response to the last injection. The last dose given was:  Anterior digastric muscle:EMG guided, bilateral, 5 units each side with Moderate EMG response - total dose 10 units BTA.      Nasalis muscle. Non EMG guided, bilateral, 5 units each side  - total dose 10 units BTA.     Depressor anguli oris:Non EMG guided, unilateral, right .- total dose  10 units BTA      Depressor anguli oris:Non EMG guided, unilateral, left .- total dose  5 units BTA  Right  Orbicularis oris muscle: non EMG guided, unilateral, inferior- right, 2.5 units.- total dose  2.5 units BTA.     Lateral pterygoid muscles: EMG guided, bilateral, 10 units each side with Strong EMG response - total dose 20 units BTA.    The total dose of botulinum toxin delivered was 57.5 units.    His return to clinic was delayed.  He comes in with additional concerns.  In particular this involves his voice.  He has vocal fatigue and dysarthria and a higher/tinny quality to his voice.  He has no airway difficulties.  Mild swallowing difficulties which are stable.        PAST MEDICAL HISTORY:   Past Medical History:   Diagnosis Date     Anxiety      Asthmas with exposure to cats      Depression      Generalized dystonia     DYT-1 genetic mutation     Lumbar disc herniation      OCD (obsessive compulsive disorder)      Rt ACL Tear        PAST SURGICAL HISTORY:   Past Surgical History:   Procedure Laterality Date     Decompression L4-L5, Posterior Spine  Fusion  01/08/2021     IMPLANT DEEP BRAIN STIMULATION GENERATOR / BATTERY Left 01/20/2017    Procedure: IMPLANT DEEP BRAIN STIMULATION GENERATOR / BATTERY;  Surgeon: Duy Carolina MD;  Location: [facility]     OPTICAL TRACKING SYSTEM INSERTION DEEP BRAIN STIMULATION Left 01/10/2017    Procedure: OPTICAL TRACKING SYSTEM INSERTION DEEP BRAIN STIMULATION;  Surgeon: Duy Carolina MD;  Location: [facility]     OPTICAL TRACKING SYSTEM INSERTION DEEP BRAIN STIMULATION Right 11/16/2017    Procedure: OPTICAL TRACKING SYSTEM INSERTION DEEP BRAIN STIMULATION;  Right Stealth Assisted Deep Brain Stimulator Placement, Phase I And II Combined, Placement Of Right Side Deep Brain Stimulator Electrode, Target Right Globus Pallidus Internus With Microelectrode Recording And Connection To The Existing Generator/Battery;  Surgeon: Duy Carolina MD;  Location: [facility]     REPLACE DEEP BRAIN STIMULATION GENERATOR / BATTERY Left 09/23/2019    Procedure: Left Deep Brain Stimulator Generator Replacement;  Surgeon: Sebastian Ramos MD;  Location: [facility]     ZZC REPAIR CRUCIATE LIGAMENT,KNEE  2008       FAMILY HISTORY:   Family History   Problem Relation Age of Onset     Diabetes Father      Cancer Paternal Grandfather         Stomach CA     Heart Disease Paternal Grandfather      Cancer Maternal Grandmother         Bladder     Depression Maternal Grandmother      Myocardial Infarction Maternal Grandfather         MI     Obsessive Compulsive Disorder Son      Genetic Disorder Son         Dystonia 2ndary to DYT 1 micah mutation     Hypertension Mother        SOCIAL HISTORY:   Social History     Tobacco Use     Smoking status: Former     Types: Cigarettes     Smokeless tobacco: Never     Tobacco comments:     social   Substance Use Topics     Alcohol use: Not Currently     Alcohol/week: 0.0 standard drinks     Comment: None       REVIEW OF SYSTEMS: Ten point review of systems was performed and is negative except for: No  flowsheet data found.     ALLERGIES: Seasonal allergies    MEDICATIONS:   Current Outpatient Medications   Medication Sig Dispense Refill     botulinum toxin type A (BOTOX) 100 units injection Inject 800 units of botulinum toxin as 200 units every 3 months 2 Units 3     Cholecalciferol (VITAMIN D3) POWD Take 1 tablet by mouth daily as needed       diazepam (VALIUM) 2 MG tablet Take 2 tablets (4 mg) by mouth every 8 hours as needed for anxiety or muscle spasms 540 tablet 1     gabapentin (NEURONTIN) 800 MG tablet TAKE 1 TABLET(800 MG) BY MOUTH THREE TIMES DAILY 90 tablet 11     mirtazapine (REMERON) 45 MG tablet Take 1 tablet (45 mg) by mouth At Bedtime 30 tablet 11     nicotine (COMMIT) 2 MG lozenge Place 2 mg inside cheek every hour as needed for smoking cessation (10x's/day)       traMADol (ULTRAM) 50 MG tablet Take 50 mg by mouth every 8 hours as needed       UNABLE TO FIND Take 1 teaspoonful by mouth 2 times daily MEDICATION NAME: Marijuana (CBD) Oil- NOT THC       vitamin B complex with vitamin C (STRESS TAB) tablet Take 1 tablet by mouth every morning        VITAMIN D PO            PHYSICAL EXAMINATION:  He  is awake, alert and in no apparent distress.    His tympanic membranes are clear and intact bilaterally. External auditory canals are clear.  Nasal exam shows a mild septal deviation without obstruction.  Examination of the oral cavity shows no suspicious lesions.  There is symmetric movement of the tongue and soft palate.    The oropharynx is clear.  His neck is supple without significant adenopathy.  Pulse is regular.  Upper airway is clear.  Cranial nerves II-XII are grossly intact.       PROCEDURE: A flexible laryngoscopy  was performed.  Informed consent was obtained and a time out was performed. 2% lidocaine and 0.025% oxymetazoline was sprayed into the nasal cavity and allowed 3 to 5 minutes for effect. The scope was passed through the right sided nostril. Examination showed the vocal folds to be  mobile and meet in the midline.  There was frequent mucus collection on the vocal folds which was difficult to completely clear.  No nodules, polyps or ulcerations are seen.  There is mild inflammation at the posterior commissure.   The vocal folds show mild inflammation. With phonation there is moderate to severe contraction of the supraglottic larynx.  Posterior displacement of the epiglottis during much of his connected speech limited view of the vocal folds.  He did respond to therapy probes.  Saliva was mildly thickened throughout the hypopharynx.  Pooling was present and more prominent on the left side than the right.  There was some spillage of saliva into the laryngeal introitus but no aspiration.  It was recognized that the topical spray applied could be affecting the amount of saliva seen.  The hypopharynx is otherwise clear as is the subglottis.     2/7/2023 Exam  Abduction. Best view. Mild mucus on right vocal fold.       Connected speech best view. Pooling of saliva          Response to therapy probe       IMPRESSION/PLAN: Orofacial dyskinesia with dysarthria primarily involving the lips and the tongue.  He has a muscle tension dysphonia.  Vocal fold function is good and he did respond well to therapy probes.  I believe he would do well with a trial of speech therapy which she is agreeable to do.  He will continue to have his mild dysarthria.  Our plan is to continue with Botox injections today.  We will focus on the right side of his face as well as the pterygoids and anterior digastric muscles.        PROCEDURE: After obtaining informed consent, ground and reference electrodes were placed. The skin area of the injection was prepped with an alcohol swab.  Treatment sites that required electromyographic guidance used a 26 gauge electromyographic injection needle.  Treatment sites that did not require electromyographic guidance used a 26-gauge half inch needle attached to a tuberculin syringe.  Multiple  injection sites where treated. Sites treated with Botulinum toxin A ( BTA) included: Images were recorded but unable to be printed for this note.     Anterior digastric muscle:EMG guided, bilateral, 5 units each side with Moderate EMG response - total dose 10 units BTA.      Nasalis muscle. Non EMG guided, 5 units right side  - total dose 5 units BTA.     Depressor anguli oris:Non EMG guided, unilateral, right .- total dose  10 units BTA     Orbicularis oris muscle: non EMG guided, unilateral, inferior- right, 2.5 units.- total dose  2.5 units BTA.     Lateral pterygoid muscles: EMG guided, bilateral, 10 units each side with Strong EMG response - total dose 20 units BTA.    The total dose of botulinum a toxin delivered today was 47.5 units.  He tolerated the procedure well and left the clinic after a short observation period      The EMG was necessary specifically in this case to identify areas of muscle overactivity as well as to access the lateral pterygoid muscles which are located in the deep pterygoid palatine fossa and not otherwise accessible without EMG guidance.       PLAN: I will have him  follow up on a PRN basis. It is anticipated that repeat injections will be required over the long term.      Again, thank you for allowing me to participate in the care of your patient.      Sincerely,    Yvse Horne MD

## 2023-02-07 NOTE — PROGRESS NOTES
"[facility]  Yves Horne Jr., M.D., F.A.C.S.  Shantell Brar M.D., M.P.H.  Radha Winston M.D.  Opal Gordillo, Ph.D., CCC-SLP  Miguel Angel Magana, Ph.D., [facility]-SLP  Katia Calderón M.M. (voice), M.A., CCC-SLP  Temitope Hameed M.A., CCC-SLP  Farideh Desouza M.S., CCC-SLP  YUKO Lo (voice), M.S., CCC-SLP    [facility]  VOICE/SPEECH/BREATHING EVALUATION AND LARYNGEAL EXAMINATION REPORT    Patient: Ayad Moreno  Date of Visit: 2/7/2023    Clinician: Temitope Hameed M.A., CCC/SLP  Seen in conjunction with: Dr. Marinelli \"Shep\" Ozzie  Referring Physician: Yves Pope MD    CHIEF COMPLAINT:  Voice    HISTORY  Ayad Moreno is a 53 year old presenting today for evaluation of dysphonia. Mr. 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 He has had a deep brain stimulator placed in 2017 (bilaterally). He is experiencing vocal fatigue, dysarthria, and mild swallowing difficulties (stable). Mr. Moreno has been seeing Dr. Horne for years for frequent facial botox injections, and is here today for recurrent injections and to address voice concerns.     VOICE    History of intervention:    Speech therapy at [facility] in 2017    Current Symptoms:    Vocal fatigue    Higher/ tinny quality to his voice (per Dr. Horne)    Vocal demand:    Counseling occupation    Not reported today    SWALLOWING    History of intervention: None reported for swallowing     Current Symptoms:    Slight swallowing difficulties (per Dr. Horne), but reported that it isn't bothering him at this time.    Due to ataxia and jaw alignment (per Dr. Horne)    COUGH/THROAT CLEARING    No complaints at this time    BREATHING    No complaints at this time    OTHER PERTINENT HISTORY    Generalized Dystonia secondary to DYT1 gene  o Oromandibular dystonia of the jaw opening type    Dysarthria    Bilateral implant deep brain " stimulation generator/ battery   o Left: 01/10/2017 and 01/20/2017  o Right: 11/16/2017    Please refer to Dr. Horne's note and chart for additional history    OBJECTIVE FINDINGS  VOICE/ SPEECH/ NON-COMMUNICATIVE LARYNGEAL BEHAVIORS EVALUATION  An evaluation of the voice and resonance was accomplished today; salient features are as follows:    Palpation of the laryngeal area shows:    Supple musculature    Reduced thyrohyoid space     Massage of the thyrohyoid area brings relief     Cough/ Throat clear:    Not observed    Breathing pattern:    Clavicular elevation on inspiration    Clavicular muscle use pattern    Shallow    Phonation is not coordinated with respiration    Tension is evident:    Face    Jaw    Neck and shoulders    Voice quality is characterized by    Pressed quality    Consistent vocal damon throughout phrases    Mild roughness    Narrow resonance (hypernasality)    Low speaking pitch    Incoordination with respiration and phonation    Habitual pitch was not formally tested, but is judged to be WNL and appropriate, but sounds lower due to rough voice quality backward focus    Loudness is WNL and appropriate for the setting, however perceptually he demonstrates significant strain to talk    In a vocal diadochokinesis task, rate and rhythm are moderately reduced with /p^ t^ k^/    A singing task shows voice quality is improved    GLOBAL ASSESSMENT OF DYSPHONIA:  80/100 (His perception of his voice)    LARYNGEAL EXAMINATION    Endoscopic laryngeal examination was performed by:  Dr. Horne  I provided the protocol of instructions for the patient.  Type of exam:   Flexible endoscopy with topical anesthesia with Benzocaine 20% was applied in both nares.    This exam shows:  Laryngeal and Vocal Fold Mucosa    Mild inflammation of the posterior commissure     Mild sticky secretions on the vocal folds R>L, and moderate presence of thickened frothy secretions throughout the laryngeal area with pooling at the  vallecula, the pyriform sinuses, and at the entrance of the esophagus  o Secretions spilled into the laryngeal vestibule towards to true vocal folds, but no aspiration observed  o Topical spray could have contributed to amount of saliva    Status of vocal fold mucosa:   o Within normal limits, with no lesions and straight vibratory margins    Neurological and Functional Integrity of the Larynx    Vocal folds are mobile and meet at midline; movement is brisk and symmetric; exam is neurologically normal     Airway is adequate    Right arytenoid demonstrates hooding while left arytenoid occasionally appears to have more sluggish mobility.    Normal function is evident during a task of quickly repeated vowels, but demonstrated reduced coordination and speed of the laryngeal structures with diadochokinesis task    Elongation of the vocal folds for pitch increase is WNL    On phonation, glottic closure is complete    Supraglottic Function and Therapy Probes    Occasional ventricular appear to approximate during phonation, however, it was difficult to view due to posterior displacement of epiglottis and other laryngeal structures obscuring view of the vocal folds during many phonatory tasks    Marked-severe four-way constriction of the supraglottic larynx during connected speech    Hyperadduction of the posterior glottis during phonation    Supraglottic function during singing is improved    Therapy probes show   o Reduction in supraglottic hyperfunction during tasks that involved flow phonation (few).    Stroboscopy was not warranted.    Dr. Winston and I reviewed this laryngeal exam with Mr. Moreno today, and I provided pertinent explanations:    Endoscopic findings are consistent with audio-perceptual assessment and patient Hx/complaints.    his symptoms are accounted for by marked-moderate four way constriction of the supraglottic structures.     Because there appears to be a functional component to his symptoms, he  would most likely benefit from functional speech therapy.      ASSESSMENT / PLAN  IMPRESSIONS:  This evaluation has resulted in the following diagnosis/diagnoses for Mr. Moreno  Dysphonia (R49.0)  Laryngeal Hyperfunction (J38.7)  Dysarthria (R47.1)      Laryngeal evaluation demonstrated marked-severe compression of the laryngeal structures, pooling of saliva at the vallecula, pyriform sinuses, and near the entrance of the esophagus, mild inflammation of the posterior commissure, right arytenoid hooding with occasional sluggish left arytenoid and reduced coordination and speed of the laryngeal structures with vocal diadochokinesis task.    Perceptual evaluation demonstrated pressed quality, consistent vocal damon throughout phrases, mild roughness, narrow resonance (hypernasality), low speaking pitch, incoordination with respiration and phonation, and reduced coordination and articulation with vocal diadochokinesis task.    STIMULABILITY: results of therapy probes during perceptual and laryngeal evaluation demonstrate improvement with flow phonation.     RECOMMENDATIONS:     A course of speech therapy is recommended to optimize vocal technique, improve voice quality and promote reduced discomfort, effort and fatigue.    He demonstrates a Good prognosis for improvement given adherence to therapeutic recommendations. Therapeutic     Positive indicators: commitment to process; diagnosis is known to respond to functional treatment;     Negative indicators: none    TREATMENT PLAN  Speech therapy    DURATION/FREQUENCY OF TREATMENT  5 weekly or bi-weekly one-hour sessions, with 1 monthly one-hour follow-up sessions      GOALS  Patient goal:    To improve and maintain a healthy voice quality  To understand the problem and fix it as much as possible  To have a normal and acceptable voice quality    Long-term goal(s): In 6  months, Mr. Moreno will:  1.  Report a week of typical vocal activities, in which dysphonia does not  exceed a level of 3 out of 10, 80% of the time   2. Report a speaking  voice quality that is acceptable to him, 90%of the time  3. In a 20-minute conversation task, demonstrate 90% accuracy with forward resonance and optimal breath flow, by therapist judgment      This treatment plan was developed with the patient who agreed with the recommendations.      TOTAL SERVICE TIME: 30 minutes  EVALUATION OF VOICE AND RESONANCE (48170)  NO CHARGE FACILITY FEE (99844)      Temitope Hameed M.A., CCC-SLP  Speech-Language Pathologist  [facility]  [email or web address]  She/Her/Hers

## 2023-02-07 NOTE — PATIENT INSTRUCTIONS
1.  You were seen in the ENT Clinic today by . If you have any questions or concerns after your appointment, please call [phone]. Press option #1 for scheduling related needs. Press option #3 for Nurse advice.    2.  Plan is to return to clinic as needed      Susan Keith LPN  [phone]  [facility] Otolaryngology

## 2023-02-08 ENCOUNTER — TELEPHONE (OUTPATIENT)
Dept: OTOLARYNGOLOGY | Facility: CLINIC | Age: 53
End: 2023-02-08
Payer: COMMERCIAL

## 2023-02-08 NOTE — TELEPHONE ENCOUNTER
LVM to schedule 5 virtual weekly or biweekly sessions with me and 1 monthly follow up with Temitope  . Gave direct number

## 2023-03-25 ENCOUNTER — HEALTH MAINTENANCE LETTER (OUTPATIENT)
Age: 53
End: 2023-03-25

## 2023-04-28 DIAGNOSIS — G24.1 DYSTONIA DUE TO DYT-1 GENE MUTATION: ICD-10-CM

## 2023-04-28 RX ORDER — DIAZEPAM 2 MG
4 TABLET ORAL EVERY 8 HOURS PRN
Qty: 540 TABLET | Refills: 0 | Status: SHIPPED | OUTPATIENT
Start: 2023-04-28 | End: 2023-08-03

## 2023-05-01 NOTE — TELEPHONE ENCOUNTER
[facility] Call Center    Phone Message    May a detailed message be left on voicemail: yes     Reason for Call: Medication Refill Request    Has the patient contacted the pharmacy for the refill? Yes   Name of medication being requested:   diazepam (VALIUM) 2 MG tablet  Provider who prescribed the medication: Dulce Manning  Pharmacy:   [id number] - [place], [place] - [address] AT [facility] [address]  Date medication is needed: ASAP- Pt is out    Pt is calling because he is out of his medication, he is aware that it is early. please call pt with any questions      Action Taken: Message routed to:  [facility] Neurology    Travel Screening: Not Applicable

## 2023-05-02 NOTE — TELEPHONE ENCOUNTER
[facility] Call Center    Phone Message    May a detailed message be left on voicemail: yes     Reason for Call: Other: Pt is calling because he is wondering if Sin Manning will authorize an early refill for the medication (diazepam (VALIUM) 2 MG tablet) Please call Pt to discuss    Action Taken: Message routed to:  [facility] Neurology    Travel Screening: Not Applicable

## 2023-05-03 ENCOUNTER — MYC MEDICAL ADVICE (OUTPATIENT)
Dept: NEUROLOGY | Facility: CLINIC | Age: 53
End: 2023-05-03
Payer: COMMERCIAL

## 2023-05-03 DIAGNOSIS — G24.1 DYSTONIA DUE TO DYT-1 GENE MUTATION: ICD-10-CM

## 2023-05-03 RX ORDER — DIAZEPAM 2 MG
4 TABLET ORAL EVERY 8 HOURS PRN
Qty: 540 TABLET | Refills: 0 | Status: CANCELLED | OUTPATIENT
Start: 2023-05-03

## 2023-05-03 NOTE — CONFIDENTIAL NOTE
Rx Authorization:    Requested Medication/ Dose: Diazepam     Date last refill ordered:     Quantity ordered:     # refills:     Date of last clinic visit with ordering provider:     Date of next clinic visit with ordering provider:     All pertinent protocol data (lab date/result):     Include pertinent information from patients message:

## 2023-05-03 NOTE — TELEPHONE ENCOUNTER
"diazepam (VALIUM) 2 MG tablet 540 tablet 0 4/28/2023  No   Sig - Route: Take 2 tablets (4 mg) by mouth every 8 hours as needed for anxiety or muscle spasms - Oral   Sent to pharmacy as: diazePAM 2 MG Oral Tablet (VALIUM)   Class: E-Prescribe   Order: [id number]   E-Prescribing Status: Receipt confirmed by pharmacy (4/28/2023 10:23 PM CDT)     Printout Tracking    External Result Report     Pharmacy    [facility] #[id number] - [place], [place] - [address] AT [facility] [address]     Called pharmacy spoke to \"Kathleen\". He will update prescription and get it ready.  "

## 2023-05-04 NOTE — TELEPHONE ENCOUNTER
Medication and strength: Diazepam 2 mg    Is this a [place] patient? Yes    Last re-ordered: 4/28/2023 for a 90 supply with 0 refills    Last appointment: 1/25/2023 with Dulce Manning NP    Action taken: No new prescription needed. Refilled on 4/28/2023

## 2023-05-16 ENCOUNTER — OFFICE VISIT (OUTPATIENT)
Dept: OTOLARYNGOLOGY | Facility: CLINIC | Age: 53
End: 2023-05-16
Payer: COMMERCIAL

## 2023-05-16 VITALS — BODY MASS INDEX: 26.41 KG/M2 | HEIGHT: 72 IN | WEIGHT: 195 LBS

## 2023-05-16 DIAGNOSIS — G24.4 OROFACIAL DYSKINESIA: Primary | ICD-10-CM

## 2023-05-16 PROCEDURE — 95874 GUIDE NERV DESTR NEEDLE EMG: CPT | Performed by: OTOLARYNGOLOGY

## 2023-05-16 PROCEDURE — 64612 DESTROY NERVE FACE MUSCLE: CPT | Mod: 50 | Performed by: OTOLARYNGOLOGY

## 2023-05-16 ASSESSMENT — PAIN SCALES - GENERAL: PAINLEVEL: NO PAIN (0)

## 2023-05-16 NOTE — LETTER
5/16/2023       RE: Ayad Moreno  [address]  [facility] [address]     Dear Colleague,    Thank you for referring your patient, Ayad Moreno, to the [facility] [place] [facility]. Please see a copy of my visit note below.    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  He has had a deep brain stimulator placed.  he was last injected on 2/7/2023. he had a good response to the last injection. The last dose given was      Anterior digastric muscle:EMG guided, bilateral, 5 units each side with Moderate EMG response - total dose 10 units BTA.      Nasalis muscle. Non EMG guided, 5 units right side  - total dose 5 units BTA.     Depressor anguli oris:Non EMG guided, unilateral, right .- total dose  10 units BTA     Orbicularis oris muscle: non EMG guided, unilateral, inferior- right, 2.5 units.- total dose  2.5 units BTA.     Lateral pterygoid muscles: EMG guided, bilateral, 10 units each side with Strong EMG response - total dose 20 units BTA.       The total dose of botulinum a toxin delivered was 47.5 units.      He also had some voice and swallowing concerns.  It was felt that he had a dysarthria primarily involving the lips and the tongue.  He also had a muscle tension dysphonia.  Vocal fold function was good and he did respond to therapy probes.  A trial of speech therapy was recommended.  It was recognized that he will continue to have his mild dysarthria.  The response lasted for 3 months. There was no Dysphagia or Dyspnea related to the injection.  He was having primarily right-sided symptoms and therefore we will focus even more on the right side today.      Our plan is to give:  Anterior digastric muscle:EMG guided, bilateral, 5 units RIGHT side with Moderate EMG response - total dose 5 units BTA.      Nasalis muscle. Non EMG guided, 5 units right side   - total dose 5 units BTA.     Depressor anguli oris:Non EMG guided, unilateral, right .- total dose  10 units BTA     Orbicularis oris muscle: non EMG guided, unilateral, inferior- right, 2.5 units.- total dose  2.5 units BTA.     Lateral pterygoid muscles: EMG guided, RIGHT, 10 units RIGHT side with Strong EMG response - total dose 10 units BTA.      Please note the decreased dosage related to the lateral pterygoid muscles and the anterior digastric muscle being unilateral injections.          PROCEDURE: After obtaining consent, the patient was placed in the supine position. Ground and reference electrodes were applied. The skin  anterior to the condyle and below the zygomatic arch  were prepared with an alcohol swab.  Using a 27-gauge, unipolar, electromyographic needle the infratemporal fossa was entered through the mandibular notch posterior to the coronoid process.  10 units of botulinum A toxin was injected into the right lateral pterygoid muscle.  There was a Strong EMG response to a jaw thrust on the right side.     Attention was then turned to the anterior digastric muscle area in the suprahyoid area. The overlying skin was prepared with an alcohol swab.  The area of the anterior digastric muscle was identified with palpation. Using a 27-gauge, unipolar electromyography needle, the anterior digastric muscle was entered through a percutaneous approach.  5 units of botulinum A toxin was injected into the right anterior digastric muscle.  There was a Strong EMG response to jaw opening on the right side.    An additional 5 units was injected without EMG guidance into the right  nasalis muscle  for dystonic movements in this area. Injections were made into the: Depressor anguli oris:Non EMG guided, unilateral, right .- total dose  10 units BTA.  Orbicularis oris muscle: non EMG guided, unilateral, inferior- right, 2.5 units.- total dose  2.5 units BTA.    Finally 7.5 units was injected into each procerus muscle  without EMG guidance for dystonic movements in this area bilaterally.    The total amount of botulinum A toxin delivered today was 47.5 units. An additional 10 units of botulinum A toxin was necessarily wasted in preparation for the injection. he tolerated the procedure well and left the clinic after a short observation period.       The EMG was necessary specifically in this case to identify areas of muscle overactivity as well as to access the lateral pterygoid muscle which is within the infratemporal fossa and  not otherwise directly accessible without EMG guidance.    PLAN: I will have him  follow up on a PRN basis. It is anticipated that repeat injections will be required over the long term.            Again, thank you for allowing me to participate in the care of your patient.      Sincerely,    Yves Horne MD

## 2023-05-16 NOTE — NURSING NOTE
No chief complaint on file.   Height 1.829 m (6'), weight 88.5 kg (195 lb). Paulina Anderson LPN

## 2023-05-16 NOTE — PATIENT INSTRUCTIONS
1.  You were seen in the ENT Clinic today by . If you have any questions or concerns after your appointment, please call [phone]. Press option #1 for scheduling related needs. Press option #3 for Nurse advice.    2. Plan is to return to clinic 8/15/23 for repeat botox injection      Susan Keith LPN  [phone]  [facility] - Otolaryngology

## 2023-05-16 NOTE — PROGRESS NOTES
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  He has had a deep brain stimulator placed.  he was last injected on 2/7/2023. he had a good response to the last injection. The last dose given was      Anterior digastric muscle:EMG guided, bilateral, 5 units each side with Moderate EMG response - total dose 10 units BTA.      Nasalis muscle. Non EMG guided, 5 units right side  - total dose 5 units BTA.     Depressor anguli oris:Non EMG guided, unilateral, right .- total dose  10 units BTA     Orbicularis oris muscle: non EMG guided, unilateral, inferior- right, 2.5 units.- total dose  2.5 units BTA.     Lateral pterygoid muscles: EMG guided, bilateral, 10 units each side with Strong EMG response - total dose 20 units BTA.       The total dose of botulinum a toxin delivered was 47.5 units.      He also had some voice and swallowing concerns.  It was felt that he had a dysarthria primarily involving the lips and the tongue.  He also had a muscle tension dysphonia.  Vocal fold function was good and he did respond to therapy probes.  A trial of speech therapy was recommended.  It was recognized that he will continue to have his mild dysarthria.  The response lasted for 3 months. There was no Dysphagia or Dyspnea related to the injection.  He was having primarily right-sided symptoms and therefore we will focus even more on the right side today.      Our plan is to give:  Anterior digastric muscle:EMG guided, bilateral, 5 units RIGHT side with Moderate EMG response - total dose 5 units BTA.      Nasalis muscle. Non EMG guided, 5 units right side  - total dose 5 units BTA.     Depressor anguli oris:Non EMG guided, unilateral, right .- total dose  10 units BTA     Orbicularis oris muscle: non EMG guided, unilateral, inferior- right, 2.5 units.- total dose  2.5 units BTA.     Lateral pterygoid muscles: EMG guided, RIGHT, 10 units RIGHT side with Strong EMG response -  total dose 10 units BTA.      Please note the decreased dosage related to the lateral pterygoid muscles and the anterior digastric muscle being unilateral injections.          PROCEDURE: After obtaining consent, the patient was placed in the supine position. Ground and reference electrodes were applied. The skin  anterior to the condyle and below the zygomatic arch  were prepared with an alcohol swab.  Using a 27-gauge, unipolar, electromyographic needle the infratemporal fossa was entered through the mandibular notch posterior to the coronoid process.  10 units of botulinum A toxin was injected into the right lateral pterygoid muscle.  There was a Strong EMG response to a jaw thrust on the right side.     Attention was then turned to the anterior digastric muscle area in the suprahyoid area. The overlying skin was prepared with an alcohol swab.  The area of the anterior digastric muscle was identified with palpation. Using a 27-gauge, unipolar electromyography needle, the anterior digastric muscle was entered through a percutaneous approach.  5 units of botulinum A toxin was injected into the right anterior digastric muscle.  There was a Strong EMG response to jaw opening on the right side.    An additional 5 units was injected without EMG guidance into the right  nasalis muscle  for dystonic movements in this area. Injections were made into the: Depressor anguli oris:Non EMG guided, unilateral, right .- total dose  10 units BTA.  Orbicularis oris muscle: non EMG guided, unilateral, inferior- right, 2.5 units.- total dose  2.5 units BTA.    Finally 7.5 units was injected into each procerus muscle without EMG guidance for dystonic movements in this area bilaterally.    The total amount of botulinum A toxin delivered today was 47.5 units. An additional 10 units of botulinum A toxin was necessarily wasted in preparation for the injection. he tolerated the procedure well and left the clinic after a short observation  period.       The EMG was necessary specifically in this case to identify areas of muscle overactivity as well as to access the lateral pterygoid muscle which is within the infratemporal fossa and  not otherwise directly accessible without EMG guidance.    PLAN: I will have him  follow up on a PRN basis. It is anticipated that repeat injections will be required over the long term.

## 2023-05-26 ENCOUNTER — VIRTUAL VISIT (OUTPATIENT)
Dept: OTOLARYNGOLOGY | Facility: CLINIC | Age: 53
End: 2023-05-26
Payer: COMMERCIAL

## 2023-05-26 DIAGNOSIS — R49.0 DYSPHONIA: Primary | ICD-10-CM

## 2023-05-26 DIAGNOSIS — J38.7 LARYNGEAL HYPERFUNCTION: ICD-10-CM

## 2023-05-26 DIAGNOSIS — R47.1 DYSARTHRIA: ICD-10-CM

## 2023-05-26 PROCEDURE — 92507 TX SP LANG VOICE COMM INDIV: CPT | Mod: GN | Performed by: SPEECH-LANGUAGE PATHOLOGIST

## 2023-05-26 NOTE — PROGRESS NOTES
Ayad Moreno is a 53 year old male who is being evaluated via a billable video visit.      Ayad has been notified and verbally consented to the following:     This video visit will be conducted between you and your provider.    Patient has opted to conduct today's video visit vs an in-person appointment.     Video visits are billed at different rates depending on your insurance coverage. Please reach out to your insurance provider with any questions.     If during the course of the call the provider feels the appointment is not appropriate, you will not be charged for this service.  Provider has received verbal consent for billable virtual visit from the patient? Yes  Will anyone else be joining your video visit? No    Call initiated at: 10:00am   Type of Visit Platform Used: [employer] Video  Location of provider: Home  Location of patient: [facility]  Yves Horne Jr., M.D., F.A.C.S.  Shantell Brar M.D., M.P.H.  Radha Winston M.D.  Opal Gordillo, Ph.D., CCC-SLP  Miguel Angel Magana, Ph.D., [facility] [place]-SLP  Katia Calderón M.M. (voice), M.A., CCC-SLP  Temitope Hameed M.A., CCC-SLP  Farideh Desouza M.S., CCC-SLP  GRISELDA Lo. (voice), M.S., CCC-SLP    [facility]  VOICE/SPEECH/BREATHING THERAPY PROGRESS REPORT    Patient: Ayad Moreno  Date of Service: 5/26/2023    Date of Last Service: 02/07/2023  Referring physician: Dr. Horne   IMPRESSIONS from initial evaluation on 02/07/2023:This evaluation has resulted in the following diagnosis/diagnoses for Mr. 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 Laryngeal evaluation demonstrated marked-severe compression of the laryngeal structures, pooling of saliva at the vallecula, pyriform sinuses, and near the entrance of the esophagus, mild inflammation of the posterior commissure, right arytenoid hooding with occasional sluggish left arytenoid and reduced coordination and speed of the laryngeal structures  with vocal diadochokinesis task.  ? Perceptual evaluation demonstrated pressed quality, consistent vocal damon throughout phrases, mild roughness, narrow resonance (hypernasality), low speaking pitch, incoordination with respiration and phonation, and reduced coordination and articulation with vocal diadochokinesis task.    I had the pleasure of seeing Mr. Moreno today, for speech therapy to address a diagnosis of:   Dysphonia (R49.0)   Laryngeal Hyperfunction (J38.7)   Dysarthria (R47.1)    Mr. Moreno was seen for evaluation on 02/07/2023.  At that time, it was determined that  he would benefit from a course of speech therapy to address the above diagnosis.  Since then, he states he has not had any change in his symptoms, which is expected, as no therapeutic suggestions were made at the time.    Mr. Moreno also states that:    His speech is not the best. He cannot interact with his mom because she has hearing loss but wears hearing aids.     He is trying to relax the right side of his face.     He talked in his sleep last night and he woke up feeling tight.    His tongue can get tight.    He does laryngeal massage after a day of speaking.     The last two days he has been talking a lot and had a presentation of 40 minutes straight talking.      Mr. Moreno presents today with the following:  Voice quality:    Moderate roughness    Low speaking pitch    Difficulty with articulation  Cough/ Throat clear:    No observed    THERAPEUTIC ACTIVITIES  Today Mr. Moreno participated in the following therapeutic activities:    Asked many questions about the nature of his symptoms, and I answered all of these thoroughly.    I provided instructions for laryngeal stretch, base of tongue stretches, and base of tongue and jaw massage massage.    [place] exercises for optimal respiratory mechanics for speech and singing.  o I provided explanation of the anatomy and physiology of respiration for speech and singing; he found this  "to be helpful  o with guidance, learned improved abdominal relaxation for inhalation  o learned techniques for optimal airflow on exhalation  o practiced in prone and supine positions on the massage table; this was helpful  o [place] a respiratory pacing exercise; this was helpful.  - Counting; this was helpful    He demonstrated more consistent articulation and less vocal damon with this counting task  o good learning, but will need practice    [place] exercises to add phonation to the optimal flowing airstream.  o Semi-occluded vocal tract exercises with a straw (\"cup and bubbles\"), lip trills, buzzy Z, trumpet, and straw phonation instructions were provided  o Instructed to use as a voice warm up, cool down, coordination of breath flow with phonation, and for please resets  o good learning, but will need practice     [place] concepts of an optimal regimen for practice.  o he should use an interval schedule of practice, with brief periods of practice frequently throughout each day    I provided an after visit summary to help facilitate practice.      IMPRESSIONS/GOALS/PLAN  Mr. Moreno had a productive session of speech therapy today, to address the following:   Dysphonia (R49.0)   Laryngeal Hyperfunction (J38.7)   Dysarthria (R47.1)    Speech therapy for him is medically necessary to allow  him to meet personal and professional demands and fully engage in activities of daily living.     He will continue to work on his exercises on a daily basis, and work on incorporating the techniques into his daily activities.    Progress toward long-term goals:   Minimal at this point, as this is first session, but good learning today    Goals for this practice period:     practice all exercises according to instructions    Plan: I will see Mr. Moreno on June 30, 2023 to work on education, modification, and carryover of therapeutic activities to more complex activities.    TOTAL SERVICE TIME: 60 minutes  TREATMENT (86494)  NO " CHARGE FACILITY FEE    Temitope Hameed M.A., CCC-SLP  Speech-Language Pathologist  [facility]  [email or web address]  She/Her/Hers     Speech recognition software may have been used in this documentation; input is reviewed before signature to the best of my ability.

## 2023-05-26 NOTE — PATIENT INSTRUCTIONS
"Chay Lion,    Great work today. Here is everything we discussed. See you in June.    Temitope Hameed M.A., CCC-SLP  Speech-Language Pathologist  [facility]  [email or web address]  She/Her/Hers    BELLY BREATHING (3 breaths per hour, until habit)  If you need to initially, lay on your stomach while in bed or on the floor, and feel your abdomen activate with inhalation. You can also have a loved one place their hands on your lower back to provide extra physical feedback of your stomach expanding with inhalation and ondina with exhalation.    Sit hinged forward with your elbows on your knees. OR Sit normally and place your hands on either side of your belly or low ribs  Inhale with rounded lips (silently slurping spaghetti noodle) and exhale on \"Sh\"  Slowly breathe in and feel your belly and back expand, like filling a balloon, pushing out against your waistband  Slowly breathe out and feel your belly and back crunch inward, like zipping tight pants  Repeat slowly and take breaks if you get dizzy  HELPFUL HINTS:  -- Tie a string or piece of elastic around your low ribs when you get dressed. You'll be able to feel yourself expand against this as you breathe all day.   -- Some people find it easiest to practice while laying on their back or lean back in chair with one hand on their belly and one hand on their chest.     Coordinate sipping breath with counting (1-2x/day):  Breathe in through rounded lips with tongue behind lower teeth or touching at the bump behind upper teeth. Make sure each number sounds clear and not creaky. If you need to play with pitch or sing the counts, do it!  Sip breath \"1\"  Sip breath \"1-2\"  Sip breath \"1-2-3\"  Sip breath \"1-2-3-4\"... up to 5, or a count of 10       Neck stretch:  Jut your jaw out like a bulldog and lift your head to the hilda. Hold for 30 seconds    Tongue Massage and stretches:  Use your thumbs to massage under your chin while you tongue rests on your lower " "lip like a puppy.  Used your thumbs to massaged under your jaw and under your chin.  Stretch your tongue out in front of you for 30 seconds. You should feel a tug in the back of your tongue. Place finger in front of you and have the tip of your tongue reach towards your finger.  Lick the front of you top teeth and the front of your bottom teeth all the way around. Do three circles one way and do three circles the other way.     SEMI-OCCLUDED VOCAL TRACT EXERCISES: 3-5x a day for 3-5 minutes, as a warm up, cool, down, or any time your voice feels tired.     If you don't have straw or cup you can do Buzzy Z (pucker your lips and sing like a bee), elephant (puff air in your upper lip), or humming. You can also use the straw alone.     Cup and Bubble Exercises   WHY: Though these exercises seems (and feels) silly they are helpful for a number of reasons. First, they make you use your air generously and consistently, helping you to coordinate your breath and your voice. Second, they lengthen and narrow the vocal tract with the straw. This narrowing (or semi-occlusion in scientific terms) creates back pressure in your throat which has been shown to help the vocal folds vibrate more easily and reduce how hard some of the other muscles are squeezing.   HOW:   With Water - Fill the cup (or bottle) about 2 inches full of water. Blow bubbles through the straw while keeping your voice on. (kind of like making an \"ooooo\" sound through straw).Keep the water bubbling the whole time. That means your air is moving consistently.   Without Water - make sound through the straw (like it's a kazoo). Make sure you are using your air freely. You can hold your hand in front of the straw to test, and you should be able to feel the air moving.   Use the \"w\" sound without the straw. Let your cheeks puff up slightly (like Alen Lim). Maintain the relaxed feeling in your throat while focusing on a sense of buzz at your lips.   After easy " sounds listed below speak through the straw (with or without water) using every day phrases and counting to help bridge between the exercises and everyday voice use.   Feel how open and relaxed your throat feels when you practice these sounds. If the bubbling or air stops or it gets tight, don't sweat it. Just breath, relax, and start again. Across all the exercises make sure you are getting a nice low breath and feeling the steady inward motion of low abdominal muscles when making sound.   Practice 5 times a day for no more than 3 minutes, and whenever you feel fatigued.   Aren't sure if you are doing it right? Ask yourself these three questions:   1. Does it feel easy?   2. Are the bubbles and voice consistent?   3. Do you feel that forward buzz?     What sounds to make:   Single pitches - use any comfortable pitch and sustain the note for as long as it feels free and easy, and your lips or tongue are bubbling.   Sighs - glide from high to low like you are sitting down in a comfortable chair after a long day of work   [place] - Start on a medium pitch and glide up just a bit and back down   On and off - use a comfortable pitch near where you speak. Keep the bubbles moving consistently while turning the voice on and off.  Singing-Take your time singing with gliding from one pitch to the next, almost like you are singing in slow motion.    Recognize how little work you need to do to get the voice working.

## 2023-05-26 NOTE — LETTER
5/26/2023       RE: Ayad Moreno  [address]  [place] [address]     Dear Colleague,    Thank you for referring your patient, Ayad Moreno, to the [facility] VOICE [facility] [place] [facility]. Please see a copy of my visit note below.    Ayad Moreno is a 53 year old male who is being evaluated via a billable video visit.      Ayad has been notified and verbally consented to the following:   This video visit will be conducted between you and your provider.  Patient has opted to conduct today's video visit vs an in-person appointment.   Video visits are billed at different rates depending on your insurance coverage. Please reach out to your insurance provider with any questions.   If during the course of the call the provider feels the appointment is not appropriate, you will not be charged for this service.  Provider has received verbal consent for billable virtual visit from the patient? Yes  Will anyone else be joining your video visit? No    Call initiated at: 10:00am   Type of Visit Platform Used: [employer] Video  Location of provider: Home  Location of patient: [facility]  Yves Horne Jr., M.D., F.A.C.S.  Shantell Brar M.D., M.P.H.  Radha Winston M.D.  Opal Gordillo, Ph.D., CCC-SLP  Miguel Angel Magana, Ph.D., CCC-SLP  Katia Calderón M.M. (voice), M.A., CCC-SLP  Temitope Hameed M.A., CCC-SLP  JAMIL Correia.S., CCC-SLP  YUKO Lo (voice), M.S., CCC-SLP    [facility] VOICE [facility]  VOICE/SPEECH/BREATHING THERAPY PROGRESS REPORT    Patient: Ayad Moreno  Date of Service: 5/26/2023    Date of Last Service: 02/07/2023  Referring physician: Dr. Horne   IMPRESSIONS from initial evaluation on 02/07/2023:This evaluation has resulted in the following diagnosis/diagnoses for Mr. Moreno   Dysphonia (R49.0)   Laryngeal Hyperfunction (J38.7)   Dysarthria (R47.1)   Laryngeal evaluation demonstrated marked-severe  compression of the laryngeal structures, pooling of saliva at the vallecula, pyriform sinuses, and near the entrance of the esophagus, mild inflammation of the posterior commissure, right arytenoid hooding with occasional sluggish left arytenoid and reduced coordination and speed of the laryngeal structures with vocal diadochokinesis task.  Perceptual evaluation demonstrated pressed quality, consistent vocal damon throughout phrases, mild roughness, narrow resonance (hypernasality), low speaking pitch, incoordination with respiration and phonation, and reduced coordination and articulation with vocal diadochokinesis task.    I had the pleasure of seeing Mr. Moreno today, for speech therapy to address a diagnosis of:   Dysphonia (R49.0)   Laryngeal Hyperfunction (J38.7)   Dysarthria (R47.1)    Mr. Moreno was seen for evaluation on 02/07/2023.  At that time, it was determined that  he would benefit from a course of speech therapy to address the above diagnosis.  Since then, he states he has not had any change in his symptoms, which is expected, as no therapeutic suggestions were made at the time.    Mr. Moreno also states that:  His speech is not the best. He cannot interact with his mom because she has hearing loss but wears hearing aids.   He is trying to relax the right side of his face.   He talked in his sleep last night and he woke up feeling tight.  His tongue can get tight.  He does laryngeal massage after a day of speaking.   The last two days he has been talking a lot and had a presentation of 40 minutes straight talking.      Mr. Moreno presents today with the following:  Voice quality:  Moderate roughness  Low speaking pitch  Difficulty with articulation  Cough/ Throat clear:  No observed    THERAPEUTIC ACTIVITIES  Today Mr. Moreno participated in the following therapeutic activities:  Asked many questions about the nature of his symptoms, and I answered all of these thoroughly.  I provided instructions  "for laryngeal stretch, base of tongue stretches, and base of tongue and jaw massage massage.  [place] exercises for optimal respiratory mechanics for speech and singing.  I provided explanation of the anatomy and physiology of respiration for speech and singing; he found this to be helpful  with guidance, learned improved abdominal relaxation for inhalation  learned techniques for optimal airflow on exhalation  practiced in prone and supine positions on the massage table; this was helpful  [place] a respiratory pacing exercise; this was helpful.  Counting; this was helpful  He demonstrated more consistent articulation and less vocal damon with this counting task  good learning, but will need practice  [place] exercises to add phonation to the optimal flowing airstream.  Semi-occluded vocal tract exercises with a straw (\"cup and bubbles\"), lip trills, buzzy Z, trumpet, and straw phonation instructions were provided  Instructed to use as a voice warm up, cool down, coordination of breath flow with phonation, and for please resets  good learning, but will need practice   [place] concepts of an optimal regimen for practice.  he should use an interval schedule of practice, with brief periods of practice frequently throughout each day  I provided an after visit summary to help facilitate practice.      IMPRESSIONS/GOALS/PLAN  Mr. Moreno had a productive session of speech therapy today, to address the following:   Dysphonia (R49.0)   Laryngeal Hyperfunction (J38.7)   Dysarthria (R47.1)    Speech therapy for him is medically necessary to allow  him to meet personal and professional demands and fully engage in activities of daily living.     He will continue to work on his exercises on a daily basis, and work on incorporating the techniques into his daily activities.    Progress toward long-term goals:   Minimal at this point, as this is first session, but good learning today    Goals for this practice period:   practice all " exercises according to instructions    Plan: I will see Mr. Moreno on June 30, 2023 to work on education, modification, and carryover of therapeutic activities to more complex activities.    TOTAL SERVICE TIME: 60 minutes  TREATMENT (26534)  NO CHARGE FACILITY FEE    Temitope Hameed M.A., CCC-SLP  Speech-Language Pathologist  [facility]  [email or web address]  She/Her/Hers     Speech recognition software may have been used in this documentation; input is reviewed before signature to the best of my ability.           Again, thank you for allowing me to participate in the care of your patient.      Sincerely,    Temitope Hameed, SLP

## 2023-06-23 DIAGNOSIS — G24.4 OROFACIAL DYSKINESIA: Primary | ICD-10-CM

## 2023-06-30 ENCOUNTER — VIRTUAL VISIT (OUTPATIENT)
Dept: OTOLARYNGOLOGY | Facility: CLINIC | Age: 53
End: 2023-06-30
Payer: COMMERCIAL

## 2023-06-30 DIAGNOSIS — R47.1 DYSARTHRIA: ICD-10-CM

## 2023-06-30 DIAGNOSIS — R49.0 DYSPHONIA: Primary | ICD-10-CM

## 2023-06-30 DIAGNOSIS — J38.7 LARYNGEAL HYPERFUNCTION: ICD-10-CM

## 2023-06-30 PROCEDURE — 92507 TX SP LANG VOICE COMM INDIV: CPT | Mod: GN | Performed by: SPEECH-LANGUAGE PATHOLOGIST

## 2023-06-30 NOTE — PROGRESS NOTES
Ayad Moreno is a 53 year old male who is being evaluated via a billable video visit.      Ayad has been notified and verbally consented to the following:     This video visit will be conducted between you and your provider.    Patient has opted to conduct today's video visit vs an in-person appointment.     Video visits are billed at different rates depending on your insurance coverage. Please reach out to your insurance provider with any questions.     If during the course of the call the provider feels the appointment is not appropriate, you will not be charged for this service.  Provider has received verbal consent for billable virtual visit from the patient? Yes  Will anyone else be joining your video visit? No    Call initiated at: 10:00am   Type of Visit Platform Used: [employer] Video  Location of provider: Home  Location of patient: [facility]  Yves Horne Jr., M.D., F.A.C.S.  Shantell Brar M.D., M.P.H.  Radha Winston M.D.  Opal Gordillo, Ph.D., CCC-SLP  Miguel Angel Magana, Ph.D., [facility]-SLP  Katia Calderón M.M. (voice), M.A., CCC-SLP  Temitope Hameed M.A., CCC-SLP  Farideh Desouza M.S., CCC-SLP  GRISELDA Lo. (voice), M.S., CCC-SLP    [facility]  VOICE/SPEECH/BREATHING THERAPY PROGRESS REPORT    Patient: Ayad Moreno  Date of Service: 6/30/2023    Date of Last Service: 05/26/2023  Referring physician: Dr. Horne     IMPRESSIONS from initial evaluation on 02/07/2023:This evaluation has resulted in the following diagnosis/diagnoses for Mr. Moreno                 Dysphonia (R49.0)                 Laryngeal Hyperfunction (J38.7)                 Dysarthria (R47.1)             ? Laryngeal evaluation demonstrated marked-severe compression of the laryngeal structures, pooling of saliva at the vallecula, pyriform sinuses, and near the entrance of the esophagus, mild inflammation of the posterior commissure, right arytenoid hooding with occasional sluggish left arytenoid and  "reduced coordination and speed of the laryngeal structures with vocal diadochokinesis task.  ? Perceptual evaluation demonstrated pressed quality, consistent vocal damon throughout phrases, mild roughness, narrow resonance (hypernasality), low speaking pitch, incoordination with respiration and phonation, and reduced coordination and articulation with vocal diadochokinesis task.     I had the pleasure of seeing Mr. Moreno today, for speech therapy to address a diagnosis of:                 Dysphonia (R49.0)                 Laryngeal Hyperfunction (J38.7)                 Dysarthria (R47.1)      PROGRESS SINCE LAST SESSION  At the last session, Mr. Moreno worked on therapeutic activities to address the above diagnosis.    Regarding practice, Mr. Moreno reports the following:     The exercises have helped him raise his pitch.     He hasn't done the exercises today.     He does the Buzzy Z every day about 2 times a day in his car.     Mr. Moreno also states that:    He hasn't had a lot of \"vocal rest\" because he was talking in his sleep and the Botox is wearing off.    He presses on the area under his jaw (behind ears) and taking deep breaths.     He feels that his speech is significantly different today compared to other days because he is tired.     He used to sing but he hasn't sang in a long time.    Mr. Moreno presents today with the following:  Voice quality:    Intermittent moderate strain    Frequent vocal damon    Intermittent moderate roughness    Frequent slurred speech    Better voice quality and articulation by the end of the session  Cough/ Throat clear:    Not observed.    THERAPEUTIC ACTIVITIES  Today Mr. Moreno participated in the following therapeutic activities:    Asked many questions about the nature of his symptoms, and I answered all of these thoroughly.    We discussed him potentially seeing Dr. Horne sooner due to his Botox is wearing off.     Demonstrated previous exercises.  o demonstrated " "improved technique of Buzzy Z    [place] exercises for improved airflow during phonation.  o speech material with aspirate onsets was facilitating  o Instructed at the syllable, word, sentence, and conversation level.  o learned techniques to reduce glottal damon, strain, and improve breath flow  - I encouraged him to \"sing\" what he is saying; this was helpful  - I encouraged him to slow down his breathing and to use intonation    He had difficulty with some articulation when having a conversation about a lawnmower. The transition from /n/ to /m/ was challening for him.  o I modeled the transition and explained that they are both nasals.     [place] exercises to maintain the improved airflow and forward focus in singing.  o did glides on /mi/ for a 1-3-1 pattern   o His range today was A#2-B4  - He demonstrated strain and a \"creaky voice\" around A#2-B2    [place] concepts of an optimal regimen for practice.  o he should use an interval schedule of practice, with brief periods of practice frequently throughout each day    I provided an after visit summary to help facilitate practice.    IMPRESSIONS/GOALS/PLAN  Mr. Moreno had a productive session of speech therapy today, to address the following:   Dysphonia (R49.0)                 Laryngeal Hyperfunction (J38.7)                 Dysarthria (R47.1)    Speech therapy for him is medically necessary to allow  him to meet personal and professional demands and fully engage in activities of daily living.     He will continue to work on his exercises on a daily basis, and work on incorporating the techniques into his daily activities.    Progress toward long-term goals:   Adequate progress; please see above    Goals for this practice period:     practice all exercises according to instructions    incorporate techniques into daily vocal activities    maintain vigilance for vocal technique    Plan: I will see Mr. Moreno on July 28, 2023 to work on education, modification, and " carryover of therapeutic activities to more complex activities.    TOTAL SERVICE TIME: 60 minutes  TREATMENT (69880)  NO CHARGE FACILITY FEE    Temitope Hameed M.A., CCC-SLP  Speech-Language Pathologist  [facility]  [email or web address]  She/Her/Hers     Speech recognition software may have been used in this documentation; input is reviewed before signature to the best of my ability.

## 2023-06-30 NOTE — LETTER
6/30/2023       RE: Ayad Moreno  [address]  [place] [address]     Dear Colleague,    Thank you for referring your patient, Ayad Moreno, to the [facility] [place] [facility]. Please see a copy of my visit note below.    Ayad Moreno is a 53 year old male who is being evaluated via a billable video visit.      Ayad has been notified and verbally consented to the following:   This video visit will be conducted between you and your provider.  Patient has opted to conduct today's video visit vs an in-person appointment.   Video visits are billed at different rates depending on your insurance coverage. Please reach out to your insurance provider with any questions.   If during the course of the call the provider feels the appointment is not appropriate, you will not be charged for this service.  Provider has received verbal consent for billable virtual visit from the patient? Yes  Will anyone else be joining your video visit? No    Call initiated at: 10:00am   Type of Visit Platform Used: [employer] Video  Location of provider: Home  Location of patient: [facility]ICE [facility]  Yves Horne Jr., M.D., F.A.C.S.  Shantell Brar M.D., M.P.H.  Radha Winston M.D.  Opal Gordillo, Ph.D., CCC-SLP  Miguel Angel Magana, Ph.D., CCC-SLP  Katia Calderón M.M. (voice), M.A., CCC-SLP  Temitope Hameed M.A., CCC-SLP  JAMIL Correia.S., CCC-SLP  YUKO Lo (voice), M.S., CCC-SLP    [facility] VOICE [facility]  VOICE/SPEECH/BREATHING THERAPY PROGRESS REPORT    Patient: Ayad Moreno  Date of Service: 6/30/2023    Date of Last Service: 05/26/2023  Referring physician: Dr. Horne     IMPRESSIONS from initial evaluation on 02/07/2023:This evaluation has resulted in the following diagnosis/diagnoses for Mr. Moreno                 Dysphonia (R49.0)                 Laryngeal Hyperfunction (J38.7)                 Dysarthria (R47.1)            "  Laryngeal evaluation demonstrated marked-severe compression of the laryngeal structures, pooling of saliva at the vallecula, pyriform sinuses, and near the entrance of the esophagus, mild inflammation of the posterior commissure, right arytenoid hooding with occasional sluggish left arytenoid and reduced coordination and speed of the laryngeal structures with vocal diadochokinesis task.  Perceptual evaluation demonstrated pressed quality, consistent vocal damon throughout phrases, mild roughness, narrow resonance (hypernasality), low speaking pitch, incoordination with respiration and phonation, and reduced coordination and articulation with vocal diadochokinesis task.     I had the pleasure of seeing Mr. Moreno today, for speech therapy to address a diagnosis of:                 Dysphonia (R49.0)                 Laryngeal Hyperfunction (J38.7)                 Dysarthria (R47.1)      PROGRESS SINCE LAST SESSION  At the last session, Mr. Moreno worked on therapeutic activities to address the above diagnosis.    Regarding practice, Mr. Moreno reports the following:   The exercises have helped him raise his pitch.   He hasn't done the exercises today.   He does the Buzzy Z every day about 2 times a day in his car.     Mr. Moreno also states that:  He hasn't had a lot of \"vocal rest\" because he was talking in his sleep and the Botox is wearing off.  He presses on the area under his jaw (behind ears) and taking deep breaths.   He feels that his speech is significantly different today compared to other days because he is tired.   He used to sing but he hasn't sang in a long time.    Mr. Moreno presents today with the following:  Voice quality:  Intermittent moderate strain  Frequent vocal damon  Intermittent moderate roughness  Frequent slurred speech  Better voice quality and articulation by the end of the session  Cough/ Throat clear:  Not observed.    THERAPEUTIC ACTIVITIES  Today Mr. Moreno participated in the " "following therapeutic activities:  Asked many questions about the nature of his symptoms, and I answered all of these thoroughly.  We discussed him potentially seeing Dr. Horne sooner due to his Botox is wearing off.   Demonstrated previous exercises.  demonstrated improved technique of Buzzy Z  [place] exercises for improved airflow during phonation.  speech material with aspirate onsets was facilitating  Instructed at the syllable, word, sentence, and conversation level.  learned techniques to reduce glottal damon, strain, and improve breath flow  I encouraged him to \"sing\" what he is saying; this was helpful  I encouraged him to slow down his breathing and to use intonation  He had difficulty with some articulation when having a conversation about a lawnmower. The transition from /n/ to /m/ was challening for him.  I modeled the transition and explained that they are both nasals.   [place] exercises to maintain the improved airflow and forward focus in singing.  did glides on /mi/ for a 1-3-1 pattern   His range today was A#2-B4  He demonstrated strain and a \"creaky voice\" around A#2-B2  [place] concepts of an optimal regimen for practice.  he should use an interval schedule of practice, with brief periods of practice frequently throughout each day  I provided an after visit summary to help facilitate practice.    IMPRESSIONS/GOALS/PLAN  Mr. Moreno had a productive session of speech therapy today, to address the following:   Dysphonia (R49.0)                 Laryngeal Hyperfunction (J38.7)                 Dysarthria (R47.1)    Speech therapy for him is medically necessary to allow  him to meet personal and professional demands and fully engage in activities of daily living.     He will continue to work on his exercises on a daily basis, and work on incorporating the techniques into his daily activities.    Progress toward long-term goals:   Adequate progress; please see above    Goals for this practice period: "   practice all exercises according to instructions  incorporate techniques into daily vocal activities  maintain vigilance for vocal technique    Plan: I will see Mr. Moreno on July 28, 2023 to work on education, modification, and carryover of therapeutic activities to more complex activities.    TOTAL SERVICE TIME: 60 minutes  TREATMENT (72607)  NO CHARGE FACILITY FEE    Temitope Hameed M.A., CCC-SLP  Speech-Language Pathologist  [facility]  [email or web address]  She/Her/Hers     Speech recognition software may have been used in this documentation; input is reviewed before signature to the best of my ability.           Again, thank you for allowing me to participate in the care of your patient.      Sincerely,    Temitope Hameed, SLP

## 2023-06-30 NOTE — PATIENT INSTRUCTIONS
"Chay Lion,    Great work today! Here is everything we discussed.    Temitope Hameed M.A., CCC-SLP  Speech-Language Pathologist  [facility]  [email or web address]  She/Her/Hers    Spacious Speech Phrases  Frequency of practice: 2-3x a day, any time you yawn, or any time you sigh    Start with an open-throated breath (like with a sniff of something wonderful or the beginning of a yawn or a surprise breath).  Let the breath do the work.  Be light, fluid, legato, and spacious.  Exaggerate inflection!  Glide over your entire pitch range.  These should feel effortless in your throat.  This is like a massage for your vocal folds, stretching and ondina the muscles, while vibrating in a spacious throat.    Heeeeeeeee   Haaaaaaaay   Silvestreoooooooh   Huuuuuuuu   Haaaaaaaah     Hi there   How are you?   Who are you?   Who is she?   Who is he?   Who are they?   Hello, hello, hello   Hey good to see you. How are you?      Try speaking in a conversation and slow down your breath. Use inflections with your speech and if needed, think of gently \"singing\" your conversation.    Vocal warm up exercise on \"tara\" on a 1-3-1 piano pattern as a warm up or voice cool down at the end of the day of talking.  (Use the free piano danial on the I-phone)  Sing on \"tara\" and glide slowly from one pitch to the next. The slower the better.   Your range today was from A#2-B4 but you showed a \"creaky\" voice at A#2-B2  Your voice break occurred around D#4 but you were able to switch to falsetto  Make adjustments (switching to falsetto as needed)  "

## 2023-07-11 ENCOUNTER — DOCUMENTATION ONLY (OUTPATIENT)
Dept: NEUROLOGY | Facility: CLINIC | Age: 53
End: 2023-07-11
Payer: COMMERCIAL

## 2023-07-11 NOTE — PROGRESS NOTES
Received FMLA from, form was completed and given to provider for signature, provider signed form and form was fax to [phone] per patient, originally copy was mailed to patient home and copy was sent to be scanned into patient chart

## 2023-08-01 ENCOUNTER — VIRTUAL VISIT (OUTPATIENT)
Dept: OTOLARYNGOLOGY | Facility: CLINIC | Age: 53
End: 2023-08-01
Payer: COMMERCIAL

## 2023-08-01 DIAGNOSIS — J38.7 LARYNGEAL HYPERFUNCTION: ICD-10-CM

## 2023-08-01 DIAGNOSIS — R49.0 DYSPHONIA: Primary | ICD-10-CM

## 2023-08-01 DIAGNOSIS — R47.1 DYSARTHRIA: ICD-10-CM

## 2023-08-01 PROCEDURE — 92507 TX SP LANG VOICE COMM INDIV: CPT | Mod: GN | Performed by: SPEECH-LANGUAGE PATHOLOGIST

## 2023-08-01 NOTE — PATIENT INSTRUCTIONS
"Hi Ayad,     Here is everything we discussed. I just messaged Dr. Horne and his nurse Susan and I am waiting to hear back.     Temitope Hameed M.A., CCC-SLP  Speech-Language Pathologist  [facility]  [email or web address]  She/Her/Hers    Breathing posture: Cross your arms and place hands on the sides of your ribcage.   Make sure you are sitting/standing up straight with your shoulders down and a long neck. You want to keep your upper body calm.   Inhale through rounded lips silently and feel your ribcage expand. You may also notice your forearms moving up.  When you exhale, your rib cage will come in.   Practice breathing and speaking in this posture.   Inhale slowly and practice typical phrases like, \"My name is Ayad,\" \"How are you?\" Etc.   You can also do the counting exercise. Start with a slightly higher pitch speaking or sing the numbers. Be mindful of the \"creaky\" voice when you speak lower.   Sip breath \"1\"  Sip breath \"1-2\"  Sip breath \"1-2-3\"  Sip breath \"1-2-3-4\"... up to 5 or 6    Continue to do the \"haaaa\" or \"heee\" or speaking phrases such as \"Hi how are you,\" \"Who is she,\" \"hello, hello, hello\" on a yawn or on a sigh. Use a higher pitch with a breathier speaking quality.     Resonant Voice: The goal is to place the sound forward to where you feel sensations at your lips, tongue, teeth, nose, etc and out of your throat.          M & N- Sentences    Target: resonance/reduce laryngeal focus    Goal: to maintain use of resonance and reduce laryngeal focus during sentence production      Cue:  Let's try sentences using a resonant voice.  Remember to keep the vibration in your oral and nasal cavities - away from your throat.     M Sentences  Meet my mom.  Mix more milk.  Merge more movies.  Moths make a mess.  March for a mile.  Chris my mailbag.  Make maple malt.  Mean marketers mumble.  Munch on mushBocada melVoxFeed.  March on [employer] [address].  Meet the man at the [employer].  Measure a million " miles.  Many men make merry.  Keene mowers make a mess.  Mules must make mud pies.  Master movers never mess up.  [place] made muffins in [place].  Metric machines mean more money.  Chris the message before I move.  Mom melted my magic mascot.  Yan made me more mashed meat.  Mommy made me mash my M&Ms.  Middle school menaces are mean.  [place] makes men's minds mushy.  Music makes me move my muscles.  Musicians' mouthpieces make music.  Monsters move in the moonbeams.  My monument to myself.  [place] meets my merry motives.   Morning memos must be read in the [employer].   messengers sent the memos to me.  My mouse and monkey wear mini mittens.  Members made mobiles with modern matchboxes,  Monarchs love jamar mangos in the morning.  Mountain climbers are mystified by monumental peaks.  [place] of mud messed up the merging motorists.  Palafox and mold are not manmade materials.  Markets in [place] collect money from mountaineers.  Mysterious marble is magnified with a microscope.  Marathon runners move quickly on the last mile.  Marshmallows are mysteriously missed.  Miniature mice made a home in my mousetrap.  My muse makes many mess moonstones.  My mask must meet medical mandates.  Multiple millionaires manufacture machines with modern microchips.      N Sentences  No news is nice.  Now the night is known.  Burke knew his nephew too.  Mahamed knew nice necklaces.  Next and now a newness is near.  Reshma never needed more news.  Nureyev knows no neck monsters.  Never need null new neighbors.  No hendricks never neglected nourishment.  Neat nurses napped next to nature.   New knees need knee socks.     M&N Sentences  Movie news makes money.  No neck monsters need more money.  Normal necks make noise.  Many nations need men.  Negligent neighbors make mockery of names.  New and many navy men.  No monotony is nice.  A monkey named Krystian new nothing by name!  Master Wayne knew the news.  New of the craven is  magnificent.  Make made ninety nuns nap.  Nice men knock on new doors.  [place] meets many mountains.    Source: [employer] - Adolescent & Adult,     2004 [employer]

## 2023-08-01 NOTE — PROGRESS NOTES
Ayad Moreno is a 53 year old male who is being evaluated via a billable video visit.      Ayad has been notified and verbally consented to the following:   This video visit will be conducted between you and your provider.  Patient has opted to conduct today's video visit vs an in-person appointment.   Video visits are billed at different rates depending on your insurance coverage. Please reach out to your insurance provider with any questions.   If during the course of the call the provider feels the appointment is not appropriate, you will not be charged for this service.  Provider has received verbal consent for billable virtual visit from the patient? Yes  Will anyone else be joining your video visit? No    Call initiated at: 03:10 pm   Type of Visit Platform Used: [employer] Video  Location of provider: Home  Location of patient: [facility]  Yves Horne Jr., M.D., F.A.C.S.  Shantell Brar M.D., M.P.H.  Radha Winston M.D.  Katia Calderón M.M. (voice), M.A., CCC-SLP  Temitope Hameed M.A., CCC-SLP  Opal Gordillo, Ph.D., CCC-SLP  Miguel Angel Magana, Ph.D., CCC-SLP  Farideh Desouza, M.S., CCC-SLP  Jose F Arellano M.M., M.A., CCC-SLP  GRISELDA Lo. (voice), M.S., CCC-SLP    [facility]  VOICE/SPEECH/BREATHING THERAPY PROGRESS REPORT    Patient: Ayad Moreno  Date of Service: 8/1/2023    Date of Last Service: 06/30/2023  Referring physician: Dr. Horne   IMPRESSIONS from initial evaluation on 02/07/2023:This evaluation has resulted in the following diagnosis/diagnoses for Mr. 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  Perceptual evaluation demonstrated pressed quality, consistent vocal damon throughout phrases, mild roughness, narrow resonance (hypernasality), low speaking pitch, incoordination with respiration and phonation, and reduced coordination and articulation with vocal diadochokinesis task.     I had the pleasure of seeing Mr. Moreno today, for speech therapy to address a diagnosis of:  Dysphonia (R49.0)  Laryngeal Hyperfunction (J38.7)  Dysarthria (R47.1)    PROGRESS SINCE LAST SESSION  At the last session, Mr. Moreno worked on therapeutic activities to address the above diagnosis.    Regarding practice, Mr. Moreno reports the following:   He drinks hot water with honey because it helps.     Mr. Moreno also states that:  Raising the pitch is helpful.   He can get anxious talking on zoom, but he tries to breathe.   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 He will talk through the anxiety.   He feels the Botox wearing off and it is frustrating because it is interfering with his relationships and communicating with his kids.     Mr. Moreno presents today with the following:  Voice quality:  Slurred speech and difficulty understanding him  Mild-moderate asthenia  Mild breathiness  Back focused resonance.  Cough/ Throat clear:  Not observed      THERAPEUTIC ACTIVITIES  Today Mr. Moreno participated in the following therapeutic activities:  Asked many questions about the nature of his symptoms, and I answered all of these thoroughly.  Reviewed exercises for optimal respiratory mechanics for speech.  with guidance, learned improved abdominal relaxation for inhalation  learned techniques for optimal airflow on exhalation  practiced with his arms crossed with hands on his ribcage  [place] a respiratory pacing  (Counting) exercise; this was  "helpful.  When he \"sang\" the numbers or started at a higher pitch, his voice and speech was clearer  good learning, but will need practice  minimal  improvement in airflow and respiratory mechanics  Reviewed exercises for improved airflow during phonation.  speech material with aspirate onsets was facilitating  I reminded him to speak as if he is yawning or sighing  Instructed at the syllable, word, and phrase level.  We went over typical phrases that he may say on Zoom or on a date  He had difficulty with saying his name because of the /r/ and /l/ sounds.   When we reviewed the strategies, this improved.   learned techniques to reduce glottal damon and improve breath flow  [place] exercises to experience a more forward sensation during phonation.  speech material with nasal continuants was facilitating  able to progress to level of short phrases  good learning, but will need practice  [place] concepts of an optimal regimen for practice.  he should use an interval schedule of practice, with brief periods of practice frequently throughout each day  I provided an after visit summary to help facilitate practice.      IMPRESSIONS/GOALS/PLAN  Mr. Moreno had a productive session of speech therapy today, to address the following:  Dysphonia (R49.0)  Laryngeal Hyperfunction (J38.7)  Dysarthria (R47.1)    Speech therapy for him is medically necessary to allow  him to meet personal and professional demands and fully engage in activities of daily living.     He will continue to work on his exercises on a daily basis, and work on incorporating the techniques into his daily activities.    Progress toward long-term goals:   Adequate but incomplete progress; please see above    Goals for this practice period:   practice all exercises according to instructions  incorporate techniques into daily vocal activities  maintain vigilance for vocal technique    Mr. Moreno is frustrated with his speech and is having difficulty communicating " with his children and others. Today, I often had to ask him to repeat himself due to being unintelligible and having a weak voice because he often spoke past the end of the breath stream. When we reviewed the strategies, he demonstrated a clearer voice and speech. However, this does not seem to become habitual yet. He was hoping to see Dr. Horne sooner than August 15, but I told him that Dr. Horne will not be available this week and he will be out of town next Tuesday. However, I told Mr. Moreno that I would message Dr. Horne just in case there is a chance of availability.     Plan: I will see Mr. Moreno in August 31, 2023 to work on education, modification, and carryover of therapeutic activities to more complex activities. He will see Dr. Horne for return Botox injection on August 15, 2023.     TOTAL SERVICE TIME: 30 minutes  TREATMENT (87303)  NO CHARGE FACILITY FEE    Temitope Hameed M.A., CCC-SLP  Speech-Language Pathologist  [facility]  [email or web address]  She/Her/Hers     Speech recognition software may have been used in this documentation; input is reviewed before signature to the best of my ability.

## 2023-08-01 NOTE — LETTER
8/1/2023       RE: Ayad Moreno  [address]  [place] [address]     Dear Colleague,    Thank you for referring your patient, Ayad Moreno, to the [facility] VOICE CLINIC [place] at [facility]. Please see a copy of my visit note below.    Ayad Moreno is a 53 year old male who is being evaluated via a billable video visit.      Ayad has been notified and verbally consented to the following:   This video visit will be conducted between you and your provider.  Patient has opted to conduct today's video visit vs an in-person appointment.   Video visits are billed at different rates depending on your insurance coverage. Please reach out to your insurance provider with any questions.   If during the course of the call the provider feels the appointment is not appropriate, you will not be charged for this service.  Provider has received verbal consent for billable virtual visit from the patient? Yes  Will anyone else be joining your video visit? No    Call initiated at: 03:10 pm   Type of Visit Platform Used: [employer] Video  Location of provider: Home  Location of patient: [facility] VOICE [facility]  Yves Horne Jr., M.D., F.A.C.S.  Shantell Brar M.D., M.P.H.  Radha Winston M.D.  Katia Calderón M.M. (voice), M.A., CCC-SLP  Temitope Hameed M.A., CCC-SLP  Opal Gordillo, Ph.D., CCC-SLP  Miguel Angel Magana, Ph.D., CCC-SLP  Farideh Desouza M.S., CCC-SLP  Jose F Arellano M.M., M.A., CCC-SLP  YUKO Lo (voice), M.S., CCC-SLP    [facility] VOICE [facility]  VOICE/SPEECH/BREATHING THERAPY PROGRESS REPORT    Patient: Ayad Moreno  Date of Service: 8/1/2023    Date of Last Service: 06/30/2023  Referring physician: Dr. Horne   IMPRESSIONS from initial evaluation on 02/07/2023:This evaluation has resulted in the following diagnosis/diagnoses for Mr. Moreno                 Dysphonia (R49.0)                 Laryngeal Hyperfunction (J38.7)                  Dysarthria (R47.1)             Laryngeal evaluation demonstrated marked-severe compression of the laryngeal structures, pooling of saliva at the vallecula, pyriform sinuses, and near the entrance of the esophagus, mild inflammation of the posterior commissure, right arytenoid hooding with occasional sluggish left arytenoid and reduced coordination and speed of the laryngeal structures with vocal diadochokinesis task.  Perceptual evaluation demonstrated pressed quality, consistent vocal damon throughout phrases, mild roughness, narrow resonance (hypernasality), low speaking pitch, incoordination with respiration and phonation, and reduced coordination and articulation with vocal diadochokinesis task.     I had the pleasure of seeing Mr. Moreno today, for speech therapy to address a diagnosis of:  Dysphonia (R49.0)  Laryngeal Hyperfunction (J38.7)  Dysarthria (R47.1)    PROGRESS SINCE LAST SESSION  At the last session, Mr. Moreno worked on therapeutic activities to address the above diagnosis.    Regarding practice, Mr. Moreno reports the following:   He drinks hot water with honey because it helps.     Mr. Moreno also states that:  Raising the pitch is helpful.   He can get anxious talking on zoom, but he tries to breathe.   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 He will talk through the anxiety.   He feels the Botox wearing off and it is frustrating because it is interfering with his relationships and communicating with his kids.     Mr. Moreno presents today with the following:  Voice quality:  Slurred speech and difficulty understanding him  Mild-moderate asthenia  Mild breathiness  Back focused resonance.  Cough/ Throat clear:  Not observed      THERAPEUTIC ACTIVITIES  Today Mr. Moreno participated in the following therapeutic activities:  Asked many questions about the nature of his symptoms, and I answered all of these  "thoroughly.  Reviewed exercises for optimal respiratory mechanics for speech.  with guidance, learned improved abdominal relaxation for inhalation  learned techniques for optimal airflow on exhalation  practiced with his arms crossed with hands on his ribcage  [place] a respiratory pacing  (Counting) exercise; this was helpful.  When he \"sang\" the numbers or started at a higher pitch, his voice and speech was clearer  good learning, but will need practice  minimal  improvement in airflow and respiratory mechanics  Reviewed exercises for improved airflow during phonation.  speech material with aspirate onsets was facilitating  I reminded him to speak as if he is yawning or sighing  Instructed at the syllable, word, and phrase level.  We went over typical phrases that he may say on Zoom or on a date  He had difficulty with saying his name because of the /r/ and /l/ sounds.   When we reviewed the strategies, this improved.   learned techniques to reduce glottal damon and improve breath flow  [place] exercises to experience a more forward sensation during phonation.  speech material with nasal continuants was facilitating  able to progress to level of short phrases  good learning, but will need practice  [place] concepts of an optimal regimen for practice.  he should use an interval schedule of practice, with brief periods of practice frequently throughout each day  I provided an after visit summary to help facilitate practice.      IMPRESSIONS/GOALS/PLAN  Mr. Moreno had a productive session of speech therapy today, to address the following:  Dysphonia (R49.0)  Laryngeal Hyperfunction (J38.7)  Dysarthria (R47.1)    Speech therapy for him is medically necessary to allow  him to meet personal and professional demands and fully engage in activities of daily living.     He will continue to work on his exercises on a daily basis, and work on incorporating the techniques into his daily activities.    Progress toward long-term " goals:   Adequate but incomplete progress; please see above    Goals for this practice period:   practice all exercises according to instructions  incorporate techniques into daily vocal activities  maintain vigilance for vocal technique    Mr. Moreno is frustrated with his speech and is having difficulty communicating with his children and others. Today, I often had to ask him to repeat himself due to being unintelligible and having a weak voice because he often spoke past the end of the breath stream. When we reviewed the strategies, he demonstrated a clearer voice and speech. However, this does not seem to become habitual yet. He was hoping to see Dr. Horne sooner than August 15, but I told him that Dr. Horne will not be available this week and he will be out of town next Tuesday. However, I told Mr. Moreno that I would message Dr. Horne just in case there is a chance of availability.     Plan: I will see Mr. Moreno in August 31, 2023 to work on education, modification, and carryover of therapeutic activities to more complex activities. He will see Dr. Horne for return Botox injection on August 15, 2023.     TOTAL SERVICE TIME: 30 minutes  TREATMENT (58275)  NO CHARGE FACILITY FEE    Temitope Hameed M.A., CCC-SLP  Speech-Language Pathologist  [facility]  [email or web address]  She/Her/Hers     Speech recognition software may have been used in this documentation; input is reviewed before signature to the best of my ability.       Again, thank you for allowing me to participate in the care of your patient.      Sincerely,    Temitope Hameed, SLP

## 2023-08-03 ENCOUNTER — MYC REFILL (OUTPATIENT)
Dept: NEUROLOGY | Facility: CLINIC | Age: 53
End: 2023-08-03
Payer: COMMERCIAL

## 2023-08-03 DIAGNOSIS — G24.1 DYSTONIA DUE TO DYT-1 GENE MUTATION: ICD-10-CM

## 2023-08-03 NOTE — TELEPHONE ENCOUNTER
Rx Authorization:  Requested Medication/ Dose diazepam (VALIUM) 2 MG tablet   Date last refill ordered:   Quantity ordered:   # refills:   Date of last clinic visit with ordering provider:   Date of next clinic visit with ordering provider:   All pertinent protocol data (lab date/result):   Include pertinent information from patients message:

## 2023-08-04 RX ORDER — DIAZEPAM 2 MG
4 TABLET ORAL EVERY 8 HOURS PRN
Qty: 540 TABLET | Refills: 0 | Status: SHIPPED | OUTPATIENT
Start: 2023-08-04 | End: 2023-11-03

## 2023-08-04 NOTE — TELEPHONE ENCOUNTER
[facility] Call Center    Phone Message    May a detailed message be left on voicemail: yes     Reason for Call: Medication Refill Request    Has the patient contacted the pharmacy for the refill? Yes   Name of medication being requested: diazepam (VALIUM) 2 MG tablet    Provider who prescribed the medication: Dulce Manning  Pharmacy: [id number] - [place], [place] - [address] AT [facility] [address]  Date medication is needed: ASAP- Pt is out       Action Taken: Message routed to:  [facility] Neurology    Travel Screening: Not Applicable

## 2023-08-15 ENCOUNTER — OFFICE VISIT (OUTPATIENT)
Dept: OTOLARYNGOLOGY | Facility: CLINIC | Age: 53
End: 2023-08-15
Payer: COMMERCIAL

## 2023-08-15 VITALS — WEIGHT: 187 LBS | HEIGHT: 72 IN | BODY MASS INDEX: 25.33 KG/M2

## 2023-08-15 DIAGNOSIS — G24.4 OROFACIAL DYSKINESIA: Primary | ICD-10-CM

## 2023-08-15 PROCEDURE — 95874 GUIDE NERV DESTR NEEDLE EMG: CPT | Performed by: OTOLARYNGOLOGY

## 2023-08-15 PROCEDURE — 64612 DESTROY NERVE FACE MUSCLE: CPT | Mod: 50 | Performed by: OTOLARYNGOLOGY

## 2023-08-15 ASSESSMENT — PAIN SCALES - GENERAL: PAINLEVEL: NO PAIN (0)

## 2023-08-15 NOTE — LETTER
8/15/2023       RE: Ayad Moreno  [address]  [place] [address]     Dear Colleague,    Thank you for referring your patient, Ayad Moreno, to the [facility] EAR NOSE AND THROAT CLINIC [place] at [facility]. Please see a copy of my visit note below.    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  He has had a deep brain stimulator placed.   he was last injected on 5/16/2023. he had a good response to the last injection. The last dose given was    Anterior digastric muscle:EMG guided, bilateral, 5 units RIGHT side with Moderate EMG response - total dose 5 units BTA.      Nasalis muscle. Non EMG guided, 5 units right side  - total dose 5 units BTA.     Depressor anguli oris:Non EMG guided, unilateral, right .- total dose  10 units BTA     Orbicularis oris muscle: non EMG guided, unilateral, inferior- right, 2.5 units.- total dose  2.5 units BTA.     Lateral pterygoid muscles: EMG guided, RIGHT, 10 units RIGHT side with Strong EMG response - total dose 10 units BTA.    Procerus muscle: None EMG guided, 7.5 units each side.      The response lasted for 2.5 months. There was no Dysphagia or Dyspnea related to the injection.   We have elected to increase his lateral pterygoid muscles to 5 lateral.  Our plan today is to give:      Anterior digastric muscle:EMG guided, 5 units each side, total dose  10 units BTA      Nasalis muscle. Non EMG guided, 5 units right side  - total dose 5 units BTA.     Depressor anguli oris:Non EMG guided, unilateral, right .- total dose  10 units BTA     Orbicularis oris muscle: non EMG guided, unilateral, inferior- right, 2.5 units.- total dose  2.5 units BTA.     Lateral pterygoid muscles: EMG guided, bilateral, 10 units each side with Strong EMG response - total dose 20 units BTA.    Procerus muscle: Non EMG guided, 5 units each side.total dose  10 units  BTA      PROCEDURE: After obtaining consent, the patient was placed in the supine position. Ground and reference electrodes were applied. The skin  anterior to the condyle and below the zygomatic arch  were prepared with an alcohol swab.  Using a 27-gauge, unipolar, electromyographic needle the infratemporal fossa was entered through the mandibular notch posterior to the coronoid process.  10 units of botulinum A toxin was injected into each lateral pterygoid muscle.  There was a Strong EMG response to a jaw thrust on the right side and a Strong EMG response to jaw thrust on the left side.     Attention was then turned to the anterior digastric muscle area in the suprahyoid area. The overlying skin was prepared with an alcohol swab.  The area of the anterior digastric muscle was identified with palpation. Using a 27-gauge, unipolar electromyography needle, the anterior digastric muscle was entered through a percutaneous approach.  5 units of botulinum A toxin was injected into each anterior digastric muscle.  There was a Moderate EMG response to jaw opening on the left side and a Moderate EMG response to jaw opening on the right side.      An additional 5 units was injected without EMG guidance into the right  nasalis muscle  for dystonic movements in this area. Injections were made into the: Depressor anguli oris:Non EMG guided, unilateral, right .- total dose  10 units BTA.  Orbicularis oris muscle: non EMG guided, unilateral, inferior- right, 2.5 units.- total dose  2.5 units BTA.     Finally 5 units was injected into each procerus muscle without EMG guidance for dystonic movements in this area bilaterally. total dose  10 units BTA       The total amount of botulinum A toxin delivered today was 57.5  units. An additional 20 units of botulinum A toxin was necessarily wasted in preparation for the injection. he tolerated the procedure well and left the clinic after a short observation period.       The EMG was  necessary specifically in this case to identify areas of muscle overactivity as well as to access the lateral pterygoid muscle which is within the infratemporal fossa and  not otherwise directly accessible without EMG guidance.    PLAN: I will have him  follow up on a PRN basis. It is anticipated that repeat injections will be required over the long term.         Again, thank you for allowing me to participate in the care of your patient.      Sincerely,    Yves Horne MD

## 2023-08-15 NOTE — PATIENT INSTRUCTIONS
1.  You were seen in the ENT Clinic today by . If you have any questions or concerns after your appointment, please call [phone]. Press option #1 for scheduling related needs. Press option #3 for Nurse advice.    2.  Plan is to return to clinic 11/14/23 for repeat botox injections      Susan Keith LPN  [phone]  [facility] - Otolaryngology

## 2023-08-15 NOTE — PROGRESS NOTES
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  He has had a deep brain stimulator placed.   he was last injected on 5/16/2023. he had a good response to the last injection. The last dose given was    Anterior digastric muscle:EMG guided, bilateral, 5 units RIGHT side with Moderate EMG response - total dose 5 units BTA.      Nasalis muscle. Non EMG guided, 5 units right side  - total dose 5 units BTA.     Depressor anguli oris:Non EMG guided, unilateral, right .- total dose  10 units BTA     Orbicularis oris muscle: non EMG guided, unilateral, inferior- right, 2.5 units.- total dose  2.5 units BTA.     Lateral pterygoid muscles: EMG guided, RIGHT, 10 units RIGHT side with Strong EMG response - total dose 10 units BTA.    Procerus muscle: None EMG guided, 7.5 units each side.      The response lasted for 2.5 months. There was no Dysphagia or Dyspnea related to the injection.   We have elected to increase his lateral pterygoid muscles to 5 lateral.  Our plan today is to give:      Anterior digastric muscle:EMG guided, 5 units each side, total dose  10 units BTA      Nasalis muscle. Non EMG guided, 5 units right side  - total dose 5 units BTA.     Depressor anguli oris:Non EMG guided, unilateral, right .- total dose  10 units BTA     Orbicularis oris muscle: non EMG guided, unilateral, inferior- right, 2.5 units.- total dose  2.5 units BTA.     Lateral pterygoid muscles: EMG guided, bilateral, 10 units each side with Strong EMG response - total dose 20 units BTA.    Procerus muscle: Non EMG guided, 5 units each side.total dose  10 units BTA      PROCEDURE: After obtaining consent, the patient was placed in the supine position. Ground and reference electrodes were applied. The skin  anterior to the condyle and below the zygomatic arch  were prepared with an alcohol swab.  Using a 27-gauge, unipolar, electromyographic needle the infratemporal fossa was entered  through the mandibular notch posterior to the coronoid process.  10 units of botulinum A toxin was injected into each lateral pterygoid muscle.  There was a Strong EMG response to a jaw thrust on the right side and a Strong EMG response to jaw thrust on the left side.     Attention was then turned to the anterior digastric muscle area in the suprahyoid area. The overlying skin was prepared with an alcohol swab.  The area of the anterior digastric muscle was identified with palpation. Using a 27-gauge, unipolar electromyography needle, the anterior digastric muscle was entered through a percutaneous approach.  5 units of botulinum A toxin was injected into each anterior digastric muscle.  There was a Moderate EMG response to jaw opening on the left side and a Moderate EMG response to jaw opening on the right side.      An additional 5 units was injected without EMG guidance into the right  nasalis muscle  for dystonic movements in this area. Injections were made into the: Depressor anguli oris:Non EMG guided, unilateral, right .- total dose  10 units BTA.  Orbicularis oris muscle: non EMG guided, unilateral, inferior- right, 2.5 units.- total dose  2.5 units BTA.     Finally 5 units was injected into each procerus muscle without EMG guidance for dystonic movements in this area bilaterally. total dose  10 units BTA       The total amount of botulinum A toxin delivered today was 57.5  units. An additional 20 units of botulinum A toxin was necessarily wasted in preparation for the injection. he tolerated the procedure well and left the clinic after a short observation period.       The EMG was necessary specifically in this case to identify areas of muscle overactivity as well as to access the lateral pterygoid muscle which is within the infratemporal fossa and  not otherwise directly accessible without EMG guidance.    PLAN: I will have him  follow up on a PRN basis. It is anticipated that repeat injections will be  required over the long term.

## 2023-08-31 ENCOUNTER — VIRTUAL VISIT (OUTPATIENT)
Dept: OTOLARYNGOLOGY | Facility: CLINIC | Age: 53
End: 2023-08-31
Payer: COMMERCIAL

## 2023-08-31 DIAGNOSIS — R49.0 DYSPHONIA: Primary | ICD-10-CM

## 2023-08-31 DIAGNOSIS — J38.7 LARYNGEAL HYPERFUNCTION: ICD-10-CM

## 2023-08-31 DIAGNOSIS — R47.1 DYSARTHRIA: ICD-10-CM

## 2023-08-31 PROCEDURE — 92507 TX SP LANG VOICE COMM INDIV: CPT | Mod: GN | Performed by: SPEECH-LANGUAGE PATHOLOGIST

## 2023-08-31 NOTE — PROGRESS NOTES
Ayad Moreno is a 53 year old male who is being evaluated via a billable video visit.      Ayad has been notified and verbally consented to the following:   This video visit will be conducted between you and your provider.  Patient has opted to conduct today's video visit vs an in-person appointment.   Video visits are billed at different rates depending on your insurance coverage. Please reach out to your insurance provider with any questions.   If during the course of the call the provider feels the appointment is not appropriate, you will not be charged for this service.  Provider has received verbal consent for billable virtual visit from the patient? Yes  Will anyone else be joining your video visit? No    Call initiated at: 10:00am   Type of Visit Platform Used: [employer] Video  Location of provider: Home  Location of patient: [facility]  Yves Horne Jr., M.D., F.A.C.S.  Shantell Brar M.D., M.P.H.  Radha Winston M.D.  Katia Calderón M.M. (voice), M.A., CCC-SLP  Temitope Hameed M.A., CCC-SLP  Opal Gordillo, Ph.D., CCC-SLP  Miguel Angel Magana, Ph.D., CCC-SLP  Farideh Desouza, M.S., CCC-SLP  Jose F Arellano M.M., M.A., CCC-SLP  GRISELDA Lo. (voice), M.S., CCC-SLP    [facility]  VOICE/SPEECH/BREATHING THERAPY PROGRESS REPORT    Patient: Ayad Moreno  Date of Service: 8/31/2023    Date of Last Service: 08/01/2023  Referring physician: Dr. Horne  IMPRESSIONS from initial evaluation on 02/07/2023:This evaluation has resulted in the following diagnosis/diagnoses for Mr. Moreno  Dysphonia (R49.0)  Laryngeal Hyperfunction (J38.7)  Dysarthria (R47.1)             Laryngeal evaluation demonstrated marked-severe compression of the laryngeal structures, pooling of saliva at the vallecula, pyriform sinuses, and near the entrance of the esophagus, mild inflammation of the posterior commissure, right arytenoid hooding with occasional sluggish left arytenoid and reduced coordination and  "speed of the laryngeal structures with vocal diadochokinesis task.  Perceptual evaluation demonstrated pressed quality, consistent vocal damon throughout phrases, mild roughness, narrow resonance (hypernasality), low speaking pitch, incoordination with respiration and phonation, and reduced coordination and articulation with vocal diadochokinesis task.     I had the pleasure of seeing Mr. Moreno today, for speech therapy to address a diagnosis of:  Dysphonia (R49.0)  Laryngeal Hyperfunction (J38.7)  Dysarthria (R47.1)    PROGRESS SINCE LAST SESSION  At the last session, Mr. Moreno worked on therapeutic activities to address the above diagnosis.    Regarding practice, Mr. Moreno reports the following:   He practices the resonant voice therapy and spacious speech when he is in the mood, in the shower, or in the car.   The laryngeal massage helps.     Mr. Moreno also states that:  He is still adjusting to the botox and it made his tongue a little numb.   He is learning to talk every three months.   /str/ words like \"strain\" are difficult and he practices them while he walks.   Anxiety affects his speech  He wansts to work on problem words.  He has done some public speaking and using humor helps because it relaxes him.   Raising his pitch helps with intelligibility.      Mr. Moreno presents today with the following:  Voice quality:  Moderate roughness  Cough/ Throat clear:  Not observed    THERAPEUTIC ACTIVITIES  Today Mr. Moreno participated in the following therapeutic activities:  Asked many questions about the nature of his symptoms, and I answered all of these thoroughly.  [place] and reviewed massage techniques to the tender areas of his neck and face, in order to reduce tension and discomfort.  [place] exercises for improving length of utterance with reduced effort for optimal carryover.  Instructed with telegraphic speech paragraph with visual reminders of when to breathe.  I encouraged him to take more " pauses with his speeches and with talking.   We reviewed words that begin with /str/ and I reminded him to utilize diaphragmatic breathing and slow down. I also encouraged him to start speaking at a higher pitch. This was helpful   I provided an after visit summary to help facilitate practice.    IMPRESSIONS/GOALS/PLAN  Mr. Moreno had a productive session of speech therapy today, to address the following:  Dysphonia (R49.0)  Laryngeal Hyperfunction (J38.7)  Dysarthria (R47.1)  Speech therapy for him is medically necessary to allow  him to meet personal and professional demands and fully engage in activities of daily living.     He will continue to work on his exercises on a daily basis, and work on incorporating the techniques into his daily activities.    Progress toward long-term goals:   Adequate progress; please see above    Goals for this practice period:   practice all exercises according to instructions  incorporate techniques into daily vocal activities  maintain vigilance for vocal technique    Plan: I will see Mr. Moreno on October 5, 2023 to work on education, modification, and carryover of therapeutic activities to more complex activities.    TOTAL SERVICE TIME: 60 minutes  TREATMENT (21124)  NO CHARGE FACILITY FEE    Temitope Hameed M.A., CCC-SLP  Speech-Language Pathologist  [facility] Voice [facility]  [email or web address]  She/Her/Hers     Speech recognition software may have been used in this documentation; input is reviewed before signature to the best of my ability.

## 2023-08-31 NOTE — LETTER
8/31/2023       RE: Ayad Moreno  [address]  [facility] [address]     Dear Colleague,    Thank you for referring your patient, Ayad Moreno, to the [facility] VOICE [facility]INIC [place] [facility]. Please see a copy of my visit note below.    Ayad Moreno is a 53 year old male who is being evaluated via a billable video visit.      Ayad has been notified and verbally consented to the following:   This video visit will be conducted between you and your provider.  Patient has opted to conduct today's video visit vs an in-person appointment.   Video visits are billed at different rates depending on your insurance coverage. Please reach out to your insurance provider with any questions.   If during the course of the call the provider feels the appointment is not appropriate, you will not be charged for this service.  Provider has received verbal consent for billable virtual visit from the patient? Yes  Will anyone else be joining your video visit? No    Call initiated at: 10:00am   Type of Visit Platform Used: [employer] Video  Location of provider: Home  Location of patient: [facility] VOICE [facility]  Yves Horne Jr., M.D., F.A.C.S.  Shantell Brar M.D., M.P.H.  Radha Winston M.D.  Katia Calderón M.M. (voice), M.A., CCC-SLP  Temitope Hameed M.A., CCC-SLP  Opal Gordillo, Ph.D., CCC-SLP  Miguel Angel Magana, Ph.D., CCC-SLP  Farideh Desouza M.S., CCC-SLP  Jose F Arellano M.M., M.A., CCC-SLP  YUKO Lo (voice), M.S., CCC-SLP    [facility] VOICE [facility]  VOICE/SPEECH/BREATHING THERAPY PROGRESS REPORT    Patient: Ayad Moreno  Date of Service: 8/31/2023    Date of Last Service: 08/01/2023  Referring physician: Dr. Horne  IMPRESSIONS from initial evaluation on 02/07/2023:This evaluation has resulted in the following diagnosis/diagnoses for Mr. Moreno  Dysphonia (R49.0)  Laryngeal Hyperfunction (J38.7)  Dysarthria (R47.1)             Laryngeal  "evaluation demonstrated marked-severe compression of the laryngeal structures, pooling of saliva at the vallecula, pyriform sinuses, and near the entrance of the esophagus, mild inflammation of the posterior commissure, right arytenoid hooding with occasional sluggish left arytenoid and reduced coordination and speed of the laryngeal structures with vocal diadochokinesis task.  Perceptual evaluation demonstrated pressed quality, consistent vocal damon throughout phrases, mild roughness, narrow resonance (hypernasality), low speaking pitch, incoordination with respiration and phonation, and reduced coordination and articulation with vocal diadochokinesis task.     I had the pleasure of seeing Mr. Moreno today, for speech therapy to address a diagnosis of:  Dysphonia (R49.0)  Laryngeal Hyperfunction (J38.7)  Dysarthria (R47.1)    PROGRESS SINCE LAST SESSION  At the last session, Mr. Moreno worked on therapeutic activities to address the above diagnosis.    Regarding practice, Mr. Moreno reports the following:   He practices the resonant voice therapy and spacious speech when he is in the mood, in the shower, or in the car.   The laryngeal massage helps.     Mr. Moreno also states that:  He is still adjusting to the botox and it made his tongue a little numb.   He is learning to talk every three months.   /str/ words like \"strain\" are difficult and he practices them while he walks.   Anxiety affects his speech  He wansts to work on problem words.  He has done some public speaking and using humor helps because it relaxes him.   Raising his pitch helps with intelligibility.      Mr. Moreno presents today with the following:  Voice quality:  Moderate roughness  Cough/ Throat clear:  Not observed    THERAPEUTIC ACTIVITIES  Today Mr. Moreno participated in the following therapeutic activities:  Asked many questions about the nature of his symptoms, and I answered all of these thoroughly.  [place] and reviewed massage " techniques to the tender areas of his neck and face, in order to reduce tension and discomfort.  [place] exercises for improving length of utterance with reduced effort for optimal carryover.  Instructed with telegraphic speech paragraph with visual reminders of when to breathe.  I encouraged him to take more pauses with his speeches and with talking.   We reviewed words that begin with /str/ and I reminded him to utilize diaphragmatic breathing and slow down. I also encouraged him to start speaking at a higher pitch. This was helpful   I provided an after visit summary to help facilitate practice.    IMPRESSIONS/GOALS/PLAN  Mr. Moreno had a productive session of speech therapy today, to address the following:  Dysphonia (R49.0)  Laryngeal Hyperfunction (J38.7)  Dysarthria (R47.1)  Speech therapy for him is medically necessary to allow  him to meet personal and professional demands and fully engage in activities of daily living.     He will continue to work on his exercises on a daily basis, and work on incorporating the techniques into his daily activities.    Progress toward long-term goals:   Adequate progress; please see above    Goals for this practice period:   practice all exercises according to instructions  incorporate techniques into daily vocal activities  maintain vigilance for vocal technique    Plan: I will see Mr. Moreno on October 5, 2023 to work on education, modification, and carryover of therapeutic activities to more complex activities.    TOTAL SERVICE TIME: 60 minutes  TREATMENT (97852)  NO CHARGE FACILITY FEE    Temitope Hameed M.A., CCC-SLP  Speech-Language Pathologist  [facility]  [email or web address]  She/Her/Hers     Speech recognition software may have been used in this documentation; input is reviewed before signature to the best of my ability.       Again, thank you for allowing me to participate in the care of your patient.      Sincerely,    Temitope Hameed, SLP

## 2023-09-01 NOTE — PATIENT INSTRUCTIONS
"Chay Lion,    Great work. Here is everything we discussed.    Temitope Hameed M.A., CCC-SLP  Speech-Language Pathologist  [facility]  [email or web address]  She/Her/Hers      Laryngeal and Face Massage / Stretches    ThyroHyoid Space Massage- circles and then pull larynx down  1. Gently place a thumb and pointer finger on each side of your carlos's apple, finding the slight indented groove along the top of each side.   2. Move your fingers in a front to back motion, massaging gently within the groove.   3. Then move your fingers in tiny circular motions, up and back, down and forward, repeat.  4. Inhale slowly with a yawny sensation, gently pulling downward along your carlos's apple (Don't pinch hard, just enough to \"suggest\" a downward movement).   5.  Reset your fingers at the top of your larynx and repeat several times.   6.  Always be gentle- don't use enough pressure that you would bruise a peach or banana if you were holding one between your fingers.     See Saw Stretch  1. Gently place a thumb and pointer finger on each side of your carlos's apple, finding the slight indented groove along the top of each side.   2. Move your thumb upward on one side while the pointer moves down on the other side.   3. Slowly switch your fingers back and forth, as if you were turning a radio dial up and down.  4. Always be gentle- don't use enough pressure that you would bruise a peach or banana if you were holding one between your fingers.     Lateral Laryngeal Stretch:  1. Position your hands like praying, then rotate both sets of fingers toward yourself and place them along each side of your carlos's apple.   2. Gentle shift your carlos's apple side-to-side, only about a centimeter in each direction.   3. Decide which direction feels easier.   4. Shift your carlos's apple about a centimeter in the easier direction and hold, breathing slow, deep breaths for about 120 seconds.   5. Shift the other direction and hold, " breathing slow, deep breaths for about 120 seconds.     Sternocleidomastoid Massage  1.Starting directly below the ears, use your hands to massage in circles. Work your way down the sides of the neck, and into the shoulders. Work back up the side of the neck, and then work down the front of the neck.    2. You may use slightly more pressure for this massage, but stop if there is pain / throbbing.  3. Try turning your head to be better able to find these sternocleidomastoid muscles.  4. Use your hands to pull down along these muscles from below the ear to the shoulders, and from below the ear in a V shape towards the front.  5. Use your fingers to gently massage the  little twiggies  (a.k.a.: sternal origin of the Sternocleidomastoid Muscle that attaches to the medial end of the clavicle/collarbone) one at a time.    Tongue Base Massage and Stretch            1.Using the knuckle of your index finger or thumb, begin massaging in small, gentle circles right under the chin.  You can also hold your chin with your index finger and use your thumb.  2. Work along the sides, middle, and tip of the bottom of the chin with a gentle pressure.    3. It is important to keep the chin level or down to avoid putting strain on the neck muscles.  4. Hold your finger in front of your face and stick out your tongue straight out as if it will touch your finger. You should feel a tug at the back of your tongue. Hold for 30 seconds and repeat 2 more times. Rest in between.     Cheek Massage and Compression:  Using your thumbs, massage under your cheek bones.  Once your reached the highest point of your cheek bones (you will your gums and molars under the highest point), you will press your thumbs inward and upward.  Hold for 30 seconds  You may feel pressure in your sinuses.  Don't forget to breathe and let your jaw relax.    Massage at your temples, at your temporomandibular joint (jaw), and under your jaw using your thumbs to dig (not  "too hard) and massage. Start from the sides of your jaw and make your way towards the front.    Breathing with reading or speaking. Breathe every 1-2 sentences. You can also do this with one of your previous speeches. The slash marks are visual reminders to breathe if you need it.     THERESA'S WIFE, // ANANDA, // FOUND OUT ONE DAY // THAT SHE WAS ACTUALLY // THERESA'S FIFTH WIFE. // THAT HE HAD // A SERIES OF MARRIAGES. // ONE RIGHT AFTER THE OTHER. // EACH OF THE PRECEDING WIVES // HAD NO KNOWLEDGE // THAT THERESA WAS // ALREADY , // AND CERTAINLY NOT // SEVERAL TIMES BEFORE. // BY TALKING WITH OTHER WOMEN // AT HER HEALTH CLUB, // ANANDA FOUND OUT // THE NAMES AND ADDRESSES // OF THE OTHER WIVES // TO WHOM THERESA WAS STILL . // ANANDA GATHERED // ALL OF THE WIVES // TOGETHER ONE NIGHT. 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 // TOGETHER, // THEY PLANNED A HOMECOMING // THAT THERESA WOULD NEVER FORGET.      Remember to take a deep breathe in slowly (let your stomach expand) and speak on the exhale. Do not hold your breath or push words out from your throat. For words like, \"problem, strange, strain,\" take your time and don't push it from your throat. When you slow down and breathe, and start at a slightly higher pitch, your voice is better and your words are clearer. Think of using inflection or starting at a high pitch and descending down when speaking.   "

## 2023-10-05 ENCOUNTER — VIRTUAL VISIT (OUTPATIENT)
Dept: OTOLARYNGOLOGY | Facility: CLINIC | Age: 53
End: 2023-10-05
Payer: COMMERCIAL

## 2023-10-05 DIAGNOSIS — R49.0 DYSPHONIA: Primary | ICD-10-CM

## 2023-10-05 DIAGNOSIS — R47.1 DYSARTHRIA: ICD-10-CM

## 2023-10-05 DIAGNOSIS — J38.7 LARYNGEAL HYPERFUNCTION: ICD-10-CM

## 2023-10-05 PROCEDURE — 92507 TX SP LANG VOICE COMM INDIV: CPT | Mod: GN | Performed by: SPEECH-LANGUAGE PATHOLOGIST

## 2023-10-05 NOTE — PROGRESS NOTES
Ayad Moreno is a 53 year old male who is being evaluated via a billable video visit.      Ayad has been notified and verbally consented to the following:   This video visit will be conducted between you and your provider.  Patient has opted to conduct today's video visit vs an in-person appointment.   Video visits are billed at different rates depending on your insurance coverage. Please reach out to your insurance provider with any questions.   If during the course of the call the provider feels the appointment is not appropriate, you will not be charged for this service.  Provider has received verbal consent for billable virtual visit from the patient? Yes  Will anyone else be joining your video visit? No    Call initiated at: 03:00 pm   Type of Visit Platform Used: [employer] Video  Location of provider: Home  Location of patient: [facility]  Yves Horne Jr., M.D., F.A.C.S.  Shantell Brar M.D., M.P.H.  Radha Winston M.D.  Katia Calderón M.M. (voice), M.A., CCC-SLP  Temitope Hameed M.A., CCC-SLP  Opal Gordillo, Ph.D., CCC-SLP  Miguel Angel Magana, Ph.D., CCC-SLP  Farideh Desouza, M.S., CCC-SLP  Jose F Arellano M.M., M.A., CCC-SLP  GRISELDA Lo. (voice), M.S., CCC-SLP    [facility]  VOICE/SPEECH/BREATHING THERAPY PROGRESS REPORT    Patient: Ayad Moreno  Date of Service: 10/5/2023    Date of Last Service: 08/31/2023  Referring physician: Dr. Horne  IMPRESSIONS from initial evaluation on 02/07/2023:This evaluation has resulted in the following diagnosis/diagnoses for Mr. Moreno  Dysphonia (R49.0)  Laryngeal Hyperfunction (J38.7)  Dysarthria (R47.1)          Laryngeal evaluation demonstrated marked-severe compression of the laryngeal structures, pooling of saliva at the vallecula, pyriform sinuses, and near the entrance of the esophagus, mild inflammation of the posterior commissure, right arytenoid hooding with occasional sluggish left arytenoid and reduced coordination and speed  "of the laryngeal structures with vocal diadochokinesis task.  Perceptual evaluation demonstrated pressed quality, consistent vocal damon throughout phrases, mild roughness, narrow resonance (hypernasality), low speaking pitch, incoordination with respiration and phonation, and reduced coordination and articulation with vocal diadochokinesis task.     I had the pleasure of seeing Mr. Moreno today, for speech therapy to address a diagnosis of:  Dysphonia (R49.0)  Laryngeal Hyperfunction (J38.7)  Dysarthria (R47.1)     PROGRESS SINCE LAST SESSION  At the last session, Mr. Moreno worked on therapeutic activities to address the above diagnosis.    Regarding practice, Mr. Moreno reports the following:   He did the breathing posture with crossed arms during a [employer] speech. He slowed down his breathing and felt that he spoke at a faster pace.     Mr. 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   He may apply for a speech contest.   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   He takes a Valium and a Benedryl before doing a speech.   He took 2 Valium in the morning and 3-4 at night.   He had a meeting and drove his family to the airport and he gave them his best voice/speech.   Right now he is feeling vocal fatigue. He did take Benadryl.  He asked about what he can do for dry mouth.      Mr. Moreno presents today with the following:  Voice quality:  Moderate-marked roughness  Back focused resonance  Hyponasal  Very slurred speech  Cough/ Throat clear:  Not observed    THERAPEUTIC ACTIVITIES  Today Mr. Moreno participated in the following therapeutic activities:  Asked many questions about the nature of his symptoms, and I answered all of these thoroughly.  [place] concepts and techniques for using saline and plain-water gargling nasal irrigation steam, etc to reduce the thickened secretions / laryngeal " irritation.  [place] concepts and techniques for improving topical and systemic hydration.  Discussed options to help with dry mouth.   [place] exercises for improving length of utterance with reduced effort for optimal carryover.  Instructed with blending phrases as a way to help with specific phonemic transitions during speeches  I provided an after visit summary to help facilitate practice.    IMPRESSIONS/GOALS/PLAN  Mr. Moreno had a productive session of speech therapy today, to address the following:  Dysphonia (R49.0)  Laryngeal Hyperfunction (J38.7)  Dysarthria (R47.1)    Progress toward long-term goals:   Adequate progress; please see above      Plan:  Mr. Moreno will see Dr. Horne on November 7, 2023. Right now, he wants a break from speech therapy and will reach out if he wants a refresher session or if anything worsens.       TOTAL SERVICE TIME: 35 minutes  TREATMENT (90981)  NO CHARGE FACILITY FEE    Temitope Hameed M.A., CCC-SLP  Speech-Language Pathologist  [facility]  [email or web address]  She/Her/Hers     Speech recognition software may have been used in this documentation; input is reviewed before signature to the best of my ability.

## 2023-10-05 NOTE — LETTER
10/5/2023       RE: Ayad Moreno  [address]  [place] [address]     Dear Colleague,    Thank you for referring your patient, Ayad Moreno, to the [facility] VOICE CLINIC [place] at [facility]. Please see a copy of my visit note below.    Ayad Moreno is a 53 year old male who is being evaluated via a billable video visit.      Ayad has been notified and verbally consented to the following:   This video visit will be conducted between you and your provider.  Patient has opted to conduct today's video visit vs an in-person appointment.   Video visits are billed at different rates depending on your insurance coverage. Please reach out to your insurance provider with any questions.   If during the course of the call the provider feels the appointment is not appropriate, you will not be charged for this service.  Provider has received verbal consent for billable virtual visit from the patient? Yes  Will anyone else be joining your video visit? No    Call initiated at: 03:00 pm   Type of Visit Platform Used: [employer] Video  Location of provider: Home  Location of patient: [facility] VOICE [facility]  Yves Horne Jr., M.D., F.A.C.S.  Shantell Brar M.D., M.P.H.  Radha Winston M.D.  Katia Calderón M.M. (voice), M.A., CCC-SLP  Temitope Hameed M.A., CCC-SLP  Opal Gordillo, Ph.D., CCC-SLP  Miguel Angel Magana, Ph.D., CCC-SLP  Farideh Desouza M.S., CCC-SLP  Jose F Arellano M.M., M.A., CCC-SLP  YUKO Lo (voice), M.S., CCC-SLP    [facility] VOICE [facility]  VOICE/SPEECH/BREATHING THERAPY PROGRESS REPORT    Patient: Ayad Moreno  Date of Service: 10/5/2023    Date of Last Service: 08/31/2023  Referring physician: Dr. Horne  IMPRESSIONS from initial evaluation on 02/07/2023:This evaluation has resulted in the following diagnosis/diagnoses for Mr. Moreno  Dysphonia (R49.0)  Laryngeal Hyperfunction (J38.7)  Dysarthria (R47.1)          Laryngeal  "evaluation demonstrated marked-severe compression of the laryngeal structures, pooling of saliva at the vallecula, pyriform sinuses, and near the entrance of the esophagus, mild inflammation of the posterior commissure, right arytenoid hooding with occasional sluggish left arytenoid and reduced coordination and speed of the laryngeal structures with vocal diadochokinesis task.  Perceptual evaluation demonstrated pressed quality, consistent vocal damon throughout phrases, mild roughness, narrow resonance (hypernasality), low speaking pitch, incoordination with respiration and phonation, and reduced coordination and articulation with vocal diadochokinesis task.     I had the pleasure of seeing Mr. Moreno today, for speech therapy to address a diagnosis of:  Dysphonia (R49.0)  Laryngeal Hyperfunction (J38.7)  Dysarthria (R47.1)     PROGRESS SINCE LAST SESSION  At the last session, Mr. Moreno worked on therapeutic activities to address the above diagnosis.    Regarding practice, Mr. Moreno reports the following:   He did the breathing posture with crossed arms during a [employer] speech. He slowed down his breathing and felt that he spoke at a faster pace.     Mr. 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   He may apply for a speech contest.   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   He takes a Valium and a Benedryl before doing a speech.   He took 2 Valium in the morning and 3-4 at night.   He had a meeting and drove his family to the airport and he gave them his best voice/speech.   Right now he is feeling vocal fatigue. He did take Benadryl.  He asked about what he can do for dry mouth.      Mr. Moreno presents today with the following:  Voice quality:  Moderate-marked roughness  Back focused resonance  Hyponasal  Very slurred speech  Cough/ Throat clear:  Not observed    THERAPEUTIC " ACTIVITIES  Today Mr. Moreno participated in the following therapeutic activities:  Asked many questions about the nature of his symptoms, and I answered all of these thoroughly.  [place] concepts and techniques for using saline and plain-water gargling nasal irrigation steam, etc to reduce the thickened secretions / laryngeal irritation.  [place] concepts and techniques for improving topical and systemic hydration.  Discussed options to help with dry mouth.   [place] exercises for improving length of utterance with reduced effort for optimal carryover.  Instructed with blending phrases as a way to help with specific phonemic transitions during speeches  I provided an after visit summary to help facilitate practice.    IMPRESSIONS/GOALS/PLAN  Mr. Moreno had a productive session of speech therapy today, to address the following:  Dysphonia (R49.0)  Laryngeal Hyperfunction (J38.7)  Dysarthria (R47.1)    Progress toward long-term goals:   Adequate progress; please see above      Plan:  Mr. Moreno will see Dr. Horne on November 7, 2023. Right now, he wants a break from speech therapy and will reach out if he wants a refresher session or if anything worsens.       TOTAL SERVICE TIME: 35 minutes  TREATMENT (45574)  NO CHARGE FACILITY FEE    Temitope Hameed M.A., CCC-SLP  Speech-Language Pathologist  [facility]  [email or web address]  She/Her/Hers     Speech recognition software may have been used in this documentation; input is reviewed before signature to the best of my ability.

## 2023-10-05 NOTE — PATIENT INSTRUCTIONS
"Chay Lion,    Here is everything we discussed. It may be good to reach out to Dr. Horne updating him about what is going on. If anything worsens or if you need a refresher session, please reach out.    Temitope Hameed M.A., CCC-SLP  Speech-Language Pathologist  [facility]  [email or web address]  She/Her/Hers    VOCAL HYGIENE  Hydration: drink til you \"pee pale\"  64 oz of plain water daily OR your body weight in lbs divided by 2  More if you're sweating, active, etc  Watch out for alcohol - it is drying and you need to drink more water to counteract their effects  Topical hydration  Sinus Rinse or Neti Pot  Personal facial steamers, humidifiers, or nebulizers  Nebulized isotonic saline  Isotonic saline (0.9%) nasal spray or gel (listed below)  Use a humidifier at night in your bedroom and/or during the day in frequently used rooms  Stimulate your oral cavity (mouth): These help promote more saliva production and make you swallow more  Throat lozenges  Pectin-based preferred: Marisela'sJohann's Breezers  Avoid menthol!  Sugar-free candies  Sip hot, cold, carbonated, or flavored water  Suck on ice chips  Bite your tongue or cheek (not too hard!)  Chew gum  Wet fruits: grapes, watermelon, cantaloupe, apples etc.  Think of sucking on a lemon or lime  Gargling with water or saline water (recipe below)  Little amounts of water  Can also tilt head from side to side      Saline gargle recipe  8 oz water  1/4 tsp of  Kosher or sea salt (table salt is irritating for your throat)  1/4 tsp of baking soda  (Or use pre-made saline packets provided below)        Vicks Steamer- Use in the mornings and at night. Do not use the menthol pods    Biotene for dry mouth      Salivia for dry mouth      Xylitol melts      Phrases for Blending  Any time there is a t, replace it with a d  Any time a word ends in o, start the next word with a w. (Go (w)into)  Any time a word ends in e, start the next word with a y. (The(e) " (y)only)  Any time a word ends in s, start the next word with a z. (He's (z)ill)  Add a slight H sound before saying I. (([facility])I need to go)     fall over  go into   put upon   leave on  the only  lose it   see it   the other  win it   do it   not even  that's enough   put on   not any  leave open   down under  cold as   one of   not old   the ice   she's ill   look at   he's ill   then add   the end  yes and  so it   high up  one at   Jackeline is    Fall_over_the_chair                      Go_(w)into the store  Leave_on_the_lights                     The(e)_(y)only one  See_it_over_there                         The(e)_(y)other day  Do_it_now    Not_even her mom  Put(d)_on_your_shoes         Not_any more  Down_under_the_stairs  Cold_as ice  Not_old_enough   the ice is cold  Look_at_the_sky   he's ill with the flu  The_end_of_the_story  yes_and no  High_up_on_the_shelf  one at a time

## 2023-11-03 ENCOUNTER — MYC REFILL (OUTPATIENT)
Dept: NEUROLOGY | Facility: CLINIC | Age: 53
End: 2023-11-03
Payer: COMMERCIAL

## 2023-11-03 DIAGNOSIS — G24.1 DYSTONIA DUE TO DYT-1 GENE MUTATION: ICD-10-CM

## 2023-11-03 NOTE — CONFIDENTIAL NOTE
Medication and strength: Diazepam 2 mg    Is this a [facility] patient? Yes    Last appointment: 1/25/2023 with Dulce Manning DNP    Next appointment: None scheduled    Last re-ordered: 8/4/2023 for a 90 day supply with 0 refills    Action taken: Sent to be signed by Dulce    
Male

## 2023-11-05 RX ORDER — DIAZEPAM 2 MG
4 TABLET ORAL EVERY 8 HOURS PRN
Qty: 540 TABLET | Refills: 0 | Status: SHIPPED | OUTPATIENT
Start: 2023-11-05 | End: 2024-02-01

## 2023-11-07 ENCOUNTER — OFFICE VISIT (OUTPATIENT)
Dept: OTOLARYNGOLOGY | Facility: CLINIC | Age: 53
End: 2023-11-07
Payer: COMMERCIAL

## 2023-11-07 DIAGNOSIS — G24.4 OROFACIAL DYSKINESIA: Primary | ICD-10-CM

## 2023-11-07 PROCEDURE — 64612 DESTROY NERVE FACE MUSCLE: CPT | Mod: 50 | Performed by: OTOLARYNGOLOGY

## 2023-11-07 NOTE — NURSING NOTE
Invasive Procedure Safety Checklist  Procedure:  Botox injection    Responsible person(s):  Complete sections as appropriate and electronically sign and date below.    Staff/Provider  Consent documentation on chart:  YES  H&P is not applicable (when straight local anesthesia is used).    Procedure Team  Completed by comparing informed consent documentation, information on the patient record and/or the marked surgical site, and discussion with the patient/guardian.     Verified:  (Select all that apply)  Patient identification (two indicators)  Procedure to be performed  Procedure site and /or laterality and/or level  Consent  Procedure site:  Site can not be marked due to location.  Provider [place] - Site/Laterality/Level:  No Level or Structure  Staff/Provider:  No images    Procedure Team:  *Pause for the Cause* verbal and active participation of team members- verify:  Patient name:  YES  Procedure to be performed:  YES  Site, laterality and level, noting patient position:  YES    Above steps completed as applicable (Electronic Signature, Title, Date):    ZANE SWANSON LPN on 11/7/2023 at 1:22 PM      Note:  Any incidents of wrong patient, wrong procedure, or wrong site are reported using the Occurrence Process already in place.  The occurrence form is required to be completed immediately with this type of event.

## 2023-11-07 NOTE — PROGRESS NOTES
Ayad Moreon is a 53 year old male with a history of oromandibular dystonia .  he was last injected on 8/15/2023. he had a good response to the last injection. The last dose given was:    Anterior digastric muscle:EMG guided, 5 units each side, total dose  10 units BTA   Nasalis muscle. Non EMG guided, 5 units right side  - total dose 5 units BTA.  Depressor anguli oris:Non EMG guided, unilateral, right .- total dose  10 units BTA  Orbicularis oris muscle: non EMG guided, unilateral, inferior- right, 2.5 units.- total dose  2.5 units BTA.  Lateral pterygoid muscles: EMG guided, bilateral, 10 units each side with Strong EMG response - total dose 20 units BTA.  Procerus muscle: Non EMG guided, 5 units each side.total dose  10 units BTA       The response lasted for 3 months. There was no Dysphagia related to the injection. He continues to do well with the jaw opening portion of his dystonia.  Hisbiggestconcerntodayistheorofacial of his dystonia.  This primarily involves the circumoral region and the forehead.  Our plan is to give:  - 5 units right zygomaticus minor muscle  - 5 units Depressor anguli oris muscle on the right.  - 2.5 units in the inferior orbicularis oris muscle bilaterally  - 5 units procerus muscle bilaterally.      PROCEDURE: After obtaining consent, the patient was placed in the supine position.  The skin  was prepared with an alcohol swab.  Using a 27-gauge needle the the affected muscle was injected with the noted dose of botulinum a toxin.      Dosages delivered today include:  - 5 units right zygomaticus minor muscle: Total 5 units  - 5 units Depressor anguli oris muscle on the right: Total 5 units  - 2.5 units in the inferior orbicularis oris muscle bilaterally : Total 5 units  - 5 units procerus muscle bilaterally: Total 10 units.    The total amount of botulinum A toxin delivered today was 25 units. An additional 40 units of botulinum A toxin was necessarily wasted in preparation for the  injection. He tolerated the procedure well and left the clinic after a short observation period.       The EMG was necessary specifically in this case to identify areas of muscle overactivity as well as to access the lateral pterygoid muscle which is within the infratemporal fossa and  not otherwise directly accessible without EMG guidance.    PLAN: I will have him  follow up on a PRN basis. It is anticipated that repeat injections will be required over the long term.

## 2023-11-07 NOTE — LETTER
11/7/2023       RE: Ayad Moreno  [address]  [place] [address]     Dear Colleague,    Thank you for referring your patient, Ayad Moreno, to the [facility] EAR NOSE AND THROAT CLINIC [place] at [facility]. Please see a copy of my visit note below.    Ayad Moreno is a 53 year old male with a history of oromandibular dystonia .  he was last injected on 8/15/2023. he had a good response to the last injection. The last dose given was:    Anterior digastric muscle:EMG guided, 5 units each side, total dose  10 units BTA   Nasalis muscle. Non EMG guided, 5 units right side  - total dose 5 units BTA.  Depressor anguli oris:Non EMG guided, unilateral, right .- total dose  10 units BTA  Orbicularis oris muscle: non EMG guided, unilateral, inferior- right, 2.5 units.- total dose  2.5 units BTA.  Lateral pterygoid muscles: EMG guided, bilateral, 10 units each side with Strong EMG response - total dose 20 units BTA.  Procerus muscle: Non EMG guided, 5 units each side.total dose  10 units BTA       The response lasted for 3 months. There was no Dysphagia related to the injection. He continues to do well with the jaw opening portion of his dystonia.  Hisbiggestconcerntodayistheorofacial of his dystonia.  This primarily involves the circumoral region and the forehead.  Our plan is to give:  - 5 units right zygomaticus minor muscle  - 5 units Depressor anguli oris muscle on the right.  - 2.5 units in the inferior orbicularis oris muscle bilaterally  - 5 units procerus muscle bilaterally.      PROCEDURE: After obtaining consent, the patient was placed in the supine position.  The skin  was prepared with an alcohol swab.  Using a 27-gauge needle the the affected muscle was injected with the noted dose of botulinum a toxin.      Dosages delivered today include:  - 5 units right zygomaticus minor muscle: Total 5 units  - 5 units Depressor anguli oris muscle on  the right: Total 5 units  - 2.5 units in the inferior orbicularis oris muscle bilaterally : Total 5 units  - 5 units procerus muscle bilaterally: Total 10 units.    The total amount of botulinum A toxin delivered today was 25 units. An additional 40 units of botulinum A toxin was necessarily wasted in preparation for the injection. He tolerated the procedure well and left the clinic after a short observation period.       The EMG was necessary specifically in this case to identify areas of muscle overactivity as well as to access the lateral pterygoid muscle which is within the infratemporal fossa and  not otherwise directly accessible without EMG guidance.    PLAN: I will have him  follow up on a PRN basis. It is anticipated that repeat injections will be required over the long term.         Again, thank you for allowing me to participate in the care of your patient.      Sincerely,    Yves Horne MD

## 2023-11-07 NOTE — PATIENT INSTRUCTIONS
1.  You were seen in the ENT Clinic today by . If you have any questions or concerns after your appointment, please call [phone]. Press option #1 for scheduling related needs. Press option #3 for Nurse advice.     2. Plan is to return to clinic February 6, 2024 at 8:30 am for repeat botox injection.        Susan Keith LPN  [phone]  [facility] - Otolaryngology

## 2023-11-28 ENCOUNTER — MYC REFILL (OUTPATIENT)
Dept: NEUROLOGY | Facility: CLINIC | Age: 53
End: 2023-11-28
Payer: COMMERCIAL

## 2023-11-28 DIAGNOSIS — G24.9 DYSTONIA: ICD-10-CM

## 2023-11-28 RX ORDER — GABAPENTIN 800 MG/1
800 TABLET ORAL 3 TIMES DAILY
Qty: 90 TABLET | Refills: 5 | Status: SHIPPED | OUTPATIENT
Start: 2023-11-28 | End: 2024-05-16

## 2023-11-28 NOTE — CONFIDENTIAL NOTE
Medication and strength: Gabapentin 800 mg    Is this a [facility] patient? Yes    Last appointment: 1/25/2023 with Dulce Manning DNP    Next appointment: None scheduled    Last re-ordered: 12/2/22 for a 30 day supply with 11 refills    Action taken: Sent to be signed by the provider. Will ask Huang to schedule him for a follow-up visit.

## 2023-11-28 NOTE — TELEPHONE ENCOUNTER
Rx Authorization:  Requested Medication/ Dose    gabapentin (NEURONTIN) 800 MG tablet     Date last refill ordered:   Quantity ordered:   # refills:   Date of last clinic visit with ordering provider:   Date of next clinic visit with ordering provider:   All pertinent protocol data (lab date/result):   Include pertinent information from patients message:

## 2023-11-29 ENCOUNTER — TELEPHONE (OUTPATIENT)
Dept: NEUROLOGY | Facility: CLINIC | Age: 53
End: 2023-11-29
Payer: COMMERCIAL

## 2023-11-29 NOTE — TELEPHONE ENCOUNTER
----- Message from Jennifer Martino RN sent at 11/28/2023 10:33 AM CST -----  Regarding: Schedule  Hi Huang,    This patient has not seen Dulce in almost a year. Can you call and schedule a follow-up visit with Dulce?    Thank you,  Jennifer Martino RN

## 2023-12-06 ENCOUNTER — OFFICE VISIT (OUTPATIENT)
Dept: NEUROLOGY | Facility: CLINIC | Age: 53
End: 2023-12-06
Payer: COMMERCIAL

## 2023-12-06 VITALS
HEART RATE: 88 BPM | BODY MASS INDEX: 25.99 KG/M2 | SYSTOLIC BLOOD PRESSURE: 118 MMHG | WEIGHT: 191.6 LBS | OXYGEN SATURATION: 96 % | DIASTOLIC BLOOD PRESSURE: 78 MMHG

## 2023-12-06 DIAGNOSIS — F41.9 ANXIETY: ICD-10-CM

## 2023-12-06 DIAGNOSIS — G24.9 GENERALIZED DYSTONIA: Primary | ICD-10-CM

## 2023-12-06 DIAGNOSIS — G24.1 DYSTONIA DUE TO DYT-1 GENE MUTATION: ICD-10-CM

## 2023-12-06 DIAGNOSIS — Z96.89 S/P DEEP BRAIN STIMULATOR PLACEMENT: ICD-10-CM

## 2023-12-06 PROCEDURE — 95970 ALYS NPGT W/O PRGRMG: CPT | Performed by: NURSE PRACTITIONER

## 2023-12-06 PROCEDURE — 99214 OFFICE O/P EST MOD 30 MIN: CPT | Mod: 25 | Performed by: NURSE PRACTITIONER

## 2023-12-06 ASSESSMENT — PAIN SCALES - GENERAL: PAINLEVEL: NO PAIN (0)

## 2023-12-06 NOTE — PATIENT INSTRUCTIONS
Dear Mr. Ayad Moreno,    Thank you for coming today.  During your visit, we have discussed the following:     __ Your DBS electrical system function (impedance) is normal. The battery life is also good.    __ During today's visit, NO DBS programming changes were made.    __  Your DBS Group History showed that you haven't used GROUP B.  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 I just didn't want to make changes when you have a big event tomorrow.     __  To improve the Left hand, you can increase the voltage from 3.6 to 3.8 V.    __  I'll talk to my colleagues about Botox injection to help the Rt hand and get back to you.     __  Return in 6 months. You may return or contact us sooner as needed.     To contact our clinic, you may call [phone] or use Get [employer] message.     To make an appointment with one of our pharmacists, (Cydney Bhatti, Pharm.D. or Romina GaliciaD), call [phone].    Dulce Manning, KARLENE, APRN  [facility]

## 2023-12-06 NOTE — PROGRESS NOTES
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 Pt hasn't tried GROUP B. He is also having Lt hand dystonia with prolong use and arthritis due to overuse.     2)  S/p Bilateral DBS lead implantation:  Normal impedance and battery life. No DBS programming changes made.       PLAN:    Since patient is having his final defense tomorrow, I opted not to make any DBS programming changes, which the pt agreed. The following patient instructions provided: -    __ Your DBS electrical system function (impedance) is normal. The battery life is also good.    __ During today's visit, NO DBS programming changes were made.    __  Your DBS Group History showed that you haven't used GROUP B.  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 I just didn't want to make changes when you have a big event tomorrow.     __  To improve the Left hand, you can increase the voltage from 3.6 to 3.8 V.    __  I'll talk to my colleagues about Botox injection to help the Rt hand and get back to you.     __  Return in 6 months. You may return or contact us sooner as needed.           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     HPI: Mr. Ayad Moreno is a 53-year-old right-handed  male who came to the [facility] neurology clinic by himself for a follow up visit.     He is s/p bilateral Globus Pallidus Internus (Gpi) deep brain stimulation (DBS) lead placement left on 1/10/2017 & right on 2017 by Dr. Carolina.  I saw him last in the clinic on 2023.    Pt is completing his masters degree and has his final defense tomorrow, which is stressful. 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 Once he gets a job, he wants to go off of  disability from [employer].    He has been  from his wife for a year, and is currently going through settlement.    He continues seeing Dr. 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    During his last visit, GROUP B was created and the contacts were changed to improve the Rt hand dystonia. Pt reports no change in the new programming. He continues having the Rt hand dystonia at rest and his thumb pulls and is tucked in his hand making fist. He is able to stretch it out. He can write slowly with difficulty.     After his last programming visit, pt left the clinic in GROUP B; however, his DBS interrogation and analysis showed that he has been in GROUP A. Therefore, he hasn't tried the GROUP B to improve the Rt hand dystonia.    Pt asked if his Left hand dystonia could be improved. His Lt hand has been fine for a long time. He reports Lt hand dystonia occurs after prolonged use of his left hand. Due to the Rt hand dystonia, he uses his Lt hand more and not having some arthritis and dystonia with prolong use. He has a range in his DBS, but hasn't made changes.     Past DBS PROGRAMMING: Left GPi    __ Monopolar Survey from 2/7/2017 reviewed.    Threshold: -   C +, 0 - = 5.0  C +, 1 - = 6.0  C +, 2 - = 6.0  C +, 3 - = 7.0      2/7/2017 4/3/2017 5/4/2017 1/12/2018 3/16/2018 5/10/2018     Left Gpi  C +, 1 -, 2 -  Volts:  2.5 volts  PW:  60 msec  Rate:  130 Hz     Left Gpi  C +, 1 -, 2 -  Volts:  3.2 volts  PW:  60 msec  Rate:  130 Hz     Left Brain  C +, 1 -, 2 -  Volts:  3.7 volts  PW:  60 msec  Rate:  130 Hz   Left Brain  C +, 1 -, 2 -  Volts:  3.8 volts  PW:  60 msec  Rate:  130 Hz   Left Brain  C +, 1 -, 2 -  Volts:  3.6 volts  PW:  60 msec  Rate:  130 Hz   Left Gpi  C +, 1 -, 2 -  Volts:  4.0 volts  PW:  60 msec  Rate:  180 Hz       PHYSICAL EXAM:    /78 (BP Location: Right arm, Patient Position: Sitting, Cuff Size: Adult Regular)   Pulse 88   Wt 86.9  kg (191 lb 9.6 oz)   SpO2 96%   BMI 25.99 kg/m      GENERAL:  Mr. Moreno is a pleasant  male who is well-groomed and well-developed sitting comfortably in the exam room without any distress. Affect is appropriate.    Procedure: DBS Interrogation & Analysis:    Lead(s):    Left Right   DBS Target GPi GPi   DBS Lead Type Company: [employer]  Model: 3389  Contacts: 0-3 Company: [employer]  Model: [id number]  Contacts: 8-11   Lead Placement Date 1/10/2017 11/16/2017     IPG(s):   1   IPG Company: [employer]  Activa-RC  Model: Activa RC  S/N: [id number]   IPG Implant Date Sep 23, 2019   Location Left chest   Battery (V) Battery Level: 75%  Rechargeable  ADRIANNA: 9/19/2033       GROUP A ACTIVE    Left Brain    C +, 1 -, 2 -  Voltage:  4.3 volts  PW:  60 msec  Rate:  180 Hz    Therapy impedance: 564 Ohms  Therapy Current: 6.9 mA      Patient :  Upper Limit: 4.5 volts  Lower Limit: 2.1  volts    Electrode Impedance Check:    Left Lead: No Problems Found.       Right Brain     C +, 9 -, 10 -  Voltage:  3.6 volts  PW:  60 msec  Rate:  180 Hz    Therapy impedance: 674 Ohms  Therapy Current: 5.2 mA    Patient :  Upper Limit: 4.3 volts  Lower Limit: 0.0 volts    Electrode Impedance Check:    Right Lead: No Problems Found.      See scanned report for impedance details.      MOVEMENT DISORDERS ASSESSMENT:     The Fahn Kavon (BFM) Disability Score     Activity Severity Factor Score   A. Speech  0 - Normal  1 - Slightly involved; easily understood  2 - Some difficulty in understanding  3 - Marked difficulty in understanding  4 - Complete or almost complete anarthria    2      B. Handwriting   (tremor or dystonia) 0 - Normal  1 - Slight difficulty, legible  2 - Almost illegible  3 - Illegible  4 - Unable to grasp to maintain hold on pen    2   C. Feeding 0 - Normal  1 - Uses  tricks , independent  2 - Can feed, but not cut  3 - Finger food only  4 - Completely dependent     1     D. Eating/swallowing 0 - Normal  1  - Occasional choking  2 - Chokes frequently; difficulty swallowing  3 - Unable to swallow firm foods  4 - Marked difficulty swallowing soft foods and liquids     1     E. Hygiene 0 - Normal  1 - Clumsy, independent  2 - Needs help with some activities  3 - Needs help with most activities  4 - Needs help with all activities    1   F. Dressing 0 - Normal  1 - Clumsy, independent  2 - Needs help with some activities  3 - Needs help with most activities  4 - Helpless     1        G. Walking 0 - Normal  1 - Slightly abnormal; hardly noticeable  2 - Moderately abnormal; obvious to naïve observer  3 - Considerably abnormal  4 - Needs assistance to walk  6 - Wheelchair-bound     0       Total (maximum: 30) = 8 (Pre DBS Initial Programming:  15)              The Fahn-Kavon Score        Region Provoking factor  General Severity Factor Weight Score   Eyes 0 0 0.5 0   Mouth 1 1 0.5 0.5   Speech & Swallowing 2 2 1.0 4   Neck 0 0 0.5 0   Right Arm 4 2 1.0 8   Left Arm 0 2 1.0 2   Right Leg 0 0 1.0 0   Left Leg 0 0 1.0 0   Trunk 0 0 1.0 0   Total Score = 14.5 (Pre DBS Initial Programmin.5)     Today I spent 50 minutes caring for the patient. 20 minutes was spent in DBS interrogation and analysis.  30 minutes was spent with reviewing records, meeting with the patient, answering questions, examining, refilling/ordering medication, coordinating care for Botox, and documentation.    Dulce Manning, KARLENE, APRN  [facility]'

## 2023-12-06 NOTE — LETTER
2023       RE: Ayad Moreno  [address]  [place] [address]       Dear Colleague,    Thank you for referring your patient, Ayad Moreno, to the [facility] NEUROLOGY CLINIC [place] at [facility]. Please see a copy of my visit note below.    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 Pt hasn't tried GROUP B. He is also having Lt hand dystonia with prolong use and arthritis due to overuse.     2)  S/p Bilateral DBS lead implantation:  Normal impedance and battery life. No DBS programming changes made.       PLAN:    Since patient is having his final defense tomorrow, I opted not to make any DBS programming changes, which the pt agreed. The following patient instructions provided: -    __ Your DBS electrical system function (impedance) is normal. The battery life is also good.    __ During today's visit, NO DBS programming changes were made.    __  Your DBS Group History showed that you haven't used GROUP B.  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 I just didn't want to make changes when you have a big event tomorrow.     __  To improve the Left hand, you can increase the voltage from 3.6 to 3.8 V.    __  I'll talk to my colleagues about Botox injection to help the Rt hand and get back to you.     __  Return in 6 months. You may return or contact us sooner as needed.           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     HPI: Mr. Ayad Moreno is a 53-year-old right-handed  male who came to the [facility] neurology clinic by himself for a follow up visit.     He is s/p bilateral Globus Pallidus Internus (Gpi) deep brain stimulation (DBS) lead  placement left on 1/10/2017 & right on 11/16/2017 by Dr. Carolina.  I saw him last in the clinic on January 25, 2023.    Pt is completing his masters degree and has his final defense tomorrow, which is stressful. 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 Once he gets a job, he wants to go off of disability from [employer].    He has been  from his wife for a year, and is currently going through settlement.    He continues seeing Dr. 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    During his last visit, GROUP B was created and the contacts were changed to improve the Rt hand dystonia. Pt reports no change in the new programming. He continues having the Rt hand dystonia at rest and his thumb pulls and is tucked in his hand making fist. He is able to stretch it out. He can write slowly with difficulty.     After his last programming visit, pt left the clinic in GROUP B; however, his DBS interrogation and analysis showed that he has been in GROUP A. Therefore, he hasn't tried the GROUP B to improve the Rt hand dystonia.    Pt asked if his Left hand dystonia could be improved. His Lt hand has been fine for a long time. He reports Lt hand dystonia occurs after prolonged use of his left hand. Due to the Rt hand dystonia, he uses his Lt hand more and not having some arthritis and dystonia with prolong use. He has a range in his DBS, but hasn't made changes.     Past DBS PROGRAMMING: Left GPi    __ Monopolar Survey from 2/7/2017 reviewed.    Threshold: -   C +, 0 - = 5.0  C +, 1 - = 6.0  C +, 2 - = 6.0  C +, 3 - = 7.0      2/7/2017 4/3/2017 5/4/2017 1/12/2018 3/16/2018 5/10/2018     Left Gpi  C +, 1 -, 2 -  Volts:  2.5 volts  PW:  60 msec  Rate:  130 Hz     Left Gpi  C +, 1 -, 2 -  Volts:  3.2 volts  PW:  60 msec  Rate:  130 Hz     Left Brain  C +, 1 -, 2 -  Volts:  3.7 volts  PW:  60 msec  Rate:  130 Hz   Left Brain  C +, 1 -, 2 -  Volts:  3.8  volts  PW:  60 msec  Rate:  130 Hz   Left Brain  C +, 1 -, 2 -  Volts:  3.6 volts  PW:  60 msec  Rate:  130 Hz   Left Gpi  C +, 1 -, 2 -  Volts:  4.0 volts  PW:  60 msec  Rate:  180 Hz       PHYSICAL EXAM:    /78 (BP Location: Right arm, Patient Position: Sitting, Cuff Size: Adult Regular)   Pulse 88   Wt 86.9 kg (191 lb 9.6 oz)   SpO2 96%   BMI 25.99 kg/m      GENERAL:  Mr. Moreno is a pleasant  male who is well-groomed and well-developed sitting comfortably in the exam room without any distress. Affect is appropriate.    Procedure: DBS Interrogation & Analysis:    Lead(s):    Left Right   DBS Target GPi GPi   DBS Lead Type Company: [employer]  Model: 3389  Contacts: 0-3 Company: [employer]  Model: 3389  Contacts: 8-11   Lead Placement Date 1/10/2017 11/16/2017     IPG(s):   1   IPG Company: [employer]  Activa-RC  Model: Activa RC  S/N: [id number]   IPG Implant Date Sep 23, 2019   Location Left chest   Battery (V) Battery Level: 75%  Rechargeable  ADRIANNA: 9/19/2033       GROUP A ACTIVE    Left Brain    C +, 1 -, 2 -  Voltage:  4.3 volts  PW:  60 msec  Rate:  180 Hz    Therapy impedance: 564 Ohms  Therapy Current: 6.9 mA      Patient :  Upper Limit: 4.5 volts  Lower Limit: 2.1  volts    Electrode Impedance Check:    Left Lead: No Problems Found.       Right Brain     C +, 9 -, 10 -  Voltage:  3.6 volts  PW:  60 msec  Rate:  180 Hz    Therapy impedance: 674 Ohms  Therapy Current: 5.2 mA    Patient :  Upper Limit: 4.3 volts  Lower Limit: 0.0 volts    Electrode Impedance Check:    Right Lead: No Problems Found.      See scanned report for impedance details.      MOVEMENT DISORDERS ASSESSMENT:     The Fahn Kavon (BFM) Disability Score     Activity Severity Factor Score   A. Speech  0 - Normal  1 - Slightly involved; easily understood  2 - Some difficulty in understanding  3 - Marked difficulty in understanding  4 - Complete or almost complete anarthria    2      B. Handwriting    (tremor or dystonia) 0 - Normal  1 - Slight difficulty, legible  2 - Almost illegible  3 - Illegible  4 - Unable to grasp to maintain hold on pen    2   C. Feeding 0 - Normal  1 - Uses  tricks , independent  2 - Can feed, but not cut  3 - Finger food only  4 - Completely dependent     1     D. Eating/swallowing 0 - Normal  1 - Occasional choking  2 - Chokes frequently; difficulty swallowing  3 - Unable to swallow firm foods  4 - Marked difficulty swallowing soft foods and liquids     1     E. Hygiene 0 - Normal  1 - Clumsy, independent  2 - Needs help with some activities  3 - Needs help with most activities  4 - Needs help with all activities    1   F. Dressing 0 - Normal  1 - Clumsy, independent  2 - Needs help with some activities  3 - Needs help with most activities  4 - Helpless     1        G. Walking 0 - Normal  1 - Slightly abnormal; hardly noticeable  2 - Moderately abnormal; obvious to naïve observer  3 - Considerably abnormal  4 - Needs assistance to walk  6 - Wheelchair-bound     0       Total (maximum: 30) = 8 (Pre DBS Initial Programming:  15)              The Fahn-Kavon Score        Region Provoking factor  General Severity Factor Weight Score   Eyes 0 0 0.5 0   Mouth 1 1 0.5 0.5   Speech & Swallowing 2 2 1.0 4   Neck 0 0 0.5 0   Right Arm 4 2 1.0 8   Left Arm 0 2 1.0 2   Right Leg 0 0 1.0 0   Left Leg 0 0 1.0 0   Trunk 0 0 1.0 0   Total Score = 14.5 (Pre DBS Initial Programmin.5)     Today I spent 50 minutes caring for the patient. 20 minutes was spent in DBS interrogation and analysis.  30 minutes was spent with reviewing records, meeting with the patient, answering questions, examining, refilling/ordering medication, coordinating care for Botox, and documentation.          Again, thank you for allowing me to participate in the care of your patient.      Sincerely,    CT Campbell CNP

## 2023-12-07 RX ORDER — MIRTAZAPINE 45 MG/1
45 TABLET, FILM COATED ORAL AT BEDTIME
Qty: 30 TABLET | Refills: 11 | Status: SHIPPED | OUTPATIENT
Start: 2023-12-07

## 2023-12-08 ENCOUNTER — TELEPHONE (OUTPATIENT)
Dept: NEUROLOGY | Facility: CLINIC | Age: 53
End: 2023-12-08
Payer: COMMERCIAL

## 2023-12-08 NOTE — TELEPHONE ENCOUNTER
LUISM, sent MyC for pt to sched follow up with Dr. Braga on 2/6/24 at 9:30.    Manually enter    12/8/23 BD

## 2023-12-31 DIAGNOSIS — F41.9 ANXIETY: ICD-10-CM

## 2024-01-02 RX ORDER — MIRTAZAPINE 45 MG/1
TABLET, FILM COATED ORAL
Qty: 30 TABLET | Refills: 11 | OUTPATIENT
Start: 2024-01-02

## 2024-01-02 NOTE — TELEPHONE ENCOUNTER
RX Authorization    Medication: MIRTAzaPINE (REMERON) 45 MG    Date last refill ordered:    Quantity ordered:    # refills:    Date of last clinic visit with ordering provider:    Date of next clinic visit with ordering provider:    All pertinent protocol data (lab date/result):    Include pertinent information from patients message:

## 2024-01-17 ENCOUNTER — OFFICE VISIT (OUTPATIENT)
Dept: NEUROLOGY | Facility: CLINIC | Age: 54
End: 2024-01-17
Payer: COMMERCIAL

## 2024-01-17 VITALS
HEART RATE: 69 BPM | DIASTOLIC BLOOD PRESSURE: 81 MMHG | RESPIRATION RATE: 16 BRPM | SYSTOLIC BLOOD PRESSURE: 122 MMHG | OXYGEN SATURATION: 96 %

## 2024-01-17 DIAGNOSIS — G24.9 GENERALIZED DYSTONIA: Primary | ICD-10-CM

## 2024-01-17 DIAGNOSIS — G24.1 DYSTONIA DUE TO DYT-1 GENE MUTATION: ICD-10-CM

## 2024-01-17 PROCEDURE — 99214 OFFICE O/P EST MOD 30 MIN: CPT | Performed by: STUDENT IN AN ORGANIZED HEALTH CARE EDUCATION/TRAINING PROGRAM

## 2024-01-17 RX ORDER — FLUTICASONE PROPIONATE 50 MCG
2 SPRAY, SUSPENSION (ML) NASAL DAILY
COMMUNITY
Start: 2023-07-13

## 2024-01-17 RX ORDER — ALBUTEROL SULFATE 90 UG/1
1-2 AEROSOL, METERED RESPIRATORY (INHALATION) EVERY 4 HOURS PRN
COMMUNITY
Start: 2023-03-31

## 2024-01-17 ASSESSMENT — PAIN SCALES - GENERAL: PAINLEVEL: NO PAIN (0)

## 2024-01-17 NOTE — LETTER
2024       RE: Ayad Moreno  [address]  [facility] [address]       Dear Colleague,    Thank you for referring your patient, Ayad Moreno, to the [facility] NEUROLOGY CLINIC [place] at [facility]. Please see a copy of my visit note below.    Department of Neurology  Movement Disorders Division   New Patient Visit    Patient: Ayad Moreno   MRN: [id number]   : 1970   Date of Visit: 2024    CC: right hand dystonia    HPI:  Mr. Moreno is a 52 yo man with DYT1 dystonia s/p bilateral GPi DBS who presents for evaluation for botulinum toxin injections for residual right hand dystonia.  He describes that his right hand curls up for the past 2 years.  Denies any associated pain or discomfort.  It is difficult to hold objects and to write.    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      Past Medical History:   Diagnosis Date    Anxiety     Asthmas with exposure to cats     Depression     Generalized dystonia     DYT-1 genetic mutation    Lumbar disc herniation     OCD (obsessive compulsive disorder)     Rt ACL Tear         Past Surgical History:   Procedure Laterality Date    Decompression L4-L5, Posterior Spine Fusion  2021    IMPLANT DEEP BRAIN STIMULATION GENERATOR / BATTERY Left 2017    Procedure: IMPLANT DEEP BRAIN STIMULATION GENERATOR / BATTERY;  Surgeon: Duy Carolina MD;  Location: [facility] OR    OPTICAL TRACKING SYSTEM INSERTION DEEP BRAIN STIMULATION Left 01/10/2017    Procedure: OPTICAL TRACKING SYSTEM INSERTION DEEP BRAIN STIMULATION;  Surgeon: Duy Carolina MD;  Location: [facility] OR    OPTICAL TRACKING SYSTEM INSERTION DEEP BRAIN STIMULATION Right 2017    Procedure: OPTICAL TRACKING SYSTEM INSERTION DEEP BRAIN STIMULATION;  Right Stealth Assisted Deep Brain Stimulator Placement, Phase I And II Combined, Placement Of Right Side Deep Brain Stimulator Electrode,  Target Right Globus Pallidus Internus With Microelectrode Recording And Connection To The Existing Generator/Battery;  Surgeon: Duy Carolina MD;  Location: [facility] OR    REPLACE DEEP BRAIN STIMULATION GENERATOR / BATTERY Left 09/23/2019    Procedure: Left Deep Brain Stimulator Generator Replacement;  Surgeon: Sebastian Ramos MD;  Location: [facility] OR    ZZC REPAIR CRUCIATE LIGAMENT,KNEE  2008        Current Outpatient Medications   Medication Sig Dispense Refill    albuterol (PROAIR HFA/PROVENTIL HFA/VENTOLIN HFA) 108 (90 Base) MCG/ACT inhaler Inhale 1-2 puffs into the lungs every 4 hours as needed for wheezing      botulinum toxin type A (BOTOX) 100 units injection Inject 800 units of botulinum toxin as 200 units every 3 months 2 Units 3    Cholecalciferol (VITAMIN D3) POWD Take 1 tablet by mouth daily as needed      diazepam (VALIUM) 2 MG tablet Take 2 tablets (4 mg) by mouth every 8 hours as needed for anxiety or muscle spasms Call [phone] to make an appt 540 tablet 0    fluticasone (FLONASE) 50 MCG/ACT nasal spray Spray 2 sprays into both nostrils daily      gabapentin (NEURONTIN) 800 MG tablet Take 1 tablet (800 mg) by mouth 3 times daily 90 tablet 5    mirtazapine (REMERON) 45 MG tablet Take 1 tablet (45 mg) by mouth at bedtime 30 tablet 11    nicotine (COMMIT) 2 MG lozenge Place 2 mg inside cheek every hour as needed for smoking cessation (10x's/day)      traMADol (ULTRAM) 50 MG tablet Take 50 mg by mouth every 8 hours as needed      VITAMIN D PO       vitamin B complex with vitamin C (STRESS TAB) tablet Take 1 tablet by mouth every morning  (Patient not taking: Reported on 1/17/2024)         Allergies   Allergen Reactions    Seasonal Allergies         Family History   Problem Relation Age of Onset    Diabetes Father     Cancer Paternal Grandfather         Stomach CA    Heart Disease Paternal Grandfather     Cancer Maternal Grandmother         Bladder    Depression Maternal Grandmother      Myocardial Infarction Maternal Grandfather         MI    Obsessive Compulsive Disorder Son     Genetic Disorder Son         Dystonia 2ndary to DYT 1 micah mutation    Hypertension Mother         Social History     Socioeconomic History    Marital status: Legally      Spouse name: Lisseth    Number of children: 2    Years of education: Not on file    Highest education level: Not on file   Occupational History    Occupation: Teacher     Employer: [employer]     Comment: Teaches 6 grade. Math. Taught for 14 years.   Tobacco Use    Smoking status: Former     Types: Cigarettes    Smokeless tobacco: Never    Tobacco comments:     social   Substance and Sexual Activity    Alcohol use: Not Currently     Alcohol/week: 0.0 standard drinks of alcohol     Comment: None    Drug use: Not Currently     Types: Marijuana     Comment: vaping    Sexual activity: Yes     Partners: Female     Birth control/protection: Condom   Other Topics Concern    Parent/sibling w/ CABG, MI or angioplasty before 65F 55M? No   Social History Narrative    Not on file     Social Determinants of Health     Financial Resource Strain: Not on file   Food Insecurity: Not on file   Transportation Needs: Not on file   Physical Activity: Not on file   Stress: Not on file   Social Connections: Not on file   Interpersonal Safety: Not on file   Housing Stability: Not on file        PHYSICAL EXAM:  /81 (BP Location: Right arm, Patient Position: Sitting, Cuff Size: Adult Regular)   Pulse 69   Resp 16   SpO2 96%   Moderate dysarthria  Right thumb held in opposition  Right digits 2-5 curled into fist  Mild right wrist flexion  No able to fully uncurl his hand actively, passive ROM better      LABS:  /4.6/103/31/11/1.19  Ca 9.5  CBC 6.9/17.6/268    IMAGING:  CT head 11/1/2022 - personally reviewed: bilateral GPi DBS, mild cerebellar atrophy        ASSESSMENT/PLAN:  Mr. Moreno is a 52 yo man with DYT1 dystonia s/p bilateral GPi DBS who presents  for treatment of residual right hand dystonia.  This is amenable to botulinum toxin therapy.  Will begin with conservative dosing as he has some use of his hand at baseline.      - Plan to inject right adductor pollicis/opponens pollicis and flexor digitorum profundus        Again, thank you for allowing me to participate in the care of your patient.      Sincerely,    Alexa Braga MD

## 2024-01-17 NOTE — PROGRESS NOTES
Movement Disorders Botulinum Toxin Clinic Note    Chief Complaint: right hand dystonia    History of Present Illness:  Ayad Moreno is a 53 year old male who presents to clinic for botulinum toxin injections for treatment of ***      Response to Last Injection: ***    Onset of effect:  ***    Benefit from last injections:  ***    Benefit in function: ***     Wearing off: ***    Side effects: ***    Additional interval history: ***      Current Outpatient Medications   Medication Sig Dispense Refill    botulinum toxin type A (BOTOX) 100 units injection Inject 800 units of botulinum toxin as 200 units every 3 months 2 Units 3    Cholecalciferol (VITAMIN D3) POWD Take 1 tablet by mouth daily as needed      diazepam (VALIUM) 2 MG tablet Take 2 tablets (4 mg) by mouth every 8 hours as needed for anxiety or muscle spasms Call [phone] to make an appt 540 tablet 0    gabapentin (NEURONTIN) 800 MG tablet Take 1 tablet (800 mg) by mouth 3 times daily 90 tablet 5    mirtazapine (REMERON) 45 MG tablet Take 1 tablet (45 mg) by mouth at bedtime 30 tablet 11    nicotine (COMMIT) 2 MG lozenge Place 2 mg inside cheek every hour as needed for smoking cessation (10x's/day)      traMADol (ULTRAM) 50 MG tablet Take 50 mg by mouth every 8 hours as needed      vitamin B complex with vitamin C (STRESS TAB) tablet Take 1 tablet by mouth every morning       VITAMIN D PO          Allergies: He is allergic to seasonal allergies.      Physical Examination:  Vital Signs:   vitals were not taken for this visit.   ***    BOTULINUM NEUROTOXIN INJECTION PROCEDURES:    VERIFICATION OF PATIENT IDENTIFICATION AND PROCEDURE     Initials   Patient Name ***   Patient  ***   Procedure Verified by: ***     Above assessments performed by:  ***      INDICATION/S FOR PROCEDURE/S:  Ayad Moreno is a 53 year old year old patient with {Dystonia/Spasticity/Sialorrhea:299078144}.     His baseline symptoms have been recalcitrant to oral medications and  "conservative therapy.  He is here today for an injection of {BOTOX/MYOBLOC:307027:a:\"Botox\"}.      GOAL OF PROCEDURE:  The goal of this procedure is to {BOTOX GOAL:928110} associated with {DYSTONIC MOVEMENTS:921348}.    TOTAL DOSE ADMINISTERED:  Dose Administered:  *** units {BOTOX/MYOBLOC:618185::\"Botox\"}    Diluent Used:  {DILUENT:550425:a}  Total Volume of Diluent Used:  *** ml  NDC #: { PMR BOTOX NDC #:774225}    CONSENT:  The risks, benefits, and treatment options were discussed with Ayad Moreno and he agreed to proceed.      Written consent was obtained by {***initials}.     EQUIPMENT USED:  {BOTOX EQUIPMENT:055989}    SKIN PREPARATION:  Skin preparation was performed using an alcohol wipe.    GUIDANCE DESCRIPTION:  {GUIDANCE:384241}     AREA/MUSCLE INJECTED:    Visual display of locations injections are scanned into the chart under MEDIA tab.    Muscles Injected Units Injected Number of Injections   *** *** ***   *** *** ***   *** *** ***   *** *** ***   *** *** ***   *** *** ***        Total Units Injected:     Unavoidable Waste:     Total Units Billed       The patient tolerated the injections without difficulty.      Assessment:    Ayad Moreno is a 53 year old male with ***.  Today we did repeat botulinum toxin injections.    Plan  Follow-up in *** months' time to consider repeat injections      Alexa Braga MD   of Neurology  Movement Disorders Division      "

## 2024-01-17 NOTE — PROGRESS NOTES
Department of Neurology  Movement Disorders Division   New Patient Visit    Patient: Ayad Moreno   MRN: [id number]   : 1970   Date of Visit: 2024    CC: right hand dystonia    HPI:  Mr. Moreno is a 54 yo man with DYT1 dystonia s/p bilateral GPi DBS who presents for evaluation for botulinum toxin injections for residual right hand dystonia.  He describes that his right hand curls up for the past 2 years.  Denies any associated pain or discomfort.  It is difficult to hold objects and to write.    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      Past Medical History:   Diagnosis Date    Anxiety     Asthmas with exposure to cats     Depression     Generalized dystonia     DYT-1 genetic mutation    Lumbar disc herniation     OCD (obsessive compulsive disorder)     Rt ACL Tear         Past Surgical History:   Procedure Laterality Date    Decompression L4-L5, Posterior Spine Fusion  2021    IMPLANT DEEP BRAIN STIMULATION GENERATOR / BATTERY Left 2017    Procedure: IMPLANT DEEP BRAIN STIMULATION GENERATOR / BATTERY;  Surgeon: Duy Carolina MD;  Location: [facility] OR    OPTICAL TRACKING SYSTEM INSERTION DEEP BRAIN STIMULATION Left 01/10/2017    Procedure: OPTICAL TRACKING SYSTEM INSERTION DEEP BRAIN STIMULATION;  Surgeon: Duy Carolina MD;  Location: [facility] OR    OPTICAL TRACKING SYSTEM INSERTION DEEP BRAIN STIMULATION Right 2017    Procedure: OPTICAL TRACKING SYSTEM INSERTION DEEP BRAIN STIMULATION;  Right Stealth Assisted Deep Brain Stimulator Placement, Phase I And II Combined, Placement Of Right Side Deep Brain Stimulator Electrode, Target Right Globus Pallidus Internus With Microelectrode Recording And Connection To The Existing Generator/Battery;  Surgeon: Duy Carolina MD;  Location: [facility] OR    REPLACE DEEP BRAIN STIMULATION GENERATOR / BATTERY Left 2019    Procedure: Left Deep Brain Stimulator Generator Replacement;  Surgeon:  Sebastian Ramos MD;  Location:  OR    [facility] REPAIR CRUCIATE LIGAMENT,KNEE  2008        Current Outpatient Medications   Medication Sig Dispense Refill    albuterol (PROAIR HFA/PROVENTIL HFA/VENTOLIN HFA) 108 (90 Base) MCG/ACT inhaler Inhale 1-2 puffs into the lungs every 4 hours as needed for wheezing      botulinum toxin type A (BOTOX) 100 units injection Inject 800 units of botulinum toxin as 200 units every 3 months 2 Units 3    Cholecalciferol (VITAMIN D3) POWD Take 1 tablet by mouth daily as needed      diazepam (VALIUM) 2 MG tablet Take 2 tablets (4 mg) by mouth every 8 hours as needed for anxiety or muscle spasms Call [phone] to make an appt 540 tablet 0    fluticasone (FLONASE) 50 MCG/ACT nasal spray Spray 2 sprays into both nostrils daily      gabapentin (NEURONTIN) 800 MG tablet Take 1 tablet (800 mg) by mouth 3 times daily 90 tablet 5    mirtazapine (REMERON) 45 MG tablet Take 1 tablet (45 mg) by mouth at bedtime 30 tablet 11    nicotine (COMMIT) 2 MG lozenge Place 2 mg inside cheek every hour as needed for smoking cessation (10x's/day)      traMADol (ULTRAM) 50 MG tablet Take 50 mg by mouth every 8 hours as needed      VITAMIN D PO       vitamin B complex with vitamin C (STRESS TAB) tablet Take 1 tablet by mouth every morning  (Patient not taking: Reported on 1/17/2024)         Allergies   Allergen Reactions    Seasonal Allergies         Family History   Problem Relation Age of Onset    Diabetes Father     Cancer Paternal Grandfather         Stomach CA    Heart Disease Paternal Grandfather     Cancer Maternal Grandmother         Bladder    Depression Maternal Grandmother     Myocardial Infarction Maternal Grandfather         MI    Obsessive Compulsive Disorder Son     Genetic Disorder Son         Dystonia 2ndary to DYT 1 micah mutation    Hypertension Mother         Social History     Socioeconomic History    Marital status: Legally      Spouse name: Lisseth    Number of children: 2     Years of education: Not on file    Highest education level: Not on file   Occupational History    Occupation: Teacher     Employer: [employer]     Comment: Teaches 6 grade. Math. Taught for 14 years.   Tobacco Use    Smoking status: Former     Types: Cigarettes    Smokeless tobacco: Never    Tobacco comments:     social   Substance and Sexual Activity    Alcohol use: Not Currently     Alcohol/week: 0.0 standard drinks of alcohol     Comment: None    Drug use: Not Currently     Types: Marijuana     Comment: vaping    Sexual activity: Yes     Partners: Female     Birth control/protection: Condom   Other Topics Concern    Parent/sibling w/ CABG, MI or angioplasty before 65F 55M? No   Social History Narrative    Not on file     Social Determinants of Health     Financial Resource Strain: Not on file   Food Insecurity: Not on file   Transportation Needs: Not on file   Physical Activity: Not on file   Stress: Not on file   Social Connections: Not on file   Interpersonal Safety: Not on file   Housing Stability: Not on file        PHYSICAL EXAM:  /81 (BP Location: Right arm, Patient Position: Sitting, Cuff Size: Adult Regular)   Pulse 69   Resp 16   SpO2 96%   Moderate dysarthria  Right thumb held in opposition  Right digits 2-5 curled into fist  Mild right wrist flexion  No able to fully uncurl his hand actively, passive ROM better      LABS:  /4.6/103/31/11/1.19  Ca 9.5  CBC 6.9/17.6/268    IMAGING:  CT head 11/1/2022 - personally reviewed: bilateral GPi DBS, mild cerebellar atrophy        ASSESSMENT/PLAN:  Mr. Moreno is a 54 yo man with DYT1 dystonia s/p bilateral GPi DBS who presents for treatment of residual right hand dystonia.  This is amenable to botulinum toxin therapy.  Will begin with conservative dosing as he has some use of his hand at baseline.      - Plan to inject right adductor pollicis/opponens pollicis and flexor digitorum profundus      Alexa Braga MD   of  Neurology  Movement Disorders Division

## 2024-01-17 NOTE — NURSING NOTE
Chief Complaint   Patient presents with    Consult    Botox     /81 (BP Location: Right arm, Patient Position: Sitting, Cuff Size: Adult Regular)   Pulse 69   Resp 16   SpO2 96%     RICHARD SRINIVAS

## 2024-02-01 ENCOUNTER — MYC REFILL (OUTPATIENT)
Dept: NEUROLOGY | Facility: CLINIC | Age: 54
End: 2024-02-01

## 2024-02-01 DIAGNOSIS — G24.1 DYSTONIA DUE TO DYT-1 GENE MUTATION: ICD-10-CM

## 2024-02-01 RX ORDER — DIAZEPAM 2 MG
4 TABLET ORAL EVERY 8 HOURS PRN
Qty: 540 TABLET | Refills: 0 | Status: SHIPPED | OUTPATIENT
Start: 2024-02-01 | End: 2024-05-02

## 2024-02-01 NOTE — TELEPHONE ENCOUNTER
RX Authorization    Medication: diazepam (VALIUM) 2 MG tablet    Date last refill ordered: 11/5/23    Quantity ordered: 540    # refills: 0    Date of last clinic visit with ordering provider: 12/6/23    Date of next clinic visit with ordering provider: 6/10/24

## 2024-02-06 ENCOUNTER — OFFICE VISIT (OUTPATIENT)
Dept: OTOLARYNGOLOGY | Facility: CLINIC | Age: 54
End: 2024-02-06
Payer: COMMERCIAL

## 2024-02-06 ENCOUNTER — OFFICE VISIT (OUTPATIENT)
Dept: NEUROLOGY | Facility: CLINIC | Age: 54
End: 2024-02-06
Payer: COMMERCIAL

## 2024-02-06 VITALS — HEIGHT: 72 IN | WEIGHT: 190 LBS | BODY MASS INDEX: 25.73 KG/M2

## 2024-02-06 DIAGNOSIS — G24.9 DYSTONIA OF RIGHT HAND: Primary | ICD-10-CM

## 2024-02-06 DIAGNOSIS — G24.4 OROFACIAL DYSKINESIA: Primary | ICD-10-CM

## 2024-02-06 PROCEDURE — 64612 DESTROY NERVE FACE MUSCLE: CPT | Mod: 50 | Performed by: OTOLARYNGOLOGY

## 2024-02-06 PROCEDURE — 95874 GUIDE NERV DESTR NEEDLE EMG: CPT | Performed by: OTOLARYNGOLOGY

## 2024-02-06 PROCEDURE — 64642 CHEMODENERV 1 EXTREMITY 1-4: CPT | Mod: RT | Performed by: STUDENT IN AN ORGANIZED HEALTH CARE EDUCATION/TRAINING PROGRAM

## 2024-02-06 ASSESSMENT — PAIN SCALES - GENERAL: PAINLEVEL: NO PAIN (0)

## 2024-02-06 NOTE — LETTER
2/6/2024       RE: Ayad Moreno  [address]  [facility] [address]       Dear Colleague,    Thank you for referring your patient, Ayad Moreno, to the [facility] [place] at [facility]. Please see a copy of my visit note below.    Movement Disorders Botulinum Toxin Clinic Note    Chief Complaint: right hand dystonia    History of Present Illness:  Ayad Moreno is a 54 year old male who presents to clinic for botulinum toxin injections for treatment of right hand dystonia.  This is his first injection with us.        Current Outpatient Medications   Medication Sig Dispense Refill    albuterol (PROAIR HFA/PROVENTIL HFA/VENTOLIN HFA) 108 (90 Base) MCG/ACT inhaler Inhale 1-2 puffs into the lungs every 4 hours as needed for wheezing      botulinum toxin type A (BOTOX) 100 units injection Inject 800 units of botulinum toxin as 200 units every 3 months 2 Units 3    Cholecalciferol (VITAMIN D3) POWD Take 1 tablet by mouth daily as needed      diazepam (VALIUM) 2 MG tablet Take 2 tablets (4 mg) by mouth every 8 hours as needed for anxiety or muscle spasms Call [phone] to make an appt 540 tablet 0    fluticasone (FLONASE) 50 MCG/ACT nasal spray Spray 2 sprays into both nostrils daily      gabapentin (NEURONTIN) 800 MG tablet Take 1 tablet (800 mg) by mouth 3 times daily 90 tablet 5    mirtazapine (REMERON) 45 MG tablet Take 1 tablet (45 mg) by mouth at bedtime 30 tablet 11    nicotine (COMMIT) 2 MG lozenge Place 2 mg inside cheek every hour as needed for smoking cessation (10x's/day)      traMADol (ULTRAM) 50 MG tablet Take 50 mg by mouth every 8 hours as needed      vitamin B complex with vitamin C (STRESS TAB) tablet Take 1 tablet by mouth every morning  (Patient not taking: Reported on 1/17/2024)      VITAMIN D PO          Allergies: He is allergic to seasonal allergies.      Physical Examination:  Vital Signs:   vitals were not  taken for this visit.   Right thumb in opposition and flexion, digits 2-5 do not fully extend    BOTULINUM NEUROTOXIN INJECTION PROCEDURES:    VERIFICATION OF PATIENT IDENTIFICATION AND PROCEDURE     Initials   Patient Name [facility]   Patient  acc   Procedure Verified by: [facility]     Above assessments performed by:  Alexa Braga MD      INDICATION/S FOR PROCEDURE/S:  Ayad Moreno is a 54 year old year old patient with dystonia affecting the  right upper extremity secondary to a diagnosis of DYT1 dystonia with associated  loss of volitional motor control.     His baseline symptoms have been recalcitrant to oral medications and conservative therapy.  He is here today for an injection of Botox.      GOAL OF PROCEDURE:  The goal of this procedure is to improve volitional motor control associated with dystonic movements.    TOTAL DOSE ADMINISTERED:  Dose Administered:  8 units Botox    Diluent Used:  Preservative Free Normal Saline  Total Volume of Diluent Used:  1 ml  NDC #: Botox 100u ([address]-13)    CONSENT:  The risks, benefits, and treatment options were discussed with Ayad Moreno and he agreed to proceed.      Written consent was obtained by [facility].     EQUIPMENT USED:  Needle-37mm stimulating/recording  EMG/NCS Machine    SKIN PREPARATION:  Skin preparation was performed using an alcohol wipe.    GUIDANCE DESCRIPTION:  Guidance was not utilized for this procedure as the patient declined due to being in a hurry and not having his DBS controller.     AREA/MUSCLE INJECTED:    Visual display of locations injections are scanned into the chart under MEDIA tab.    Muscles Injected Units Injected Number of Injections   Right FDS 5 1   Right flexor pollicis 3 1        Total Units Injected: 8    Unavoidable Waste: 92    Total Units Billed 100      The patient tolerated the injections without difficulty.      Assessment:    Ayad Moreno is a 54 year old male with DYT1 dystonia and residual right hand dystonia.  Today we  did initial botulinum toxin injections.    Plan  Follow-up in 3 months' time to consider repeat injections        Again, thank you for allowing me to participate in the care of your patient.      Sincerely,    Alexa Braga MD

## 2024-02-06 NOTE — PROGRESS NOTES
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  he was last injected on 11/17/2023. he had a good response to the last injection. The last dose given was    - 5 units right zygomaticus minor muscle  - 5 units Depressor anguli oris muscle on the right.  - 2.5 units in the inferior orbicularis oris muscle bilaterally  - 5 units procerus muscle bilaterally.    Previously on 8/15/2023 he had received:    Anterior digastric muscle:EMG guided, 5 units each side, total dose  10 units BTA   Nasalis muscle. Non EMG guided, 5 units right side  - total dose 5 units BTA.  Depressor anguli oris:Non EMG guided, unilateral, right .- total dose  10 units BTA  Orbicularis oris muscle: non EMG guided, unilateral, inferior- right, 2.5 units.- total dose  2.5 units BTA.  Lateral pterygoid muscles: EMG guided, bilateral, 10 units each side with Strong EMG response - total dose 20 units BTA.  Procerus muscle: Non EMG guided, 5 units each side.total dose  10 units BTA    Our last communication he noted that he continued to do well with the jaw opening and portion of his dystonia.    He continues to do well with the jaw opening portion of his dystonia.  He feels this is related to his deep brain stimulation.  He also notes that his lower lip was a little weak and he like to hold off on that.  Therefore our plan today is to give:    - 5 units right zygomaticus minor muscle  - 5 units Depressor anguli oris muscle on the right.  - 5 units procerus muscle bilaterally.    PROCEDURE: After obtaining consent, the patient was placed in the supine position.  The skin  was prepared with an alcohol swab.  Using a 27-gauge needle the the affected muscle was injected with the noted dose of botulinum a toxin.       Dosages delivered today include:  - 5 units right zygomaticus minor muscle: Total 5 units  - 5 units Depressor anguli oris muscle on the right: Total 5 units  - 5 units procerus muscle  bilaterally: Total 10 units.     The total amount of botulinum A toxin delivered today was 20 units. An additional 40 units of botulinum A toxin was necessarily wasted in preparation for the injection. He tolerated the procedure well and left the clinic after a short observation period.           PLAN: I will have him  follow up on a PRN basis. It is anticipated that repeat injections will be required over the long term.

## 2024-02-06 NOTE — PATIENT INSTRUCTIONS
1.  You were seen in the ENT Clinic today by . If you have any questions or concerns after your appointment, please call [phone]. Press option #1 for scheduling related needs. Press option #3 for Nurse advice.    2.  Plan is to return to clinic 5/14/24 for repeat botox injection      Susan Keith LPN  [phone]  [facility] - Otolaryngology

## 2024-02-06 NOTE — PROGRESS NOTES
Movement Disorders Botulinum Toxin Clinic Note    Chief Complaint: right hand dystonia    History of Present Illness:  Ayad Moreno is a 54 year old male who presents to clinic for botulinum toxin injections for treatment of right hand dystonia.  This is his first injection with us.        Current Outpatient Medications   Medication Sig Dispense Refill    albuterol (PROAIR HFA/PROVENTIL HFA/VENTOLIN HFA) 108 (90 Base) MCG/ACT inhaler Inhale 1-2 puffs into the lungs every 4 hours as needed for wheezing      botulinum toxin type A (BOTOX) 100 units injection Inject 800 units of botulinum toxin as 200 units every 3 months 2 Units 3    Cholecalciferol (VITAMIN D3) POWD Take 1 tablet by mouth daily as needed      diazepam (VALIUM) 2 MG tablet Take 2 tablets (4 mg) by mouth every 8 hours as needed for anxiety or muscle spasms Call [phone] to make an appt 540 tablet 0    fluticasone (FLONASE) 50 MCG/ACT nasal spray Spray 2 sprays into both nostrils daily      gabapentin (NEURONTIN) 800 MG tablet Take 1 tablet (800 mg) by mouth 3 times daily 90 tablet 5    mirtazapine (REMERON) 45 MG tablet Take 1 tablet (45 mg) by mouth at bedtime 30 tablet 11    nicotine (COMMIT) 2 MG lozenge Place 2 mg inside cheek every hour as needed for smoking cessation (10x's/day)      traMADol (ULTRAM) 50 MG tablet Take 50 mg by mouth every 8 hours as needed      vitamin B complex with vitamin C (STRESS TAB) tablet Take 1 tablet by mouth every morning  (Patient not taking: Reported on 2024)      VITAMIN D PO          Allergies: He is allergic to seasonal allergies.      Physical Examination:  Vital Signs:   vitals were not taken for this visit.   Right thumb in opposition and flexion, digits 2-5 do not fully extend    BOTULINUM NEUROTOXIN INJECTION PROCEDURES:    VERIFICATION OF PATIENT IDENTIFICATION AND PROCEDURE     Initials   Patient Name acc   Patient  acc   Procedure Verified by: acc     Above assessments performed by:  Alexa  PJ Braga MD      INDICATION/S FOR PROCEDURE/S:  Ayad Moreno is a 54 year old year old patient with dystonia affecting the  right upper extremity secondary to a diagnosis of DYT1 dystonia with associated  loss of volitional motor control.     His baseline symptoms have been recalcitrant to oral medications and conservative therapy.  He is here today for an injection of Botox.      GOAL OF PROCEDURE:  The goal of this procedure is to improve volitional motor control associated with dystonic movements.    TOTAL DOSE ADMINISTERED:  Dose Administered:  8 units Botox    Diluent Used:  Preservative Free Normal Saline  Total Volume of Diluent Used:  1 ml  NDC #: Botox 100u ([id number])    CONSENT:  The risks, benefits, and treatment options were discussed with Ayad Moreno and he agreed to proceed.      Written consent was obtained by [facility].     EQUIPMENT USED:  Needle-37mm stimulating/recording  EMG/NCS Machine    SKIN PREPARATION:  Skin preparation was performed using an alcohol wipe.    GUIDANCE DESCRIPTION:  Guidance was not utilized for this procedure as the patient declined due to being in a hurry and not having his DBS controller.     AREA/MUSCLE INJECTED:    Visual display of locations injections are scanned into the chart under MEDIA tab.    Muscles Injected Units Injected Number of Injections   Right FDS 5 1   Right flexor pollicis 3 1        Total Units Injected: 8    Unavoidable Waste: 92    Total Units Billed 100      The patient tolerated the injections without difficulty.      Assessment:    Ayad Moreno is a 54 year old male with DYT1 dystonia and residual right hand dystonia.  Today we did initial botulinum toxin injections.    Plan  Follow-up in 3 months' time to consider repeat injections      Alexa Braga MD   of Neurology  Movement Disorders Division

## 2024-02-06 NOTE — LETTER
2/6/2024       RE: Ayad Moreno  [address]  [facility] [address]     Dear Colleague,    Thank you for referring your patient, Ayad Moreno, to the [facility] EAR NOSE AND THROAT CLINIC [place] at [facility]. Please see a copy of my visit note below.    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  he was last injected on 11/17/2023. he had a good response to the last injection. The last dose given was    - 5 units right zygomaticus minor muscle  - 5 units Depressor anguli oris muscle on the right.  - 2.5 units in the inferior orbicularis oris muscle bilaterally  - 5 units procerus muscle bilaterally.    Previously on 8/15/2023 he had received:    Anterior digastric muscle:EMG guided, 5 units each side, total dose  10 units BTA   Nasalis muscle. Non EMG guided, 5 units right side  - total dose 5 units BTA.  Depressor anguli oris:Non EMG guided, unilateral, right .- total dose  10 units BTA  Orbicularis oris muscle: non EMG guided, unilateral, inferior- right, 2.5 units.- total dose  2.5 units BTA.  Lateral pterygoid muscles: EMG guided, bilateral, 10 units each side with Strong EMG response - total dose 20 units BTA.  Procerus muscle: Non EMG guided, 5 units each side.total dose  10 units BTA    Our last communication he noted that he continued to do well with the jaw opening and portion of his dystonia.    He continues to do well with the jaw opening portion of his dystonia.  He feels this is related to his deep brain stimulation.  He also notes that his lower lip was a little weak and he like to hold off on that.  Therefore our plan today is to give:    - 5 units right zygomaticus minor muscle  - 5 units Depressor anguli oris muscle on the right.  - 5 units procerus muscle bilaterally.    PROCEDURE: After obtaining consent, the patient was placed in the supine position.  The skin  was  prepared with an alcohol swab.  Using a 27-gauge needle the the affected muscle was injected with the noted dose of botulinum a toxin.       Dosages delivered today include:  - 5 units right zygomaticus minor muscle: Total 5 units  - 5 units Depressor anguli oris muscle on the right: Total 5 units  - 5 units procerus muscle bilaterally: Total 10 units.     The total amount of botulinum A toxin delivered today was 20 units. An additional 40 units of botulinum A toxin was necessarily wasted in preparation for the injection. He tolerated the procedure well and left the clinic after a short observation period.           PLAN: I will have him  follow up on a PRN basis. It is anticipated that repeat injections will be required over the long term.      Again, thank you for allowing me to participate in the care of your patient.      Sincerely,    Yves Horne MD

## 2024-05-02 ENCOUNTER — MYC REFILL (OUTPATIENT)
Dept: NEUROLOGY | Facility: CLINIC | Age: 54
End: 2024-05-02
Payer: COMMERCIAL

## 2024-05-02 DIAGNOSIS — G24.1 DYSTONIA DUE TO DYT-1 GENE MUTATION: ICD-10-CM

## 2024-05-02 RX ORDER — DIAZEPAM 2 MG
4 TABLET ORAL EVERY 8 HOURS PRN
Qty: 540 TABLET | Refills: 0 | Status: SHIPPED | OUTPATIENT
Start: 2024-05-02 | End: 2024-08-05

## 2024-05-02 NOTE — TELEPHONE ENCOUNTER
Dulce pt                                          RX Authorization    Medication: diazepam (VALIUM) 2 MG tablet     Date last refill ordered: 2/1/24    Quantity ordered: 540    # refills: 0    Date of last clinic visit with ordering provider: 12/6/23    Date of next clinic visit with ordering provider: 6/10/24

## 2024-05-03 RX ORDER — DIAZEPAM 2 MG
TABLET ORAL
Qty: 540 TABLET | OUTPATIENT
Start: 2024-05-03

## 2024-05-16 DIAGNOSIS — G24.9 DYSTONIA: ICD-10-CM

## 2024-05-16 RX ORDER — GABAPENTIN 800 MG/1
800 TABLET ORAL 3 TIMES DAILY
Qty: 90 TABLET | Refills: 5 | Status: SHIPPED | OUTPATIENT
Start: 2024-05-16 | End: 2024-10-07

## 2024-05-16 NOTE — TELEPHONE ENCOUNTER
RX Authorization    Medication: gabapentin (NEURONTIN) 800 MG tablet     Date last refill ordered: 11/28/23    Quantity ordered: 90    # refills: 5    Date of last clinic visit with ordering provider: 12/6/23     Date of next clinic visit with ordering provider: 6/10/24

## 2024-05-26 ENCOUNTER — HEALTH MAINTENANCE LETTER (OUTPATIENT)
Age: 54
End: 2024-05-26

## 2024-06-03 ENCOUNTER — TELEPHONE (OUTPATIENT)
Dept: OTOLARYNGOLOGY | Facility: CLINIC | Age: 54
End: 2024-06-03
Payer: COMMERCIAL

## 2024-06-03 NOTE — TELEPHONE ENCOUNTER
[facility] Call Center    Phone Message    May a detailed message be left on voicemail: yes     Reason for Call: Other: Per pt states he really needs to be seen tomorrow. Please call to discuss. Thank you     Action Taken: Message routed to:  [facility] ENT    Travel Screening: Not Applicable     Date of Service:                                                                     
Spoke with patient and appointment scheduled.  
[Post Op: _________] : a [unfilled] post op visit

## 2024-06-04 ENCOUNTER — OFFICE VISIT (OUTPATIENT)
Dept: OTOLARYNGOLOGY | Facility: CLINIC | Age: 54
End: 2024-06-04
Payer: COMMERCIAL

## 2024-06-04 VITALS — WEIGHT: 190 LBS | HEIGHT: 72 IN | BODY MASS INDEX: 25.73 KG/M2

## 2024-06-04 DIAGNOSIS — G24.4 OROFACIAL DYSKINESIA: Primary | ICD-10-CM

## 2024-06-04 PROCEDURE — 64612 DESTROY NERVE FACE MUSCLE: CPT | Mod: 50 | Performed by: OTOLARYNGOLOGY

## 2024-06-04 NOTE — PATIENT INSTRUCTIONS
1.  You were seen in the ENT Clinic today by . If you have any questions or concerns after your appointment, please call [phone]. Press option #1 for scheduling related needs. Press option #3 for Nurse advice.    2  Plan is to return to clinic 9/10/24 for repeat botox injections      Susan Keith LPN  [phone]  [facility] - Otolaryngology

## 2024-06-04 NOTE — LETTER
6/4/2024       RE: Ayad Moreno  [address]  [facility] [address]     Dear Colleague,    Thank you for referring your patient, Ayad Moreno, to the [facility] EAR NOSE AND THROAT CLINIC [place] at [facility]. Please see a copy of my visit note below.    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  he was last injected on 2/6/2024. he had a good response to the last injection. The last dose given was     - 5 units right zygomaticus minor muscle  - 5 units Depressor anguli oris muscle on the right.  - 5 units procerus muscle bilaterally.    ___________________________________________________  Previously on 8/15/2023 he had received:     Anterior digastric muscle:EMG guided, 5 units each side, total dose  10 units BTA   Nasalis muscle. Non EMG guided, 5 units right side  - total dose 5 units BTA.  Depressor anguli oris:Non EMG guided, unilateral, right .- total dose  10 units BTA  Orbicularis oris muscle: non EMG guided, unilateral, inferior- right, 2.5 units.- total dose  2.5 units BTA.  Lateral pterygoid muscles: EMG guided, bilateral, 10 units each side with Strong EMG response - total dose 20 units BTA.  Procerus muscle: Non EMG guided, 5 units each side.total dose  10 units BTA  ___________________________________________________________      He had a nice response from the most recent injections.  He continues to have primarily right-sided facial dysfunction and our plan is to give    Zygomaticus minor muscle: Non EMG guided 5 units right   Depressor anguli oris muscle: Non EMG guided 5 units  right.  Orbicularis oris muscle: non EMG guided, unilateral, inferior- right, 2.5 units.-  Nasalis muscle. Non EMG guided, 5 units right side  Depressor anguli oris:Non EMG guided, 7.5 units right side  Procerus muscle: Non EMG guided, 5 units each side.total dose 10 units      PROCEDURE: After  obtaining consent, the patient was placed in the supine position.  The skin  was prepared with an alcohol swab.  Using a 27-gauge needle the the affected muscle was injected with the noted dose of botulinum a toxin.       Dosages delivered today include:  -Zygomaticus minor muscle: Non EMG guided 5 units right   Orbicularis oris muscle: non EMG guided, unilateral, inferior- right, 2.5 units.-  Nasalis muscle. Non EMG guided, 5 units right side  Depressor anguli oris:Non EMG guided, 7.5 units right side  Procerus muscle: Non EMG guided, 5 units each side.total dose 10 units        The total amount of botulinum A toxin delivered today was 30 units. An additional 70 units of botulinum A toxin was necessarily wasted in preparation for the injection. he tolerated the procedure well and left the clinic after a short observation period.           PLAN: I will have him  follow up on a PRN basis. It is anticipated that repeat injections will be required over the long term.         Again, thank you for allowing me to participate in the care of your patient.      Sincerely,    Yves Horne MD

## 2024-06-04 NOTE — PROGRESS NOTES
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  he was last injected on 2/6/2024. he had a good response to the last injection. The last dose given was     - 5 units right zygomaticus minor muscle  - 5 units Depressor anguli oris muscle on the right.  - 5 units procerus muscle bilaterally.    ___________________________________________________  Previously on 8/15/2023 he had received:     Anterior digastric muscle:EMG guided, 5 units each side, total dose  10 units BTA   Nasalis muscle. Non EMG guided, 5 units right side  - total dose 5 units BTA.  Depressor anguli oris:Non EMG guided, unilateral, right .- total dose  10 units BTA  Orbicularis oris muscle: non EMG guided, unilateral, inferior- right, 2.5 units.- total dose  2.5 units BTA.  Lateral pterygoid muscles: EMG guided, bilateral, 10 units each side with Strong EMG response - total dose 20 units BTA.  Procerus muscle: Non EMG guided, 5 units each side.total dose  10 units BTA  ___________________________________________________________      He had a nice response from the most recent injections.  He continues to have primarily right-sided facial dysfunction and our plan is to give    Zygomaticus minor muscle: Non EMG guided 5 units right   Depressor anguli oris muscle: Non EMG guided 5 units  right.  Orbicularis oris muscle: non EMG guided, unilateral, inferior- right, 2.5 units.-  Nasalis muscle. Non EMG guided, 5 units right side  Depressor anguli oris:Non EMG guided, 7.5 units right side  Procerus muscle: Non EMG guided, 5 units each side.total dose 10 units      PROCEDURE: After obtaining consent, the patient was placed in the supine position.  The skin  was prepared with an alcohol swab.  Using a 27-gauge needle the the affected muscle was injected with the noted dose of botulinum a toxin.       Dosages delivered today include:  -Zygomaticus minor muscle: Non EMG guided 5 units right   Orbicularis  oris muscle: non EMG guided, unilateral, inferior- right, 2.5 units.-  Nasalis muscle. Non EMG guided, 5 units right side  Depressor anguli oris:Non EMG guided, 7.5 units right side  Procerus muscle: Non EMG guided, 5 units each side.total dose 10 units        The total amount of botulinum A toxin delivered today was 30 units. An additional 70 units of botulinum A toxin was necessarily wasted in preparation for the injection. he tolerated the procedure well and left the clinic after a short observation period.           PLAN: I will have him  follow up on a PRN basis. It is anticipated that repeat injections will be required over the long term.

## 2024-08-04 ENCOUNTER — HEALTH MAINTENANCE LETTER (OUTPATIENT)
Age: 54
End: 2024-08-04

## 2024-08-05 ENCOUNTER — MYC REFILL (OUTPATIENT)
Dept: NEUROLOGY | Facility: CLINIC | Age: 54
End: 2024-08-05
Payer: COMMERCIAL

## 2024-08-05 DIAGNOSIS — G24.1 DYSTONIA DUE TO DYT-1 GENE MUTATION: ICD-10-CM

## 2024-08-06 NOTE — CONFIDENTIAL NOTE
Ayad was instructed to see Dulce back in 6 months. Will send refills to be signed and send him a message regarding making an appointment.

## 2024-08-07 RX ORDER — DIAZEPAM 2 MG
4 TABLET ORAL EVERY 8 HOURS PRN
Qty: 540 TABLET | Refills: 0 | Status: SHIPPED | OUTPATIENT
Start: 2024-08-07 | End: 2024-10-07

## 2024-08-12 ENCOUNTER — TELEPHONE (OUTPATIENT)
Dept: NEUROLOGY | Facility: CLINIC | Age: 54
End: 2024-08-12

## 2024-08-12 DIAGNOSIS — G24.1 DYSTONIA DUE TO DYT-1 GENE MUTATION: ICD-10-CM

## 2024-08-12 RX ORDER — DIAZEPAM 2 MG/1
4 TABLET ORAL EVERY 8 HOURS PRN
Qty: 540 TABLET | Refills: 0 | Status: CANCELLED | OUTPATIENT
Start: 2024-08-12

## 2024-08-12 NOTE — CONFIDENTIAL NOTE
Prior Authorization Retail Medication Request    Medication/Dose:  diazePAM 2 mg tbas  Diagnosis and ICD code (if different than what is on RX):  see chart  New/renewal/insurance change PA/secondary ins. PA:  Previously Tried and Failed:  see chart  Rationale:  see chart    Insurance   Primary: aetna  Insurance ID:  [id number]    Secondary (if applicable):  Insurance ID:      Pharmacy Information (if different than what is on RX)  [facility]  Phone:  [phone]  Fax:

## 2024-08-13 NOTE — TELEPHONE ENCOUNTER
Central Prior Authorization Team - Phone: [phone]     PA Initiation    Medication: DIAZEPAM 2 MG PO TABS  Insurance Company: [employer] - Phone [phone] Fax [phone]  Pharmacy Filling the Rx: [facility] #[id number] - [place], [place] - [address] AT [facility] [address]  Filling Pharmacy Phone: [phone]  Filling Pharmacy Fax:    Start Date: 8/13/2024

## 2024-08-14 NOTE — TELEPHONE ENCOUNTER
Prior Authorization Approval    Medication: DIAZEPAM 2 MG PO TABS  Authorization Effective Date: 3/1/2024  Authorization Expiration Date: 12/31/2024  Approved Dose/Quantity: 540/90  Reference #:     Insurance Company: [employer] - Phone [phone] Fax [phone]  Expected CoPay: $    CoPay Card Available:      Financial Assistance Needed:   Which Pharmacy is filling the prescription: [id number] - [place], [place] - [address] AT [facility] [address]  Pharmacy Notified: Yes  Patient Notified: Yes

## 2024-08-19 ENCOUNTER — TELEPHONE (OUTPATIENT)
Dept: NEUROLOGY | Facility: CLINIC | Age: 54
End: 2024-08-19
Payer: COMMERCIAL

## 2024-08-19 NOTE — TELEPHONE ENCOUNTER
The patient was called and rescheduled to 10/7/24 at 8:10 AM with KARLENE Cardona. The patient is aware [employer], his medical insurance will not be accepted after 1/1/25. The patient stated he plans to choose a new insurance carrier when enrollment comes around.    The patient was reminded to bring their patient controller with them to their upcoming appointment, with their battery fully charged if it's a rechargeable battery.

## 2024-08-19 NOTE — TELEPHONE ENCOUNTER
[facility] Call Center    Phone Message    May a detailed message be left on voicemail: yes     Reason for Call: Appointment Intake    Referring Provider Name: Kerri White  Diagnosis and/or Symptoms: DBS Programing     Action Taken: Message routed to:  [facility] Neurology     Travel Screening: Not Applicable     Date of Service:

## 2024-09-10 ENCOUNTER — OFFICE VISIT (OUTPATIENT)
Dept: OTOLARYNGOLOGY | Facility: CLINIC | Age: 54
End: 2024-09-10
Payer: COMMERCIAL

## 2024-09-10 VITALS — HEIGHT: 72 IN | WEIGHT: 190 LBS | BODY MASS INDEX: 25.73 KG/M2

## 2024-09-10 DIAGNOSIS — G24.4 OROFACIAL DYSKINESIA: Primary | ICD-10-CM

## 2024-09-10 PROCEDURE — 64612 DESTROY NERVE FACE MUSCLE: CPT | Mod: 50 | Performed by: OTOLARYNGOLOGY

## 2024-09-10 NOTE — PROGRESS NOTES
Ayad Moreno is a 54 year old male with a history of oromandibular dystonia he was last injected on 6/4/2024. he had a good response to the last injection. The last dose given was           Zygomaticus minor muscle: Non EMG guided 5 units right   Depressor anguli oris muscle: Non EMG guided 5 units  right.  Orbicularis oris muscle: non EMG guided, unilateral, inferior- right, 2.5 units.-  Nasalis muscle. Non EMG guided, 5 units right side  Depressor anguli oris:Non EMG guided, 7.5 units right side  Procerus muscle: Non EMG guided, 5 units each side.total dose 10 units    After the injection he had no complications or particularly no excessive facial droop, swallowing problems or breathing problems.   He continues to have primarily right-sided facial dysfunction and our plan is to give     Zygomaticus minor muscle: Non EMG guided 5 units right   Depressor anguli oris muscle: Non EMG guided 5 units  right.  Orbicularis oris muscle: non EMG guided, unilateral, inferior- right, 2.5 units.-  Nasalis muscle. Non EMG guided, 5 units right side  Depressor anguli oris:Non EMG guided, 7.5 units right side  Procerus muscle: Non EMG guided, 5 units each side.total dose 10 units    PROCEDURE: After obtaining consent, the patient was placed in the supine position.  The skin  was prepared with an alcohol swab.  Using a 27-gauge needle the the affected muscle was injected with the noted dose of botulinum a toxin.       Dosages delivered today include:  -Zygomaticus minor muscle: Non EMG guided 5 units right   Orbicularis oris muscle: non EMG guided, unilateral, inferior- right, 2.5 units.-  Nasalis muscle. Non EMG guided, 5 units right side  Depressor anguli oris:Non EMG guided, 7.5 units right side  Procerus muscle: Non EMG guided, 5 units each side.total dose 10 units           The total amount of botulinum A toxin delivered today was 30 units. An additional 70 units of botulinum A toxin was necessarily wasted in preparation  for the injection. he tolerated the procedure well and left the clinic after a short observation period.       PLAN: I will have him  follow up on a PRN basis.

## 2024-09-10 NOTE — LETTER
9/10/2024       RE: Ayad Moreno  [address]  [place] [address]     Dear Colleague,    Thank you for referring your patient, Ayad Moreno, to the [facility] EAR NOSE AND THROAT CLINIC [place] at [facility]. Please see a copy of my visit note below.    Ayad Moreno is a 54 year old male with a history of oromandibular dystonia he was last injected on 6/4/2024. he had a good response to the last injection. The last dose given was           Zygomaticus minor muscle: Non EMG guided 5 units right   Depressor anguli oris muscle: Non EMG guided 5 units  right.  Orbicularis oris muscle: non EMG guided, unilateral, inferior- right, 2.5 units.-  Nasalis muscle. Non EMG guided, 5 units right side  Depressor anguli oris:Non EMG guided, 7.5 units right side  Procerus muscle: Non EMG guided, 5 units each side.total dose 10 units    After the injection he had no complications or particularly no excessive facial droop, swallowing problems or breathing problems.   He continues to have primarily right-sided facial dysfunction and our plan is to give     Zygomaticus minor muscle: Non EMG guided 5 units right   Depressor anguli oris muscle: Non EMG guided 5 units  right.  Orbicularis oris muscle: non EMG guided, unilateral, inferior- right, 2.5 units.-  Nasalis muscle. Non EMG guided, 5 units right side  Depressor anguli oris:Non EMG guided, 7.5 units right side  Procerus muscle: Non EMG guided, 5 units each side.total dose 10 units    PROCEDURE: After obtaining consent, the patient was placed in the supine position.  The skin  was prepared with an alcohol swab.  Using a 27-gauge needle the the affected muscle was injected with the noted dose of botulinum a toxin.       Dosages delivered today include:  -Zygomaticus minor muscle: Non EMG guided 5 units right   Orbicularis oris muscle: non EMG guided, unilateral, inferior- right, 2.5 units.-  Nasalis muscle. Non  EMG guided, 5 units right side  Depressor anguli oris:Non EMG guided, 7.5 units right side  Procerus muscle: Non EMG guided, 5 units each side.total dose 10 units           The total amount of botulinum A toxin delivered today was 30 units. An additional 70 units of botulinum A toxin was necessarily wasted in preparation for the injection. he tolerated the procedure well and left the clinic after a short observation period.       PLAN: I will have him  follow up on a PRN basis.       Again, thank you for allowing me to participate in the care of your patient.      Sincerely,    Yves Horne MD

## 2024-09-10 NOTE — NURSING NOTE
Invasive Procedure Safety Checklist  Procedure:  Botox Injection    Responsible person(s):  Complete sections as appropriate and electronically sign and date below.    Staff/Provider  Consent documentation on chart:  YES  H&P is not applicable (when straight local anesthesia is used).    Procedure Team  Completed by comparing informed consent documentation, information on the patient record and/or the marked surgical site, and discussion with the patient/guardian.     Verified:  (Select all that apply)  Patient identification (two indicators)  Procedure to be performed  Procedure site and /or laterality and/or level  Consent  Procedure site:  Site can not be marked due to location.  Provider [place] - Site/Laterality/Level:  No Level or Structure  Staff/Provider:  No images    Procedure Team:  *Pause for the Cause* verbal and active participation of team members- verify:  Patient name:  YES  Procedure to be performed:  YES  Site, laterality and level, noting patient position:  YES    Above steps completed as applicable (Electronic Signature, Title, Date):    Kristen Hernandez RN on 9/10/2024 at 10:10 AM      Note:  Any incidents of wrong patient, wrong procedure, or wrong site are reported using the Occurrence Process already in place.  The occurrence form is required to be completed immediately with this type of event.

## 2024-10-03 ENCOUNTER — TELEPHONE (OUTPATIENT)
Dept: NEUROLOGY | Facility: CLINIC | Age: 54
End: 2024-10-03
Payer: COMMERCIAL

## 2024-10-03 NOTE — TELEPHONE ENCOUNTER
Ayad  still has one 30 day refill remaining. I contacted the pharmacy and the pharmacist confirmed this is the case. The prescription is available to be refilled and the medication is in stock. I called him to inform him of this. No further action needed at this time.

## 2024-10-03 NOTE — TELEPHONE ENCOUNTER
[facility] Call Center    Phone Message    May a detailed message be left on voicemail: yes     Reason for Call: Medication Refill Request    Has the patient contacted the pharmacy for the refill? Yes   Name of medication being requested: diazepam (VALIUM) 2 MG tablet   Provider who prescribed the medication: [facility]  Pharmacy: [id number] - [place], [place] - [address] AT [facility] [address]  Date medication is needed: 10/3/24     Ayad stated that he only has enough medication to get him through tonight.    Action Taken: Message routed to:  [facility] NEUROLOGY    Travel Screening: Not Applicable     Date of Service:

## 2024-10-06 ENCOUNTER — MYC MEDICAL ADVICE (OUTPATIENT)
Dept: NEUROLOGY | Facility: CLINIC | Age: 54
End: 2024-10-06
Payer: COMMERCIAL

## 2024-10-07 ENCOUNTER — OFFICE VISIT (OUTPATIENT)
Dept: NEUROLOGY | Facility: CLINIC | Age: 54
End: 2024-10-07
Payer: COMMERCIAL

## 2024-10-07 VITALS
HEART RATE: 73 BPM | RESPIRATION RATE: 74 BRPM | DIASTOLIC BLOOD PRESSURE: 75 MMHG | SYSTOLIC BLOOD PRESSURE: 114 MMHG | OXYGEN SATURATION: 96 %

## 2024-10-07 DIAGNOSIS — F41.9 ANXIETY: ICD-10-CM

## 2024-10-07 DIAGNOSIS — G24.1 DYSTONIA DUE TO DYT-1 GENE MUTATION: ICD-10-CM

## 2024-10-07 DIAGNOSIS — G24.9 DYSTONIA: ICD-10-CM

## 2024-10-07 DIAGNOSIS — Z96.89 S/P DEEP BRAIN STIMULATOR PLACEMENT: Primary | ICD-10-CM

## 2024-10-07 DIAGNOSIS — R47.1 DYSARTHRIA: ICD-10-CM

## 2024-10-07 PROCEDURE — 95970 ALYS NPGT W/O PRGRMG: CPT | Performed by: NURSE PRACTITIONER

## 2024-10-07 PROCEDURE — 99213 OFFICE O/P EST LOW 20 MIN: CPT | Mod: 25 | Performed by: NURSE PRACTITIONER

## 2024-10-07 RX ORDER — GABAPENTIN 800 MG/1
800 TABLET ORAL 3 TIMES DAILY
Qty: 90 TABLET | Refills: 5 | Status: SHIPPED | OUTPATIENT
Start: 2024-10-07

## 2024-10-07 RX ORDER — DIAZEPAM 2 MG/1
4 TABLET ORAL EVERY 8 HOURS PRN
Qty: 540 TABLET | Refills: 0 | Status: SHIPPED | OUTPATIENT
Start: 2024-10-07

## 2024-10-07 RX ORDER — MIRTAZAPINE 45 MG/1
45 TABLET, FILM COATED ORAL AT BEDTIME
Qty: 30 TABLET | Refills: 11 | Status: SHIPPED | OUTPATIENT
Start: 2024-10-07

## 2024-10-07 ASSESSMENT — PAIN SCALES - GENERAL: PAINLEVEL: MILD PAIN (2)

## 2024-10-07 NOTE — LETTER
10/7/2024       RE: Ayad Moreno  [address]  [facility] [address]     Dear Colleague,    Thank you for referring your patient, Ayad Moreno, to the [facility] NEUROLOGY CLINIC [place] at [facility]. Please see a copy of my visit note below.    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      Pt used to be a teacher. Due to generalized dystonia affecting his writing on the board and speaking, he had to quit his job and go on disability.  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  He is concerned that seeing more clients might be difficulty, but he is willing to try.  If he needs accomodation, I would be happy to write the letter as Ayad has come so far to get to this point.    He has done Speech Therapy in the past.     2)  S/p Bilateral DBS lead implantation:  Normal impedance and battery life. No DBS programming changes made.       PLAN:    The following patient instructions provided: -    __ Your DBS electrical system function (impedance) is normal. The battery life is also good.    __ During today's visit, no DBS programming changes were made.    __  I have refilled your Rx for Diazepam, Gabapentin, and Remeron.    __  If you need accomodation for your job, let me know.  I would be happy to write the letter.     __  I'll have Huang Paz CMA call you to schedule a follow up in 6 months. You may return or contact us sooner as needed.           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     HPI: Mr. Ayad Moreno is a 54-year-old right-handed  male who came to the [facility] neurology clinic by himself for a follow up visit.    He is s/p bilateral Globus Pallidus Internus (Gpi) deep brain stimulation (DBS) lead placement left on 1/10/2017 & right on 11/16/2017 by Dr. Carolina.  I saw him last in the clinic on December 6, 2023.    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  He is enjoying his job.  Due to vocal dystonia, the more he talks, the more his voice and tongue gets tired.  He can create his own schedule and has been able to manage seeing 20 clients per week.  After seeing 2 - 3 clients, he takes 1 hour break to rest his voice.  The job expectation is to see 25 clients per week. He is now sure if this is going to be too much for him given his Hx of generalized dystonia affecting his speech.    He has talked to his HR and they have suggested writing a letter addressing the following three points. Pt wanted to hold off on this at this time as he is able to manage current client load.      1) The nature and severity of your medical condition;   2) Any limitations that your medical condition imposes on your ability to perform the responsibilities of your position and the anticipated duration of each of those limitations; and   3) The specific accommodations you are requesting and the duration of the requested accommodations.     He has continued seeing Dr. 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 Last visit was on 9/10/2024.    Pt asked to refill his Rx.       PHYSICAL EXAM:    /75   Pulse 73   Resp (!) 74   SpO2 96%     GENERAL:  Mr. Moreno is a pleasant  male who is well-groomed and well-developed sitting comfortably in the exam room without any distress. Affect is appropriate.    Procedure: DBS Interrogation  & Analysis:    Lead(s):    Left Right   DBS Target GPi GPi   DBS Lead Type Company: [employer]  Model: 3389  Contacts: 0-3 Company: [employer]  Model: 3389  Contacts: 8-11   Lead Placement Date 1/10/2017 11/16/2017     IPG(s):   1   IPG Company: [employer]  Activa-RC  Model: Activa RC  S/N: [id number]   IPG Implant Date Sep 23, 2019   Location Left chest   Battery (V) Battery Level: 75%  Rechargeable  ADRIANNA: 9/19/2033  Charging it weekly       GROUP A ACTIVE    Left Brain    C +, 1 -, 2 -  Voltage:  4.1 (previously 4.3 V)  PW:  60 msec  Rate:  180 Hz    Therapy impedance: 570 Ohms  Therapy Current: 7.0 mA      Patient :  Upper Limit: 4.5 volts  Lower Limit: 2.1  volts    Electrode Impedance Check:    Left Lead: No Problems Found.       Right Brain     C +, 9 -, 10 -  Voltage:  3.6 volts (previously 3.6 V)  PW:  60 msec  Rate:  180 Hz    Therapy impedance: 686 Ohms  Therapy Current: 5.1 mA    Patient :  Upper Limit: 4.3 volts  Lower Limit: 0.0 volts    Electrode Impedance Check:    Right Lead: No Problems Found.      See scanned report for impedance details.      Today I spent 35 minutes caring for the patient. 15 minutes was spent in DBS interrogation and analysis.  20 minutes was spent with reviewing records, meeting with the patient, answering questions, refilling/ordering medication, and documentation.    Dulce Manning DNP, APRN  [facility] Neurology Clinic'      Again, thank you for allowing me to participate in the care of your patient.      Sincerely,    CT Campbell CNP

## 2024-10-07 NOTE — PROGRESS NOTES
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      Pt used to be a teacher. Due to generalized dystonia affecting his writing on the board and speaking, he had to quit his job and go on disability.  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  He is concerned that seeing more clients might be difficulty, but he is willing to try.  If he needs accomodation, I would be happy to write the letter as Ayad has come so far to get to this point.    He has done Speech Therapy in the past.     2)  S/p Bilateral DBS lead implantation:  Normal impedance and battery life. No DBS programming changes made.       PLAN:    The following patient instructions provided: -    __ Your DBS electrical system function (impedance) is normal. The battery life is also good.    __ During today's visit, no DBS programming changes were made.    __  I have refilled your Rx for Diazepam, Gabapentin, and Remeron.    __  If you need accomodation for your job, let me know.  I would be happy to write the letter.     __  I'll have Huang Paz CMA call you to schedule a follow up in 6 months. You may return or contact us sooner as needed.           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     HPI: Mr. Ayad Moreno is a 54-year-old right-handed  male who came to the [facility] neurology clinic by himself for a follow up visit.    He is s/p bilateral Globus Pallidus Internus (Gpi) deep brain stimulation (DBS) lead placement left on 1/10/2017 & right on 2017 by Dr. Carolina.   I saw him last in the clinic on December 6, 2023.    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  He is enjoying his job.  Due to vocal dystonia, the more he talks, the more his voice and tongue gets tired.  He can create his own schedule and has been able to manage seeing 20 clients per week.  After seeing 2 - 3 clients, he takes 1 hour break to rest his voice.  The job expectation is to see 25 clients per week. He is now sure if this is going to be too much for him given his Hx of generalized dystonia affecting his speech.    He has talked to his HR and they have suggested writing a letter addressing the following three points. Pt wanted to hold off on this at this time as he is able to manage current client load.      1) The nature and severity of your medical condition;   2) Any limitations that your medical condition imposes on your ability to perform the responsibilities of your position and the anticipated duration of each of those limitations; and   3) The specific accommodations you are requesting and the duration of the requested accommodations.     He has continued seeing Dr. 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 Last visit was on 9/10/2024.    Pt asked to refill his Rx.       PHYSICAL EXAM:    /75   Pulse 73   Resp (!) 74   SpO2 96%     GENERAL:  Mr. Moreno is a pleasant  male who is well-groomed and well-developed sitting comfortably in the exam room without any distress. Affect is appropriate.    Procedure: DBS Interrogation & Analysis:    Lead(s):    Left Right   DBS Target GPi GPi   DBS Lead Type Company: [employer]  Model: 3389  Contacts: 0-3 Company: [employer]  Model: 3389  Contacts: 8-11   Lead Placement Date 1/10/2017 11/16/2017     IPG(s):   1   IPG Company: [employer]  Activa-RC  Model: Activa RC  S/N: [id number]   IPG Implant Date Sep  23, 2019   Location Left chest   Battery (V) Battery Level: 75%  Rechargeable  ADRIANNA: 9/19/2033  Charging it weekly       GROUP A ACTIVE    Left Brain    C +, 1 -, 2 -  Voltage:  4.1 (previously 4.3 V)  PW:  60 msec  Rate:  180 Hz    Therapy impedance: 570 Ohms  Therapy Current: 7.0 mA      Patient :  Upper Limit: 4.5 volts  Lower Limit: 2.1  volts    Electrode Impedance Check:    Left Lead: No Problems Found.       Right Brain     C +, 9 -, 10 -  Voltage:  3.6 volts (previously 3.6 V)  PW:  60 msec  Rate:  180 Hz    Therapy impedance: 686 Ohms  Therapy Current: 5.1 mA    Patient :  Upper Limit: 4.3 volts  Lower Limit: 0.0 volts    Electrode Impedance Check:    Right Lead: No Problems Found.      See scanned report for impedance details.      Today I spent 35 minutes caring for the patient. 15 minutes was spent in DBS interrogation and analysis.  20 minutes was spent with reviewing records, meeting with the patient, answering questions, refilling/ordering medication, and documentation.    Dulce Manning DNP, APRN  [facility]'

## 2024-11-22 NOTE — MR AVS SNAPSHOT
After Visit Summary   8/23/2018    Ayad Moreno    MRN: [id number]           Patient Information     Date Of Birth          1970        Visit Information        Provider Department      8/23/2018 11:00 AM Uche Loera MD [facility] Neurology        Today's Diagnoses     Dystonia due to DYT-1 gene mutation    -  1    Generalized dystonia        Depression with anxiety          Care Instructions    We increased the voltage of your right brain to 3.9 volts today.          Follow-ups after your visit        Your next 10 appointments already scheduled     Aug 28, 2018  9:30 AM CDT   Neuro Treatment with Allison E Alpers, SLP   [facility] Speech Therapy ([facility])    [address]  [address]  [facility] [address] [phone]              Who to contact     Please call your clinic at [phone] to:    Ask questions about your health    Make or cancel appointments    Discuss your medicines    Learn about your test results    Speak to your doctor            Additional Information About Your Visit        [employer] System Information     [employer] gives you secure access to your electronic health record. If you see a primary care provider, you can also send messages to your care team and make appointments. If you have questions, please call your primary care clinic.  If you do not have a primary care provider, please call [phone] and they will assist you.      [employer] is an electronic gateway that provides easy, online access to your medical records. With [employer], you can request a clinic appointment, read your test results, renew a prescription or communicate with your care team.     To access your existing account, please contact your [facility] or call [phone] for assistance.        Care EveryWhere ID     This is your Care EveryWhere ID. This could be used by other organizations to access your [place] medical records  [id number]       "  Your Vitals Were     Pulse Temperature Height Pulse Oximetry BMI (Body Mass Index)       65 97.8  F (36.6  C) 1.867 m (6' 1.5\") 97% 23.84 kg/m2        Blood Pressure from Last 3 Encounters:   08/23/18 119/69   05/10/18 112/67   03/16/18 112/72    Weight from Last 3 Encounters:   08/23/18 83.1 kg (183 lb 3.2 oz)   05/10/18 87.5 kg (192 lb 12.8 oz)   03/16/18 91.1 kg (200 lb 13.4 oz)              Today, you had the following     No orders found for display       Primary Care Provider Office Phone # Fax #    Yves Pope [phone]       [facility] [address] [place] [address]        Equal Access to Services     [facility]: Hadii aad ku hadasho Soomaali, waaxda luqadaha, qaybta kaalmada adeegyada, raffy alamoin haykrystyna lopez . So [facility] [phone].    ATENCIÓN: Si habla español, tiene a loving disposición servicios gratuitos de asistencia lingüística. Celeste al [phone].    We comply with applicable federal civil rights laws and [place] laws. We do not discriminate on the basis of race, color, national origin, age, disability, sex, sexual orientation, or gender identity.            Thank you!     Thank you for choosing [facility] NEUROLOGY  for your care. Our goal is always to provide you with excellent care. Hearing back from our patients is one way we can continue to improve our services. Please take a few minutes to complete the written survey that you may receive in the mail after your visit with us. Thank you!             Your Updated Medication List - Protect others around you: Learn how to safely use, store and throw away your medicines at www.disposemymeds.org.          This list is accurate as of 8/23/18 11:59 PM.  Always use your most recent med list.                   Brand Name Dispense Instructions for use Diagnosis    BOTOX 100 units injection   Generic drug:  botulinum toxin type A     95 each    every 3 months Inject 95 units    " PACU ANESTHESIA ADMISSION NOTE      Procedure: Robotic assisted laparoscopic cholecystectomy  Post op diagnosis:      ____  Intubated  TV:______       Rate: ______      FiO2: ______    __x__  Patent Airway    __x__  Full return of protective reflexes    __x__  Full recovery from anesthesia / back to baseline     Vitals:   T: 97          R:    14              BP:   137/75               Sat:    96%               P: 74      Mental Status:  __x__ Awake   __x___ Alert   _____ Drowsy   _____ Sedated    Nausea/Vomiting:  __x__ NO  ______Yes,   See Post - Op Orders          Pain Scale (0-10):  ___0__    Treatment: ____ None    _x___ See Post - Op/PCA Orders    Post - Operative Fluids:   ____ Oral   ___x_ See Post - Op Orders    Plan: Discharge:   __x__Home       _____Floor     _____Critical Care    _____  Other:_________________    Comments: patient hemodynamically stable. Handoff endorsed to PACU RN. No anesthesia related issues present. To be discharged home when criteria met Oromandibular dystonia       diazepam 2 MG tablet    VALIUM    60    Take 4 mg by mouth 2 times daily Takes 2 pills at a time        NEURONTIN 800 MG tablet   Generic drug:  gabapentin     90    Take 800 mg by mouth 3 times daily        NICORETTE 2 MG lozenge   Generic drug:  nicotine polacrilex      Place 2 mg inside cheek every hour as needed for smoking cessation (10x's/day)        REMERON 30 MG tablet   Generic drug:  mirtazapine     30    Take 1 tablet by mouth At Bedtime        traMADol 50 MG tablet    ULTRAM     Take 50 mg by mouth every 8 hours as needed        UNABLE TO FIND      Take 1 teaspoonful by mouth 2 times daily MEDICATION NAME: Marijuana (CBD) Oil- NOT THC        vitamin B complex with vitamin C Tabs tablet      Take 1 tablet by mouth every morning

## 2024-11-26 ENCOUNTER — MYC MEDICAL ADVICE (OUTPATIENT)
Dept: NEUROLOGY | Facility: CLINIC | Age: 54
End: 2024-11-26
Payer: COMMERCIAL

## 2024-11-27 NOTE — CONFIDENTIAL NOTE
"Called [facility], they will fill the diazepam, the mirtazapine needs an \"override\". He states the patient should call his insurance to let them know why he needs a sooner refill.  "

## 2024-12-10 ENCOUNTER — OFFICE VISIT (OUTPATIENT)
Dept: OTOLARYNGOLOGY | Facility: CLINIC | Age: 54
End: 2024-12-10
Payer: COMMERCIAL

## 2024-12-10 DIAGNOSIS — G24.4 OROFACIAL DYSKINESIA: Primary | ICD-10-CM

## 2024-12-10 PROCEDURE — 64612 DESTROY NERVE FACE MUSCLE: CPT | Performed by: OTOLARYNGOLOGY

## 2024-12-10 NOTE — PROGRESS NOTES
Ayad Moreno is a 54 year old male with a history of oromandibular dystonia he was last injected on 9/10/2024. he had a good response to the last injection. The last dose given was      Zygomaticus minor muscle: Non EMG guided 5 units right   Depressor anguli oris muscle: Non EMG guided 5 units  right.  Orbicularis oris muscle: non EMG guided, unilateral, inferior- right, 2.5 units.-  Nasalis muscle. Non EMG guided, 5 units right side  Depressor anguli oris:Non EMG guided, 7.5 units right side  Procerus muscle: Non EMG guided, 5 units each side.total dose 10 units         After the injection he had no complications or particularly no excessive facial droop, swallowing problems or breathing problems. He is having some slight increase in facial movement around the nose on the left side otherwise is predominantly right-sided symptoms.      Our plan is to give    Zygomaticus minor muscle: Non EMG guided 5 units right   Depressor anguli oris muscle: Non EMG guided 5 units  right.  Orbicularis oris muscle: non EMG guided, unilateral, inferior- right, 2.5 units.-  Nasalis muscle. Non EMG guided, 5 units bilateral (total dose 10 units)  Depressor anguli oris:Non EMG guided, 7.5 units right side  Procerus muscle: Non EMG guided, 5 units each side.total dose 10 units          PROCEDURE: After obtaining consent, the patient was placed in the supine position. Ground and reference electrodes were applied. The cheek and temporal areas were prepared with an alcohol swab.  Using a 27- needle, the affected muscle was injected with the noted dose of botulinum a toxin.         Zygomaticus minor muscle: Non EMG guided 5 units right   Depressor anguli oris muscle: Non EMG guided 5 units  right.  Orbicularis oris muscle: non EMG guided, unilateral, inferior- right, 2.5 units.-  Nasalis muscle. Non EMG guided, 5 units bilateral (total dose 10 units)  Depressor anguli oris:Non EMG guided, 7.5 units right side  Procerus muscle: Non EMG  guided, 5 units each side.total dose 10 units          The total amount of botulinum A toxin delivered today was 40 units. An additional 60 units of botulinum A toxin was necessarily wasted in preparation for the injection. he tolerated the procedure well and left the clinic after a short observation period.         The EMG was necessary specifically in this case to identify areas of greatest  muscle activity within the masseter and temporalis muscles, information  not otherwise directly accessible without EMG guidance.    PLAN: I will have him  follow up on a PRN basis.

## 2024-12-10 NOTE — LETTER
12/10/2024       RE: Ayad Moreno  [address]  [facility] [address]       Dear Colleague,    Thank you for referring your patient, Ayad Moreno, to the [facility] EAR NOSE AND THROAT CLINIC [place] at [facility]. Please see a copy of my visit note below.    Ayad Moreno is a 54 year old male with a history of oromandibular dystonia he was last injected on 9/10/2024. he had a good response to the last injection. The last dose given was      Zygomaticus minor muscle: Non EMG guided 5 units right   Depressor anguli oris muscle: Non EMG guided 5 units  right.  Orbicularis oris muscle: non EMG guided, unilateral, inferior- right, 2.5 units.-  Nasalis muscle. Non EMG guided, 5 units right side  Depressor anguli oris:Non EMG guided, 7.5 units right side  Procerus muscle: Non EMG guided, 5 units each side.total dose 10 units         After the injection he had no complications or particularly no excessive facial droop, swallowing problems or breathing problems. He is having some slight increase in facial movement around the nose on the left side otherwise is predominantly right-sided symptoms.      Our plan is to give    Zygomaticus minor muscle: Non EMG guided 5 units right   Depressor anguli oris muscle: Non EMG guided 5 units  right.  Orbicularis oris muscle: non EMG guided, unilateral, inferior- right, 2.5 units.-  Nasalis muscle. Non EMG guided, 5 units bilateral (total dose 10 units)  Depressor anguli oris:Non EMG guided, 7.5 units right side  Procerus muscle: Non EMG guided, 5 units each side.total dose 10 units          PROCEDURE: After obtaining consent, the patient was placed in the supine position. Ground and reference electrodes were applied. The cheek and temporal areas were prepared with an alcohol swab.  Using a 27- needle, the affected muscle was injected with the noted dose of botulinum a toxin.         Zygomaticus minor muscle: Non EMG  guided 5 units right   Depressor anguli oris muscle: Non EMG guided 5 units  right.  Orbicularis oris muscle: non EMG guided, unilateral, inferior- right, 2.5 units.-  Nasalis muscle. Non EMG guided, 5 units bilateral (total dose 10 units)  Depressor anguli oris:Non EMG guided, 7.5 units right side  Procerus muscle: Non EMG guided, 5 units each side.total dose 10 units        The total amount of botulinum A toxin delivered today was 40 units. An additional 60 units of botulinum A toxin was necessarily wasted in preparation for the injection. he tolerated the procedure well and left the clinic after a short observation period.         The EMG was necessary specifically in this case to identify areas of greatest  muscle activity within the masseter and temporalis muscles, information  not otherwise directly accessible without EMG guidance.    PLAN: I will have him  follow up on a PRN basis.         Again, thank you for allowing me to participate in the care of your patient.      Sincerely,    Yves Horne MD

## 2024-12-10 NOTE — PATIENT INSTRUCTIONS
1.  You were seen in the ENT Clinic today by . If you have any questions or concerns after your appointment, please call [phone]. Press option #1 for scheduling related needs. Press option #3 for Nurse advice.     2. Plan is to return to clinic March 11, 2025 for repeat botox injection.        Susan Keith LPN  [phone]  [facility] - Otolaryngology

## 2024-12-10 NOTE — NURSING NOTE
Invasive Procedure Safety Checklist  Procedure:  Botox    Responsible person(s):  Complete sections as appropriate and electronically sign and date below.    Staff/Provider  Consent documentation on chart:  YES  H&P is not applicable (when straight local anesthesia is used).    Procedure Team  Completed by comparing informed consent documentation, information on the patient record and/or the marked surgical site, and discussion with the patient/guardian.     Verified:  (Select all that apply)  Patient identification (two indicators)  Procedure to be performed  Procedure site and /or laterality and/or level  Consent  Procedure site:  Site can not be marked due to location.  Provider [place] - Site/Laterality/Level:  No Level or Structure  Staff/Provider:  No images    Procedure Team:  *Pause for the Cause* verbal and active participation of team members- verify:  Patient name:  YES  Procedure to be performed:  YES  Site, laterality and level, noting patient position:  YES    Above steps completed as applicable (Electronic Signature, Title, Date):    ZANE SWANSON LPN on 12/10/2024 at 11:16 AM      Note:  Any incidents of wrong patient, wrong procedure, or wrong site are reported using the Occurrence Process already in place.  The occurrence form is required to be completed immediately with this type of event.

## 2025-01-21 DIAGNOSIS — G24.9 DYSTONIA: ICD-10-CM

## 2025-01-21 DIAGNOSIS — G24.1 DYSTONIA DUE TO DYT-1 GENE MUTATION: ICD-10-CM

## 2025-01-21 RX ORDER — DIAZEPAM 2 MG/1
4 TABLET ORAL EVERY 8 HOURS PRN
Qty: 540 TABLET | Refills: 0 | Status: SHIPPED | OUTPATIENT
Start: 2025-01-21

## 2025-01-21 NOTE — TELEPHONE ENCOUNTER
RX Authorization    Medication: diazepam (VALIUM) 2 MG tablet     Date last refill ordered: 10/7/24    Quantity ordered: 540    # refills: 0    Date of last clinic visit with ordering provider: 10/7/24    Date of next clinic visit with ordering provider: None scheduled

## 2025-02-06 ENCOUNTER — TELEPHONE (OUTPATIENT)
Dept: OTOLARYNGOLOGY | Facility: CLINIC | Age: 55
End: 2025-02-06
Payer: COMMERCIAL

## 2025-02-06 NOTE — TELEPHONE ENCOUNTER
LVM that patients insurance is no longer accepted. Patient can get new insurance or will need to be cancelled. Gave ENT number

## 2025-03-11 ENCOUNTER — OFFICE VISIT (OUTPATIENT)
Dept: OTOLARYNGOLOGY | Facility: CLINIC | Age: 55
End: 2025-03-11
Payer: COMMERCIAL

## 2025-03-11 DIAGNOSIS — G24.4 OROFACIAL DYSKINESIA: Primary | ICD-10-CM

## 2025-03-11 NOTE — PROGRESS NOTES
Ayad Moreno is a 55 year old male with a history of oromandibular dystonia he was last injected on 12/10/2024. he had a good response to the last injection. The last dose given was     Zygomaticus minor muscle: Non EMG guided 5 units right   Depressor anguli oris muscle: Non EMG guided 5 units  right.  Orbicularis oris muscle: non EMG guided, unilateral, inferior- right, 2.5 units.-  Nasalis muscle. Non EMG guided, 5 units bilateral (total dose 10 units)  Depressor anguli oris:Non EMG guided, 7.5 units right side  Procerus muscle: Non EMG guided, 5 units each side.total dose 10 units    Following injection he had no complications of excessive facial paralysis, swallowing or breathing difficulties.    Our plan is to give:  Zygomaticus minor muscle: Non EMG guided 5 units right   Depressor anguli oris muscle: Non EMG guided 5 units  right.  Orbicularis oris muscle: non EMG guided, unilateral, inferior- right, 2.5 units.-  Nasalis muscle. Non EMG guided, 5 units bilateral (total dose 10 units)  Depressor anguli oris: Non EMG guided, 7.5 units right side  Procerus muscle: Non EMG guided, 5 units each side.total dose 10 units    PROCEDURE: After obtaining consent, the patient was placed in the supine position.  The injection areas were prepped with an alcohol swab.  Using a 27-gauge needle the affected muscle was injected with a noted dose of botulinum a toxin.        Zygomaticus minor muscle: Non EMG guided 5 units right   Depressor anguli oris muscle: Non EMG guided 5 units  right.  Orbicularis oris muscle: non EMG guided, unilateral, inferior- right, 2.5 units.-  Nasalis muscle. Non EMG guided, 5 units bilateral (total dose 10 units)  Depressor anguli oris:Non EMG guided, 7.5 units right side  Procerus muscle: Non EMG guided, 5 units each side.total dose 10 units     The total amount of botulinum A toxin delivered today was 40 units. An additional 60 units of botulinum A toxin was necessarily wasted in preparation  for the injection. he tolerated the procedure well and left the clinic after a short observation period.           PLAN: I will have him  follow up on a PRN basis.

## 2025-03-11 NOTE — LETTER
3/11/2025       RE: Ayad Moreno  [address]  [facility] [address]     Dear Colleague,    Thank you for referring your patient, Ayad Moreno, to the [facility] EAR NOSE AND THROAT CLINIC [place] at [facility]. Please see a copy of my visit note below.    Ayad Moreno is a 55 year old male with a history of oromandibular dystonia he was last injected on 12/10/2024. he had a good response to the last injection. The last dose given was     Zygomaticus minor muscle: Non EMG guided 5 units right   Depressor anguli oris muscle: Non EMG guided 5 units  right.  Orbicularis oris muscle: non EMG guided, unilateral, inferior- right, 2.5 units.-  Nasalis muscle. Non EMG guided, 5 units bilateral (total dose 10 units)  Depressor anguli oris:Non EMG guided, 7.5 units right side  Procerus muscle: Non EMG guided, 5 units each side.total dose 10 units    Following injection he had no complications of excessive facial paralysis, swallowing or breathing difficulties.    Our plan is to give:  Zygomaticus minor muscle: Non EMG guided 5 units right   Depressor anguli oris muscle: Non EMG guided 5 units  right.  Orbicularis oris muscle: non EMG guided, unilateral, inferior- right, 2.5 units.-  Nasalis muscle. Non EMG guided, 5 units bilateral (total dose 10 units)  Depressor anguli oris: Non EMG guided, 7.5 units right side  Procerus muscle: Non EMG guided, 5 units each side.total dose 10 units    PROCEDURE: After obtaining consent, the patient was placed in the supine position.  The injection areas were prepped with an alcohol swab.  Using a 27-gauge needle the affected muscle was injected with a noted dose of botulinum a toxin.        Zygomaticus minor muscle: Non EMG guided 5 units right   Depressor anguli oris muscle: Non EMG guided 5 units  right.  Orbicularis oris muscle: non EMG guided, unilateral, inferior- right, 2.5 units.-  Nasalis muscle. Non EMG guided,  5 units bilateral (total dose 10 units)  Depressor anguli oris:Non EMG guided, 7.5 units right side  Procerus muscle: Non EMG guided, 5 units each side.total dose 10 units     The total amount of botulinum A toxin delivered today was 40 units. An additional 60 units of botulinum A toxin was necessarily wasted in preparation for the injection. he tolerated the procedure well and left the clinic after a short observation period.           PLAN: I will have him  follow up on a PRN basis.       Again, thank you for allowing me to participate in the care of your patient.      Sincerely,    Yves Horne MD

## 2025-03-11 NOTE — PATIENT INSTRUCTIONS
1.  You were seen in the ENT Clinic today by . If you have any questions or concerns after your appointment, please call [phone]. Press option #1 for scheduling related needs. Press option #3 for Nurse advice.    2. Plan is to return to clinic on 6/10/25 for repeat botox injection      Susan Keith LPN  [phone]  [facility] - Otolaryngology

## 2025-04-04 DIAGNOSIS — G24.9 DYSTONIA: ICD-10-CM

## 2025-04-04 NOTE — TELEPHONE ENCOUNTER
RX Authorization    Medication: gabapentin (NEURONTIN) 800 MG tablet     Date last refill ordered: 10/7/24    Quantity ordered: 90    # refills: 5    Date of last clinic visit with ordering provider: 10/7/24    Date of next clinic visit with ordering provider: None scheduled

## 2025-04-06 RX ORDER — GABAPENTIN 800 MG/1
800 TABLET ORAL 3 TIMES DAILY
Qty: 90 TABLET | Refills: 5 | Status: SHIPPED | OUTPATIENT
Start: 2025-04-06

## 2025-04-10 ENCOUNTER — TELEPHONE (OUTPATIENT)
Dept: NEUROLOGY | Facility: CLINIC | Age: 55
End: 2025-04-10
Payer: COMMERCIAL

## 2025-04-15 NOTE — TELEPHONE ENCOUNTER
Spoke to the patient and stated he saw Dr. Loera in the [place] and was told by Dr. Loera he should make an appointment to follow up on his programming. The patient stated he wanted to see Dulce. The patient was scheduled at [facility]'s next available appointment in [place] as the patient did not want to go to [place], on 6/30/25 at 7 AM. The patient was reminded to bring their patient controller with them to their upcoming appointment, with their battery fully charged if it's a rechargeable battery. The patient verbalized their understanding and had no further questions.

## 2025-04-15 NOTE — TELEPHONE ENCOUNTER
HCC coding opportunities       Chart reviewed, no opportunity found: CHART REVIEWED, NO OPPORTUNITY FOUND        Patients Insurance        Commercial Insurance: [employer]        The patient left a message on 4/14 at 7:39 AM stated he stopped in the hallway by someone (the patient's speech was slurred on the call so it was hard to make out who this person was), to schedule to see them in the office. The patient stated he would like to discuss if he should get a new lead since his symptoms are not relieved with his current settings and DBS leads.

## 2025-06-10 ENCOUNTER — OFFICE VISIT (OUTPATIENT)
Dept: OTOLARYNGOLOGY | Facility: CLINIC | Age: 55
End: 2025-06-10
Payer: COMMERCIAL

## 2025-06-10 DIAGNOSIS — G24.4 OROFACIAL DYSKINESIA: Primary | ICD-10-CM

## 2025-06-10 PROCEDURE — 64612 DESTROY NERVE FACE MUSCLE: CPT | Mod: 50 | Performed by: OTOLARYNGOLOGY

## 2025-06-10 PROCEDURE — 1126F AMNT PAIN NOTED NONE PRSNT: CPT | Performed by: OTOLARYNGOLOGY

## 2025-06-10 ASSESSMENT — PAIN SCALES - GENERAL: PAINLEVEL_OUTOF10: NO PAIN (0)

## 2025-06-10 NOTE — PROGRESS NOTES
Ayad Moreno is a 55 year old male with a history of oromandibular dystonia he was last injected on 3/11/2025. he had a good response to the last injection. The last dose given was      Zygomaticus minor muscle: Non EMG guided 5 units right   Depressor anguli oris muscle: Non EMG guided 5 units  right.  Orbicularis oris muscle: non EMG guided, unilateral, inferior- right, 2.5 units.-  Nasalis muscle. Non EMG guided, 5 units bilateral (total dose 10 units)  Depressor anguli oris:Non EMG guided, 7.5 units right side  Procerus muscle: Non EMG guided, 5 units each side.total dose 10 units    There was no dysphagia or airway issues.  He had a good duration of response and returns today.  He would like to not have injection on the left sided nasalis and we will reduce his depressor anguli oris lower injection to 5 units .    Our plan today is to give    Zygomaticus minor muscle: Non EMG guided 5 units right   Depressor anguli oris muscle: Non EMG guided 5 units  right upper.  Orbicularis oris muscle: non EMG guided, unilateral, inferior- right, 2.5 units.-  Nasalis muscle. Non EMG guided, 5 units right side only  Depressor anguli oris:Non EMG guided, 5 units right side lower  Procerus muscle: Non EMG guided, 5 units each side.(total dose 10 units)    PROCEDURE: After obtaining consent, the patient was placed in the supine position.  The injection areas were prepped with an alcohol swab.  Using a 27-gauge needle the affected muscle was injected with a noted dose of botulinum a toxin.     Zygomaticus minor muscle: Non EMG guided 5 units right   Depressor anguli oris muscle: Non EMG guided 5 units  right upper.  Orbicularis oris muscle: non EMG guided, unilateral, inferior- right, 2.5 units.-  Nasalis muscle. Non EMG guided, 5 units right side only (total dose 5 units)  Depressor anguli oris:Non EMG guided, 5 units right side lower  Procerus muscle: Non EMG guided, 5 units each side.(total dose 10 units)           The  total amount of botulinum A toxin delivered today was 32.5 units. An additional 67.5 units of botulinum A toxin was necessarily wasted in preparation for the injection. he tolerated the procedure well and left the clinic after a short observation period.           PLAN: I will have him  follow up with a physical medicine rehabilitation department for his orofacial dyskinesia as I will be retiring.

## 2025-06-10 NOTE — LETTER
6/10/2025       RE: Ayad Moreno  [address]  [facility] [place] [address]       Dear Colleague,    Thank you for referring your patient, Ayad Moreno, to the [facility] EAR NOSE AND THROAT CLINIC [place] [facility]. Please see a copy of my visit note below.    Ayad Moreno is a 55 year old male with a history of oromandibular dystonia he was last injected on 3/11/2025. he had a good response to the last injection. The last dose given was      Zygomaticus minor muscle: Non EMG guided 5 units right   Depressor anguli oris muscle: Non EMG guided 5 units  right.  Orbicularis oris muscle: non EMG guided, unilateral, inferior- right, 2.5 units.-  Nasalis muscle. Non EMG guided, 5 units bilateral (total dose 10 units)  Depressor anguli oris:Non EMG guided, 7.5 units right side  Procerus muscle: Non EMG guided, 5 units each side.total dose 10 units    There was no dysphagia or airway issues.  He had a good duration of response and returns today.  He would like to not have injection on the left sided nasalis and we will reduce his depressor anguli oris lower injection to 5 units .    Our plan today is to give    Zygomaticus minor muscle: Non EMG guided 5 units right   Depressor anguli oris muscle: Non EMG guided 5 units  right upper.  Orbicularis oris muscle: non EMG guided, unilateral, inferior- right, 2.5 units.-  Nasalis muscle. Non EMG guided, 5 units right side only  Depressor anguli oris:Non EMG guided, 5 units right side lower  Procerus muscle: Non EMG guided, 5 units each side.(total dose 10 units)    PROCEDURE: After obtaining consent, the patient was placed in the supine position.  The injection areas were prepped with an alcohol swab.  Using a 27-gauge needle the affected muscle was injected with a noted dose of botulinum a toxin.     Zygomaticus minor muscle: Non EMG guided 5 units right   Depressor anguli oris muscle: Non EMG guided 5 units   right upper.  Orbicularis oris muscle: non EMG guided, unilateral, inferior- right, 2.5 units.-  Nasalis muscle. Non EMG guided, 5 units right side only (total dose 5 units)  Depressor anguli oris:Non EMG guided, 5 units right side lower  Procerus muscle: Non EMG guided, 5 units each side.(total dose 10 units)       The total amount of botulinum A toxin delivered today was 32.5 units. An additional 67.5 units of botulinum A toxin was necessarily wasted in preparation for the injection. he tolerated the procedure well and left the clinic after a short observation period.       PLAN: I will have him  follow up with a physical medicine rehabilitation department for his orofacial dyskinesia as I will be retiring.        Again, thank you for allowing me to participate in the care of your patient.      Sincerely,    Yves Horne MD

## 2025-06-10 NOTE — PATIENT INSTRUCTIONS
1.  You were seen in the ENT Clinic today by Dr. Horne. If you have any questions or concerns after your appointment, please call [phone]. Press option #1 for scheduling related needs. Press option #3 for Nurse advice.     2.  Dr. Horne has recommended the following:              - we will place referral to physical rehab medicine to resume your botox injections. They will contact you for scheduling     3.  Plan is to return to clinic as needed     How to Contact Us:  Send a [employer] message to your provider. Our team will respond to you via [employer]. Occasionally, we will need to call you to get further information.  For urgent matters (Monday-Friday, 8:00 AM-3:30 PM), call the ENT Clinic: [phone] and speak with a call center team member - they will route your call appropriately.           Susan Keith LPN  [phone]  [facility] - Otolaryngology

## 2025-06-13 ENCOUNTER — TELEPHONE (OUTPATIENT)
Dept: PHYSICAL MEDICINE AND REHAB | Facility: CLINIC | Age: 55
End: 2025-06-13
Payer: COMMERCIAL

## 2025-06-13 NOTE — TELEPHONE ENCOUNTER
Waiting to hear back from pt to call and confirm the two appts with Dr Good  I have secured for him.

## 2025-06-16 ENCOUNTER — PATIENT OUTREACH (OUTPATIENT)
Dept: CARE COORDINATION | Facility: CLINIC | Age: 55
End: 2025-06-16
Payer: COMMERCIAL

## 2025-06-18 ENCOUNTER — PATIENT OUTREACH (OUTPATIENT)
Dept: CARE COORDINATION | Facility: CLINIC | Age: 55
End: 2025-06-18
Payer: COMMERCIAL

## 2025-06-26 ENCOUNTER — MYC MEDICAL ADVICE (OUTPATIENT)
Dept: NEUROLOGY | Facility: CLINIC | Age: 55
End: 2025-06-26
Payer: COMMERCIAL

## 2025-06-30 ENCOUNTER — OFFICE VISIT (OUTPATIENT)
Dept: NEUROLOGY | Facility: CLINIC | Age: 55
End: 2025-06-30
Payer: COMMERCIAL

## 2025-06-30 VITALS
DIASTOLIC BLOOD PRESSURE: 83 MMHG | OXYGEN SATURATION: 97 % | RESPIRATION RATE: 16 BRPM | HEART RATE: 77 BPM | SYSTOLIC BLOOD PRESSURE: 124 MMHG

## 2025-06-30 DIAGNOSIS — Z96.89 S/P DEEP BRAIN STIMULATOR PLACEMENT: ICD-10-CM

## 2025-06-30 DIAGNOSIS — G24.1 DYSTONIA DUE TO DYT-1 GENE MUTATION: Primary | ICD-10-CM

## 2025-06-30 DIAGNOSIS — R47.1 DYSARTHRIA: ICD-10-CM

## 2025-06-30 DIAGNOSIS — F41.9 ANXIETY: ICD-10-CM

## 2025-06-30 RX ORDER — GABAPENTIN 800 MG/1
800 TABLET ORAL 3 TIMES DAILY
Qty: 90 TABLET | Refills: 5 | Status: SHIPPED | OUTPATIENT
Start: 2025-06-30

## 2025-06-30 RX ORDER — MIRTAZAPINE 45 MG/1
45 TABLET, FILM COATED ORAL AT BEDTIME
Qty: 90 TABLET | Refills: 3 | Status: SHIPPED | OUTPATIENT
Start: 2025-06-30

## 2025-06-30 ASSESSMENT — PAIN SCALES - GENERAL: PAINLEVEL_OUTOF10: NO PAIN (0)

## 2025-06-30 NOTE — NURSING NOTE
Chief Complaint   Patient presents with    DBS Programming           /83 (BP Location: Right arm, Patient Position: Sitting, Cuff Size: Adult Regular)   Pulse 77   Resp 16   SpO2 97%       Aleajndra Perez

## 2025-06-30 NOTE — PROGRESS NOTES
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 The battery life is also good.    __ During today's visit: No DBS Programming changes made.     __ Call Lola Skaggs [employer] Rep. I would be happy to send a Rx for a new charging cable.     __ During today's visit, no DBS programming changes were made.    __ You are dealing with situational mood changes.  I would be happy to refer you to see a counselor as you go through adjusting to changes.     __ I have refilled your Gabapentin, and Remeron. Let me know if the pharmacy declines refilling your Gabapentin Rx early.     __ I'll have Huang Paz CMA call you to schedule a follow up visit with Dr. Loera in 6 months. You may return or contact us sooner as needed.           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     HPI: Mr. Ayad Moreno is a 55-year-old right-handed  male who came to the [facility] neurology clinic by himself for a follow up visit.    Pertinent Dystonia Hx:  Onset of RUE posturing, with writing, around the age of 11 or 12.  He was diagnosed with dystonia at age 14. Genetic testing He adopted a various compensatory strategies, and eventually switched to writing left-handed. Speech began to be affected around the 12th grade, and he developed a clear dysarthria, though he still communicates orally effectively without aids. 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 In , he did genetic testing that confirmed DYT1  mutation.  His son also dystonia due to DYT1 Mutation treated with bilateral DBS.    Medications Tried: Sinemet and Artane, both stopped for side effects without benefit. Has been getting botulinum toxin injections for the oromandibular dystonia.     He is s/p bilateral Globus Pallidus Internus (Gpi) deep brain stimulation (DBS) lead placement:  Left = 1/10/2017  Right = 11/16/2017 by Dr. Carolina.      Monopolar Review:   Left GPi = 2/7/2017  Right GPi = 12/13/2017      I saw him last in the clinic in Oct 2024. 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 This is going well. He sees 5 clients per day. During last visit, pt was concerned that he might need accommodations to have a flexible working schedule. Things have been going well.     He is going to [facility] with his parents and asked for an early refill on his Gabapentin.     He has been busy getting his house ready to sell and move to the cities to be close to work. Saying goodbye to a house he lived for a long time has been hard. At times he feels depressed at the same time, he is excited to move into his condo.     He has continued seeing Dr. Horne for Botox injection to improve oromandibular dystonia. Dr. Horne is retiring.     His DBS  cable is breaking at the connection and the wire is showing. He didn't get the message sent by Jennifer Martino RN via [employer]. While in the clinic, he checked his phone and found the message left by Lola Skaggs, [employer] Rep. He'll call her back & if a Rx is needed he asked to write a Rx for a wireless recharger.     He reports his son's DBS lead fractured. He is concerned about a lead fracture and the cause. He is also concerned about his next battery replacement, which will be in 2033 and if he would have insurance.     Pt reported that he saw Dr. Loera somewhere in a  hallway and he was told to make an appointment to see him.          PHYSICAL EXAM:    VITALS: /83 (BP Location: Right arm, Patient Position: Sitting, Cuff Size: Adult Regular)   Pulse 77   Resp 16   SpO2 97%     GENERAL:  Mr. Moreno is a pleasant  male who is well-groomed and well-developed sitting comfortably in the exam room without any distress. Affect is appropriate.        6/30/2025     7:00 AM   Unified Dystonia Rating Scale (UDRS)   Visit Type GenClin   Telemedicine? No   Medication On   DBS Left On   DBS Right On   Eyes and Upper Face SEVERITY 0   Eyes and Upper Face DURATION 0   EYES/UPPER FACE TOTAL 0   Lower Face SEVERITY 1   Lower Face DURATION 1   Jaw and Tongue SEVERITY 0   Jaw and Tongue DURATION 1   Larynx SEVERITY 2   Larynx DURATION 2   LOWER FACE/JAW/TONGUE AND LARYNX TOTAL 7   Neck SEVERITY 0   Neck DURATION 0   NECK TOTAL 0   Shoulder SEVERITY - Left 0   Shoulder DURATION - Left 0   Shoulder SEVERITY - Right 0   Shoulder DURATION - Right 0   Distal Arm: Elbow & Hand SEVERITY - Left 1   Distal Arm: Elbow & Hand DURATION - Left 3   Distal Arm: Elbow & Hand SEVERITY - Right 2   Distal Arm: Elbow & Hand DURATION - Right 4   SHOULDER/ARM/HAND TOTAL 10   Proximal Leg SEVERITY - Left 1   Proximal Leg DURATION - Left 1   Proximal Leg SEVERITY - Right 1   Proximal Leg DURATION - Right 1   Distal Leg: Knee & Foot SEVERITY - Left 0   Distal Leg: Knee & Foot DURATION - Left 0   Distal Leg: Knee & Foot SEVERITY - Right 0   Distal Leg: Knee & Foot DURATION - Right 0   Trunk SEVERITY 0   Trunk DURATION 0   LEG, KNEE, FOOT AND TRUNK TOTAL 4   UDRS TOTAL SCORE 21          Procedure: DBS Interrogation & Analysis:    Lead(s) Information:   Lead Side Left Right   Lead Target GPi GPi   Lead  Medtronic Medtronic   Lead model [id number]   Lead implant date 1/10/2017 11/16/2017   Lead implant surgeon Dr. Caity Carolina     Battery Information:   IPG  Medtronic   IPG model Activa RC   IPG implant date 9/23/2019   IPG implant surgeon   Richard   IPG Location Left chest    IPG Battery status ADRIANNA Date: 9/19/2033   System impedances OK? Yes         GROUP A ACTIVE    Left Brain    C +, 1 -, 2 -  Voltage:  4.1 (previously 4.1 V)  PW:  60 msec  Rate:  180 Hz    Therapy impedance: 528 Ohms  Therapy Current: 7.5 mA      Patient :  Upper Limit: 4.5 volts  Lower Limit: 2.1  volts    Electrode Impedance Check:    Left Lead: No Problems Found.       Right Brain     C +, 9 -, 10 -  Voltage:  3.6 volts (previously 3.6 V)  PW:  60 msec  Rate:  180 Hz    Therapy impedance: 626 Ohms  Therapy Current: 5.6 mA    Patient :  Upper Limit: 4.3 volts  Lower Limit: 0.0 volts    Electrode Impedance Check:    Right Lead: No Problems Found.      See scanned report for impedance details.      Today I spent 50 minutes caring for the patient. 15 minutes was spent in DBS interrogation and analysis.  35 minutes was spent  in reviewing records, obtaining history,  answering questions, examining, refilling/ordering medication, and documentation.    Dulce Manning, KARLENE, APRN  [facility] Neurology Clinic

## 2025-06-30 NOTE — PATIENT INSTRUCTIONS
Dear Mr. Ayad Moreno,    Thank you for coming today.  During your visit, we have discussed the following:     __ Your DBS electrical system function (impedance) is normal. The battery life is also good.    __ During today's visit: No DBS Programming changes made.     __ Call Niranjan Orozco. I would be happy to send a Rx for a new charging cable.     __ During today's visit, no DBS programming changes were made.    __ You are dealing with situational mood changes.  I would be happy to refer you to see a counselor as you go through adjusting to changes.     __ I have refilled your Gabapentin, and Remeron. Let me know if the pharmacy declines refilling your Gabapentin Rx early.     __ I'll have Huang Paz CMA call you to schedule a follow up visit with Dr. Loera in 6 months. You may return or contact us sooner as needed.     To contact our clinic, you may call [phone] or use [employer] message.     To make an appointment with one of our pharmacists, (Dr. Bhatti, Pharm.D., Dr. Dixon, PharmD, or Dr. Taylor, Pharm.D.), call [phone].    Dulce Manning, KARLENE, APRN  [facility] Neurology Clinic

## 2025-06-30 NOTE — LETTER
2025       RE: Ayad Moreno  [address]  [place] [address]     Dear Colleague,    Thank you for referring your patient, Ayad Moreno, to the [facility] NEUROLOGY CLINIC [place] at [facility]. Please see a copy of my visit note below.    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 The battery life is also good.    __ During today's visit: No DBS Programming changes made.     __ Call Niranjan Orozco Rep. I would be happy to send a Rx for a new charging cable.     __ During today's visit, no DBS programming changes were made.    __ You are dealing with situational mood changes.  I would be happy to refer you to see a counselor as you go through adjusting to changes.     __ I have refilled your Gabapentin, and Remeron. Let me know if the pharmacy declines refilling your Gabapentin Rx early.     __ I'll have Huang Paz CMA call you to schedule a follow up visit with Dr. Loera in 6 months. You may return or contact us sooner as needed.           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     HPI: Mr. Ayad Moreno is a 55-year-old right-handed  male who came to the [facility] neurology clinic by himself for a follow up visit.    Pertinent Dystonia Hx:  Onset of RUE posturing, with writing, around the age of 11 or 12.  He was diagnosed with dystonia at age 14. Genetic testing He adopted a various compensatory strategies, and eventually switched to writing  left-handed. Speech began to be affected around the 12th grade, and he developed a clear dysarthria, though he still communicates orally effectively without aids. 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 In 2015, he did genetic testing that confirmed DYT1 mutation.  His son also dystonia due to DYT1 Mutation treated with bilateral DBS.    Medications Tried: Sinemet and Artane, both stopped for side effects without benefit. Has been getting botulinum toxin injections for the oromandibular dystonia.     He is s/p bilateral Globus Pallidus Internus (Gpi) deep brain stimulation (DBS) lead placement:  Left = 1/10/2017  Right = 11/16/2017 by Dr. Carolina.      Monopolar Review:   Left GPi = 2/7/2017  Right GPi = 12/13/2017      I saw him last in the clinic in Oct 2024. 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 This is going well. He sees 5 clients per day. During last visit, pt was concerned that he might need accommodations to have a flexible working schedule. Things have been going well.     He is going to [facility] with his parents and asked for an early refill on his Gabapentin.     He has been busy getting his house ready to sell and move to the cities to be close to work. Saying goodbye to a house he lived for a long time has been hard. At times he feels depressed at the same time, he is excited to move into his condo.     He has continued seeing Dr. Horne for Botox injection to improve oromandibular dystonia. Dr. Horne is retiring.     His DBS  cable is breaking at the connection and the wire is showing. He didn't get the message sent by Jennifer Martino RN via [employer]. While in the clinic, he checked his phone and found the message left by Lola Skaggs [employer] Rep. He'll call her back & if a Rx is needed he asked to write a Rx for a wireless recharger.     He reports his  son's DBS lead fractured. He is concerned about a lead fracture and the cause. He is also concerned about his next battery replacement, which will be in 2033 and if he would have insurance.     Pt reported that he saw Dr. Loera somewhere in a  hallway and he was told to make an appointment to see him.         PHYSICAL EXAM:    VITALS: /83 (BP Location: Right arm, Patient Position: Sitting, Cuff Size: Adult Regular)   Pulse 77   Resp 16   SpO2 97%     GENERAL:  Mr. Moreno is a pleasant  male who is well-groomed and well-developed sitting comfortably in the exam room without any distress. Affect is appropriate.        6/30/2025     7:00 AM   Unified Dystonia Rating Scale (UDRS)   Visit Type GenClin   Telemedicine? No   Medication On   DBS Left On   DBS Right On   Eyes and Upper Face SEVERITY 0   Eyes and Upper Face DURATION 0   EYES/UPPER FACE TOTAL 0   Lower Face SEVERITY 1   Lower Face DURATION 1   Jaw and Tongue SEVERITY 0   Jaw and Tongue DURATION 1   Larynx SEVERITY 2   Larynx DURATION 2   LOWER FACE/JAW/TONGUE AND LARYNX TOTAL 7   Neck SEVERITY 0   Neck DURATION 0   NECK TOTAL 0   Shoulder SEVERITY - Left 0   Shoulder DURATION - Left 0   Shoulder SEVERITY - Right 0   Shoulder DURATION - Right 0   Distal Arm: Elbow & Hand SEVERITY - Left 1   Distal Arm: Elbow & Hand DURATION - Left 3   Distal Arm: Elbow & Hand SEVERITY - Right 2   Distal Arm: Elbow & Hand DURATION - Right 4   SHOULDER/ARM/HAND TOTAL 10   Proximal Leg SEVERITY - Left 1   Proximal Leg DURATION - Left 1   Proximal Leg SEVERITY - Right 1   Proximal Leg DURATION - Right 1   Distal Leg: Knee & Foot SEVERITY - Left 0   Distal Leg: Knee & Foot DURATION - Left 0   Distal Leg: Knee & Foot SEVERITY - Right 0   Distal Leg: Knee & Foot DURATION - Right 0   Trunk SEVERITY 0   Trunk DURATION 0   LEG, KNEE, FOOT AND TRUNK TOTAL 4   UDRS TOTAL SCORE 21          Procedure: DBS Interrogation & Analysis:    Lead(s) Information:   Lead Side Left  Right   Lead Target GPi GPi   Lead  Medtronic Medtronic   Lead model [id number]   Lead implant date 1/10/2017 11/16/2017   Lead implant surgeon Dr. Caity Carolina     Battery Information:   IPG  [employer]tronic   IPG model Activa RC   IPG implant date 9/23/2019   IPG implant surgeon Dr. Ramos   IPG Location Left chest    IPG Battery status ADRIANNA Date: 9/19/2033   System impedances OK? Yes         GROUP A ACTIVE    Left Brain    C +, 1 -, 2 -  Voltage:  4.1 (previously 4.1 V)  PW:  60 msec  Rate:  180 Hz    Therapy impedance: 528 Ohms  Therapy Current: 7.5 mA      Patient :  Upper Limit: 4.5 volts  Lower Limit: 2.1  volts    Electrode Impedance Check:    Left Lead: No Problems Found.       Right Brain     C +, 9 -, 10 -  Voltage:  3.6 volts (previously 3.6 V)  PW:  60 msec  Rate:  180 Hz    Therapy impedance: 626 Ohms  Therapy Current: 5.6 mA    Patient :  Upper Limit: 4.3 volts  Lower Limit: 0.0 volts    Electrode Impedance Check:    Right Lead: No Problems Found.      See scanned report for impedance details.      Today I spent 50 minutes caring for the patient. 15 minutes was spent in DBS interrogation and analysis.  35 minutes was spent  in reviewing records, obtaining history,  answering questions, examining, refilling/ordering medication, and documentation.    Dulce Manning DNP, APRN  [facility] Neurology Clinic    Again, thank you for allowing me to participate in the care of your patient.      Sincerely,    CT Campbell CNP

## 2025-07-01 ENCOUNTER — TELEPHONE (OUTPATIENT)
Dept: NEUROLOGY | Facility: CLINIC | Age: 55
End: 2025-07-01
Payer: COMMERCIAL

## 2025-07-01 NOTE — TELEPHONE ENCOUNTER
[facility] Call Center    Phone Message    May a detailed message be left on voicemail: yes     Reason for Call: Medication Refill Request    Has the patient contacted the pharmacy for the refill? Yes   Name of medication being requested: gabapentin (NEURONTIN) 800 MG tablet     Provider who prescribed the medication: Cindy Manning    Pharmacy: [id number] - [place], [place] - [address] AT [facility] [address]    Date medication is needed: 07/04 (Pt is leaving the country for 10 days, and needs an early refill)    Action Taken: Message routed to:  [facility] ([facility]): Neurology    Travel Screening: Not Applicable     Date of Service:

## 2025-07-01 NOTE — TELEPHONE ENCOUNTER
Refill was sent yesterday, 6/30/25. I called the pharmacy to see if there were any issues with the Rx that was sent. They wanted to know if it is okay to fill the medication early. I told them that Ayad will be traveling so he will need the medication a few days early, but I do not know if insurance will cover an early refill.     I will be out of town for the rest of this week, so I wanted to resolve this before leaving. I told them it is okay to fill the prescription tomorrow. If there is any reason that an early refill should not be authorized, please call the pharmacy to let them know.     Thank you!

## 2025-07-08 ENCOUNTER — TELEPHONE (OUTPATIENT)
Dept: NEUROLOGY | Facility: CLINIC | Age: 55
End: 2025-07-08
Payer: COMMERCIAL

## 2025-07-08 NOTE — TELEPHONE ENCOUNTER
----- Message from Cindy Manning sent at 6/30/2025  3:41 PM CDT -----  Regarding: Schedule with Dr. Evaristo Encinas,    Please schedule Ayad with Dr. Loera for a 6-month follow up visit - 60 min DBS/New. Ayad hasn't seen Dr. MCCABE in a few years. Thank you.

## 2025-07-08 NOTE — TELEPHONE ENCOUNTER
The patient was called and scheduled on 1/15/26 at 9 AM with Dr. Loera. The patient was reminded to bring their patient controller with them to their upcoming appointment, with their battery fully charged if it's a rechargeable battery. The patient verbalized their understanding and had no further questions.

## (undated) DEVICE — DRAPE IOBAN ISOLATION VERTICAL 320X21CM 6617

## (undated) DEVICE — SUTURE BOOTS 051003PBX

## (undated) DEVICE — LINEN TOWEL PACK X6 WHITE 5487

## (undated) DEVICE — DRSG PRIMAPORE 02X3" 7133

## (undated) DEVICE — SU VICRYL 3-0 SH 8X18" UND J864D

## (undated) DEVICE — SU MONOCRYL 4-0 PS-2 27" UND Y426H

## (undated) DEVICE — PREP CHLORAPREP 26ML TINTED ORANGE  260815

## (undated) DEVICE — DRSG TELFA 3"X8" NON25720

## (undated) DEVICE — ESU ELEC BLADE 2.75" COATED/INSULATED E1455

## (undated) DEVICE — SOL WATER IRRIG 1000ML BOTTLE 2F7114

## (undated) DEVICE — SYR 10ML LL W/O NDL 302995

## (undated) DEVICE — ESU GROUND PAD ADULT W/CORD E7507

## (undated) DEVICE — SOL NACL 0.9% IRRIG 1000ML BOTTLE 2F7124

## (undated) DEVICE — LABEL MEDICATION SYSTEM 3303-P

## (undated) DEVICE — SU SILK 2-0 TIE 12X30" A305H

## (undated) DEVICE — DECANTER VIAL 2006S

## (undated) DEVICE — DRAPE SLEEVE 599

## (undated) DEVICE — DRAPE LAP PEDS DISP 29492

## (undated) DEVICE — DRSG TELFA 3X8" 1238

## (undated) DEVICE — PREP CHLORAPREP CLEAR 3ML 260400

## (undated) DEVICE — DRSG GAUZE 4X4" TRAY 6939

## (undated) DEVICE — PREP SKIN SCRUB TRAY 4461A

## (undated) DEVICE — TUBE GUIDE ALPHA OMEGA SYSTEM STR-007721-00

## (undated) DEVICE — SU ETHIBOND 2-0 SHDA 30" X563H

## (undated) DEVICE — COVER CAMERA VIDEO LASER 9X96" 04-CC227

## (undated) DEVICE — Device

## (undated) DEVICE — PAD CHUX UNDERPAD 23X24" 7136

## (undated) DEVICE — ADH SKIN CLOSURE PREMIERPRO EXOFIN 1.0ML 3470

## (undated) DEVICE — PACK NEURO MINOR UMMC SNE32MNMU4

## (undated) DEVICE — SOL NACL 0.9% INJ 1000ML BAG 2B1324X

## (undated) DEVICE — GLOVE PROTEXIS MICRO 7.5  2D73PM75

## (undated) DEVICE — DRSG TEGADERM 4X4 3/4" 1626W

## (undated) DEVICE — SPONGE COTTONOID 1/2X1/2" 20-04S

## (undated) DEVICE — CABLE SCREEN DBS 3550-68

## (undated) DEVICE — LINEN TOWEL PACK X5 5464

## (undated) DEVICE — PACK GOWN 3/PK DISP XL SBA32GPFCB

## (undated) DEVICE — DRSG STERI STRIP 1/2X4" R1547

## (undated) DEVICE — CABLE 5 CHANNEL INPUT  ALPHA OMEGA SYSTEM STR-000642-55

## (undated) DEVICE — DRAPE C-ARM W/STRAPS 42X72" 07-CA104

## (undated) DEVICE — PREP POVIDONE IODINE SCRUB 7.5% 120ML

## (undated) DEVICE — BASIN SET SINGLE STERILE 13752-624

## (undated) DEVICE — ESU GROUND PAD ADULT REM W/15' CORD E7507DB

## (undated) DEVICE — PREP POVIDONE IODINE SOLUTION 10% 120ML

## (undated) DEVICE — SYR 10ML FINGER CONTROL W/O NDL 309695

## (undated) DEVICE — ELEC MICROPHONICS-FREE ALPHA OMEGA STR-009080-00

## (undated) DEVICE — LINEN TOWEL PACK X30 5481

## (undated) DEVICE — PACK SET-UP STD 9102

## (undated) DEVICE — WIPES FOLEY CARE SURESTEP PROVON DFC100

## (undated) DEVICE — SOL ADH LIQUID BENZOIN SWAB 0.6ML C1544

## (undated) DEVICE — SU VICRYL 0 CT-1 36" J346H

## (undated) DEVICE — DRAPE MAYO STAND 23X54 8337

## (undated) DEVICE — NDL 25GA 2"  8881200441

## (undated) DEVICE — HEADRING POINTS STEREOTACTIC DHRSL

## (undated) DEVICE — CATH TRAY FOLEY SURESTEP 16FR W/URNE MTR STLK LATEX A303316A

## (undated) DEVICE — SYR BULB IRRIG 50ML LATEX FREE 0035280

## (undated) DEVICE — GLOVE PROTEXIS BLUE W/NEU-THERA 8.0  2D73EB80

## (undated) DEVICE — ESU CORD BIPOLAR GREEN 10-4000

## (undated) DEVICE — PREP CHLORHEXIDINE 4% 4OZ (HIBICLENS) 57504

## (undated) DEVICE — DRAPE SHEET REV FOLD 3/4 9349

## (undated) DEVICE — CHARGING SYSTEM

## (undated) DEVICE — SPONGE SURGIFOAM 100 1974

## (undated) DEVICE — DRAPE IOBAN INCISE 13X13" 6640EZ

## (undated) DEVICE — BLADE CLIPPER SGL USE 9680

## (undated) DEVICE — SUCTION MANIFOLD DORNOCH ULTRA CART UL-CL500

## (undated) DEVICE — SU DERMABOND ADVANCED .7ML DNX12

## (undated) DEVICE — NDL BLUNT 18GA 1" W/O FILTER 305181

## (undated) DEVICE — SYR 30ML LL W/O NDL 302832

## (undated) DEVICE — PREP POVIDONE IODINE SCRUB 7.5% 4OZ APL82212

## (undated) DEVICE — PREP POVIDONE IODINE SOLUTION 10% 4OZ

## (undated) RX ORDER — CEFAZOLIN SODIUM 2 G/100ML
INJECTION, SOLUTION INTRAVENOUS
Status: DISPENSED
Start: 2017-11-16

## (undated) RX ORDER — PHENYLEPHRINE HCL IN 0.9% NACL 1 MG/10 ML
SYRINGE (ML) INTRAVENOUS
Status: DISPENSED
Start: 2019-09-23

## (undated) RX ORDER — PHENYLEPHRINE HCL IN 0.9% NACL 1 MG/10 ML
SYRINGE (ML) INTRAVENOUS
Status: DISPENSED
Start: 2017-11-16

## (undated) RX ORDER — CEFAZOLIN SODIUM 1 G/3ML
INJECTION, POWDER, FOR SOLUTION INTRAMUSCULAR; INTRAVENOUS
Status: DISPENSED
Start: 2017-11-16

## (undated) RX ORDER — BACITRACIN 50000 [IU]/1
INJECTION, POWDER, FOR SOLUTION INTRAMUSCULAR
Status: DISPENSED
Start: 2019-09-23

## (undated) RX ORDER — LIDOCAINE HYDROCHLORIDE 20 MG/ML
INJECTION, SOLUTION EPIDURAL; INFILTRATION; INTRACAUDAL; PERINEURAL
Status: DISPENSED
Start: 2017-11-16

## (undated) RX ORDER — PROPOFOL 10 MG/ML
INJECTION, EMULSION INTRAVENOUS
Status: DISPENSED
Start: 2017-11-16

## (undated) RX ORDER — EPHEDRINE SULFATE 50 MG/ML
INJECTION, SOLUTION INTRAMUSCULAR; INTRAVENOUS; SUBCUTANEOUS
Status: DISPENSED
Start: 2017-11-16

## (undated) RX ORDER — FENTANYL CITRATE 50 UG/ML
INJECTION, SOLUTION INTRAMUSCULAR; INTRAVENOUS
Status: DISPENSED
Start: 2019-09-23

## (undated) RX ORDER — ONDANSETRON 2 MG/ML
INJECTION INTRAMUSCULAR; INTRAVENOUS
Status: DISPENSED
Start: 2017-11-16

## (undated) RX ORDER — PROPOFOL 10 MG/ML
INJECTION, EMULSION INTRAVENOUS
Status: DISPENSED
Start: 2019-09-23

## (undated) RX ORDER — HYDROMORPHONE HYDROCHLORIDE 1 MG/ML
INJECTION, SOLUTION INTRAMUSCULAR; INTRAVENOUS; SUBCUTANEOUS
Status: DISPENSED
Start: 2017-11-16

## (undated) RX ORDER — ONDANSETRON 2 MG/ML
INJECTION INTRAMUSCULAR; INTRAVENOUS
Status: DISPENSED
Start: 2019-09-23

## (undated) RX ORDER — FENTANYL CITRATE 50 UG/ML
INJECTION, SOLUTION INTRAMUSCULAR; INTRAVENOUS
Status: DISPENSED
Start: 2017-11-16

## (undated) RX ORDER — ROCURONIUM BROMIDE 50 MG/5 ML
SYRINGE (ML) INTRAVENOUS
Status: DISPENSED
Start: 2017-11-16

## (undated) RX ORDER — BUPIVACAINE HYDROCHLORIDE 5 MG/ML
INJECTION, SOLUTION EPIDURAL; INTRACAUDAL
Status: DISPENSED
Start: 2019-09-23

## (undated) RX ORDER — GLYCOPYRROLATE 0.2 MG/ML
INJECTION, SOLUTION INTRAMUSCULAR; INTRAVENOUS
Status: DISPENSED
Start: 2017-11-16

## (undated) RX ORDER — SODIUM CHLORIDE AND POTASSIUM CHLORIDE 150; 900 MG/100ML; MG/100ML
INJECTION, SOLUTION INTRAVENOUS
Status: DISPENSED
Start: 2017-11-16

## (undated) RX ORDER — LIDOCAINE HYDROCHLORIDE 20 MG/ML
INJECTION, SOLUTION EPIDURAL; INFILTRATION; INTRACAUDAL; PERINEURAL
Status: DISPENSED
Start: 2019-09-23

## (undated) RX ORDER — CEFAZOLIN SODIUM 2 G/100ML
INJECTION, SOLUTION INTRAVENOUS
Status: DISPENSED
Start: 2019-09-23